# Patient Record
Sex: FEMALE | Race: WHITE | Employment: OTHER | URBAN - METROPOLITAN AREA
[De-identification: names, ages, dates, MRNs, and addresses within clinical notes are randomized per-mention and may not be internally consistent; named-entity substitution may affect disease eponyms.]

---

## 2017-01-23 DIAGNOSIS — I10 ESSENTIAL HYPERTENSION WITH GOAL BLOOD PRESSURE LESS THAN 140/90: ICD-10-CM

## 2017-01-23 DIAGNOSIS — E11.9 TYPE 2 DIABETES MELLITUS WITHOUT COMPLICATION, WITHOUT LONG-TERM CURRENT USE OF INSULIN (HCC): ICD-10-CM

## 2017-01-23 DIAGNOSIS — E78.00 HYPERCHOLESTEREMIA: Primary | ICD-10-CM

## 2017-01-23 RX ORDER — GLIPIZIDE 10 MG/1
10 TABLET, FILM COATED, EXTENDED RELEASE ORAL
Qty: 90 TABLET | Refills: 0 | Status: SHIPPED | OUTPATIENT
Start: 2017-01-23 | End: 2017-06-15

## 2017-01-23 RX ORDER — AMLODIPINE BESYLATE AND BENAZEPRIL HYDROCHLORIDE 10; 20 MG/1; MG/1
1 CAPSULE ORAL DAILY
Qty: 90 CAPSULE | Refills: 0 | Status: SHIPPED | OUTPATIENT
Start: 2017-01-23 | End: 2017-06-21

## 2017-01-23 RX ORDER — PRAVASTATIN SODIUM 20 MG
20 TABLET ORAL
Qty: 90 TABLET | Refills: 0 | Status: SHIPPED | OUTPATIENT
Start: 2017-01-23 | End: 2017-06-21

## 2017-02-02 ENCOUNTER — TELEPHONE (OUTPATIENT)
Dept: INTERNAL MEDICINE CLINIC | Facility: CLINIC | Age: 72
End: 2017-02-02

## 2017-03-15 ENCOUNTER — APPOINTMENT (OUTPATIENT)
Dept: LAB | Age: 72
End: 2017-03-15
Attending: FAMILY MEDICINE
Payer: MEDICARE

## 2017-03-15 ENCOUNTER — OFFICE VISIT (OUTPATIENT)
Dept: INTERNAL MEDICINE CLINIC | Facility: CLINIC | Age: 72
End: 2017-03-15

## 2017-03-15 VITALS
TEMPERATURE: 98 F | DIASTOLIC BLOOD PRESSURE: 70 MMHG | RESPIRATION RATE: 16 BRPM | OXYGEN SATURATION: 98 % | HEART RATE: 75 BPM | HEIGHT: 69 IN | BODY MASS INDEX: 36.84 KG/M2 | SYSTOLIC BLOOD PRESSURE: 124 MMHG | WEIGHT: 248.75 LBS

## 2017-03-15 DIAGNOSIS — E11.9 TYPE 2 DIABETES MELLITUS WITHOUT COMPLICATION, UNSPECIFIED LONG TERM INSULIN USE STATUS: Primary | ICD-10-CM

## 2017-03-15 DIAGNOSIS — E78.00 PURE HYPERCHOLESTEROLEMIA: ICD-10-CM

## 2017-03-15 DIAGNOSIS — E11.9 TYPE 2 DIABETES MELLITUS WITHOUT COMPLICATION, UNSPECIFIED LONG TERM INSULIN USE STATUS: ICD-10-CM

## 2017-03-15 DIAGNOSIS — I10 ESSENTIAL HYPERTENSION, BENIGN: ICD-10-CM

## 2017-03-15 LAB
ALBUMIN SERPL-MCNC: 4.1 G/DL (ref 3.5–4.8)
ALP LIVER SERPL-CCNC: 113 U/L (ref 55–142)
ALT SERPL-CCNC: 23 U/L (ref 14–54)
AST SERPL-CCNC: 16 U/L (ref 15–41)
BILIRUB SERPL-MCNC: 0.4 MG/DL (ref 0.1–2)
BUN BLD-MCNC: 17 MG/DL (ref 8–20)
CALCIUM BLD-MCNC: 9.5 MG/DL (ref 8.3–10.3)
CHLORIDE: 103 MMOL/L (ref 101–111)
CHOLEST SMN-MCNC: 155 MG/DL (ref ?–200)
CO2: 31 MMOL/L (ref 22–32)
CREAT BLD-MCNC: 0.94 MG/DL (ref 0.55–1.02)
CREAT UR-SCNC: 202 MG/DL
EST. AVERAGE GLUCOSE BLD GHB EST-MCNC: 134 MG/DL (ref 68–126)
GLUCOSE BLD-MCNC: 100 MG/DL (ref 70–99)
HBA1C MFR BLD HPLC: 6.3 % (ref ?–5.7)
HDLC SERPL-MCNC: 62 MG/DL (ref 45–?)
HDLC SERPL: 2.5 {RATIO} (ref ?–4.44)
LDLC SERPL CALC-MCNC: 53 MG/DL (ref ?–130)
M PROTEIN MFR SERPL ELPH: 7.7 G/DL (ref 6.1–8.3)
MICROALBUMIN UR-MCNC: 2.54 MG/DL
MICROALBUMIN/CREAT 24H UR-RTO: 12.6 UG/MG (ref ?–30)
NONHDLC SERPL-MCNC: 93 MG/DL (ref ?–130)
POTASSIUM SERPL-SCNC: 4.5 MMOL/L (ref 3.6–5.1)
SODIUM SERPL-SCNC: 143 MMOL/L (ref 136–144)
TRIGLYCERIDES: 198 MG/DL (ref ?–150)
VLDL: 40 MG/DL (ref 5–40)

## 2017-03-15 PROCEDURE — 80053 COMPREHEN METABOLIC PANEL: CPT

## 2017-03-15 PROCEDURE — 82043 UR ALBUMIN QUANTITATIVE: CPT

## 2017-03-15 PROCEDURE — 80061 LIPID PANEL: CPT

## 2017-03-15 PROCEDURE — 36415 COLL VENOUS BLD VENIPUNCTURE: CPT

## 2017-03-15 PROCEDURE — 83036 HEMOGLOBIN GLYCOSYLATED A1C: CPT

## 2017-03-15 PROCEDURE — 82570 ASSAY OF URINE CREATININE: CPT

## 2017-03-15 PROCEDURE — 99214 OFFICE O/P EST MOD 30 MIN: CPT | Performed by: FAMILY MEDICINE

## 2017-03-15 NOTE — PROGRESS NOTES
HPI:    Patient ID: Josh Cantu is a 66year old female. HPI  Josh Cantu is a 66year old female.   HPI:   Here for med ck   Taking meds as directed   Feels well overall  BP occ is cked  /70's      Had eye ck in Jan       Current Outpa kg/m2  SpO2 98%  GENERAL: well developed, well nourished,in no apparent distress  SKIN: no rashes,  NECK: supple,no adenopathy,  LUNGS: clear to auscultation  CARDIO: RRR without murmur  EXTREMITIES: no cyanosis, clubbing or edema  Bilateral barefoot skin encounter diagnosis)  Pure hypercholesterolemia  Essential hypertension, benign    No orders of the defined types were placed in this encounter.        Meds This Visit:  No prescriptions requested or ordered in this encounter    Imaging & Referrals:  None

## 2017-05-22 ENCOUNTER — OFFICE VISIT (OUTPATIENT)
Dept: FAMILY MEDICINE CLINIC | Facility: CLINIC | Age: 72
End: 2017-05-22

## 2017-05-22 VITALS
OXYGEN SATURATION: 98 % | TEMPERATURE: 98 F | HEART RATE: 94 BPM | WEIGHT: 248 LBS | RESPIRATION RATE: 20 BRPM | SYSTOLIC BLOOD PRESSURE: 122 MMHG | DIASTOLIC BLOOD PRESSURE: 70 MMHG | HEIGHT: 69 IN | BODY MASS INDEX: 36.73 KG/M2
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
## 2017-05-22 PROCEDURE — 99213 OFFICE O/P EST LOW 20 MIN: CPT | Performed by: NURSE PRACTITIONER

## 2017-05-22 NOTE — PATIENT INSTRUCTIONS
May take Benadryl as directed. Insect, Spider, and Scorpion Bites and Stings  Most insect bites are harmless and cause only minor swelling or itching.  But if you’re allergic to insects such as wasps or bees, a sting can cause a life-threatening allerg · Try to remove a stinger you can see. Use your fingernail, a knife edge, or credit card to scrape against the skin. Do not squeeze or pull.   · Apply ice or a cold compress to reduce pain and swelling (keep a thin cloth between the cold source and the skin

## 2017-05-22 NOTE — PROGRESS NOTES
CHIEF COMPLAINT:   Patient presents with:  Bump: on arms for 1 day  Itching: on/off  Redness      HPI:     Concetta Smith is a 66year old female who presents with concerns of insect bites. Patient first noticed symptoms 1 days ago.   Reports erythema, i 0 Standard drinks or equivalent per week       Comment: rare       REVIEW OF SYSTEMS:   GENERAL: feels well otherwise, no fever, no chills. SKIN: as above. CHEST: no chest pains, no palpitations.   LUNGS: denies shortness of breath with exertion or r Most insect bites are harmless and cause only minor swelling or itching. But if you’re allergic to insects such as wasps or bees, a sting can cause a life-threatening allergic reaction.  The venom (poison) from scorpions and certain spiders can also be dead · Apply ice or a cold compress to reduce pain and swelling (keep a thin cloth between the cold source and the skin). Date Last Reviewed: 11/20/2014  © 0093-0997 [address]. All rights reserved.  Oneil Hilton

## 2017-05-23 ENCOUNTER — OFFICE VISIT (OUTPATIENT)
Dept: INTERNAL MEDICINE CLINIC | Facility: CLINIC | Age: 72
End: 2017-05-23

## 2017-05-23 VITALS
DIASTOLIC BLOOD PRESSURE: 66 MMHG | WEIGHT: 250.5 LBS | SYSTOLIC BLOOD PRESSURE: 140 MMHG | RESPIRATION RATE: 16 BRPM | OXYGEN SATURATION: 99 % | BODY MASS INDEX: 37 KG/M2 | TEMPERATURE: 99 F | HEART RATE: 84 BPM

## 2017-05-23 DIAGNOSIS — R22.9 LOCAL SUPERFICIAL SWELLING: Primary | ICD-10-CM

## 2017-05-23 DIAGNOSIS — T78.40XA ALLERGIC REACTION, INITIAL ENCOUNTER: ICD-10-CM

## 2017-05-23 PROCEDURE — 99213 OFFICE O/P EST LOW 20 MIN: CPT | Performed by: FAMILY MEDICINE

## 2017-05-23 RX ORDER — METHYLPREDNISOLONE 4 MG/1
TABLET ORAL
Qty: 1 KIT | Refills: 0 | Status: SHIPPED | OUTPATIENT
Start: 2017-05-23 | End: 2017-09-20

## 2017-05-23 NOTE — PROGRESS NOTES
CHIEF COMPLAINT:   Patient presents with:  Swelling: Facial swelling last night. Follow up [facility] 5/22/17. HPI:     Monty Owens is a 66year old female who presents with concerns of facial swelling. Patient first noticed symptoms 1 days ago.   Repo unspecified type diabetes mellitus without mention of complication, not stated as uncontrolled       Social History:    Smoking Status: Never Smoker                      Alcohol Use: Yes                Comment: occ       REVIEW OF SYSTEMS:   GENERAL: feels itching. Pt referred to Allergist for testing. The patient indicates understanding of these issues and agrees to the plan. The patient is asked to call if sx's persist or worsen.

## 2017-06-15 DIAGNOSIS — E11.9 TYPE 2 DIABETES MELLITUS WITHOUT COMPLICATION, WITHOUT LONG-TERM CURRENT USE OF INSULIN (HCC): Primary | ICD-10-CM

## 2017-06-15 RX ORDER — GLIPIZIDE 10 MG/1
10 TABLET, FILM COATED, EXTENDED RELEASE ORAL
Qty: 90 TABLET | Refills: 0 | Status: SHIPPED | OUTPATIENT
Start: 2017-06-15 | End: 2017-09-20

## 2017-06-21 DIAGNOSIS — E78.00 HYPERCHOLESTEREMIA: Primary | ICD-10-CM

## 2017-06-21 DIAGNOSIS — I10 ESSENTIAL HYPERTENSION WITH GOAL BLOOD PRESSURE LESS THAN 140/90: ICD-10-CM

## 2017-06-21 RX ORDER — AMLODIPINE BESYLATE AND BENAZEPRIL HYDROCHLORIDE 10; 20 MG/1; MG/1
1 CAPSULE ORAL DAILY
Qty: 90 CAPSULE | Refills: 0 | Status: SHIPPED | OUTPATIENT
Start: 2017-06-21 | End: 2017-09-01

## 2017-06-21 RX ORDER — PRAVASTATIN SODIUM 20 MG
20 TABLET ORAL
Qty: 90 TABLET | Refills: 0 | Status: SHIPPED | OUTPATIENT
Start: 2017-06-21 | End: 2017-09-01

## 2017-09-01 DIAGNOSIS — E78.00 HYPERCHOLESTEREMIA: ICD-10-CM

## 2017-09-01 DIAGNOSIS — I10 ESSENTIAL HYPERTENSION WITH GOAL BLOOD PRESSURE LESS THAN 140/90: ICD-10-CM

## 2017-09-01 RX ORDER — AMLODIPINE BESYLATE AND BENAZEPRIL HYDROCHLORIDE 10; 20 MG/1; MG/1
CAPSULE ORAL
Qty: 90 CAPSULE | Refills: 0 | Status: SHIPPED | OUTPATIENT
Start: 2017-09-01 | End: 2017-11-30

## 2017-09-01 RX ORDER — PRAVASTATIN SODIUM 20 MG
TABLET ORAL
Qty: 90 TABLET | Refills: 0 | Status: SHIPPED | OUTPATIENT
Start: 2017-09-01 | End: 2017-11-30

## 2017-09-19 DIAGNOSIS — E78.00 HYPERCHOLESTEREMIA: ICD-10-CM

## 2017-09-20 ENCOUNTER — OFFICE VISIT (OUTPATIENT)
Dept: INTERNAL MEDICINE CLINIC | Facility: CLINIC | Age: 72
End: 2017-09-20

## 2017-09-20 ENCOUNTER — LAB ENCOUNTER (OUTPATIENT)
Dept: LAB | Age: 72
End: 2017-09-20
Attending: FAMILY MEDICINE
Payer: MEDICARE

## 2017-09-20 VITALS
WEIGHT: 249 LBS | BODY MASS INDEX: 36.88 KG/M2 | RESPIRATION RATE: 13 BRPM | OXYGEN SATURATION: 98 % | SYSTOLIC BLOOD PRESSURE: 120 MMHG | DIASTOLIC BLOOD PRESSURE: 60 MMHG | TEMPERATURE: 98 F | HEART RATE: 72 BPM | HEIGHT: 69 IN

## 2017-09-20 DIAGNOSIS — Z00.00 ENCOUNTER FOR ANNUAL HEALTH EXAMINATION: ICD-10-CM

## 2017-09-20 DIAGNOSIS — E78.00 HYPERCHOLESTEREMIA: ICD-10-CM

## 2017-09-20 DIAGNOSIS — L50.9 URTICARIA: ICD-10-CM

## 2017-09-20 DIAGNOSIS — I10 ESSENTIAL HYPERTENSION, BENIGN: ICD-10-CM

## 2017-09-20 DIAGNOSIS — E11.9 TYPE 2 DIABETES MELLITUS WITHOUT COMPLICATION, UNSPECIFIED LONG TERM INSULIN USE STATUS: ICD-10-CM

## 2017-09-20 DIAGNOSIS — Z13.31 DEPRESSION SCREENING: ICD-10-CM

## 2017-09-20 DIAGNOSIS — I65.29 OCCLUSION AND STENOSIS OF CAROTID ARTERY: ICD-10-CM

## 2017-09-20 DIAGNOSIS — Z12.31 VISIT FOR SCREENING MAMMOGRAM: ICD-10-CM

## 2017-09-20 DIAGNOSIS — E11.9 TYPE 2 DIABETES MELLITUS WITHOUT COMPLICATION, WITHOUT LONG-TERM CURRENT USE OF INSULIN (HCC): ICD-10-CM

## 2017-09-20 DIAGNOSIS — E78.00 PURE HYPERCHOLESTEROLEMIA: ICD-10-CM

## 2017-09-20 DIAGNOSIS — Z13.1 SCREENING FOR DIABETES MELLITUS (DM): ICD-10-CM

## 2017-09-20 DIAGNOSIS — Z13.6 SCREENING FOR CARDIOVASCULAR CONDITION: ICD-10-CM

## 2017-09-20 DIAGNOSIS — Z00.00 MEDICARE ANNUAL WELLNESS VISIT, SUBSEQUENT: Primary | ICD-10-CM

## 2017-09-20 LAB
ALBUMIN SERPL-MCNC: 4.1 G/DL (ref 3.5–4.8)
ALP LIVER SERPL-CCNC: 123 U/L (ref 55–142)
ALT SERPL-CCNC: 25 U/L (ref 14–54)
AST SERPL-CCNC: 18 U/L (ref 15–41)
BASOPHILS # BLD AUTO: 0.06 X10(3) UL (ref 0–0.1)
BASOPHILS NFR BLD AUTO: 0.5 %
BILIRUB SERPL-MCNC: 0.5 MG/DL (ref 0.1–2)
BUN BLD-MCNC: 18 MG/DL (ref 8–20)
CALCIUM BLD-MCNC: 9.6 MG/DL (ref 8.3–10.3)
CHLORIDE: 101 MMOL/L (ref 101–111)
CHOLEST SMN-MCNC: 171 MG/DL (ref ?–200)
CO2: 28 MMOL/L (ref 22–32)
CREAT BLD-MCNC: 0.96 MG/DL (ref 0.55–1.02)
EOSINOPHIL # BLD AUTO: 0.07 X10(3) UL (ref 0–0.3)
EOSINOPHIL NFR BLD AUTO: 0.6 %
ERYTHROCYTE [DISTWIDTH] IN BLOOD BY AUTOMATED COUNT: 14.1 % (ref 11.5–16)
EST. AVERAGE GLUCOSE BLD GHB EST-MCNC: 146 MG/DL (ref 68–126)
GLUCOSE BLD-MCNC: 115 MG/DL (ref 70–99)
HBA1C MFR BLD HPLC: 6.7 % (ref ?–5.7)
HCT VFR BLD AUTO: 46.9 % (ref 34–50)
HDLC SERPL-MCNC: 61 MG/DL (ref 45–?)
HDLC SERPL: 2.8 {RATIO} (ref ?–4.44)
HGB BLD-MCNC: 15 G/DL (ref 12–16)
IMMATURE GRANULOCYTE COUNT: 0.07 X10(3) UL (ref 0–1)
IMMATURE GRANULOCYTE RATIO %: 0.6 %
LDLC SERPL CALC-MCNC: 73 MG/DL (ref ?–130)
LDLC SERPL-MCNC: 37 MG/DL (ref 5–40)
LYMPHOCYTES # BLD AUTO: 3.04 X10(3) UL (ref 0.9–4)
LYMPHOCYTES NFR BLD AUTO: 25.4 %
M PROTEIN MFR SERPL ELPH: 8 G/DL (ref 6.1–8.3)
MCH RBC QN AUTO: 30.3 PG (ref 27–33.2)
MCHC RBC AUTO-ENTMCNC: 32 G/DL (ref 31–37)
MCV RBC AUTO: 94.7 FL (ref 81–100)
MONOCYTES # BLD AUTO: 0.94 X10(3) UL (ref 0.1–0.6)
MONOCYTES NFR BLD AUTO: 7.8 %
NEUTROPHIL ABS PRELIM: 7.8 X10 (3) UL (ref 1.3–6.7)
NEUTROPHILS # BLD AUTO: 7.8 X10(3) UL (ref 1.3–6.7)
NEUTROPHILS NFR BLD AUTO: 65.1 %
NONHDLC SERPL-MCNC: 110 MG/DL (ref ?–130)
PLATELET # BLD AUTO: 349 10(3)UL (ref 150–450)
POTASSIUM SERPL-SCNC: 4.7 MMOL/L (ref 3.6–5.1)
RBC # BLD AUTO: 4.95 X10(6)UL (ref 3.8–5.1)
RED CELL DISTRIBUTION WIDTH-SD: 49.1 FL (ref 35.1–46.3)
SODIUM SERPL-SCNC: 138 MMOL/L (ref 136–144)
TRIGLYCERIDES: 183 MG/DL (ref ?–150)
TSI SER-ACNC: 1.58 MIU/ML (ref 0.35–5.5)
WBC # BLD AUTO: 12 X10(3) UL (ref 4–13)

## 2017-09-20 PROCEDURE — 80053 COMPREHEN METABOLIC PANEL: CPT

## 2017-09-20 PROCEDURE — G0444 DEPRESSION SCREEN ANNUAL: HCPCS | Performed by: FAMILY MEDICINE

## 2017-09-20 PROCEDURE — G0439 PPPS, SUBSEQ VISIT: HCPCS | Performed by: FAMILY MEDICINE

## 2017-09-20 PROCEDURE — 84443 ASSAY THYROID STIM HORMONE: CPT

## 2017-09-20 PROCEDURE — 36415 COLL VENOUS BLD VENIPUNCTURE: CPT

## 2017-09-20 PROCEDURE — 80061 LIPID PANEL: CPT

## 2017-09-20 PROCEDURE — 99214 OFFICE O/P EST MOD 30 MIN: CPT | Performed by: FAMILY MEDICINE

## 2017-09-20 PROCEDURE — 83036 HEMOGLOBIN GLYCOSYLATED A1C: CPT

## 2017-09-20 PROCEDURE — 85025 COMPLETE CBC W/AUTO DIFF WBC: CPT

## 2017-09-20 RX ORDER — GLIPIZIDE 10 MG/1
10 TABLET, FILM COATED, EXTENDED RELEASE ORAL
Qty: 90 TABLET | Refills: 1 | Status: SHIPPED | OUTPATIENT
Start: 2017-09-20 | End: 2018-03-10

## 2017-09-20 NOTE — PATIENT INSTRUCTIONS
Gosia Henriquez's SCREENING SCHEDULE   Tests on this list are recommended by your physician but may not be covered, or covered at this frequency, by your insurer. Please check with your insurance carrier before scheduling to verify coverage.    Aldo Denney previous visit.  Limited to patients who meet one of the following criteria:   • Men who are 73-68 years old and have smoked more than 100 cigarettes in their lifetime   • Anyone with a family history    Colorectal Cancer Screening  Covered up to Age 76 on 10/06/2017 Please get this Mammogram regularly   Immunizations      Influenza  Covered Annually   Orders placed or performed in visit on 09/28/16  -FLU VACC PRSV FREE INC ANTIG    Please get every year    Pneumococcal 13 (Prevnar)  Covered Once after 65 [place])  [email or web address] org  This link also has information from the [address] [place] [address] regarding Advance Directives.

## 2017-09-20 NOTE — PROGRESS NOTES
HPI:   Darlene Castillo is a 74year old female who presents for a Medicare Subsequent Annual Wellness visit (Pt already had Initial Annual Wellness).       Annual Physical due on 09/20/2018        Patient Care Team: Patient Care Team:  Genaro Fleischer, MD a Measles without mention of complication; Type II or unspecified type diabetes mellitus without mention of complication, not stated as uncontrolled; and Varicella without mention of complication.     She  has a past surgical history that includes colonoscopy Acuity: 20/50   Both Eyes Visual Acuity: Uncorrected Both Eyes Chart Acuity: 20/50   Able To Tolerate Visual Acuity: Yes      General Appearance:  Alert, cooperative, no distress, appears stated age   Head:  Normocephalic, without obvious abnormality, atra (two) times daily. Type 2 diabetes mellitus without complication, without long-term current use of insulin (HCC)  -     GlipiZIDE ER 10 MG Oral Tablet 24 Hr; Take 1 tablet (10 mg total) by mouth once daily. Dx: E11.9         Ms.  Leta Bars already takes a Able without help    Toileting: Able without help    Dressing: Able without help    Eating: Able without help    Driving: Able without help    Preparing your meals: Able without help    Managing money/bills: Able without help    Taking medications as presc or Procedure External Lab or Procedure   Diabetes Screening      HbgA1C   Annually   Lab Results  Component Value Date   A1C 6.3 (H) 03/15/2017    No flowsheet data found.     Fasting Blood Sugar (FSB)Annually   Glucose (mg/dL)   Date Value   03/15/2017 100 63th birthday    Pneumococcal 23 (Pneumovax)  Covered Once after 65 12/27/2010 Please get once after your 65th birthday    Hepatitis B for Moderate/High Risk No vaccine history found Medium/high risk factors:   End-stage renal disease   Hemophiliacs who re found.    COPD      Spirometry Testing Annually Spirometry date:  No flowsheet data found.          Template: [facility] MEDICARE ANNUAL ASSESSMENT FEMALE [[id number]]

## 2017-09-29 ENCOUNTER — IMMUNIZATION (OUTPATIENT)
Dept: FAMILY MEDICINE CLINIC | Facility: CLINIC | Age: 72
End: 2017-09-29

## 2017-09-29 PROCEDURE — 90653 IIV ADJUVANT VACCINE IM: CPT | Performed by: NURSE PRACTITIONER

## 2017-09-29 PROCEDURE — G0008 ADMIN INFLUENZA VIRUS VAC: HCPCS | Performed by: NURSE PRACTITIONER

## 2017-10-12 ENCOUNTER — HOSPITAL ENCOUNTER (OUTPATIENT)
Dept: MAMMOGRAPHY | Age: 72
Discharge: HOME | End: 2017-10-12
Attending: FAMILY MEDICINE
Payer: MEDICARE

## 2017-10-12 DIAGNOSIS — Z12.31 VISIT FOR SCREENING MAMMOGRAM: ICD-10-CM

## 2017-10-12 PROCEDURE — 77067 SCR MAMMO BI INCL CAD: CPT | Performed by: FAMILY MEDICINE

## 2017-11-30 DIAGNOSIS — E78.00 HYPERCHOLESTEREMIA: ICD-10-CM

## 2017-11-30 DIAGNOSIS — I10 ESSENTIAL HYPERTENSION WITH GOAL BLOOD PRESSURE LESS THAN 140/90: ICD-10-CM

## 2017-11-30 RX ORDER — AMLODIPINE BESYLATE AND BENAZEPRIL HYDROCHLORIDE 10; 20 MG/1; MG/1
CAPSULE ORAL
Qty: 90 CAPSULE | Refills: 0 | Status: SHIPPED | OUTPATIENT
Start: 2017-11-30 | End: 2018-02-10

## 2017-11-30 RX ORDER — PRAVASTATIN SODIUM 20 MG
TABLET ORAL
Qty: 90 TABLET | Refills: 0 | Status: SHIPPED | OUTPATIENT
Start: 2017-11-30 | End: 2018-02-10

## 2018-02-10 DIAGNOSIS — E78.00 HYPERCHOLESTEREMIA: ICD-10-CM

## 2018-02-10 DIAGNOSIS — I10 ESSENTIAL HYPERTENSION WITH GOAL BLOOD PRESSURE LESS THAN 140/90: ICD-10-CM

## 2018-02-12 RX ORDER — PRAVASTATIN SODIUM 20 MG
TABLET ORAL
Qty: 90 TABLET | Refills: 0 | Status: SHIPPED | OUTPATIENT
Start: 2018-02-12 | End: 2018-05-13

## 2018-02-12 RX ORDER — AMLODIPINE BESYLATE AND BENAZEPRIL HYDROCHLORIDE 10; 20 MG/1; MG/1
CAPSULE ORAL
Qty: 90 CAPSULE | Refills: 0 | Status: SHIPPED | OUTPATIENT
Start: 2018-02-12 | End: 2018-05-13

## 2018-03-10 DIAGNOSIS — E11.9 TYPE 2 DIABETES MELLITUS WITHOUT COMPLICATION, WITHOUT LONG-TERM CURRENT USE OF INSULIN (HCC): ICD-10-CM

## 2018-03-12 RX ORDER — GLIPIZIDE 10 MG/1
TABLET, FILM COATED, EXTENDED RELEASE ORAL
Qty: 90 TABLET | Refills: 0 | Status: SHIPPED | OUTPATIENT
Start: 2018-03-12 | End: 2018-06-08

## 2018-03-19 ENCOUNTER — TELEPHONE (OUTPATIENT)
Dept: INTERNAL MEDICINE CLINIC | Facility: CLINIC | Age: 73
End: 2018-03-19

## 2018-03-19 DIAGNOSIS — E78.00 HYPERCHOLESTEREMIA: ICD-10-CM

## 2018-03-19 NOTE — TELEPHONE ENCOUNTER
Last filled 9/20/17 #180  With 1 refill      Last Microalb 3/15/17    Notes Recorded by Arely Negrete MD on 3/16/2017 at 7:36 AM  Looks good  CPM    Ref Range & Units 3/15/17  8:23 AM   Microalbumin, Urine mg/dL 2.54    Creatinine Ur Random mg/dL 202.00

## 2018-03-21 ENCOUNTER — APPOINTMENT (OUTPATIENT)
Dept: LAB | Age: 73
End: 2018-03-21
Attending: FAMILY MEDICINE
Payer: MEDICARE

## 2018-03-21 ENCOUNTER — OFFICE VISIT (OUTPATIENT)
Dept: INTERNAL MEDICINE CLINIC | Facility: CLINIC | Age: 73
End: 2018-03-21

## 2018-03-21 VITALS
HEART RATE: 77 BPM | SYSTOLIC BLOOD PRESSURE: 122 MMHG | RESPIRATION RATE: 16 BRPM | WEIGHT: 240 LBS | OXYGEN SATURATION: 98 % | TEMPERATURE: 98 F | DIASTOLIC BLOOD PRESSURE: 64 MMHG | BODY MASS INDEX: 35.55 KG/M2 | HEIGHT: 69 IN

## 2018-03-21 DIAGNOSIS — I10 ESSENTIAL HYPERTENSION, BENIGN: ICD-10-CM

## 2018-03-21 DIAGNOSIS — E78.00 PURE HYPERCHOLESTEROLEMIA: ICD-10-CM

## 2018-03-21 DIAGNOSIS — E11.9 TYPE 2 DIABETES MELLITUS WITHOUT COMPLICATION, UNSPECIFIED WHETHER LONG TERM INSULIN USE (HCC): Primary | ICD-10-CM

## 2018-03-21 DIAGNOSIS — E11.9 TYPE 2 DIABETES MELLITUS WITHOUT COMPLICATION, UNSPECIFIED WHETHER LONG TERM INSULIN USE (HCC): ICD-10-CM

## 2018-03-21 LAB
ALBUMIN SERPL-MCNC: 3.9 G/DL (ref 3.5–4.8)
ALP LIVER SERPL-CCNC: 117 U/L (ref 55–142)
ALT SERPL-CCNC: 27 U/L (ref 14–54)
AST SERPL-CCNC: 19 U/L (ref 15–41)
BILIRUB SERPL-MCNC: 0.4 MG/DL (ref 0.1–2)
BUN BLD-MCNC: 19 MG/DL (ref 8–20)
CALCIUM BLD-MCNC: 9.4 MG/DL (ref 8.3–10.3)
CHLORIDE: 101 MMOL/L (ref 101–111)
CHOLEST SMN-MCNC: 126 MG/DL (ref ?–200)
CO2: 27 MMOL/L (ref 22–32)
CREAT BLD-MCNC: 0.99 MG/DL (ref 0.55–1.02)
CREAT UR-SCNC: 296 MG/DL
EST. AVERAGE GLUCOSE BLD GHB EST-MCNC: 143 MG/DL (ref 68–126)
GLUCOSE BLD-MCNC: 110 MG/DL (ref 70–99)
HBA1C MFR BLD HPLC: 6.6 % (ref ?–5.7)
HDLC SERPL-MCNC: 50 MG/DL (ref 45–?)
HDLC SERPL: 2.52 {RATIO} (ref ?–4.44)
LDLC SERPL CALC-MCNC: 45 MG/DL (ref ?–130)
M PROTEIN MFR SERPL ELPH: 7.4 G/DL (ref 6.1–8.3)
MICROALBUMIN UR-MCNC: 2.5 MG/DL
MICROALBUMIN/CREAT 24H UR-RTO: 8.4 UG/MG (ref ?–30)
NONHDLC SERPL-MCNC: 76 MG/DL (ref ?–130)
POTASSIUM SERPL-SCNC: 4.3 MMOL/L (ref 3.6–5.1)
SODIUM SERPL-SCNC: 139 MMOL/L (ref 136–144)
TRIGL SERPL-MCNC: 154 MG/DL (ref ?–150)
VLDLC SERPL CALC-MCNC: 31 MG/DL (ref 5–40)

## 2018-03-21 PROCEDURE — 83036 HEMOGLOBIN GLYCOSYLATED A1C: CPT

## 2018-03-21 PROCEDURE — 80053 COMPREHEN METABOLIC PANEL: CPT

## 2018-03-21 PROCEDURE — 82570 ASSAY OF URINE CREATININE: CPT

## 2018-03-21 PROCEDURE — 36415 COLL VENOUS BLD VENIPUNCTURE: CPT

## 2018-03-21 PROCEDURE — 80061 LIPID PANEL: CPT

## 2018-03-21 PROCEDURE — 99214 OFFICE O/P EST MOD 30 MIN: CPT | Performed by: FAMILY MEDICINE

## 2018-03-21 PROCEDURE — 82043 UR ALBUMIN QUANTITATIVE: CPT

## 2018-03-21 NOTE — PROGRESS NOTES
HPI:    Patient ID: Lorene Walters is a 74year old female. HPI  HPI:   Lorene Walters is a 74year old female who presents for recheck of her diabetes/med ck  . Patient’s FBS have been good. Last visit with ophthalmologist was this month.   Pt has TABLET ONCE DAILY Disp: 90 tablet Rfl: 0   PRAVASTATIN SODIUM 20 MG Oral Tab TAKE 1 TABLET DAILY Disp: 90 tablet Rfl: 0   AMLODIPINE BESY-BENAZEPRIL HCL 10-20 MG Oral Cap TAKE 1 CAPSULE DAILY Disp: 90 capsule Rfl: 0   Glucose Blood (FREESTYLE LITE TEST) In exertion  GI: denies abdominal pain and denies heartburn  NEURO: denies headaches    EXAM:   /64 (BP Location: Right arm, Patient Position: Sitting, Cuff Size: large)   Pulse 77   Temp 98.1 °F (36.7 °C) (Oral)   Resp 16   Ht 69\"   Wt 240 lb   SpO2 9 Physical Exam           ASSESSMENT/PLAN:   Type 2 diabetes mellitus without complication, unspecified whether long term insulin use (hcc)  (primary encounter diagnosis)  Pure hypercholesterolemia  Essential hypertension, benign    No orders of the define

## 2018-05-13 DIAGNOSIS — I10 ESSENTIAL HYPERTENSION WITH GOAL BLOOD PRESSURE LESS THAN 140/90: ICD-10-CM

## 2018-05-13 DIAGNOSIS — E78.00 HYPERCHOLESTEREMIA: ICD-10-CM

## 2018-05-14 RX ORDER — PRAVASTATIN SODIUM 20 MG
TABLET ORAL
Qty: 90 TABLET | Refills: 0 | Status: SHIPPED | OUTPATIENT
Start: 2018-05-14 | End: 2018-08-11

## 2018-05-14 RX ORDER — AMLODIPINE BESYLATE AND BENAZEPRIL HYDROCHLORIDE 10; 20 MG/1; MG/1
CAPSULE ORAL
Qty: 90 CAPSULE | Refills: 0 | Status: SHIPPED | OUTPATIENT
Start: 2018-05-14 | End: 2018-08-11

## 2018-06-08 DIAGNOSIS — E11.9 TYPE 2 DIABETES MELLITUS WITHOUT COMPLICATION, WITHOUT LONG-TERM CURRENT USE OF INSULIN (HCC): ICD-10-CM

## 2018-06-12 RX ORDER — GLIPIZIDE 10 MG/1
TABLET, FILM COATED, EXTENDED RELEASE ORAL
Qty: 90 TABLET | Refills: 0 | Status: SHIPPED | OUTPATIENT
Start: 2018-06-12 | End: 2018-09-10

## 2018-07-26 ENCOUNTER — PATIENT OUTREACH (OUTPATIENT)
Dept: CASE MANAGEMENT | Age: 73
End: 2018-07-26

## 2018-07-26 NOTE — PROGRESS NOTES
Contacted patient in regards to CCM enrollment. Patient is unsure if she would like to proceed with enrollment. Program information sent to patient. I will follow up with patient in 1 month for any additional questions.

## 2018-08-11 DIAGNOSIS — E78.00 HYPERCHOLESTEREMIA: ICD-10-CM

## 2018-08-11 DIAGNOSIS — I10 ESSENTIAL HYPERTENSION WITH GOAL BLOOD PRESSURE LESS THAN 140/90: ICD-10-CM

## 2018-08-13 RX ORDER — AMLODIPINE BESYLATE AND BENAZEPRIL HYDROCHLORIDE 10; 20 MG/1; MG/1
CAPSULE ORAL
Qty: 90 CAPSULE | Refills: 0 | Status: SHIPPED | OUTPATIENT
Start: 2018-08-13 | End: 2018-11-11

## 2018-08-13 RX ORDER — PRAVASTATIN SODIUM 20 MG
TABLET ORAL
Qty: 90 TABLET | Refills: 0 | Status: SHIPPED | OUTPATIENT
Start: 2018-08-13 | End: 2019-01-21 | Stop reason: ALTCHOICE

## 2018-08-29 DIAGNOSIS — E78.00 HYPERCHOLESTEREMIA: ICD-10-CM

## 2018-09-10 DIAGNOSIS — E11.9 TYPE 2 DIABETES MELLITUS WITHOUT COMPLICATION, WITHOUT LONG-TERM CURRENT USE OF INSULIN (HCC): ICD-10-CM

## 2018-09-11 ENCOUNTER — TELEPHONE (OUTPATIENT)
Dept: INTERNAL MEDICINE CLINIC | Facility: CLINIC | Age: 73
End: 2018-09-11

## 2018-09-11 DIAGNOSIS — E11.9 TYPE 2 DIABETES MELLITUS WITHOUT COMPLICATION, WITHOUT LONG-TERM CURRENT USE OF INSULIN (HCC): ICD-10-CM

## 2018-09-11 RX ORDER — GLIPIZIDE 10 MG/1
TABLET, FILM COATED, EXTENDED RELEASE ORAL
Qty: 90 TABLET | Refills: 0 | Status: SHIPPED | OUTPATIENT
Start: 2018-09-11 | End: 2018-09-13

## 2018-09-11 NOTE — TELEPHONE ENCOUNTER
Patient called stating that the medication glipizide was sent to the wrong pharmacy.  [facility]

## 2018-09-13 RX ORDER — GLIPIZIDE 10 MG/1
TABLET, FILM COATED, EXTENDED RELEASE ORAL
Qty: 90 TABLET | Refills: 0 | Status: SHIPPED | OUTPATIENT
Start: 2018-09-13 | End: 2018-12-17

## 2018-10-04 ENCOUNTER — LAB ENCOUNTER (OUTPATIENT)
Dept: LAB | Age: 73
End: 2018-10-04
Attending: FAMILY MEDICINE
Payer: MEDICARE

## 2018-10-04 ENCOUNTER — OFFICE VISIT (OUTPATIENT)
Dept: INTERNAL MEDICINE CLINIC | Facility: CLINIC | Age: 73
End: 2018-10-04
Payer: MEDICARE

## 2018-10-04 VITALS
TEMPERATURE: 98 F | HEIGHT: 68.5 IN | OXYGEN SATURATION: 98 % | RESPIRATION RATE: 16 BRPM | SYSTOLIC BLOOD PRESSURE: 130 MMHG | DIASTOLIC BLOOD PRESSURE: 80 MMHG | WEIGHT: 242 LBS | BODY MASS INDEX: 36.26 KG/M2 | HEART RATE: 80 BPM

## 2018-10-04 DIAGNOSIS — E11.9 TYPE 2 DIABETES MELLITUS WITHOUT COMPLICATION, UNSPECIFIED WHETHER LONG TERM INSULIN USE (HCC): ICD-10-CM

## 2018-10-04 DIAGNOSIS — I10 ESSENTIAL HYPERTENSION, BENIGN: ICD-10-CM

## 2018-10-04 DIAGNOSIS — E78.00 PURE HYPERCHOLESTEROLEMIA: ICD-10-CM

## 2018-10-04 DIAGNOSIS — L50.9 URTICARIA: ICD-10-CM

## 2018-10-04 DIAGNOSIS — Z13.6 SCREENING FOR CARDIOVASCULAR CONDITION: ICD-10-CM

## 2018-10-04 DIAGNOSIS — Z12.31 VISIT FOR SCREENING MAMMOGRAM: ICD-10-CM

## 2018-10-04 DIAGNOSIS — Z13.1 SCREENING FOR DIABETES MELLITUS (DM): ICD-10-CM

## 2018-10-04 DIAGNOSIS — Z00.00 MEDICARE ANNUAL WELLNESS VISIT, SUBSEQUENT: Primary | ICD-10-CM

## 2018-10-04 DIAGNOSIS — I77.9 CAROTID ARTERY DISEASE, UNSPECIFIED LATERALITY, UNSPECIFIED TYPE (HCC): ICD-10-CM

## 2018-10-04 DIAGNOSIS — Z13.31 DEPRESSION SCREENING: ICD-10-CM

## 2018-10-04 DIAGNOSIS — Z00.00 ENCOUNTER FOR ANNUAL HEALTH EXAMINATION: ICD-10-CM

## 2018-10-04 PROCEDURE — 82043 UR ALBUMIN QUANTITATIVE: CPT

## 2018-10-04 PROCEDURE — 85025 COMPLETE CBC W/AUTO DIFF WBC: CPT

## 2018-10-04 PROCEDURE — G0439 PPPS, SUBSEQ VISIT: HCPCS | Performed by: FAMILY MEDICINE

## 2018-10-04 PROCEDURE — G0444 DEPRESSION SCREEN ANNUAL: HCPCS | Performed by: FAMILY MEDICINE

## 2018-10-04 PROCEDURE — 80061 LIPID PANEL: CPT

## 2018-10-04 PROCEDURE — 83036 HEMOGLOBIN GLYCOSYLATED A1C: CPT

## 2018-10-04 PROCEDURE — 99214 OFFICE O/P EST MOD 30 MIN: CPT | Performed by: FAMILY MEDICINE

## 2018-10-04 PROCEDURE — 84443 ASSAY THYROID STIM HORMONE: CPT

## 2018-10-04 PROCEDURE — 80053 COMPREHEN METABOLIC PANEL: CPT

## 2018-10-04 PROCEDURE — 36415 COLL VENOUS BLD VENIPUNCTURE: CPT

## 2018-10-04 PROCEDURE — 82570 ASSAY OF URINE CREATININE: CPT

## 2018-10-04 NOTE — PATIENT INSTRUCTIONS
August Henriquez's SCREENING SCHEDULE   Tests on this list are recommended by your physician but may not be covered, or covered at this frequency, by your insurer. Please check with your insurance carrier before scheduling to verify coverage.    Katherine Riggs Limited to patients who meet one of the following criteria:   • Men who are 73-68 years old and have smoked more than 100 cigarettes in their lifetime   • Anyone with a family history    Colorectal Cancer Screening  Covered up to Age 76     Colonoscopy Scr display for this patient.  Please get this Mammogram regularly   Immunizations      Influenza  Covered Annually Orders placed or performed in visit on 09/28/16   • FLU VACC PRSV FREE INC ANTIG    Please get every year    Pneumococcal 13 (Prevnar)  Covered [place])  [email or web address]  This link also has information from the [address] [place] [address] regarding Advance Directives.

## 2018-10-04 NOTE — PROGRESS NOTES
HPI:   Stan Ramirez is a 74year old female who presents for a Medicare Subsequent Annual Wellness visit (Pt already had Initial Annual Wellness).       Annual Physical due on 10/04/2019        Fall/Risk Assessment   She has been screened for Falls and Taking for the 10/4/18 encounter (Office Visit) with Lou Mcfarland MD:  GlipiZIDE ER 10 MG Oral Tablet 24 Hr TAKE 1 TABLET ONCE DAILY   METFORMIN HCL 1000 MG Oral Tab TAKE 1 TABLET TWICE A DAY   PRAVASTATIN SODIUM 20 MG Oral Tab TAKE 1 TABLET DAILY   AMLO anxiety  HEMATOLOGIC: denies hx of anemia  ENDOCRINE: denies thyroid history  ALL/ASTHMA: denies  asthma    EXAM:   /80   Pulse 80   Temp 98 °F (36.7 °C) (Oral)   Resp 16   Ht 68.5\"   Wt 242 lb   SpO2 98%   BMI 36.26 kg/m²  Estimated body mass index RELEVANT CHRONIC CONDITIONS:   Karime Hopkins is a 74year old female who presents for a Medicare Assessment.      PLAN SUMMARY:   Diagnoses and all orders for this visit:    Medicare annual wellness visit, subsequent    Type 2 diabetes mellitus without indicates understanding of these issues and agrees to the plan. Reinforced healthy diet, lifestyle, and exercise. No Follow-up on file.      Saroj Villegas MD, 10/4/2018     General Health     In the past six months, have you lost more than 10 pounds wit Pap: Every 3 yrs age 21-65 or Pap+HPV every 5 yrs age 33-67, age 72 and older at high risk There are no preventive care reminders to display for this patient.  Update Health Maintenance if applicable    Chlamydia  Annually if high risk No results found for: 03/21/2018 0.99    No flowsheet data found. Drug Serum Conc  Annually No results found for: DIGOXIN, DIG, VALP No flowsheet data found.        Diabetes      HgbA1C  Annually HGBA1C (%)   Date Value   03/06/2014 6.6 (H)     HgbA1C (%)   Date Value   03/ type (hcc)  Visit for screening mammogram  Screening for cardiovascular condition  Encounter for annual health examination  Screening for diabetes mellitus (dm)  Depression screening    Orders Placed This Encounter      Lipid Panel      CMP      Hemoglobin

## 2018-10-05 ENCOUNTER — TELEPHONE (OUTPATIENT)
Dept: INTERNAL MEDICINE CLINIC | Facility: CLINIC | Age: 73
End: 2018-10-05

## 2018-10-05 NOTE — TELEPHONE ENCOUNTER
Patient called and wanted to let Dr. Trevor Guerrero know that she had her flu shot and Shingrix vaccine  today. Please advise.

## 2018-10-13 ENCOUNTER — HOSPITAL ENCOUNTER (OUTPATIENT)
Dept: MAMMOGRAPHY | Age: 73
Discharge: HOME | End: 2018-10-13
Attending: FAMILY MEDICINE
Payer: MEDICARE

## 2018-10-13 DIAGNOSIS — Z12.31 VISIT FOR SCREENING MAMMOGRAM: ICD-10-CM

## 2018-10-13 PROCEDURE — 77067 SCR MAMMO BI INCL CAD: CPT | Performed by: FAMILY MEDICINE

## 2018-10-13 PROCEDURE — 77063 BREAST TOMOSYNTHESIS BI: CPT | Performed by: FAMILY MEDICINE

## 2018-11-11 DIAGNOSIS — I10 ESSENTIAL HYPERTENSION WITH GOAL BLOOD PRESSURE LESS THAN 140/90: ICD-10-CM

## 2018-11-12 RX ORDER — AMLODIPINE BESYLATE AND BENAZEPRIL HYDROCHLORIDE 10; 20 MG/1; MG/1
CAPSULE ORAL
Qty: 90 CAPSULE | Refills: 1 | Status: SHIPPED | OUTPATIENT
Start: 2018-11-12 | End: 2019-05-10

## 2018-12-17 DIAGNOSIS — E11.9 TYPE 2 DIABETES MELLITUS WITHOUT COMPLICATION, WITHOUT LONG-TERM CURRENT USE OF INSULIN (HCC): ICD-10-CM

## 2018-12-17 RX ORDER — GLIPIZIDE 10 MG/1
TABLET, FILM COATED, EXTENDED RELEASE ORAL
Qty: 90 TABLET | Refills: 0 | Status: SHIPPED | OUTPATIENT
Start: 2018-12-17 | End: 2019-03-17

## 2019-01-21 ENCOUNTER — TELEPHONE (OUTPATIENT)
Dept: INTERNAL MEDICINE CLINIC | Facility: CLINIC | Age: 74
End: 2019-01-21

## 2019-01-21 RX ORDER — ROSUVASTATIN CALCIUM 5 MG/1
5 TABLET, COATED ORAL NIGHTLY
Qty: 90 TABLET | Refills: 0 | Status: SHIPPED | OUTPATIENT
Start: 2019-01-21 | End: 2019-04-03

## 2019-01-21 NOTE — TELEPHONE ENCOUNTER
Is there an alternate cholesterol medication for patient? She went off of it because of muscle cramping and now would like to know if she should get on something else before April appointment scheduled?   Please ask doctor and call back

## 2019-02-25 DIAGNOSIS — E78.00 HYPERCHOLESTEREMIA: ICD-10-CM

## 2019-02-25 NOTE — TELEPHONE ENCOUNTER
Metformin approved per protocol  Last OV relevant to medication: 10-4-18  Last refill date: 8-29-18  #/refills: 1  When pt was asked to return for OV: none  Upcoming appt/reason: 4-4-19  Recent labs: 10-4-18: Microalb./ CBC/ TSH/ HgBA1C/ CMP/ Lipid

## 2019-03-17 DIAGNOSIS — E11.9 TYPE 2 DIABETES MELLITUS WITHOUT COMPLICATION, WITHOUT LONG-TERM CURRENT USE OF INSULIN (HCC): ICD-10-CM

## 2019-03-18 RX ORDER — GLIPIZIDE 10 MG/1
TABLET, FILM COATED, EXTENDED RELEASE ORAL
Qty: 90 TABLET | Refills: 1 | Status: SHIPPED | OUTPATIENT
Start: 2019-03-18 | End: 2019-07-12

## 2019-04-03 RX ORDER — ROSUVASTATIN CALCIUM 5 MG/1
TABLET, COATED ORAL
Qty: 90 TABLET | Refills: 0 | Status: SHIPPED | OUTPATIENT
Start: 2019-04-03 | End: 2019-04-04

## 2019-04-03 NOTE — TELEPHONE ENCOUNTER
Refill requested:   Requested Prescriptions     Pending Prescriptions Disp Refills   • ROSUVASTATIN CALCIUM 5 MG Oral Tab [Pharmacy Med Name: ROSUVASTATIN TABS 5MG] 90 tablet 0     Sig: TAKE 1 TABLET NIGHTLY         Passed protocol    Last refill: 1/21/201

## 2019-04-04 ENCOUNTER — APPOINTMENT (OUTPATIENT)
Dept: LAB | Age: 74
End: 2019-04-04
Attending: FAMILY MEDICINE
Payer: MEDICARE

## 2019-04-04 ENCOUNTER — OFFICE VISIT (OUTPATIENT)
Dept: INTERNAL MEDICINE CLINIC | Facility: CLINIC | Age: 74
End: 2019-04-04
Payer: MEDICARE

## 2019-04-04 VITALS
WEIGHT: 238 LBS | HEART RATE: 72 BPM | DIASTOLIC BLOOD PRESSURE: 60 MMHG | OXYGEN SATURATION: 98 % | RESPIRATION RATE: 16 BRPM | BODY MASS INDEX: 36 KG/M2 | SYSTOLIC BLOOD PRESSURE: 124 MMHG

## 2019-04-04 DIAGNOSIS — E78.00 PURE HYPERCHOLESTEROLEMIA: ICD-10-CM

## 2019-04-04 DIAGNOSIS — E11.9 TYPE 2 DIABETES MELLITUS WITHOUT COMPLICATION, UNSPECIFIED WHETHER LONG TERM INSULIN USE (HCC): ICD-10-CM

## 2019-04-04 DIAGNOSIS — I10 ESSENTIAL HYPERTENSION, BENIGN: Primary | ICD-10-CM

## 2019-04-04 PROCEDURE — 83036 HEMOGLOBIN GLYCOSYLATED A1C: CPT

## 2019-04-04 PROCEDURE — 80053 COMPREHEN METABOLIC PANEL: CPT

## 2019-04-04 PROCEDURE — 36415 COLL VENOUS BLD VENIPUNCTURE: CPT

## 2019-04-04 PROCEDURE — 99214 OFFICE O/P EST MOD 30 MIN: CPT | Performed by: FAMILY MEDICINE

## 2019-04-04 PROCEDURE — 80061 LIPID PANEL: CPT

## 2019-04-04 RX ORDER — ROSUVASTATIN CALCIUM 5 MG/1
TABLET, COATED ORAL
Qty: 90 TABLET | Refills: 0 | COMMUNITY
Start: 2019-04-04 | End: 2019-10-07

## 2019-04-04 NOTE — PROGRESS NOTES
HPI:    Patient ID: Yeny Myrick is a 74year old female. HPI  Yeny Myrick is a 74year old female.   HPI:   Here for f/u on the meds   Is taking meds   The crestor is also causing some aches but not as severe as the pravastatin   Other meds a Patient Position: Sitting)   Pulse 72   Resp 16   Wt 238 lb   SpO2 98%   BMI 35.66 kg/m²   GENERAL: well developed, well nourished,in no apparent distress  SKIN: no rashes  NECK: supple,no adenopathy  LUNGS: clear to auscultation  CARDIO: RRR without murmu Placed This Encounter      Hemoglobin A1C [E]      COMP METABOLIC PANEL      LIPID PANEL      Meds This Visit:  Requested Prescriptions      No prescriptions requested or ordered in this encounter       Imaging & Referrals:  None       RU#[id number]

## 2019-04-08 ENCOUNTER — TELEPHONE (OUTPATIENT)
Dept: INTERNAL MEDICINE CLINIC | Facility: CLINIC | Age: 74
End: 2019-04-08

## 2019-05-10 DIAGNOSIS — I10 ESSENTIAL HYPERTENSION WITH GOAL BLOOD PRESSURE LESS THAN 140/90: ICD-10-CM

## 2019-05-10 RX ORDER — AMLODIPINE BESYLATE AND BENAZEPRIL HYDROCHLORIDE 10; 20 MG/1; MG/1
CAPSULE ORAL
Qty: 90 CAPSULE | Refills: 1 | Status: SHIPPED | OUTPATIENT
Start: 2019-05-10 | End: 2019-07-26 | Stop reason: ALTCHOICE

## 2019-07-05 ENCOUNTER — TELEPHONE (OUTPATIENT)
Dept: INTERNAL MEDICINE CLINIC | Facility: CLINIC | Age: 74
End: 2019-07-05

## 2019-07-05 NOTE — TELEPHONE ENCOUNTER
Pt would like a call back states she has been felling weak she just fell last week is not mobil and would like some advise on how to get home care

## 2019-07-08 NOTE — TELEPHONE ENCOUNTER
Pt was on her way to   being discharged from hospital ,caught her shoe getting in car and fell unable to get back up paramedics were called to help her up .denies any weakness to to extremities walking with walker .  Another time earlier in  w

## 2019-07-12 ENCOUNTER — OFFICE VISIT (OUTPATIENT)
Dept: INTERNAL MEDICINE CLINIC | Facility: CLINIC | Age: 74
End: 2019-07-12
Payer: MEDICARE

## 2019-07-12 VITALS
BODY MASS INDEX: 35.21 KG/M2 | TEMPERATURE: 98 F | OXYGEN SATURATION: 98 % | WEIGHT: 235 LBS | HEIGHT: 68.5 IN | SYSTOLIC BLOOD PRESSURE: 106 MMHG | DIASTOLIC BLOOD PRESSURE: 56 MMHG | HEART RATE: 94 BPM | RESPIRATION RATE: 16 BRPM

## 2019-07-12 DIAGNOSIS — E11.9 TYPE 2 DIABETES MELLITUS WITHOUT COMPLICATION, WITHOUT LONG-TERM CURRENT USE OF INSULIN (HCC): ICD-10-CM

## 2019-07-12 DIAGNOSIS — R29.898 WEAKNESS OF BOTH LOWER EXTREMITIES: Primary | ICD-10-CM

## 2019-07-12 DIAGNOSIS — I10 ESSENTIAL HYPERTENSION, BENIGN: ICD-10-CM

## 2019-07-12 DIAGNOSIS — L03.116 CELLULITIS OF LEFT LOWER EXTREMITY: ICD-10-CM

## 2019-07-12 PROCEDURE — 99214 OFFICE O/P EST MOD 30 MIN: CPT | Performed by: FAMILY MEDICINE

## 2019-07-12 RX ORDER — AMLODIPINE BESYLATE AND BENAZEPRIL HYDROCHLORIDE 5; 20 MG/1; MG/1
1 CAPSULE ORAL DAILY
Qty: 90 CAPSULE | Refills: 0 | Status: SHIPPED | OUTPATIENT
Start: 2019-07-12 | End: 2019-09-17

## 2019-07-12 RX ORDER — CEFADROXIL 500 MG/1
500 CAPSULE ORAL 2 TIMES DAILY
Qty: 20 CAPSULE | Refills: 0 | Status: SHIPPED | OUTPATIENT
Start: 2019-07-12 | End: 2019-07-26 | Stop reason: ALTCHOICE

## 2019-07-12 RX ORDER — GLIPIZIDE 5 MG/1
5 TABLET, FILM COATED, EXTENDED RELEASE ORAL
Qty: 90 TABLET | Refills: 1 | Status: SHIPPED | OUTPATIENT
Start: 2019-07-12 | End: 2019-12-12

## 2019-07-12 NOTE — PROGRESS NOTES
HPI:    Patient ID: Fermín Matt is a 74year old female.     HPI  Here for LE weakness  BL   Has hard time getting up from the ground after a fall   Has had to call 911 to help her get up    [place] Hazy 4 times recent past     Generalized weakness in the LEs ASSESSMENT/PLAN:  (R24.445) Weakness of both lower extremities  (primary encounter diagnosis)  Plan: multifactorial   Glad she is feeling better   Lower lotrel to 5/20   Lower glipizide to 5     Treat the infcn   Vineet 2 weeks    Spinal stenosis?    Does n

## 2019-07-26 ENCOUNTER — OFFICE VISIT (OUTPATIENT)
Dept: INTERNAL MEDICINE CLINIC | Facility: CLINIC | Age: 74
End: 2019-07-26
Payer: MEDICARE

## 2019-07-26 VITALS
WEIGHT: 235.5 LBS | HEIGHT: 68.5 IN | DIASTOLIC BLOOD PRESSURE: 68 MMHG | BODY MASS INDEX: 35.28 KG/M2 | SYSTOLIC BLOOD PRESSURE: 124 MMHG | RESPIRATION RATE: 16 BRPM | OXYGEN SATURATION: 98 % | TEMPERATURE: 98 F | HEART RATE: 94 BPM

## 2019-07-26 DIAGNOSIS — I10 ESSENTIAL HYPERTENSION, BENIGN: Primary | ICD-10-CM

## 2019-07-26 DIAGNOSIS — R29.898 WEAKNESS OF BOTH LOWER EXTREMITIES: ICD-10-CM

## 2019-07-26 DIAGNOSIS — E11.9 TYPE 2 DIABETES MELLITUS WITHOUT COMPLICATION, UNSPECIFIED WHETHER LONG TERM INSULIN USE (HCC): ICD-10-CM

## 2019-07-26 DIAGNOSIS — L03.116 CELLULITIS OF LEFT LOWER EXTREMITY: ICD-10-CM

## 2019-07-26 PROCEDURE — 99214 OFFICE O/P EST MOD 30 MIN: CPT | Performed by: FAMILY MEDICINE

## 2019-07-26 NOTE — PROGRESS NOTES
HPI:    Patient ID: Lorene Walters is a 74year old female.     HPI  Here for f/u from last time    Does feel much better, 85%      No longer using walker   Just a cane   Can walk around the house better    Able to do her chores now   Did finish the abx mellitus without complication, unspecified whether long term insulin use (hcc)  Weakness of both lower extremities    No orders of the defined types were placed in this encounter.       Meds This Visit:  Requested Prescriptions      No prescriptions request

## 2019-08-01 ENCOUNTER — TELEPHONE (OUTPATIENT)
Dept: INTERNAL MEDICINE CLINIC | Facility: CLINIC | Age: 74
End: 2019-08-01

## 2019-08-01 NOTE — TELEPHONE ENCOUNTER
Completed and signed Medicare Initial evaluation/Plan of Care faxed to [facility] with confirmation     Original placed in scan and copy place in blue folder

## 2019-08-24 DIAGNOSIS — E78.00 HYPERCHOLESTEREMIA: ICD-10-CM

## 2019-08-24 NOTE — TELEPHONE ENCOUNTER
Metformin HCL 1000 MG Oral Tab    Passed Protocol    Last OV relevant to medication: 7/26/2019    Last refill date: 2/25/2019     #/refills: #180 w/ 1 refill    When pt was asked to return for OV: None noted    Upcoming appt/reason: No future appointments

## 2019-09-17 RX ORDER — AMLODIPINE BESYLATE AND BENAZEPRIL HYDROCHLORIDE 5; 20 MG/1; MG/1
CAPSULE ORAL
Qty: 90 CAPSULE | Refills: 0 | Status: SHIPPED | OUTPATIENT
Start: 2019-09-17 | End: 2019-11-25

## 2019-09-17 NOTE — TELEPHONE ENCOUNTER
Amlodipine besy-benazepril HCl  Last OV relevant to medication: 7-26-19  Last refill date: 7-12-19  #/refills: 0  When pt was asked to return for OV: 3 mo.   Upcoming appt/reason: 10-7-19  Recent labs: 4-4-19: CMP

## 2019-09-30 ENCOUNTER — IMMUNIZATION (OUTPATIENT)
Dept: FAMILY MEDICINE CLINIC | Facility: CLINIC | Age: 74
End: 2019-09-30
Payer: MEDICARE

## 2019-09-30 DIAGNOSIS — Z23 NEED FOR VACCINATION: ICD-10-CM

## 2019-10-07 ENCOUNTER — LAB ENCOUNTER (OUTPATIENT)
Dept: LAB | Age: 74
End: 2019-10-07
Attending: FAMILY MEDICINE
Payer: MEDICARE

## 2019-10-07 ENCOUNTER — OFFICE VISIT (OUTPATIENT)
Dept: INTERNAL MEDICINE CLINIC | Facility: CLINIC | Age: 74
End: 2019-10-07
Payer: MEDICARE

## 2019-10-07 VITALS
BODY MASS INDEX: 33.11 KG/M2 | WEIGHT: 221 LBS | HEIGHT: 68.5 IN | RESPIRATION RATE: 16 BRPM | HEART RATE: 75 BPM | TEMPERATURE: 98 F | OXYGEN SATURATION: 98 % | SYSTOLIC BLOOD PRESSURE: 100 MMHG | DIASTOLIC BLOOD PRESSURE: 52 MMHG

## 2019-10-07 DIAGNOSIS — E78.00 PURE HYPERCHOLESTEROLEMIA: ICD-10-CM

## 2019-10-07 DIAGNOSIS — E55.9 VITAMIN D DEFICIENCY: ICD-10-CM

## 2019-10-07 DIAGNOSIS — E11.9 TYPE 2 DIABETES MELLITUS WITHOUT COMPLICATION, UNSPECIFIED WHETHER LONG TERM INSULIN USE (HCC): ICD-10-CM

## 2019-10-07 DIAGNOSIS — I77.9 CAROTID ARTERY DISEASE, UNSPECIFIED LATERALITY, UNSPECIFIED TYPE (HCC): ICD-10-CM

## 2019-10-07 DIAGNOSIS — I10 ESSENTIAL HYPERTENSION, BENIGN: ICD-10-CM

## 2019-10-07 DIAGNOSIS — Z00.00 MEDICARE ANNUAL WELLNESS VISIT, SUBSEQUENT: Primary | ICD-10-CM

## 2019-10-07 DIAGNOSIS — Z12.31 VISIT FOR SCREENING MAMMOGRAM: ICD-10-CM

## 2019-10-07 DIAGNOSIS — E11.42 DIABETIC POLYNEUROPATHY ASSOCIATED WITH TYPE 2 DIABETES MELLITUS (HCC): ICD-10-CM

## 2019-10-07 DIAGNOSIS — Z00.00 ENCOUNTER FOR ANNUAL HEALTH EXAMINATION: ICD-10-CM

## 2019-10-07 PROBLEM — L50.9 URTICARIA: Status: RESOLVED | Noted: 2017-09-20 | Resolved: 2019-10-07

## 2019-10-07 PROCEDURE — 99214 OFFICE O/P EST MOD 30 MIN: CPT | Performed by: FAMILY MEDICINE

## 2019-10-07 PROCEDURE — 36415 COLL VENOUS BLD VENIPUNCTURE: CPT

## 2019-10-07 PROCEDURE — 84443 ASSAY THYROID STIM HORMONE: CPT

## 2019-10-07 PROCEDURE — 80061 LIPID PANEL: CPT

## 2019-10-07 PROCEDURE — 80053 COMPREHEN METABOLIC PANEL: CPT

## 2019-10-07 PROCEDURE — G0439 PPPS, SUBSEQ VISIT: HCPCS | Performed by: FAMILY MEDICINE

## 2019-10-07 PROCEDURE — 83036 HEMOGLOBIN GLYCOSYLATED A1C: CPT

## 2019-10-07 PROCEDURE — 85025 COMPLETE CBC W/AUTO DIFF WBC: CPT

## 2019-10-07 PROCEDURE — 82306 VITAMIN D 25 HYDROXY: CPT

## 2019-10-07 RX ORDER — ROSUVASTATIN CALCIUM 5 MG/1
TABLET, COATED ORAL
Qty: 90 TABLET | Refills: 0 | Status: SHIPPED | OUTPATIENT
Start: 2019-10-07 | End: 2019-12-12

## 2019-10-07 RX ORDER — GABAPENTIN 100 MG/1
100 CAPSULE ORAL 3 TIMES DAILY
Qty: 90 CAPSULE | Refills: 0 | Status: SHIPPED | OUTPATIENT
Start: 2019-10-07 | End: 2019-10-30

## 2019-10-07 NOTE — PROGRESS NOTES
HPI:   Kelly Weinberg is a 66year old female who presents for a Medicare Subsequent Annual Wellness visit (Pt already had Initial Annual Wellness).       Annual Physical due on 10/07/2020        Fall/Risk Assessment   She has been screened for Falls and recent labs)   Lab Results   Component Value Date    WBC 11.8 10/04/2018    HGB 14.4 10/04/2018    .0 10/04/2018        ALLERGIES:   She has No Known Allergies.     CURRENT MEDICATIONS:     Outpatient Medications Marked as Taking for the 10/7/19 enco vision  HEENT: denies nasal congestion, sinus pain or ST  LUNGS: denies shortness of breath  No cough      CARDIOVASCULAR: denies chest pain     GI: denies abdominal pain, denies heartburn   No BPR     : denies dysuria  MUSCULOSKELETAL: denies back pain • Td, Preserv Free 07/11/2016   • Varicella 01/01/2010   • Zoster Vaccine Live (Zostavax) 10/04/2011   • Zoster Vaccine Recombinant Adjuvanted (Shingrix) 10/05/2018, 01/18/2019        ASSESSMENT AND OTHER RELEVANT CHRONIC CONDITIONS:   Darlene Castillo [facility] [place]  Plan: HEMOGLOBIN A1C        Ck labs      (E78.00) Pure hypercholesterolemia  Plan: LIPID PANEL, ASSAY, THYROID STIM HORMONE        Ck labs      (E55.9) Vitamin D deficiency  Plan: VITAMIN D, 25-HYDROXY        Ck labs       (Z12.31) Visit for screeni 10 years No results found for this or any previous visit. No flowsheet data found. Fecal Occult Blood Annually No results found for: FOB No flowsheet data found.     Glaucoma Screening      Ophthalmology Visit Annually: Diabetics, FHx Glaucoma, AA>50, H your pharmacy  prescription benefits      SPECIFIC DISEASE MONITORING Internal Lab or Procedure External Lab or Procedure      Annual Monitoring of Persistent     Medications (ACE/ARB, digoxin diuretics, anticonvulsants.)    Potassium  Annually Potassium ( Acidophilus/Pectin (PROBIOTIC) Oral Cap Take 1 capsule by mouth daily. Disp:  Rfl:    Calcium Citrate-Vitamin D (CITRACAL + D OR) Take 1,200 mg by mouth daily. Disp:  Rfl:    Aspirin 81 MG Oral Tab Take 81 mg by mouth daily.  Disp:  Rfl:      Allergies:

## 2019-10-07 NOTE — PATIENT INSTRUCTIONS
Nona Henriquez's SCREENING SCHEDULE   Tests on this list are recommended by your physician but may not be covered, or covered at this frequency, by your insurer. Please check with your insurance carrier before scheduling to verify coverage.    Flavio Daugherty Limited to patients who meet one of the following criteria:   • Men who are 73-68 years old and have smoked more than 100 cigarettes in their lifetime   • Anyone with a family history    Colorectal Cancer Screening  Covered up to Age 76     Colonoscopy Scr There are no preventive care reminders to display for this patient.  Please get this Mammogram regularly   Immunizations      Influenza  Covered Annually Orders placed or performed in visit on 09/28/16   • FLU VACC [address] ANTIG    Please get every ye forms are also available in [place]  [email or web address]  This link also has information from the [facility] regarding Advance Directives.

## 2019-10-11 ENCOUNTER — TELEPHONE (OUTPATIENT)
Dept: INTERNAL MEDICINE CLINIC | Facility: CLINIC | Age: 74
End: 2019-10-11

## 2019-10-11 DIAGNOSIS — D72.829 LEUKOCYTOSIS, UNSPECIFIED TYPE: Primary | ICD-10-CM

## 2019-10-16 ENCOUNTER — HOSPITAL ENCOUNTER (OUTPATIENT)
Dept: MAMMOGRAPHY | Age: 74
Discharge: HOME | End: 2019-10-16
Attending: FAMILY MEDICINE
Payer: MEDICARE

## 2019-10-16 DIAGNOSIS — Z12.31 VISIT FOR SCREENING MAMMOGRAM: ICD-10-CM

## 2019-10-16 PROCEDURE — 77067 SCR MAMMO BI INCL CAD: CPT | Performed by: FAMILY MEDICINE

## 2019-10-16 PROCEDURE — 77063 BREAST TOMOSYNTHESIS BI: CPT | Performed by: FAMILY MEDICINE

## 2019-10-25 ENCOUNTER — HOSPITAL ENCOUNTER (OUTPATIENT)
Dept: ULTRASOUND IMAGING | Age: 74
Discharge: HOME | End: 2019-10-25
Attending: FAMILY MEDICINE
Payer: MEDICARE

## 2019-10-25 ENCOUNTER — HOSPITAL ENCOUNTER (OUTPATIENT)
Dept: MAMMOGRAPHY | Age: 74
Discharge: HOME | End: 2019-10-25
Attending: FAMILY MEDICINE
Payer: MEDICARE

## 2019-10-25 DIAGNOSIS — R92.2 INCONCLUSIVE MAMMOGRAM: ICD-10-CM

## 2019-10-25 PROCEDURE — 77061 BREAST TOMOSYNTHESIS UNI: CPT | Performed by: FAMILY MEDICINE

## 2019-10-25 PROCEDURE — 77065 DX MAMMO INCL CAD UNI: CPT | Performed by: FAMILY MEDICINE

## 2019-10-25 PROCEDURE — 76642 ULTRASOUND BREAST LIMITED: CPT | Performed by: FAMILY MEDICINE

## 2019-10-25 NOTE — IMAGING NOTE
This Breast Care RN spoke with Karime Sandeep and her , Amanda Hernandez re Ultrasound guided Right breast biopsy recommendation by Dr. Nikhil Almonte. Procedure reviewed and all questions answered.   On the day of the biopsy, pt instructed to take Tylenol 650-1000

## 2019-10-31 RX ORDER — GABAPENTIN 100 MG/1
100 CAPSULE ORAL 3 TIMES DAILY
Qty: 90 CAPSULE | Refills: 3 | Status: SHIPPED | OUTPATIENT
Start: 2019-10-31 | End: 2019-12-12

## 2019-11-05 ENCOUNTER — HOSPITAL ENCOUNTER (OUTPATIENT)
Dept: MAMMOGRAPHY | Facility: HOSPITAL | Age: 74
Discharge: HOME | End: 2019-11-05
Attending: FAMILY MEDICINE
Payer: MEDICARE

## 2019-11-05 DIAGNOSIS — R92.8 ABNORMAL MAMMOGRAM: ICD-10-CM

## 2019-11-05 DIAGNOSIS — R92.8 ABNORMAL MAMMOGRAM OF RIGHT BREAST: ICD-10-CM

## 2019-11-05 PROCEDURE — 88305 TISSUE EXAM BY PATHOLOGIST: CPT | Performed by: FAMILY MEDICINE

## 2019-11-05 PROCEDURE — 77065 DX MAMMO INCL CAD UNI: CPT | Performed by: FAMILY MEDICINE

## 2019-11-05 PROCEDURE — 19083 BX BREAST 1ST LESION US IMAG: CPT | Performed by: FAMILY MEDICINE

## 2019-11-05 PROCEDURE — 19084 BX BREAST ADD LESION US IMAG: CPT | Performed by: FAMILY MEDICINE

## 2019-11-07 NOTE — TELEPHONE ENCOUNTER
Faxed [facility] Medicare Discharge Summary- 11/1/19 to #[phone]. Signed and dated by Dr. Monique Han 11/7/19.

## 2019-11-08 ENCOUNTER — TELEPHONE (OUTPATIENT)
Dept: MAMMOGRAPHY | Facility: HOSPITAL | Age: 74
End: 2019-11-08

## 2019-11-08 NOTE — TELEPHONE ENCOUNTER
Telephoned Andreea Santizo and name,  verified with patient. Notified Andreea Santizo of negative for malignancy right breast 2 site biopsy result. Concordance pending. Andreea Santizo reports biopsy site is healing well.  Hematoma management discu

## 2019-11-11 ENCOUNTER — TELEPHONE (OUTPATIENT)
Dept: INTERNAL MEDICINE CLINIC | Facility: CLINIC | Age: 74
End: 2019-11-11

## 2019-11-11 ENCOUNTER — LAB ENCOUNTER (OUTPATIENT)
Dept: LAB | Age: 74
End: 2019-11-11
Attending: FAMILY MEDICINE
Payer: MEDICARE

## 2019-11-11 DIAGNOSIS — D72.829 LEUKOCYTOSIS, UNSPECIFIED TYPE: ICD-10-CM

## 2019-11-11 PROCEDURE — 85025 COMPLETE CBC W/AUTO DIFF WBC: CPT

## 2019-11-11 PROCEDURE — 36415 COLL VENOUS BLD VENIPUNCTURE: CPT

## 2019-11-11 NOTE — TELEPHONE ENCOUNTER
[facility] faxing paperwork with an addendum for doctor \"to check revise and then sign.' as patient was requested to discharge PT in error; she just took a week off.  Please watch for fax and send back to [facility]/call with any questions

## 2019-11-12 ENCOUNTER — TELEPHONE (OUTPATIENT)
Dept: INTERNAL MEDICINE CLINIC | Facility: CLINIC | Age: 74
End: 2019-11-12

## 2019-11-14 ENCOUNTER — TELEPHONE (OUTPATIENT)
Dept: INTERNAL MEDICINE CLINIC | Facility: CLINIC | Age: 74
End: 2019-11-14

## 2019-11-14 DIAGNOSIS — M62.81 MUSCLE WEAKNESS-GENERAL: Primary | ICD-10-CM

## 2019-11-14 NOTE — TELEPHONE ENCOUNTER
Duke Sharpe from [facility] physical therapy called requesting script for patient to continue PT services. States that they accidentally faxed a discharge order which doctor had signed.  Therefore patient was discharged by mistake and needs to restart therapy services

## 2019-11-25 PROCEDURE — G0008 ADMIN INFLUENZA VIRUS VAC: HCPCS | Performed by: NURSE PRACTITIONER

## 2019-11-25 PROCEDURE — 90662 IIV NO PRSV INCREASED AG IM: CPT | Performed by: NURSE PRACTITIONER

## 2019-11-26 RX ORDER — AMLODIPINE BESYLATE AND BENAZEPRIL HYDROCHLORIDE 5; 20 MG/1; MG/1
CAPSULE ORAL
Qty: 90 CAPSULE | Refills: 0 | Status: SHIPPED | OUTPATIENT
Start: 2019-11-26 | End: 2019-12-31

## 2019-12-12 ENCOUNTER — OFFICE VISIT (OUTPATIENT)
Dept: INTERNAL MEDICINE CLINIC | Facility: CLINIC | Age: 74
End: 2019-12-12
Payer: MEDICARE

## 2019-12-12 VITALS
SYSTOLIC BLOOD PRESSURE: 118 MMHG | BODY MASS INDEX: 35.06 KG/M2 | WEIGHT: 234 LBS | DIASTOLIC BLOOD PRESSURE: 68 MMHG | TEMPERATURE: 98 F | RESPIRATION RATE: 12 BRPM | HEIGHT: 68.5 IN | OXYGEN SATURATION: 99 % | HEART RATE: 78 BPM

## 2019-12-12 DIAGNOSIS — I10 ESSENTIAL HYPERTENSION, BENIGN: Primary | ICD-10-CM

## 2019-12-12 DIAGNOSIS — E11.42 DIABETIC POLYNEUROPATHY ASSOCIATED WITH TYPE 2 DIABETES MELLITUS (HCC): ICD-10-CM

## 2019-12-12 DIAGNOSIS — D72.829 LEUKOCYTOSIS, UNSPECIFIED TYPE: ICD-10-CM

## 2019-12-12 DIAGNOSIS — E11.9 TYPE 2 DIABETES MELLITUS WITHOUT COMPLICATION, WITHOUT LONG-TERM CURRENT USE OF INSULIN (HCC): ICD-10-CM

## 2019-12-12 DIAGNOSIS — M79.662 BILATERAL CALF PAIN: ICD-10-CM

## 2019-12-12 DIAGNOSIS — I73.9 CLAUDICATION OF BOTH LOWER EXTREMITIES (HCC): ICD-10-CM

## 2019-12-12 DIAGNOSIS — M79.661 BILATERAL CALF PAIN: ICD-10-CM

## 2019-12-12 PROCEDURE — 99214 OFFICE O/P EST MOD 30 MIN: CPT | Performed by: FAMILY MEDICINE

## 2019-12-12 RX ORDER — GABAPENTIN 100 MG/1
200 CAPSULE ORAL 3 TIMES DAILY
Qty: 180 CAPSULE | Refills: 0 | Status: SHIPPED | OUTPATIENT
Start: 2019-12-12 | End: 2020-02-10

## 2019-12-12 RX ORDER — ROSUVASTATIN CALCIUM 5 MG/1
TABLET, COATED ORAL
Qty: 90 TABLET | Refills: 1 | Status: SHIPPED | OUTPATIENT
Start: 2019-12-12 | End: 2019-12-31

## 2019-12-12 RX ORDER — GLIPIZIDE 5 MG/1
5 TABLET, FILM COATED, EXTENDED RELEASE ORAL
Qty: 90 TABLET | Refills: 1 | Status: SHIPPED | OUTPATIENT
Start: 2019-12-12 | End: 2020-06-09

## 2019-12-12 NOTE — PROGRESS NOTES
HPI:    Patient ID: Elijah Murillo is a 66year old female. HPI  Elijah Murillo is a 66year old female.   HPI:   Here for f/u   Finished w the PT       Strength is better but still w achiness in the LEs leg area   Thighs ok   Off on  Not nessacari feels well otherwise  SKIN: denies rashes  RESPIRATORY: denies shortness of breath   CARDIOVASCULAR: denies chest pain on exertion  GI: denies abdominal pain  NEURO: denies headaches    EXAM:   /68   Pulse 78   Temp 97.5 °F (36.4 °C) (Oral)   Resp 12 Strip E11.9 Test once daily 100 each 1   • Acidophilus/Pectin (PROBIOTIC) Oral Cap Take 1 capsule by mouth daily. • Calcium Citrate-Vitamin D (CITRACAL + D OR) Take 1,200 mg by mouth daily. • Aspirin 81 MG Oral Tab Take 81 mg by mouth daily.

## 2019-12-31 ENCOUNTER — HOSPITAL ENCOUNTER (INPATIENT)
Facility: HOSPITAL | Age: 74
LOS: 3 days | Discharge: HOME | DRG: 853 | End: 2020-01-03
Attending: EMERGENCY MEDICINE | Admitting: HOSPITALIST
Payer: MEDICARE

## 2019-12-31 ENCOUNTER — APPOINTMENT (OUTPATIENT)
Dept: GENERAL RADIOLOGY | Facility: HOSPITAL | Age: 74
DRG: 853 | End: 2019-12-31
Attending: EMERGENCY MEDICINE
Payer: MEDICARE

## 2019-12-31 ENCOUNTER — APPOINTMENT (OUTPATIENT)
Dept: CT IMAGING | Facility: HOSPITAL | Age: 74
DRG: 853 | End: 2019-12-31
Attending: EMERGENCY MEDICINE
Payer: MEDICARE

## 2019-12-31 DIAGNOSIS — R65.21 SEPTIC SHOCK (HCC): ICD-10-CM

## 2019-12-31 DIAGNOSIS — N30.00 ACUTE CYSTITIS WITHOUT HEMATURIA: ICD-10-CM

## 2019-12-31 DIAGNOSIS — A41.9 SEPTIC SHOCK (HCC): ICD-10-CM

## 2019-12-31 DIAGNOSIS — N28.9 RENAL INSUFFICIENCY: Primary | ICD-10-CM

## 2019-12-31 LAB
ADENOVIRUS PCR:: NEGATIVE
ALBUMIN SERPL-MCNC: 3 G/DL (ref 3.4–5)
ALBUMIN/GLOB SERPL: 0.8 {RATIO} (ref 1–2)
ALLENS TEST: POSITIVE
ALP LIVER SERPL-CCNC: 141 U/L (ref 55–142)
ALT SERPL-CCNC: 23 U/L (ref 13–56)
ANION GAP SERPL CALC-SCNC: 8 MMOL/L (ref 0–18)
APTT PPP: 28.3 SECONDS (ref 25.4–36.1)
ARTERIAL BLD GAS O2 SATURATION: 92 % (ref 92–100)
ARTERIAL BLOOD GAS BASE EXCESS: -1.2 MMOL/L (ref ?–2)
ARTERIAL BLOOD GAS HCO3: 20.6 MEQ/L (ref 22–26)
ARTERIAL BLOOD GAS PCO2: 27 MM HG (ref 35–45)
ARTERIAL BLOOD GAS PH: 7.5 (ref 7.35–7.45)
ARTERIAL BLOOD GAS PO2: 58 MM HG (ref 80–105)
AST SERPL-CCNC: 28 U/L (ref 15–37)
B PERT DNA SPEC QL NAA+PROBE: NEGATIVE
BASOPHILS # BLD: 0 X10(3) UL (ref 0–0.2)
BASOPHILS NFR BLD: 0 %
BILIRUB SERPL-MCNC: 0.4 MG/DL (ref 0.1–2)
BILIRUB UR QL STRIP.AUTO: NEGATIVE
BUN BLD-MCNC: 31 MG/DL (ref 7–18)
BUN/CREAT SERPL: 17.9 (ref 10–20)
C PNEUM DNA SPEC QL NAA+PROBE: NEGATIVE
CALCIUM BLD-MCNC: 9.4 MG/DL (ref 8.5–10.1)
CALCULATED O2 SATURATION: 92 % (ref 92–100)
CARBOXYHEMOGLOBIN: 1.4 % SAT (ref 0–3)
CHLORIDE SERPL-SCNC: 103 MMOL/L (ref 98–112)
CO2 SERPL-SCNC: 27 MMOL/L (ref 21–32)
COLOR UR AUTO: YELLOW
CORONAVIRUS 229E PCR:: NEGATIVE
CORONAVIRUS HKU1 PCR:: NEGATIVE
CORONAVIRUS NL63 PCR:: NEGATIVE
CORONAVIRUS OC43 PCR:: NEGATIVE
CREAT BLD-MCNC: 1.73 MG/DL (ref 0.55–1.02)
DEPRECATED RDW RBC AUTO: 48.6 FL (ref 35.1–46.3)
EOSINOPHIL # BLD: 0 X10(3) UL (ref 0–0.7)
EOSINOPHIL NFR BLD: 0 %
ERYTHROCYTE [DISTWIDTH] IN BLOOD BY AUTOMATED COUNT: 14.7 % (ref 11–15)
FLUAV RNA SPEC QL NAA+PROBE: NEGATIVE
FLUBV RNA SPEC QL NAA+PROBE: NEGATIVE
GLOBULIN PLAS-MCNC: 4 G/DL (ref 2.8–4.4)
GLUCOSE BLD-MCNC: 134 MG/DL (ref 70–99)
GLUCOSE BLD-MCNC: 165 MG/DL (ref 70–99)
GLUCOSE UR STRIP.AUTO-MCNC: NEGATIVE MG/DL
HCT VFR BLD AUTO: 42 % (ref 35–48)
HGB BLD-MCNC: 13.5 G/DL (ref 12–16)
INR BLD: 1.08 (ref 0.9–1.1)
IONIZED CALCIUM: 1.1 MMOL/L (ref 1.12–1.32)
LACTATE SERPL-SCNC: 2.2 MMOL/L (ref 0.4–2)
LACTATE SERPL-SCNC: 2.5 MMOL/L (ref 0.4–2)
LACTATE SERPL-SCNC: 2.9 MMOL/L (ref 0.4–2)
LACTATE SERPL-SCNC: 4.2 MMOL/L (ref 0.4–2)
LACTIC ACID ARTERIAL: 4.7 MMOL/L (ref 0.5–2)
LYMPHOCYTES NFR BLD: 0.54 X10(3) UL (ref 1–4)
LYMPHOCYTES NFR BLD: 2 %
M PROTEIN MFR SERPL ELPH: 7 G/DL (ref 6.4–8.2)
MCH RBC QN AUTO: 29 PG (ref 26–34)
MCHC RBC AUTO-ENTMCNC: 32.1 G/DL (ref 31–37)
MCV RBC AUTO: 90.1 FL (ref 80–100)
METAPNEUMOVIRUS PCR:: NEGATIVE
METHEMOGLOBIN: 0.8 % SAT (ref 0.4–1.5)
MONOCYTES # BLD: 1.36 X10(3) UL (ref 0.1–1)
MONOCYTES NFR BLD: 5 %
MORPHOLOGY: NORMAL
MYCOPLASMA PNEUMONIA PCR:: NEGATIVE
NEUTROPHILS # BLD AUTO: 24.92 X10 (3) UL (ref 1.5–7.7)
NEUTROPHILS NFR BLD: 54 %
NEUTS BAND NFR BLD: 39 %
NEUTS HYPERSEG # BLD: 25.2 X10(3) UL (ref 1.5–7.7)
NITRITE UR QL STRIP.AUTO: POSITIVE
OSMOLALITY SERPL CALC.SUM OF ELEC: 295 MOSM/KG (ref 275–295)
PARAINFLUENZA 1 PCR:: NEGATIVE
PARAINFLUENZA 2 PCR:: NEGATIVE
PARAINFLUENZA 3 PCR:: NEGATIVE
PARAINFLUENZA 4 PCR:: NEGATIVE
PATIENT TEMPERATURE: 98.6 F
PH UR STRIP.AUTO: 5 [PH] (ref 4.5–8)
PLATELET # BLD AUTO: 252 10(3)UL (ref 150–450)
PLATELET MORPHOLOGY: NORMAL
POTASSIUM BLOOD GAS: 4.2 MMOL/L (ref 3.6–5.1)
POTASSIUM SERPL-SCNC: 4.1 MMOL/L (ref 3.5–5.1)
PROT UR STRIP.AUTO-MCNC: 30 MG/DL
PSA SERPL DL<=0.01 NG/ML-MCNC: 14.5 SECONDS (ref 12.5–14.7)
RBC # BLD AUTO: 4.66 X10(6)UL (ref 3.8–5.3)
RHINOVIRUS/ENTERO PCR:: NEGATIVE
RSV RNA SPEC QL NAA+PROBE: NEGATIVE
SODIUM BLOOD GAS: 136 MMOL/L (ref 136–144)
SODIUM SERPL-SCNC: 138 MMOL/L (ref 136–145)
SP GR UR STRIP.AUTO: 1.02 (ref 1–1.03)
TOTAL CELLS COUNTED: 100
TOTAL HEMOGLOBIN: 13.6 G/DL (ref 11.7–16)
UROBILINOGEN UR STRIP.AUTO-MCNC: <2 MG/DL
WBC # BLD AUTO: 27.1 X10(3) UL (ref 4–11)

## 2019-12-31 PROCEDURE — 99291 CRITICAL CARE FIRST HOUR: CPT | Performed by: HOSPITALIST

## 2019-12-31 PROCEDURE — 71045 X-RAY EXAM CHEST 1 VIEW: CPT | Performed by: EMERGENCY MEDICINE

## 2019-12-31 PROCEDURE — 72131 CT LUMBAR SPINE W/O DYE: CPT | Performed by: EMERGENCY MEDICINE

## 2019-12-31 RX ORDER — MORPHINE SULFATE 4 MG/ML
1 INJECTION, SOLUTION INTRAMUSCULAR; INTRAVENOUS EVERY 2 HOUR PRN
Status: DISCONTINUED | OUTPATIENT
Start: 2019-12-31 | End: 2020-01-03

## 2019-12-31 RX ORDER — METOCLOPRAMIDE HYDROCHLORIDE 5 MG/ML
5 INJECTION INTRAMUSCULAR; INTRAVENOUS EVERY 8 HOURS PRN
Status: DISCONTINUED | OUTPATIENT
Start: 2019-12-31 | End: 2020-01-02

## 2019-12-31 RX ORDER — MORPHINE SULFATE 4 MG/ML
4 INJECTION, SOLUTION INTRAMUSCULAR; INTRAVENOUS EVERY 2 HOUR PRN
Status: DISCONTINUED | OUTPATIENT
Start: 2019-12-31 | End: 2020-01-03

## 2019-12-31 RX ORDER — ROSUVASTATIN CALCIUM 5 MG/1
5 TABLET, COATED ORAL NIGHTLY
COMMUNITY
End: 2020-04-29

## 2019-12-31 RX ORDER — ACETAMINOPHEN 500 MG
1000 TABLET ORAL EVERY 6 HOURS PRN
Status: DISCONTINUED | OUTPATIENT
Start: 2019-12-31 | End: 2020-01-03

## 2019-12-31 RX ORDER — HEPARIN SODIUM 5000 [USP'U]/ML
5000 INJECTION, SOLUTION INTRAVENOUS; SUBCUTANEOUS EVERY 8 HOURS SCHEDULED
Status: DISCONTINUED | OUTPATIENT
Start: 2019-12-31 | End: 2020-01-03

## 2019-12-31 RX ORDER — SODIUM CHLORIDE 9 MG/ML
INJECTION, SOLUTION INTRAVENOUS CONTINUOUS
Status: ACTIVE | OUTPATIENT
Start: 2019-12-31 | End: 2019-12-31

## 2019-12-31 RX ORDER — SODIUM CHLORIDE 9 MG/ML
INJECTION, SOLUTION INTRAVENOUS CONTINUOUS
Status: DISCONTINUED | OUTPATIENT
Start: 2019-12-31 | End: 2020-01-02

## 2019-12-31 RX ORDER — ONDANSETRON 2 MG/ML
4 INJECTION INTRAMUSCULAR; INTRAVENOUS EVERY 6 HOURS PRN
Status: DISCONTINUED | OUTPATIENT
Start: 2019-12-31 | End: 2020-01-03

## 2019-12-31 RX ORDER — DEXTROSE MONOHYDRATE 25 G/50ML
50 INJECTION, SOLUTION INTRAVENOUS
Status: DISCONTINUED | OUTPATIENT
Start: 2019-12-31 | End: 2020-01-01

## 2019-12-31 RX ORDER — ACETAMINOPHEN 650 MG/1
650 SUPPOSITORY RECTAL ONCE
Status: COMPLETED | OUTPATIENT
Start: 2019-12-31 | End: 2019-12-31

## 2019-12-31 RX ORDER — MORPHINE SULFATE 4 MG/ML
2 INJECTION, SOLUTION INTRAMUSCULAR; INTRAVENOUS EVERY 2 HOUR PRN
Status: DISCONTINUED | OUTPATIENT
Start: 2019-12-31 | End: 2020-01-03

## 2019-12-31 RX ORDER — AMLODIPINE BESYLATE AND BENAZEPRIL HYDROCHLORIDE 5; 20 MG/1; MG/1
1 CAPSULE ORAL DAILY
Status: ON HOLD | COMMUNITY
End: 2020-01-03

## 2019-12-31 RX ORDER — ASPIRIN 81 MG/1
81 TABLET ORAL DAILY
Status: ON HOLD | COMMUNITY
End: 2021-06-09

## 2019-12-31 NOTE — ED NOTES
Report called to [facility]. Room 471 not ready yet, will call us when ready.

## 2019-12-31 NOTE — ED NOTES
Rn went into room and pt not responding to name. Monitor vitals all wnl except for heart rate, tachycardic. Pt felt hot and rectal temp taken.

## 2019-12-31 NOTE — PROGRESS NOTES
[facility] Pharmacy Note: Antimicrobial Weight Based Dose Adjustment for: piperacillin/tazobactam (Kei Roberts)    Ella Mcclendon is a 66year old female who has been prescribed piperacillin/tazobactam (ZOSYN) 3.375 g IV in the ER x 1 dose.     12/31 weight = 106.1 k

## 2019-12-31 NOTE — CONSULTS
[address] Patient Status:  Emergency    1945 MRN [id number]   Location [address] Attending Christine Christianson MD   Hosp Day # 0 PCP Emile Thomason MD     Date of Admission: 2019  Admission Rosi smokeless tobacco. She reports current alcohol use. She reports that she does not use drugs. Family History:  No family history on file.       Home Medications:  No outpatient medications have been marked as taking for the 12/31/19 encounter [facility] 32.1 12/31/2019    RDW 14.7 12/31/2019    .0 12/31/2019     Lab Results   Component Value Date     12/31/2019    K 4.1 12/31/2019     12/31/2019    CO2 27.0 12/31/2019    BUN 31 12/31/2019    CREATSERUM 1.73 12/31/2019     12/31/2

## 2019-12-31 NOTE — ED PROVIDER NOTES
Patient Seen in: [facility] Emergency Department      History   Patient presents with:  Fatigue  Back Pain    Stated Complaint: weakness, chronic    HPI    66-year-old woman who comes emergency department stating that she is had intermittent lumbar reviewed. All other systems reviewed and negative except as noted above.     Physical Exam     ED Triage Vitals [12/31/19 1058]   /59   Pulse 98   Resp 18   Temp 100.1 °F (37.8 °C)   Temp src Oral   SpO2 90 %   O2 Device None (Room air)       Cur Abnormal; Notable for the following components:    Lactic Acid 2.9 (*)     All other components within normal limits   ABG PANEL W ELECT AND LACTATE - Abnormal; Notable for the following components:    ABG pH 7.50 (*)     ABG pCO2 27 (*)     ABG pO2 58 (*) ordered. Patient started on Zosyn. At 2:30 PM - the patient became less responsive. She will localize pain and moan. Blood gas obtained and showed pH 7.50, PCO2 27, PO2 58 on room air.   Lactate increased to 4.7 from the initial measure of 2.9 about

## 2019-12-31 NOTE — ED INITIAL ASSESSMENT (HPI)
Pt here weakness in both legs along with back pain. Pt denies any injury. Pt had hx of weakness, pt scheduled for US in January for leg weakness. Pt denies n,v,d. Pt usually uses walker in am, today called medics for unable to get out of bed.

## 2019-12-31 NOTE — H&P
[facility]  History and Physical     [place] Patient Status:  Emergency    1945 MRN [id number]   Location [address] [facility] Attending Savi Hood MD   Hosp Day # 0 PCP Horacio Causey MD     Chief Complaint: 11/24/2003    +diminutive polyp; repeat 10 years Rickie Rich   • HYSTERECTOMY     • LAPAROSCOPIC CHOLECYSTECTOMY  02/21/2005   • OTHER SURGICAL HISTORY  1972    salpingo-oophorectomy right side   • REMOVAL GALLBLADDER     • TOTAL ABDOM HYSTERECTOMY  1998    rem Abdomen: Soft, nontender, nondistended. Positive bowel sounds. No rebound or guarding. Neurologic: CNII-XII grossly intact. No acute focal neurological deficits but diffusely 4/5 in all extremities  Musculoskeletal: Moves all extremities.   Extremities:

## 2020-01-01 ENCOUNTER — ANESTHESIA (OUTPATIENT)
Dept: SURGERY | Facility: HOSPITAL | Age: 75
DRG: 853 | End: 2020-01-01
Payer: MEDICARE

## 2020-01-01 ENCOUNTER — APPOINTMENT (OUTPATIENT)
Dept: CT IMAGING | Facility: HOSPITAL | Age: 75
DRG: 853 | End: 2020-01-01
Attending: INTERNAL MEDICINE
Payer: MEDICARE

## 2020-01-01 ENCOUNTER — ANESTHESIA EVENT (OUTPATIENT)
Dept: SURGERY | Facility: HOSPITAL | Age: 75
DRG: 853 | End: 2020-01-01
Payer: MEDICARE

## 2020-01-01 ENCOUNTER — APPOINTMENT (OUTPATIENT)
Dept: GENERAL RADIOLOGY | Facility: HOSPITAL | Age: 75
DRG: 853 | End: 2020-01-01
Attending: UROLOGY
Payer: MEDICARE

## 2020-01-01 LAB
ANION GAP SERPL CALC-SCNC: 7 MMOL/L (ref 0–18)
BASOPHILS # BLD: 0 X10(3) UL (ref 0–0.2)
BASOPHILS NFR BLD: 0 %
BUN BLD-MCNC: 36 MG/DL (ref 7–18)
BUN/CREAT SERPL: 20.1 (ref 10–20)
C DIFF TOX B STL QL: NEGATIVE
CALCIUM BLD-MCNC: 7.7 MG/DL (ref 8.5–10.1)
CHLORIDE SERPL-SCNC: 113 MMOL/L (ref 98–112)
CO2 SERPL-SCNC: 23 MMOL/L (ref 21–32)
CREAT BLD-MCNC: 1.79 MG/DL (ref 0.55–1.02)
DEPRECATED RDW RBC AUTO: 51.2 FL (ref 35.1–46.3)
EOSINOPHIL # BLD: 0 X10(3) UL (ref 0–0.7)
EOSINOPHIL NFR BLD: 0 %
ERYTHROCYTE [DISTWIDTH] IN BLOOD BY AUTOMATED COUNT: 15.4 % (ref 11–15)
GLUCOSE BLD-MCNC: 135 MG/DL (ref 70–99)
GLUCOSE BLD-MCNC: 161 MG/DL (ref 70–99)
GLUCOSE BLD-MCNC: 198 MG/DL (ref 70–99)
GLUCOSE BLD-MCNC: 209 MG/DL (ref 70–99)
GLUCOSE BLD-MCNC: 227 MG/DL (ref 70–99)
HCT VFR BLD AUTO: 35.1 % (ref 35–48)
HGB BLD-MCNC: 11.5 G/DL (ref 12–16)
LACTATE SERPL-SCNC: 1.4 MMOL/L (ref 0.4–2)
LACTATE SERPL-SCNC: 2.6 MMOL/L (ref 0.4–2)
LACTATE SERPL-SCNC: 2.7 MMOL/L (ref 0.4–2)
LYMPHOCYTES NFR BLD: 1.02 X10(3) UL (ref 1–4)
LYMPHOCYTES NFR BLD: 2 %
MCH RBC QN AUTO: 29.6 PG (ref 26–34)
MCHC RBC AUTO-ENTMCNC: 32.8 G/DL (ref 31–37)
MCV RBC AUTO: 90.2 FL (ref 80–100)
METAMYELOCYTES # BLD: 1.02 X10(3) UL
METAMYELOCYTES NFR BLD: 2 %
MONOCYTES # BLD: 0.51 X10(3) UL (ref 0.1–1)
MONOCYTES NFR BLD: 1 %
MORPHOLOGY: NORMAL
NEUTROPHILS # BLD AUTO: 43.65 X10 (3) UL (ref 1.5–7.7)
NEUTROPHILS NFR BLD: 72 %
NEUTS BAND NFR BLD: 23 %
NEUTS HYPERSEG # BLD: 48.55 X10(3) UL (ref 1.5–7.7)
OSMOLALITY SERPL CALC.SUM OF ELEC: 311 MOSM/KG (ref 275–295)
PLATELET # BLD AUTO: 179 10(3)UL (ref 150–450)
PLATELET MORPHOLOGY: NORMAL
POTASSIUM SERPL-SCNC: 4.1 MMOL/L (ref 3.5–5.1)
RBC # BLD AUTO: 3.89 X10(6)UL (ref 3.8–5.3)
SODIUM SERPL-SCNC: 143 MMOL/L (ref 136–145)
TOTAL CELLS COUNTED: 100
WBC # BLD AUTO: 51.1 X10(3) UL (ref 4–11)

## 2020-01-01 PROCEDURE — BT1FYZZ FLUOROSCOPY OF LEFT KIDNEY, URETER AND BLADDER USING OTHER CONTRAST: ICD-10-PCS | Performed by: UROLOGY

## 2020-01-01 PROCEDURE — 0T778DZ DILATION OF LEFT URETER WITH INTRALUMINAL DEVICE, VIA NATURAL OR ARTIFICIAL OPENING ENDOSCOPIC: ICD-10-PCS | Performed by: UROLOGY

## 2020-01-01 PROCEDURE — 99233 SBSQ HOSP IP/OBS HIGH 50: CPT | Performed by: HOSPITALIST

## 2020-01-01 PROCEDURE — 74176 CT ABD & PELVIS W/O CONTRAST: CPT | Performed by: INTERNAL MEDICINE

## 2020-01-01 DEVICE — STENT URET 6F 26CM WO GW INL: Type: IMPLANTABLE DEVICE | Site: URETER | Status: FUNCTIONAL

## 2020-01-01 RX ORDER — HYDROMORPHONE HYDROCHLORIDE 1 MG/ML
0.4 INJECTION, SOLUTION INTRAMUSCULAR; INTRAVENOUS; SUBCUTANEOUS EVERY 5 MIN PRN
Status: ACTIVE | OUTPATIENT
Start: 2020-01-01 | End: 2020-01-02

## 2020-01-01 RX ORDER — ONDANSETRON 2 MG/ML
INJECTION INTRAMUSCULAR; INTRAVENOUS AS NEEDED
Status: DISCONTINUED | OUTPATIENT
Start: 2020-01-01 | End: 2020-01-01 | Stop reason: SURG

## 2020-01-01 RX ORDER — LIDOCAINE HYDROCHLORIDE 20 MG/ML
JELLY TOPICAL AS NEEDED
Status: DISCONTINUED | OUTPATIENT
Start: 2020-01-01 | End: 2020-01-01

## 2020-01-01 RX ORDER — INSULIN ASPART 100 [IU]/ML
INJECTION, SOLUTION INTRAVENOUS; SUBCUTANEOUS ONCE
Status: DISCONTINUED | OUTPATIENT
Start: 2020-01-01 | End: 2020-01-01

## 2020-01-01 RX ORDER — SODIUM CHLORIDE 9 MG/ML
INJECTION, SOLUTION INTRAVENOUS CONTINUOUS PRN
Status: DISCONTINUED | OUTPATIENT
Start: 2020-01-01 | End: 2020-01-01 | Stop reason: SURG

## 2020-01-01 RX ORDER — SODIUM CHLORIDE, SODIUM LACTATE, POTASSIUM CHLORIDE, CALCIUM CHLORIDE 600; 310; 30; 20 MG/100ML; MG/100ML; MG/100ML; MG/100ML
INJECTION, SOLUTION INTRAVENOUS CONTINUOUS
Status: DISCONTINUED | OUTPATIENT
Start: 2020-01-01 | End: 2020-01-01

## 2020-01-01 RX ORDER — LABETALOL HYDROCHLORIDE 5 MG/ML
5 INJECTION, SOLUTION INTRAVENOUS EVERY 5 MIN PRN
Status: ACTIVE | OUTPATIENT
Start: 2020-01-01 | End: 2020-01-02

## 2020-01-01 RX ORDER — ONDANSETRON 2 MG/ML
4 INJECTION INTRAMUSCULAR; INTRAVENOUS AS NEEDED
Status: ACTIVE | OUTPATIENT
Start: 2020-01-01 | End: 2020-01-02

## 2020-01-01 RX ORDER — NALOXONE HYDROCHLORIDE 0.4 MG/ML
80 INJECTION, SOLUTION INTRAMUSCULAR; INTRAVENOUS; SUBCUTANEOUS AS NEEDED
Status: ACTIVE | OUTPATIENT
Start: 2020-01-01 | End: 2020-01-02

## 2020-01-01 RX ORDER — DEXTROSE MONOHYDRATE 25 G/50ML
50 INJECTION, SOLUTION INTRAVENOUS
Status: DISCONTINUED | OUTPATIENT
Start: 2020-01-01 | End: 2020-01-03

## 2020-01-01 RX ORDER — LIDOCAINE HYDROCHLORIDE 10 MG/ML
INJECTION, SOLUTION EPIDURAL; INFILTRATION; INTRACAUDAL; PERINEURAL AS NEEDED
Status: DISCONTINUED | OUTPATIENT
Start: 2020-01-01 | End: 2020-01-01 | Stop reason: SURG

## 2020-01-01 RX ADMIN — SODIUM CHLORIDE: 9 INJECTION, SOLUTION INTRAVENOUS at 15:14:00

## 2020-01-01 RX ADMIN — ONDANSETRON 4 MG: 2 INJECTION INTRAMUSCULAR; INTRAVENOUS at 14:44:00

## 2020-01-01 RX ADMIN — SODIUM CHLORIDE: 9 INJECTION, SOLUTION INTRAVENOUS at 14:39:00

## 2020-01-01 RX ADMIN — LIDOCAINE HYDROCHLORIDE 30 MG: 10 INJECTION, SOLUTION EPIDURAL; INFILTRATION; INTRACAUDAL; PERINEURAL at 14:26:00

## 2020-01-01 NOTE — CONSULTS
INFECTIOUS DISEASE [facility] Patient Status:  Inpatient    1945 MRN [id number]   [facility] 4SW-A Attending Jayla Amin MD   Hosp Day # 1 PCP Saroj Villegas, Intravenous, Once **FOLLOWED BY** 0.9% NaCl infusion, , Intravenous, Continuous  •  glucose (DEX4) oral liquid 15 g, 15 g, Oral, Q15 Min PRN **OR** Glucose-Vitamin C (DEX-4) chewable tab 4 tablet, 4 tablet, Oral, Q15 Min PRN **OR** dextrose 50 % injection total) by mouth 3 (three) times daily. , Disp: 180 capsule, Rfl: 0  Acidophilus/Pectin (PROBIOTIC) Oral Cap, Take 1 capsule by mouth daily. , Disp: , Rfl:   Calcium Citrate-Vitamin D (CITRACAL + D OR), Take 1,200 mg by mouth daily. , Disp: , Rfl:         Re   Klebsiella pneumoniae by PCR DetectedAbnormal                   Problem list reviewed:  Patient Active Problem List:     Carotid artery disease ([facility]     Diabetes mellitus ([facility]     Pure hypercholesterolemia     Essential hypertension, benign

## 2020-01-01 NOTE — PROGRESS NOTES
Pharmacy Note: Renal dose adjustment of Dylan Johnson is a 66year old female who has been prescribed Merrem 500mg IV every 6 hours.   CrCl is 28.9 ml/min so the dose has been adjusted  to 500mg IV every 12 hours per hospital renal dose adjust

## 2020-01-01 NOTE — OPERATIVE REPORT
URETEROSCOPY OPERATIVE NOTE    PATIENT NAME: Stan Ramirez  YOB: 1945  DATE OF SERVICE: 1/1/2020    SURGEON:  Jake Roman MD    ASSISTANT:  None    PREOPERATIVE DIAGNOSIS:  Left mid/distal 4mm ureteral calculus, left hydronephrosis, bact The patient was brought to the operating room and placed in the supine position on the OR table. SCD's were applied and all pressure points were carefully padded. At this point, anesthesia was successfully induced.   The patient was then given the periope This completed the procedure. They tolerated it well without complications. Please note that I was present for, and completed the entirety of this operation myself.     PLAN:  She will return to the ICU for close observation and continuation of antibiot

## 2020-01-01 NOTE — PROGRESS NOTES
01/01/20 1220   Clinical Encounter Type   Visited With Patient and family together   Routine Visit Introduction    responded to [facility] consult. Patient stated that she did not have any needs at this time.    remains available for vilma

## 2020-01-01 NOTE — PROGRESS NOTES
[facility] Pharmacy Note: Antimicrobial Weight Based Dose Adjustment for: piperacillin/tazobactam (Juan Gottron)    Saniya Garner is a 66year old female who has been prescribed piperacillin/tazobactam (ZOSYN) 3.375 g every 8 hours.     Estimated Creatinine Clearance

## 2020-01-01 NOTE — ANESTHESIA POSTPROCEDURE EVALUATION
[address] [address] Patient Status:  Inpatient   Age/Gender 66year old female MRN [id number]   Location [address] [place] [address] Attending Mary Jo Toledo MD   [facility] Day # 1 PCP Emile Thomason MD       Anesthesia Post-op Note    Proced

## 2020-01-01 NOTE — PROGRESS NOTES
[facility] PULMONARY/CRITICAL CARE    S: Pt is feeling better. Denies pain, sob, cough. Weaned off levophed.      Meds:  • Heparin Sodium (Porcine)  5,000 Units Subcutaneous Q8H [address]   • Insulin Aspart Pen  1-10 Units Subcutaneous TID AC and HS   • meropenem  500 m reviewed    Assessment and Plan    1. Septic shock: 2/2 UTI, GNR bacteremia.  Off pressors as of this am. WBC has increased to 50K  -IVF  -vasopressors as needed to maintain MAP >65  -Abx per ID -- meropenem  -trend lactate  -will order CT A/P to r/o absces

## 2020-01-01 NOTE — PLAN OF CARE
Received pt drowsy, opens eyes, knows her name, disoriented to place, time, date, follows all simple commands, requested to be pulled up in the bed, generalized weakness noted.  Pt is asking for water, due to temp up to 101.2, tylenol given, swallows withou Instruct patient on self management of diabetes  Outcome: Progressing

## 2020-01-01 NOTE — OCCUPATIONAL THERAPY NOTE
OCCUPATIONAL THERAPY EVALUATION - INPATIENT     Room Number: 471/471-A   Evaluation Date: 1/1/2020  Type of Evaluation: Initial  Presenting Problem: back pain, sepsis     Physician Order: IP Consult to Occupational Therapy  Reason for Therapy: ADL/IADL Dys Yes  Patient Regularly Uses:  Other (Comment)(contact lenses)    Prior Level of Function: Pt alert and oriented x4, reports she lives with healthy, supportive spouse in single level home, reports her spouse is \"in the process of putting in a railing for th completed bed mobility to EOB with min A. Pt unable to jerome socks while seated at EOB, therefore provided total assist to complete. Educated pt on long handled equipment for lower body dressing so as to limit back pain and increase independence.  Pt stood t modifications of tasks    Clinical Decision Making LOW - Analysis of occupational profile, problem-focused assessments, limited treatment options    Overall Complexity LOW     OT Discharge Recommendations: Home with home health PT/OT  OT Device Recommendat

## 2020-01-01 NOTE — PROGRESS NOTES
[facility]  Progress Note     Classie  Patient Status:  Inpatient    1945 MRN [id number]   [facility] 4SW-A Attending Rachele Dunham MD   Hosp Day # 1 PCP Antonio Ruiz MD     Chief Complaint: sepsis  S:  Feel better now. Acute pyelonephritis  1. Now +Blood Cx Klebsiella  2. Aggressive IVF  3. CT abd pelvis stone protocol  4. Urology consult with incidental hydro on CT spine  5. IV abx- ID consulted  6. ICU following    2.  Lactic acidosis- Serial lactates trending down

## 2020-01-01 NOTE — PHYSICAL THERAPY NOTE
PHYSICAL THERAPY EVALUATION - INPATIENT     Room Number: 471/471-A  Evaluation Date: 1/1/2020  Type of Evaluation: Initial  Physician Order: PT Eval and Treat    Presenting Problem: sepsis  Reason for Therapy: Mobility Dysfunction and Discharge Plann Yes  Patient Owned Equipment: Rolling walker;Cane(uses cane out of house, rw in AM only)  Patient Regularly Uses:  Other (Comment)(contact lenses)    Prior Level of [place]: Pt alert and oriented x4, reports she lives with healthy, supportive spouse in bedclothes, sheets and blankets)?: None   -   Sitting down on and standing up from a chair with arms (e.g., wheelchair, bedside commode, etc.): A Little   -   Moving from lying on back to sitting on the side of the bed?: A Little   How much help from [facility] concerns addressed    ASSESSMENT   Patient is a 66year old female admitted on 12/31/2019 for lumbar pain, weakness bilat LE, inability to ambulate, diagnosed with sepsis.  Pt improving, today alert and oriented x4, able to actively participate with therapy supervision   Goal #5    Goal #6    Goal Comments: Goals established on 1/1/2020

## 2020-01-01 NOTE — ANESTHESIA PROCEDURE NOTES
Airway  Date/Time: 1/1/2020 2:27 PM  Urgency: elective    Airway not difficult    General Information and Staff    Patient location during procedure: OR  Anesthesiologist: Regina Frank MD  Performed: anesthesiologist     Indications and Patient Conditi

## 2020-01-01 NOTE — ANESTHESIA PREPROCEDURE EVALUATION
PRE-OP EVALUATION    Patient Name: [facility]    Pre-op Diagnosis: Ureteral calculi [N20.1]    Procedure(s):  CYSTOSCOPY LEFT URETEROSCOPY AND STENT INSERTION    Surgeon(s) and Role:     Annie Tran MD - Primary    Pre-op vitals reviewed.   Temp: TID AC and HS  [COMPLETED] Piperacillin Sod-Tazobactam So (ZOSYN) 4.5 g in dextrose 5 % 100 mL MBP/ADD-vantage, 4.5 g, Intravenous, Once  norepinephrine (LEVOPHED) 4 mg/250 ml premix infusion, 0.5-10 mcg/min, Intravenous, Continuous  [COMPLETED] sodium chl diabetes mellitus (HCC)     Renal insufficiency     Lactic acidosis     Septicemia (HCC)     Acute respiratory failure with hypoxia (HCC)     BRITTANY (acute kidney injury) ([facility] [address]     Septic shock (HCC)     Acute cystitis without hematuria     Acute pyelonephrit guidelines. Post-procedure pain management plan discussed with surgeon and patient.       Plan/risks discussed with: patient                Present on Admission:  **None**

## 2020-01-01 NOTE — CONSULTS
[facility]    Report of Consultation    Fermín Matt Patient Status:  Inpatient    1945 MRN [id number]   [facility] 4SW-A Attending Siena Gomez MD   Hosp Day # 1 PCP Jennifer Berumen MD     Date of Admission:  2019  Ramírez CHOLECYSTECTOMY  02/21/2005   • OTHER SURGICAL HISTORY  1972    salpingo-oophorectomy right side   • REMOVAL GALLBLADDER     • TOTAL ABDOM HYSTERECTOMY  1998    removal of left ovary     Family History  No family history on file.     Social History  Social meals. Rosuvastatin Calcium 5 MG Oral Tab, Take 5 mg by mouth nightly. glipiZIDE ER 5 MG Oral Tablet 24 Hr, Take 1 tablet (5 mg total) by mouth once daily. gabapentin 100 MG Oral Cap, Take 2 capsules (200 mg total) by mouth 3 (three) times daily.   Acido Dictated by: Franny Biggs MD on 12/31/2019 at 13:49     Approved by: Franny Biggs MD on 12/31/2019 at 13:52          Ct Abdomen+pelvis Kidneystone 2d Rndr(no Iv,no Oral)(cpt=74176)    Result Date: 1/1/2020  CONCLUSION:  1.  There is an obstructing 6 nephrostomy tube placement. She will need to return to the ICU post operatively for close monitoring, as some patients can transiently decompensate after stent placement in these settings.      Thank you for allowing me to participate in the care of your pa

## 2020-01-01 NOTE — PLAN OF CARE
Patient admitted from the ED alert and oriented to place and self.  present at bedside. Remains on 2L/NC. No c/o pain or discomfort. No SOB noted. Generalized weakness noted. Swelling to BLE. Please refer to nursing assessments.  Will continue to mon

## 2020-01-02 LAB
ANION GAP SERPL CALC-SCNC: 7 MMOL/L (ref 0–18)
BASOPHILS # BLD: 0 X10(3) UL (ref 0–0.2)
BASOPHILS NFR BLD: 0 %
BUN BLD-MCNC: 34 MG/DL (ref 7–18)
BUN/CREAT SERPL: 29.8 (ref 10–20)
CALCIUM BLD-MCNC: 7 MG/DL (ref 8.5–10.1)
CHLORIDE SERPL-SCNC: 114 MMOL/L (ref 98–112)
CO2 SERPL-SCNC: 21 MMOL/L (ref 21–32)
CREAT BLD-MCNC: 1.14 MG/DL (ref 0.55–1.02)
DEPRECATED RDW RBC AUTO: 51.7 FL (ref 35.1–46.3)
EOSINOPHIL # BLD: 0 X10(3) UL (ref 0–0.7)
EOSINOPHIL NFR BLD: 0 %
ERYTHROCYTE [DISTWIDTH] IN BLOOD BY AUTOMATED COUNT: 15.7 % (ref 11–15)
GLUCOSE BLD-MCNC: 123 MG/DL (ref 70–99)
GLUCOSE BLD-MCNC: 123 MG/DL (ref 70–99)
GLUCOSE BLD-MCNC: 151 MG/DL (ref 70–99)
GLUCOSE BLD-MCNC: 161 MG/DL (ref 70–99)
GLUCOSE BLD-MCNC: 175 MG/DL (ref 70–99)
HAV IGM SER QL: 1.5 MG/DL (ref 1.6–2.6)
HCT VFR BLD AUTO: 35.2 % (ref 35–48)
HGB BLD-MCNC: 11.3 G/DL (ref 12–16)
K PNEUMON DNA BLD POS QL NAA+NON-PROBE: DETECTED
LYMPHOCYTES NFR BLD: 0.76 X10(3) UL (ref 1–4)
LYMPHOCYTES NFR BLD: 2 %
MCH RBC QN AUTO: 28.8 PG (ref 26–34)
MCHC RBC AUTO-ENTMCNC: 32.1 G/DL (ref 31–37)
MCV RBC AUTO: 89.6 FL (ref 80–100)
MONOCYTES # BLD: 0.38 X10(3) UL (ref 0.1–1)
MONOCYTES NFR BLD: 1 %
MORPHOLOGY: NORMAL
NEUTROPHILS # BLD AUTO: 33.12 X10 (3) UL (ref 1.5–7.7)
NEUTROPHILS NFR BLD: 85 %
NEUTS BAND NFR BLD: 12 %
NEUTS HYPERSEG # BLD: 36.96 X10(3) UL (ref 1.5–7.7)
OSMOLALITY SERPL CALC.SUM OF ELEC: 303 MOSM/KG (ref 275–295)
PLATELET # BLD AUTO: 171 10(3)UL (ref 150–450)
PLATELET MORPHOLOGY: NORMAL
POTASSIUM SERPL-SCNC: 3.8 MMOL/L (ref 3.5–5.1)
RBC # BLD AUTO: 3.93 X10(6)UL (ref 3.8–5.3)
SODIUM SERPL-SCNC: 142 MMOL/L (ref 136–145)
TOTAL CELLS COUNTED: 100
WBC # BLD AUTO: 38.1 X10(3) UL (ref 4–11)

## 2020-01-02 PROCEDURE — 99233 SBSQ HOSP IP/OBS HIGH 50: CPT | Performed by: HOSPITALIST

## 2020-01-02 RX ORDER — METOCLOPRAMIDE HYDROCHLORIDE 5 MG/ML
10 INJECTION INTRAMUSCULAR; INTRAVENOUS EVERY 8 HOURS PRN
Status: DISCONTINUED | OUTPATIENT
Start: 2020-01-02 | End: 2020-01-03

## 2020-01-02 RX ORDER — POTASSIUM CHLORIDE 20 MEQ/1
40 TABLET, EXTENDED RELEASE ORAL ONCE
Status: COMPLETED | OUTPATIENT
Start: 2020-01-02 | End: 2020-01-02

## 2020-01-02 RX ORDER — MAGNESIUM OXIDE 400 MG (241.3 MG MAGNESIUM) TABLET
800 TABLET ONCE
Status: COMPLETED | OUTPATIENT
Start: 2020-01-02 | End: 2020-01-02

## 2020-01-02 RX ORDER — POTASSIUM CHLORIDE 1.5 G/1.77G
40 POWDER, FOR SOLUTION ORAL ONCE
Status: DISCONTINUED | OUTPATIENT
Start: 2020-01-02 | End: 2020-01-02

## 2020-01-02 RX ORDER — CEFAZOLIN SODIUM/WATER 2 G/20 ML
2 SYRINGE (ML) INTRAVENOUS EVERY 8 HOURS
Status: DISCONTINUED | OUTPATIENT
Start: 2020-01-02 | End: 2020-01-03

## 2020-01-02 NOTE — PROGRESS NOTES
Pharmacy Note: Renal dose adjustment   Karla Fontaine was originally prescribed Reglan which was renally dose adjusted at the time of the original order per P&T approved renal dosing protocol. Renal function has now improved.     Estimated Creatinine Cl

## 2020-01-02 NOTE — PROGRESS NOTES
[facility]  Urology Progress Note    Kelly Pump Patient Status:  Inpatient    1945 MRN [id number]   [facility] 4SW-A Attending Cruz Lemon MD   Hosp Day # 2 PCP Rashmi Gamez MD     Subjective:  Kelly Pump is a(n)

## 2020-01-02 NOTE — PHYSICAL THERAPY NOTE
PHYSICAL THERAPY TREATMENT NOTE - INPATIENT    Room Number: 471/471-A     Session: 1  Number of Visits to Meet Established Goals: 5    Presenting Problem: sepsis   History related to current admission: Pt is 66year old female admitted on 12/31/2019 from Restriction: None                PAIN ASSESSMENT   Ratin  Location: denies at this time  Management Techniques:  Activity promotion    BALANCE left with call light in reach. RN aware.       THERAPEUTIC EXERCISES  Lower Extremity Ankle pumps     Upper Extremity      Position Sitting     Repetitions   20   Sets   1     Patient End of Session: Up in chair;Needs met;Call light within reach;RN aware of

## 2020-01-02 NOTE — HOME CARE RN NOTE
Received referral from [facility] . Met with patient at the bedside to discuss home health services and offer choice. Patient is not agreeable to [facility] services at discharge. Contact information provided. Any questions addressed. CNM Updated.

## 2020-01-02 NOTE — PROGRESS NOTES
[facility]  Progress Note     Swapna Montanez Patient Status:  Inpatient    1945 MRN [id number]   [facility] 4SW-A Attending Miguel Millard MD   Hosp Day # 2 PCP Quita Becerra MD     Chief Complaint: sepsis    S:  Reports f Subcutaneous TID AC and HS     ASSESSMENT / PLAN:   1. Septic shock resolved  2. Klebsiella bacteremia 2/2 Acute pyelonephritis, improving  3. Left ureteral calculus s/p stent  1. Continue ancef IV per ID and cx reviewed  2. reilly/stent per urology  4.  Lac

## 2020-01-02 NOTE — CM/SW NOTE
01/02/20 1500   CM/SW Screening   Referral Source    Information Source Chart review;Nursing rounds   Patient's Mental Status Alert;Oriented   Patient lives with Spouse   Patient Status Prior to Admission   Independent with ADLs and Mobility

## 2020-01-02 NOTE — PROGRESS NOTES
[facility]                INFECTIOUS DISEASE PROGRESS NOTE    Kelly Pump Patient Status:  Inpatient    1945 MRN [id number]   [facility] 4SW-A Attending Cruz Lemon MD   Hosp Day # 2 PCP Rashmi Gamez MD     Antibiot Collection Time: 12/31/19 12:18 PM   Result Value Ref Range    Blood Culture Result Gram Negative Avila (A) N/A    Blood Culture Smear Gram Negative Rods (A) N/A       Blood Culture FREQ X 2 [[id number]] (Abnormal)  Collected: 12/31/19 1203   Order Status: C

## 2020-01-02 NOTE — PLAN OF CARE
Assumed care of pt. For noc shift. A/Ox4.  2l NC--> Room air 1/2 a.m., diminished breath sounds. NSR on bedside monitor, stable BP, afebrile. Tolerating ice chips. Holm with output charted- s/p cystoscopy, and stent placement.   Up to bedside commode a

## 2020-01-02 NOTE — PROGRESS NOTES
Critical Care Progress Note     Assessment / Plan:  1. Septic shock - due to Kelebsiella bacteremia from UTI  - stop IVFs  - lactic acid normalized  - meropenem to cefazolin today per ID  2.  Left ureteral calculus s/p cystoscopy and stent placement  - per

## 2020-01-02 NOTE — OCCUPATIONAL THERAPY NOTE
OCCUPATIONAL THERAPY TREATMENT NOTE - INPATIENT     Room Number: 471/471-A  Session: 1   Number of Visits to Meet Established Goals: 5    Presenting Problem: renal insufficinency, back pain, cystitis, septic shock     History related to current admission: couples. \"     Patient self-stated goal is none identified     OBJECTIVE       WEIGHT BEARING RESTRICTION  Weight Bearing Restriction: None                PAIN ASSESSMENT  Rating: Unable to rate  Location: \"back ache, not pain\"   Management Techniques: A questions and concerns addressed    ASSESSMENT   Pt is a 65 y/o F admitted to [facility]  [address] [place] [address] with an admitting diagnosis of renal insufficinency, back pain, cystitis, septic shock.  Pt was previously functioning independently including

## 2020-01-02 NOTE — BH PROGRESS NOTE
Received patient this AM A&Ox4 on room air and no c/o pain. Tolerating diet, patient to have soft diet for dinner. IVF discontinued. Antibiotic switched to Ancef. Holm remains in place with adequate output.  Patient up in chair for most of the morning and

## 2020-01-02 NOTE — PLAN OF CARE
Problem: Patient/Family Goals  Goal: Patient/Family Long Term Goal  Description  Patient's Long Term Goal: \"to go home\"    Interventions:  - IV abx  Blood cultures/ urine culture  Fluids    - See additional Care Plan goals for specific interventions therapy as ordered  Outcome: Progressing     Problem: GENITOURINARY - ADULT  Goal: Absence of urinary retention  Description  INTERVENTIONS:  - Assess patient’s ability to void and empty bladder  - Monitor intake/output and perform bladder scan as needed appropriate  Outcome: Progressing     Problem: RISK FOR INFECTION - ADULT  Goal: Absence of fever/infection during anticipated neutropenic period  Description  INTERVENTIONS  - Monitor WBC  - Administer growth factors as ordered  - Implement neutropenic gu

## 2020-01-02 NOTE — PROGRESS NOTES
01/02/20 1016   Clinical Encounter Type   Visited With Patient   Routine Visit Follow-up   Continue Visiting No   Oriental orthodox Encounters   Spiritual Requests During Visit / Hospitalization [address] Patient wa

## 2020-01-02 NOTE — PLAN OF CARE
Patient received alert and oriented x4, no c/o pain or discomfort. Patient up to chair today with PT/OT. Up to commode with multiple sfot, formed BM today with 2 assist. Cdiff and RPP negative. Isolation discontinued.  STAT CT ordered to evaluate for hydron

## 2020-01-03 VITALS
HEART RATE: 64 BPM | HEIGHT: 69 IN | BODY MASS INDEX: 35.81 KG/M2 | OXYGEN SATURATION: 100 % | DIASTOLIC BLOOD PRESSURE: 69 MMHG | SYSTOLIC BLOOD PRESSURE: 141 MMHG | WEIGHT: 241.81 LBS | RESPIRATION RATE: 18 BRPM | TEMPERATURE: 98 F

## 2020-01-03 LAB
BASOPHILS # BLD AUTO: 0.07 X10(3) UL (ref 0–0.2)
BASOPHILS NFR BLD AUTO: 0.3 %
DEPRECATED RDW RBC AUTO: 52.2 FL (ref 35.1–46.3)
EOSINOPHIL # BLD AUTO: 0.02 X10(3) UL (ref 0–0.7)
EOSINOPHIL NFR BLD AUTO: 0.1 %
ERYTHROCYTE [DISTWIDTH] IN BLOOD BY AUTOMATED COUNT: 15.9 % (ref 11–15)
GLUCOSE BLD-MCNC: 131 MG/DL (ref 70–99)
HAV IGM SER QL: 1.7 MG/DL (ref 1.6–2.6)
HCT VFR BLD AUTO: 38.5 % (ref 35–48)
HGB BLD-MCNC: 12.8 G/DL (ref 12–16)
IMM GRANULOCYTES # BLD AUTO: 0.19 X10(3) UL (ref 0–1)
IMM GRANULOCYTES NFR BLD: 0.7 %
LYMPHOCYTES # BLD AUTO: 1.54 X10(3) UL (ref 1–4)
LYMPHOCYTES NFR BLD AUTO: 6 %
MCH RBC QN AUTO: 29.7 PG (ref 26–34)
MCHC RBC AUTO-ENTMCNC: 33.2 G/DL (ref 31–37)
MCV RBC AUTO: 89.3 FL (ref 80–100)
MONOCYTES # BLD AUTO: 0.63 X10(3) UL (ref 0.1–1)
MONOCYTES NFR BLD AUTO: 2.4 %
NEUTROPHILS # BLD AUTO: 23.3 X10 (3) UL (ref 1.5–7.7)
NEUTROPHILS # BLD AUTO: 23.3 X10(3) UL (ref 1.5–7.7)
NEUTROPHILS NFR BLD AUTO: 90.5 %
PLATELET # BLD AUTO: 212 10(3)UL (ref 150–450)
POTASSIUM SERPL-SCNC: 4 MMOL/L (ref 3.5–5.1)
RBC # BLD AUTO: 4.31 X10(6)UL (ref 3.8–5.3)
WBC # BLD AUTO: 25.8 X10(3) UL (ref 4–11)

## 2020-01-03 PROCEDURE — 99239 HOSP IP/OBS DSCHRG MGMT >30: CPT | Performed by: HOSPITALIST

## 2020-01-03 RX ORDER — AMLODIPINE BESYLATE AND BENAZEPRIL HYDROCHLORIDE 5; 20 MG/1; MG/1
1 CAPSULE ORAL DAILY
Qty: 30 CAPSULE | Refills: 0 | Status: SHIPPED | COMMUNITY
Start: 2020-01-06 | End: 2020-01-09

## 2020-01-03 RX ORDER — AMLODIPINE BESYLATE 5 MG/1
5 TABLET ORAL DAILY
Status: DISCONTINUED | OUTPATIENT
Start: 2020-01-03 | End: 2020-01-03

## 2020-01-03 RX ORDER — CIPROFLOXACIN 500 MG/1
500 TABLET, FILM COATED ORAL 2 TIMES DAILY
Qty: 30 TABLET | Refills: 0 | Status: SHIPPED | OUTPATIENT
Start: 2020-01-03 | End: 2020-01-18

## 2020-01-03 RX ORDER — MAGNESIUM OXIDE 400 MG (241.3 MG MAGNESIUM) TABLET
400 TABLET ONCE
Status: COMPLETED | OUTPATIENT
Start: 2020-01-03 | End: 2020-01-03

## 2020-01-03 RX ORDER — ROSUVASTATIN CALCIUM 5 MG/1
5 TABLET, COATED ORAL NIGHTLY
Status: DISCONTINUED | OUTPATIENT
Start: 2020-01-03 | End: 2020-01-03

## 2020-01-03 NOTE — DISCHARGE SUMMARY
[facility]  DISCHARGE SUMMARY     Monty Owens Patient Status:  Inpatient    1945 MRN [id number]   [facility] 3NW-A Attending Adela Ervin MD   Hosp Day # 3 PCP Micki Pedro MD     Date of Admission: 2019  Date of consulted. CT scan showed obstructing 6 mm left ureteral calculus with marked left hydronephrosis and hydroureter. Patient was taken to the OR for stent placement. She continued to be monitored closely in the ICU post procedure.   Blood cultures returned start on January 6, 2020. If you are unsure what to do until then, ask your doctor or other care provider. Take 1 capsule by mouth daily.    Quantity:  30 capsule  Refills:  0        CONTINUE taking these medications      Instructions Prescription deta -----------------------------------------------------------------------------------------------  PATIENT DISCHARGE INSTRUCTIONS: See electronic chart    EUGENIA Saavedra    Time spent:  > 30 minutes

## 2020-01-03 NOTE — OCCUPATIONAL THERAPY NOTE
OCCUPATIONAL THERAPY TREATMENT NOTE - INPATIENT     Room Number: 329/329-A  Session: 1   Number of Visits to Meet Established Goals: 5    Presenting Problem: renal insufficinency, back pain, cystitis, septic shock     History related to current admission: home    OBJECTIVE  Precautions: None    WEIGHT BEARING RESTRICTION  Weight Bearing Restriction: None                PAIN ASSESSMENT  Ratin  Location: denies  Management Techniques:  Activity promotion;Repositioning     ACTIVITY TOLERANCE addressed    ASSESSMENT   Patient demonstrate increased standing balance, endurance, strength during self care tasks. Patient is requiring mod I to supervision assist during functional transfers from modified surfaces.   Patient requires mod I to min a for

## 2020-01-03 NOTE — PLAN OF CARE
Problem: Patient/Family Goals  Goal: Patient/Family Long Term Goal  Description  Patient's Long Term Goal: \"to go home\"    Interventions:  - IV abx  Blood cultures/ urine culture  Fluids    - See additional Care Plan goals for specific interventions

## 2020-01-03 NOTE — PROGRESS NOTES
[facility]                INFECTIOUS DISEASE PROGRESS NOTE    Lorene Walters Patient Status:  Inpatient    1945 MRN [id number]   [facility] 4SW-A Attending Chrystal Oppenheim, MD   Hosp Day # 3 PCP Enma Perez MD     Antibiot Urine Culture 50,000-99,000 CFU/ML Klebsiella pneumoniae (A) N/A       Susceptibility    Klebsiella pneumoniae -  (no method available)     Ampicillin >=32 Resistant      Ampicillin + Sulbactam 4 Sensitive      Cefazolin <=4 Sensitive      Ciprofloxacin < 1. Left ureteral stone /hydronephrosis/Klebsiella bacteremia  SP cysto/stent 1/1  -renal function improved  On cefazo  Home po cipro  today      Shawanda Toth MD  [facility]  [phone]

## 2020-01-03 NOTE — PLAN OF CARE
Assumed care of pt for NOC shift. A/ox 4. RA while awake, 2l NC while asleep- clear/diminished lung sounds. NSR on bedside monitor. Afebrile, stable BP. Tolerating PO fluid and nutrition- encouarged fluid intake. No reports of pain.   Transfer orders

## 2020-01-03 NOTE — PROGRESS NOTES
NURSING ADMISSION NOTE      Patient admitted via Wheelchair from ICU  Oriented to room. Safety precautions initiated. Bed in low position. Call light in reach.

## 2020-01-03 NOTE — PROGRESS NOTES
[facility]  Urology Progress Note    Sandy Kiser Patient Status:  Inpatient    1945 MRN [id number]   [facility] 3NW-A Attending Flo Pandey MD   Hosp Day # 3 PCP Sheldon Gonzalez MD     Subjective:  Sandy Kiser is a(n)

## 2020-01-03 NOTE — PLAN OF CARE
Pt alert and orientatedx4. Arrived from the ICU this shift in stable condition. Room air. Pulse Ox. Denies pain. Denies N/V. Carb controlled diet. Ice water proved. Up with stand by.  Cane in room but states she only needs that when she is going longer dist decreased coronary artery perfusion - ex.  Angina  - Evaluate fluid balance, assess for edema, trend weights  Outcome: Progressing  Goal: Absence of cardiac arrhythmias or at baseline  Description  INTERVENTIONS:  - Continuous cardiac monitoring, monitor vi

## 2020-01-03 NOTE — PROGRESS NOTES
[facility]  Progress Note     Kelly Pump Patient Status:  Inpatient    1945 MRN [id number]   [facility] 4SW-A Attending Cruz Lemon MD   Hosp Day # 3 PCP Rashmi Gamez MD     Chief Complaint: sepsis    S:  Reports f Imaging data reviewed in Epic.   Medications:   • magnesium oxide  400 mg Oral Once   • ceFAZolin  2 g Intravenous Q8H   • Heparin Sodium (Porcine)  5,000 Units Subcutaneous Q8H [address]   • Insulin Aspart Pen  1-10 Units Subcutaneous TID AC and HS     ASSESSMEN

## 2020-01-03 NOTE — PROGRESS NOTES
PT RESTING IN BED, EASY NON LABORED BREATHING ON RA. IV HL. TREJO DRAINING YELLOW URINE. PT TOLERATING DM DIET. UP WITH SB ASSIST AND WALKER. ACCHECK QID. AM MEDS ADMIN. INSTRUCTED TO CALL IF ANY NEEDS ARISE, CALL LIGHT WITH IN REACH.

## 2020-01-03 NOTE — PROGRESS NOTES
DISCHARGE PAPERWORK DISCUSSED WITH PT, ALL QUESTIONS ANSWERED. DISCHARGE PAPERWORK GIVEN TO PT, PT LEFT UNIT IN STABLE CONDITION TO [facility] VIA WHEELCHAIR.

## 2020-01-06 ENCOUNTER — PATIENT OUTREACH (OUTPATIENT)
Dept: CASE MANAGEMENT | Age: 75
End: 2020-01-06

## 2020-01-06 DIAGNOSIS — R65.21 SEPTIC SHOCK (HCC): ICD-10-CM

## 2020-01-06 DIAGNOSIS — N13.30 HYDRONEPHROSIS, UNSPECIFIED HYDRONEPHROSIS TYPE: ICD-10-CM

## 2020-01-06 DIAGNOSIS — Z02.9 ENCOUNTERS FOR UNSPECIFIED ADMINISTRATIVE PURPOSE: ICD-10-CM

## 2020-01-06 DIAGNOSIS — N10 ACUTE PYELONEPHRITIS: ICD-10-CM

## 2020-01-06 DIAGNOSIS — N28.9 RENAL INSUFFICIENCY: ICD-10-CM

## 2020-01-06 DIAGNOSIS — N30.00 ACUTE CYSTITIS WITHOUT HEMATURIA: ICD-10-CM

## 2020-01-06 DIAGNOSIS — A41.9 SEPTIC SHOCK (HCC): ICD-10-CM

## 2020-01-06 DIAGNOSIS — A41.9 SEPTICEMIA (HCC): ICD-10-CM

## 2020-01-06 PROCEDURE — 1111F DSCHRG MED/CURRENT MED MERGE: CPT

## 2020-01-06 NOTE — PROGRESS NOTES
Initial Post Discharge Follow Up   Discharge Date: 1/3/20  Contact Date: 1/6/2020    Consent Verification:  Assessment Completed With: Patient  HIPAA Verified? Yes    Discharge Dx:    1. Septic shock resolved  2.  Klebsiella bacteremia 2/2 Acute pyelone about your diagnoses? No  • Are you able to perform normal daily activities of living as you have prior to your hospital stay (dressing, bathing, ambulating to the bathroom, etc)?  yes  • (NCM) Was patient given a different diet per AVS? no      Medications addressed before your next visit with your PCP?  (DME, meds, disease concerns, Etc): No     Follow up appointments:      Your appointments     Date & Time Appointment Department [facility])    Jan 09, 2020 10:00 AM [address] [place] [address] Follow Up with HANNAH Salgado to a surgery scheduler. Interventions by [facility]:  NCM reviewed medications, discharge instructions, S&S of infection/blood clots, patient instructed to report any new or worsening symptoms, when to call the doctor and when to call 911.  Patient verbalized u

## 2020-01-07 ENCOUNTER — TELEPHONE (OUTPATIENT)
Dept: INTERNAL MEDICINE CLINIC | Facility: CLINIC | Age: 75
End: 2020-01-07

## 2020-01-07 NOTE — TELEPHONE ENCOUNTER
Received pre op request for Cystoscopy, Ureteroscopy, Retrograde Pyelogram, Laser Lithotripsy, Stone Extraction, Stent Exchange scheduled for 1/23/2020 with Dr Roverto Charlton. Please contact pt to schedule Pre Op. He needs an EKG.    Paperwork in upcoming

## 2020-01-09 ENCOUNTER — OFFICE VISIT (OUTPATIENT)
Dept: INTERNAL MEDICINE CLINIC | Facility: CLINIC | Age: 75
End: 2020-01-09
Payer: MEDICARE

## 2020-01-09 ENCOUNTER — EKG ENCOUNTER (OUTPATIENT)
Dept: LAB | Age: 75
End: 2020-01-09
Attending: FAMILY MEDICINE
Payer: MEDICARE

## 2020-01-09 VITALS
BODY MASS INDEX: 35.47 KG/M2 | OXYGEN SATURATION: 95 % | SYSTOLIC BLOOD PRESSURE: 122 MMHG | DIASTOLIC BLOOD PRESSURE: 60 MMHG | HEIGHT: 69 IN | WEIGHT: 239.5 LBS | RESPIRATION RATE: 18 BRPM | HEART RATE: 81 BPM | TEMPERATURE: 98 F

## 2020-01-09 DIAGNOSIS — N17.9 AKI (ACUTE KIDNEY INJURY) (HCC): ICD-10-CM

## 2020-01-09 DIAGNOSIS — R60.0 LOWER EXTREMITY EDEMA: ICD-10-CM

## 2020-01-09 DIAGNOSIS — N10 ACUTE PYELONEPHRITIS: ICD-10-CM

## 2020-01-09 DIAGNOSIS — I10 ESSENTIAL HYPERTENSION, BENIGN: ICD-10-CM

## 2020-01-09 DIAGNOSIS — N20.1 LEFT URETERAL CALCULUS: ICD-10-CM

## 2020-01-09 DIAGNOSIS — A41.9 SEPTICEMIA (HCC): Primary | ICD-10-CM

## 2020-01-09 LAB
ATRIAL RATE: 92 BPM
P AXIS: 67 DEGREES
P-R INTERVAL: 124 MS
Q-T INTERVAL: 366 MS
QRS DURATION: 90 MS
QTC CALCULATION (BEZET): 452 MS
R AXIS: -19 DEGREES
T AXIS: 142 DEGREES
VENTRICULAR RATE: 92 BPM

## 2020-01-09 PROCEDURE — 99495 TRANSJ CARE MGMT MOD F2F 14D: CPT | Performed by: FAMILY MEDICINE

## 2020-01-09 PROCEDURE — 93010 ELECTROCARDIOGRAM REPORT: CPT | Performed by: INTERNAL MEDICINE

## 2020-01-09 PROCEDURE — 1111F DSCHRG MED/CURRENT MED MERGE: CPT | Performed by: FAMILY MEDICINE

## 2020-01-09 PROCEDURE — 93005 ELECTROCARDIOGRAM TRACING: CPT

## 2020-01-09 RX ORDER — BENAZEPRIL HYDROCHLORIDE AND HYDROCHLOROTHIAZIDE 20; 12.5 MG/1; MG/1
1 TABLET ORAL DAILY
Qty: 30 TABLET | Refills: 0 | Status: SHIPPED | OUTPATIENT
Start: 2020-01-09 | End: 2020-02-04

## 2020-01-09 NOTE — PROGRESS NOTES
HPI:    Tan Mcclure is a 66year old female here today for hospital follow up.    She was discharged from Inpatient hospital, [facility] to Home   Admission Date: 12/31/19   Discharge Date: 1/3/20  Hospital Discharge Diagnoses (since 12/10/2019) continue on oral antibiotics per ID. Patient will resume blood pressure meds in 2-3 days as discussed. She has blood pressure cuff at home and plans to monitor. She was instructed to follow-up with PCP - has appointment on 1/9.   Urology as outpatient fo surgical history that includes colonoscopy & polypectomy (11/24/2003); laparoscopic cholecystectomy (02/21/2005); total abdom hysterectomy (1998); other surgical history (1972); hysterectomy; colonoscopy (12/19/2013); colonoscopy (12/19/13 ); and removal g Oral Tab; Take 1 tablet by mouth daily. No orders of the defined types were placed in this encounter.       Meds & Refills for this Visit:  Requested Prescriptions     Signed Prescriptions Disp Refills   • Benazepril-hydroCHLOROthiazide 20-12.5 MG Or

## 2020-01-16 ENCOUNTER — TELEPHONE (OUTPATIENT)
Dept: INTERNAL MEDICINE CLINIC | Facility: CLINIC | Age: 75
End: 2020-01-16

## 2020-01-16 ENCOUNTER — APPOINTMENT (OUTPATIENT)
Dept: LAB | Age: 75
End: 2020-01-16
Attending: FAMILY MEDICINE
Payer: MEDICARE

## 2020-01-16 DIAGNOSIS — N10 ACUTE PYELONEPHRITIS: ICD-10-CM

## 2020-01-16 DIAGNOSIS — N20.1 LEFT URETERAL CALCULUS: ICD-10-CM

## 2020-01-16 LAB
BILIRUB UR QL STRIP.AUTO: NEGATIVE
COLOR UR AUTO: YELLOW
GLUCOSE UR STRIP.AUTO-MCNC: NEGATIVE MG/DL
HYALINE CASTS #/AREA URNS AUTO: PRESENT /LPF
KETONES UR STRIP.AUTO-MCNC: NEGATIVE MG/DL
NITRITE UR QL STRIP.AUTO: NEGATIVE
PH UR STRIP.AUTO: 5 [PH] (ref 4.5–8)
PROT UR STRIP.AUTO-MCNC: 100 MG/DL
RBC #/AREA URNS AUTO: >10 /HPF
SP GR UR STRIP.AUTO: 1.01 (ref 1–1.03)
UROBILINOGEN UR STRIP.AUTO-MCNC: <2 MG/DL

## 2020-01-16 PROCEDURE — 87086 URINE CULTURE/COLONY COUNT: CPT

## 2020-01-16 PROCEDURE — 81001 URINALYSIS AUTO W/SCOPE: CPT

## 2020-01-16 NOTE — TELEPHONE ENCOUNTER
Rep from [facility] with a corrected lab result for patient:  WBC corrected from 21-50 to 11-20 lower than original result; please let doctor know.   Also the urine culture is still processing

## 2020-01-21 ENCOUNTER — HOSPITAL ENCOUNTER (OUTPATIENT)
Dept: ULTRASOUND IMAGING | Age: 75
Discharge: HOME | End: 2020-01-21
Attending: FAMILY MEDICINE
Payer: MEDICARE

## 2020-01-21 DIAGNOSIS — I73.9 CLAUDICATION OF BOTH LOWER EXTREMITIES (HCC): ICD-10-CM

## 2020-01-21 DIAGNOSIS — M79.661 BILATERAL CALF PAIN: ICD-10-CM

## 2020-01-21 DIAGNOSIS — M79.662 BILATERAL CALF PAIN: ICD-10-CM

## 2020-01-21 PROCEDURE — 93923 UPR/LXTR ART STDY 3+ LVLS: CPT | Performed by: FAMILY MEDICINE

## 2020-02-05 RX ORDER — BENAZEPRIL HYDROCHLORIDE AND HYDROCHLOROTHIAZIDE 20; 12.5 MG/1; MG/1
1 TABLET ORAL DAILY
Qty: 90 TABLET | Refills: 0 | Status: SHIPPED | OUTPATIENT
Start: 2020-02-05 | End: 2020-04-17

## 2020-02-10 ENCOUNTER — OFFICE VISIT (OUTPATIENT)
Dept: INTERNAL MEDICINE CLINIC | Facility: CLINIC | Age: 75
End: 2020-02-10
Payer: MEDICARE

## 2020-02-10 VITALS
DIASTOLIC BLOOD PRESSURE: 62 MMHG | SYSTOLIC BLOOD PRESSURE: 120 MMHG | RESPIRATION RATE: 16 BRPM | BODY MASS INDEX: 33.92 KG/M2 | TEMPERATURE: 98 F | WEIGHT: 229 LBS | HEIGHT: 69 IN | OXYGEN SATURATION: 99 % | HEART RATE: 66 BPM

## 2020-02-10 DIAGNOSIS — M48.061 SPINAL STENOSIS OF LUMBAR REGION, UNSPECIFIED WHETHER NEUROGENIC CLAUDICATION PRESENT: ICD-10-CM

## 2020-02-10 DIAGNOSIS — M79.604 LOWER EXTREMITY PAIN, BILATERAL: Primary | ICD-10-CM

## 2020-02-10 DIAGNOSIS — M79.605 LOWER EXTREMITY PAIN, BILATERAL: Primary | ICD-10-CM

## 2020-02-10 DIAGNOSIS — N30.00 ACUTE CYSTITIS WITHOUT HEMATURIA: ICD-10-CM

## 2020-02-10 PROCEDURE — 99214 OFFICE O/P EST MOD 30 MIN: CPT | Performed by: FAMILY MEDICINE

## 2020-02-10 RX ORDER — GABAPENTIN 300 MG/1
300 CAPSULE ORAL 3 TIMES DAILY
Qty: 270 CAPSULE | Refills: 1 | Status: SHIPPED | OUTPATIENT
Start: 2020-02-10 | End: 2020-07-27

## 2020-02-10 NOTE — PROGRESS NOTES
HPI:    Patient ID: Autumn Daniel is a 66year old female. HPI  Autumn Daniel is a 66year old female.   HPI:   Pt is here after her L ureteral stent was removed     Overall feeling good   Denies any urinary s/s     The BP meds are working well otherwise  SKIN: denies rashes  RESPIRATORY: denies shortness of breath   No cough  CARDIOVASCULAR: denies chest pain on exertion   GI: denies abdominal pain  NEURO: denies headaches    EXAM:   /62   Pulse 66   Temp 98 °F (36.7 °C) (Oral)   Resp 16 ASSESSMENT/PLAN:   No diagnosis found. No orders of the defined types were placed in this encounter.       Meds This Visit:  Requested Prescriptions      No prescriptions requested or ordered in this encounter       Imaging & Referrals:  None       ID

## 2020-04-17 RX ORDER — BENAZEPRIL HYDROCHLORIDE AND HYDROCHLOROTHIAZIDE 20; 12.5 MG/1; MG/1
TABLET ORAL
Qty: 90 TABLET | Refills: 0 | Status: SHIPPED | OUTPATIENT
Start: 2020-04-17 | End: 2020-07-16

## 2020-04-17 NOTE — TELEPHONE ENCOUNTER
Benazepril-hydroCHLOROthiazide 20-12.5 MG QD  Last OV relevant to medication: 2/10/20  Last refill date:2/5/20 #90  When pt was asked to return for OV: 4/2020  Upcoming appt/reason:   Future Appointments   Date Time Provider Alejandro Nguyen   5/6/2020  8

## 2020-04-29 RX ORDER — ROSUVASTATIN CALCIUM 5 MG/1
TABLET, COATED ORAL
Qty: 90 TABLET | Refills: 0 | Status: SHIPPED | OUTPATIENT
Start: 2020-04-29 | End: 2020-07-28

## 2020-04-29 NOTE — TELEPHONE ENCOUNTER
Rosuvastatin 5 mg  Last OV relevant to medication: 12-12-19  Last refill date: 12-12-19 #/refills: 1  When pt was asked to return for OV: none  Upcoming appt/reason: 5-6-20  Recent labs: 10-7-19: lipid

## 2020-05-06 ENCOUNTER — VIRTUAL PHONE E/M (OUTPATIENT)
Dept: INTERNAL MEDICINE CLINIC | Facility: CLINIC | Age: 75
End: 2020-05-06
Payer: MEDICARE

## 2020-05-06 DIAGNOSIS — R92.8 ABNORMAL MAMMOGRAM: ICD-10-CM

## 2020-05-06 DIAGNOSIS — E11.42 DIABETIC POLYNEUROPATHY ASSOCIATED WITH TYPE 2 DIABETES MELLITUS (HCC): ICD-10-CM

## 2020-05-06 DIAGNOSIS — I10 ESSENTIAL HYPERTENSION, BENIGN: Primary | ICD-10-CM

## 2020-05-06 DIAGNOSIS — E78.00 PURE HYPERCHOLESTEROLEMIA: ICD-10-CM

## 2020-05-06 DIAGNOSIS — M79.10 MUSCULAR ACHES: ICD-10-CM

## 2020-05-06 PROCEDURE — 99442 PHONE E/M BY PHYS 11-20 MIN: CPT | Performed by: FAMILY MEDICINE

## 2020-05-06 NOTE — PROGRESS NOTES
Virtual Telephone Check-In    Kingsleyedson Yu verbally consents to a Virtual/Telephone Check-In visit on 05/06/20. Patient understands and accepts financial responsibility for any deductible, co-insurance and/or co-pays associated with this service. Hyperlipidemia    • Measles without mention of complication    • Type II or unspecified type diabetes mellitus without mention of complication, not stated as uncontrolled    • Varicella without mention of complication       Social History:  Social History

## 2020-05-14 ENCOUNTER — OFFICE VISIT (OUTPATIENT)
Dept: ELECTROPHYSIOLOGY | Facility: HOSPITAL | Age: 75
End: 2020-05-14
Attending: FAMILY MEDICINE
Payer: MEDICARE

## 2020-05-14 DIAGNOSIS — E11.42 DIABETIC POLYNEUROPATHY ASSOCIATED WITH TYPE 2 DIABETES MELLITUS (HCC): ICD-10-CM

## 2020-05-14 PROCEDURE — 95909 NRV CNDJ TST 5-6 STUDIES: CPT | Performed by: OTHER

## 2020-05-14 PROCEDURE — 95885 MUSC TST DONE W/NERV TST LIM: CPT | Performed by: OTHER

## 2020-05-15 NOTE — PROCEDURES
[address]      PATIENT'S NAME: Loretta Miguel   REFERRING PHYSICIAN: Krystin Brooks M.D.    PATIENT ACCOUNT #: [de-identified] LOCATION: [facility]   MEDICAL RECORD #: [id number] DATE OF BIRTH: [birth year]

## 2020-05-21 ENCOUNTER — APPOINTMENT (OUTPATIENT)
Dept: LAB | Age: 75
End: 2020-05-21
Attending: FAMILY MEDICINE
Payer: MEDICARE

## 2020-05-21 DIAGNOSIS — E78.00 PURE HYPERCHOLESTEROLEMIA: ICD-10-CM

## 2020-05-21 DIAGNOSIS — E11.42 DIABETIC POLYNEUROPATHY ASSOCIATED WITH TYPE 2 DIABETES MELLITUS (HCC): ICD-10-CM

## 2020-05-21 DIAGNOSIS — M79.10 MUSCULAR ACHES: ICD-10-CM

## 2020-05-21 PROCEDURE — 82550 ASSAY OF CK (CPK): CPT

## 2020-05-21 PROCEDURE — 80061 LIPID PANEL: CPT

## 2020-05-21 PROCEDURE — 85652 RBC SED RATE AUTOMATED: CPT

## 2020-05-21 PROCEDURE — 36415 COLL VENOUS BLD VENIPUNCTURE: CPT

## 2020-05-21 PROCEDURE — 83036 HEMOGLOBIN GLYCOSYLATED A1C: CPT

## 2020-05-21 PROCEDURE — 86038 ANTINUCLEAR ANTIBODIES: CPT

## 2020-06-01 ENCOUNTER — HOSPITAL ENCOUNTER (OUTPATIENT)
Dept: MAMMOGRAPHY | Age: 75
Discharge: HOME | End: 2020-06-01
Attending: FAMILY MEDICINE
Payer: MEDICARE

## 2020-06-01 DIAGNOSIS — R92.8 ABNORMAL MAMMOGRAM: ICD-10-CM

## 2020-06-01 PROCEDURE — 77061 BREAST TOMOSYNTHESIS UNI: CPT | Performed by: FAMILY MEDICINE

## 2020-06-01 PROCEDURE — 77065 DX MAMMO INCL CAD UNI: CPT | Performed by: FAMILY MEDICINE

## 2020-06-09 DIAGNOSIS — E11.9 TYPE 2 DIABETES MELLITUS WITHOUT COMPLICATION, WITHOUT LONG-TERM CURRENT USE OF INSULIN (HCC): ICD-10-CM

## 2020-06-09 RX ORDER — GLIPIZIDE 5 MG/1
TABLET, EXTENDED RELEASE ORAL
Qty: 90 TABLET | Refills: 0 | Status: SHIPPED | OUTPATIENT
Start: 2020-06-09 | End: 2020-09-02

## 2020-07-16 ENCOUNTER — OFFICE VISIT (OUTPATIENT)
Dept: NEUROLOGY | Facility: CLINIC | Age: 75
End: 2020-07-16
Payer: MEDICARE

## 2020-07-16 ENCOUNTER — TELEPHONE (OUTPATIENT)
Dept: INTERNAL MEDICINE CLINIC | Facility: CLINIC | Age: 75
End: 2020-07-16

## 2020-07-16 VITALS
HEART RATE: 84 BPM | WEIGHT: 233 LBS | DIASTOLIC BLOOD PRESSURE: 74 MMHG | RESPIRATION RATE: 13 BRPM | BODY MASS INDEX: 34.51 KG/M2 | HEIGHT: 69 IN | SYSTOLIC BLOOD PRESSURE: 132 MMHG

## 2020-07-16 DIAGNOSIS — E11.42 DIABETIC PERIPHERAL NEUROPATHY (HCC): ICD-10-CM

## 2020-07-16 DIAGNOSIS — R29.898 BILATERAL LEG WEAKNESS: ICD-10-CM

## 2020-07-16 PROCEDURE — 99214 OFFICE O/P EST MOD 30 MIN: CPT | Performed by: OTHER

## 2020-07-16 RX ORDER — ANTIOX #8/OM3/DHA/EPA/LUT/ZEAX 250-2.5 MG
CAPSULE ORAL
COMMUNITY
Start: 2020-05-23

## 2020-07-16 RX ORDER — BENAZEPRIL HYDROCHLORIDE AND HYDROCHLOROTHIAZIDE 20; 12.5 MG/1; MG/1
TABLET ORAL
Qty: 90 TABLET | Refills: 0 | Status: SHIPPED | OUTPATIENT
Start: 2020-07-16 | End: 2020-10-14

## 2020-07-16 NOTE — PROGRESS NOTES
HPI:    Patient ID: Sherri Dodson is a 66year old female. PCP: Dr Agnes Harrell    HPI  Patient is a 66year old female with history of hypertension, diabetes,hyperlipidemia who presented for evaluation of bilateral leg weakness.  She reports her legs are pro History reviewed. No pertinent family history.    Social History    Tobacco Use      Smoking status: Never Smoker      Smokeless tobacco: Never Used    Alcohol use: Yes      Comment: occ    Drug use: No         Review of Systems   Constitutional: Negative sounds are normal.   Skin: Skin is warm and dry. Psychiatric:  normal mood and affect. Neurological   Awake, alert and oriented to time, place and person. Speech is fluent with intact comprehension, repetition and naming.    Normal attention and memor Crestor to see if that improves her symptoms  4. Refer to physical therapy for leg strengthening and gait training  5. Continue Gabapentin at same dose      Thank you for allowing us to participate in your patient's care.  Please do not hesitate to call if

## 2020-07-16 NOTE — PATIENT INSTRUCTIONS
After your visit at the [facility] office  today, please direct any follow up questions or medication needs to the staff in our [place] office so that your concerns may be promptly addressed.   We are available through [employer] or at the numbers below: be picked up in office. • Please allow the office 2-3 business days to fill the prescription. • Patient must present photo ID at time of . PLEASE NOTE: PRESCRIPTIONS MUST BE PICKED UP PRIOR TO 3:00PM MONDAY-FRIDAY    Scheduling Tests:     If your submitting forms to office staff. • Form completion may require an additional fee. • A signed Release of Information (ANGELICA) must be on file before forms may be submitted. When dropping off forms, please ask the  for this paper.    • Failure

## 2020-07-21 ENCOUNTER — HOSPITAL ENCOUNTER (OUTPATIENT)
Dept: MRI IMAGING | Age: 75
Discharge: HOME | End: 2020-07-21
Attending: Other
Payer: MEDICARE

## 2020-07-21 DIAGNOSIS — R29.898 BILATERAL LEG WEAKNESS: ICD-10-CM

## 2020-07-21 PROCEDURE — 72148 MRI LUMBAR SPINE W/O DYE: CPT | Performed by: OTHER

## 2020-07-27 RX ORDER — GABAPENTIN 300 MG/1
CAPSULE ORAL
Qty: 270 CAPSULE | Refills: 3 | Status: SHIPPED | OUTPATIENT
Start: 2020-07-27 | End: 2021-01-14

## 2020-07-27 NOTE — PROGRESS NOTES
LOWER EXTREMITY EVALUATION:   Referring Physician: Dr. Fariba Irizarry  Diagnosis: Bilateral leg weakness      Date of Service: 7/27/2020     PATIENT SUMMARY   Radha Barnhart is a 66year old female who presents to therapy today with complaints of BLE bhargav shopping again and ambulate without the front wheeled walker.  The patient's past medical history was reviewed and significant findings include hypercholesterolemia, HTN, acute respiratory failure w/ hypoxia, diabetes mellitus, diabetic polyneuropathy, spin PT discussed evaluation findings, pathology, POC and HEP. Khushbu voiced understanding and performs HEP correctly without reported pain. Skilled Physical Therapy is medically necessary to address the above impairments and reach functional goals.      Sofya ergonomics, pain science education , importance of remaining active, strategies to reduce fall risk at home and shoe wear.   Patient was instructed in and issued a HEP for: forward lumbar stretch, SKC, and supine hamstring stretch    Charges: TIFFANIE Arambula precautions, and treatment options and has agreed to actively participate in planning and for this course of care. Thank you for your referral. Please co-sign or sign and return this letter via fax as soon as possible to [phone].  If you have any qu

## 2020-07-28 ENCOUNTER — OFFICE VISIT (OUTPATIENT)
Dept: PHYSICAL THERAPY | Age: 75
End: 2020-07-28
Attending: Other
Payer: MEDICARE

## 2020-07-28 DIAGNOSIS — R29.898 BILATERAL LEG WEAKNESS: ICD-10-CM

## 2020-07-28 PROCEDURE — 97162 PT EVAL MOD COMPLEX 30 MIN: CPT

## 2020-07-28 PROCEDURE — 97110 THERAPEUTIC EXERCISES: CPT

## 2020-07-28 RX ORDER — ROSUVASTATIN CALCIUM 5 MG/1
TABLET, COATED ORAL
Qty: 90 TABLET | Refills: 0 | Status: SHIPPED | OUTPATIENT
Start: 2020-07-28 | End: 2021-03-10

## 2020-07-28 NOTE — TELEPHONE ENCOUNTER
Rosuvastatin 5 mg  Last OV relevant to medication: 5-6-20  Last refill date: 4-29-20 #/refills: 0  When pt was asked to return for OV: none  Upcoming appt/reason: none  Recent labs: 5-21-20: Lipid

## 2020-08-06 ENCOUNTER — OFFICE VISIT (OUTPATIENT)
Dept: PHYSICAL THERAPY | Age: 75
End: 2020-08-06
Attending: Other
Payer: MEDICARE

## 2020-08-06 DIAGNOSIS — R29.898 BILATERAL LEG WEAKNESS: ICD-10-CM

## 2020-08-06 PROCEDURE — 97116 GAIT TRAINING THERAPY: CPT

## 2020-08-06 PROCEDURE — 97110 THERAPEUTIC EXERCISES: CPT

## 2020-08-06 NOTE — PROGRESS NOTES
Dx:  Bilateral leg weakness        Insurance (Authorized # of Visits):  6           Authorizing Physician: Dr. Issa Guillen MD visit: none scheduled  Fall Risk: standard         Precautions: n/a             Subjective:  The patient comes into therapy with community ambulation  · Pt will perform 9 reps during the 30 sec STS test in order to improve her efficiency with transfe  · Pt will improve knee extension ROM to 10 deg to allow proper heel strike during gait and terminal knee extension in stance   · Pt w

## 2020-08-11 ENCOUNTER — OFFICE VISIT (OUTPATIENT)
Dept: PHYSICAL THERAPY | Age: 75
End: 2020-08-11
Attending: Other
Payer: MEDICARE

## 2020-08-11 DIAGNOSIS — R29.898 BILATERAL LEG WEAKNESS: ICD-10-CM

## 2020-08-11 PROCEDURE — 97116 GAIT TRAINING THERAPY: CPT

## 2020-08-11 PROCEDURE — 97110 THERAPEUTIC EXERCISES: CPT

## 2020-08-11 NOTE — PROGRESS NOTES
Dx:  Bilateral leg weakness        Insurance (Authorized # of Visits):  6           Authorizing Physician: Dr. Jessica Cain  Next MD visit: none scheduled  Fall Risk: standard         Precautions: n/a             Subjective:  The patient comes into therapy with degrees to improve ability to perform swing clearance during gait   · Pt will stand for at least 30 minutes with < or = VAS 2/10 BLE pain and discomfort  · Pt will increase hip and knee strength to grossly 4/5 to be able to get up and down from the floor s

## 2020-08-13 ENCOUNTER — OFFICE VISIT (OUTPATIENT)
Dept: PHYSICAL THERAPY | Age: 75
End: 2020-08-13
Attending: Other
Payer: MEDICARE

## 2020-08-13 ENCOUNTER — TELEPHONE (OUTPATIENT)
Dept: NEUROLOGY | Facility: CLINIC | Age: 75
End: 2020-08-13

## 2020-08-13 DIAGNOSIS — R29.898 BILATERAL LEG WEAKNESS: ICD-10-CM

## 2020-08-13 PROCEDURE — 97110 THERAPEUTIC EXERCISES: CPT

## 2020-08-13 PROCEDURE — 97112 NEUROMUSCULAR REEDUCATION: CPT

## 2020-08-13 PROCEDURE — 97116 GAIT TRAINING THERAPY: CPT

## 2020-08-13 NOTE — PROGRESS NOTES
Dx:  Bilateral leg weakness        Insurance (Authorized # of Visits):  6           Authorizing Physician: Dr. Aditya Guillen MD visit: none scheduled  Fall Risk: standard         Precautions: n/a             Subjective:  The patient comes into therapy with she received a handout. The patient will continue to benefit from skilled PT to reach all her functional goals.       Goals:   · Pt will improve hip ABD and ER strength to 4/5 to increase ease with standing and walking   · Pt will improve functional hip str sets    Supine Tr A holds w/ marching, x 10 reps, 2 sets     6\" forward step ups w/ BUE support - x 10 reps, 2 sets 6\" forward step ups w/ BUE support - x 10 reps, 2 sets    6\" lateral step overs w/ BUE support - x 10 reps, 2 sets --     SKC - 30 sec, 6

## 2020-08-14 NOTE — TELEPHONE ENCOUNTER
Wheels for walker order faxed to [facility] with fax confirmation received. Sent to scanning.

## 2020-08-17 NOTE — TELEPHONE ENCOUNTER
Received fax from [address] [place] [address] stating they need an order for a new walker with wheels as patient did not purchase her existing walker through Medicare. New Walker order placed in provider's folder for review and signature.

## 2020-08-17 NOTE — TELEPHONE ENCOUNTER
Wheelchair order form completed and signed by provider and faxed to [facility] [employer] with fax confirmation received. Sent to scanning.

## 2020-08-18 ENCOUNTER — OFFICE VISIT (OUTPATIENT)
Dept: PHYSICAL THERAPY | Age: 75
End: 2020-08-18
Attending: Other
Payer: MEDICARE

## 2020-08-18 DIAGNOSIS — R29.898 BILATERAL LEG WEAKNESS: ICD-10-CM

## 2020-08-18 PROCEDURE — 97110 THERAPEUTIC EXERCISES: CPT

## 2020-08-18 PROCEDURE — 97140 MANUAL THERAPY 1/> REGIONS: CPT

## 2020-08-18 PROCEDURE — 97112 NEUROMUSCULAR REEDUCATION: CPT

## 2020-08-18 NOTE — PROGRESS NOTES
Dx:  Bilateral leg weakness        Insurance (Authorized # of Visits):  6           Authorizing Physician: Dr. Fredy Uriostegui  Next MD visit: none scheduled  Fall Risk: standard         Precautions: n/a             Subjective:  The patient comes into therapy with reciprocally with use of 1 handrail   · Pt will perform TUG in < or = to 15 seconds, demonstrating improved gait speed for improved community ambulation  · Pt will perform 9 reps during the 30 sec STS test in order to improve her efficiency with transfe  · 6\" forward step ups w/ BUE support - x 10 reps, 2 sets 6\" forward step ups w/ BUE support - x 10 reps, 2 sets    6\" lateral step overs w/ BUE support - x 10 reps, 2 sets -- 6\" forward step ups w/ BUE support - x 10 reps, 2 sets    SKC - 30 sec, 6 se

## 2020-08-19 NOTE — TELEPHONE ENCOUNTER
Spoke with patient and appt made for 9/2/2020. Patient requesting labs to be ordered prior to appt.     Please advise which labs to order

## 2020-08-20 ENCOUNTER — OFFICE VISIT (OUTPATIENT)
Dept: PHYSICAL THERAPY | Age: 75
End: 2020-08-20
Attending: Other
Payer: MEDICARE

## 2020-08-20 DIAGNOSIS — R29.898 BILATERAL LEG WEAKNESS: ICD-10-CM

## 2020-08-20 PROCEDURE — 97110 THERAPEUTIC EXERCISES: CPT

## 2020-08-20 PROCEDURE — 97112 NEUROMUSCULAR REEDUCATION: CPT

## 2020-08-20 PROCEDURE — 97140 MANUAL THERAPY 1/> REGIONS: CPT

## 2020-08-20 NOTE — PROGRESS NOTES
Dx:  Bilateral leg weakness        Insurance (Authorized # of Visits):  6           Authorizing Physician: Dr. Alison Barakat  Next MD visit: none scheduled  Fall Risk: standard         Precautions: n/a             Subjective:  The patient comes into therapy with will stand for at least 30 minutes with < or = VAS 2/10 BLE pain and discomfort  · Pt will increase hip and knee strength to grossly 4/5 to be able to get up and down from the floor safely       Plan: POC will emphasize lumbar flexion stretching, LE streng reps, 2 sets --   SKC - 30 sec, 6 sets SKC - 30 sec, 6 sets -- LBW/MW w/ YB inside parallel bars w BUE support - 20 feet - 6 reps --   Forward lumbar stretch to floor - 60 sec, 3 sets Forward lumbar stretch to floor - 60 sec, 3 sets Forward lumbar stretch

## 2020-08-25 ENCOUNTER — OFFICE VISIT (OUTPATIENT)
Dept: PHYSICAL THERAPY | Age: 75
End: 2020-08-25
Attending: Other
Payer: MEDICARE

## 2020-08-25 DIAGNOSIS — R29.898 BILATERAL LEG WEAKNESS: ICD-10-CM

## 2020-08-25 PROCEDURE — 97116 GAIT TRAINING THERAPY: CPT

## 2020-08-25 PROCEDURE — 97110 THERAPEUTIC EXERCISES: CPT

## 2020-08-25 PROCEDURE — 97112 NEUROMUSCULAR REEDUCATION: CPT

## 2020-08-25 NOTE — PROGRESS NOTES
Dx:  Bilateral leg weakness        Insurance (Authorized # of Visits):  6           Authorizing Physician: Dr. Grace Lerma  Next MD visit: none scheduled  Fall Risk: standard         Precautions: n/a             Subjective:  The patient comes into therapy with down from the floor safely       Plan: The patient will be formally reassessed next visit, 8/27/20.   Date: 8/11/20                TX#: 3/8 Date: 8/13/20                TX#: 4/8 Date: 8/18/20                TX#: 5/8 Date: 8/20/20  Tx#: 6/8 Date: 8/25/20  Tx inside parallel bars    Forward gait over hurdles inside parallel bars w/ 2 lb weights    Lateral stepping inside parallel bars w/ 2 lb ankle weights   SKC - 30 sec, 6 sets -- LBW/MW w/ YB inside parallel bars w BUE support - 20 feet - 6 reps --    Forward

## 2020-08-27 ENCOUNTER — OFFICE VISIT (OUTPATIENT)
Dept: PHYSICAL THERAPY | Age: 75
End: 2020-08-27
Attending: Other
Payer: MEDICARE

## 2020-08-27 ENCOUNTER — OFFICE VISIT (OUTPATIENT)
Dept: NEUROLOGY | Facility: CLINIC | Age: 75
End: 2020-08-27
Payer: MEDICARE

## 2020-08-27 VITALS
HEIGHT: 69 IN | SYSTOLIC BLOOD PRESSURE: 134 MMHG | RESPIRATION RATE: 15 BRPM | BODY MASS INDEX: 35.37 KG/M2 | WEIGHT: 238.81 LBS | DIASTOLIC BLOOD PRESSURE: 74 MMHG | HEART RATE: 78 BPM

## 2020-08-27 DIAGNOSIS — E11.42 DIABETIC PERIPHERAL NEUROPATHY (HCC): ICD-10-CM

## 2020-08-27 DIAGNOSIS — R29.898 BILATERAL LEG WEAKNESS: ICD-10-CM

## 2020-08-27 DIAGNOSIS — M48.061 SPINAL STENOSIS OF LUMBAR REGION WITHOUT NEUROGENIC CLAUDICATION: ICD-10-CM

## 2020-08-27 PROCEDURE — 97110 THERAPEUTIC EXERCISES: CPT

## 2020-08-27 PROCEDURE — 99213 OFFICE O/P EST LOW 20 MIN: CPT | Performed by: OTHER

## 2020-08-27 NOTE — PROGRESS NOTES
Progress Summary    Pt has attended 8 visits in Physical Therapy. Subjective: The patient comes into therapy with some mild BLE discomfort. Miguel Heck states she was very sore after Tuesday's session due to the progression in her exercise interventions.  A functional ability. She shows a noticeable improvement with her TUG and 6MWT compared to her initial evaluation. Therapy was able to work with [employer] to get [place] a front wheeled walker.  This has made a tremendous improvement with her gait and energy 8/27/20  Tx#: 8/8   NU Step Level 7 - 6 mins NU Step Level 7 - 6 mins NU Step Level 9 - 6 mins -- NU Step Level 7 - 5 mins   Bilateral Manual hamstring stretch - 3 mins -- -- Supine hook lying abduction w/ RB, x 20 reps, 2 sets --   Elevated STS, x 10 reps floor - 60 sec, 3 sets Forward lumbar stretch to floor - 60 sec, 3 sets --   -- -- --   --   -- R side lying grade 2 lumbar joint mobilizations - 8 mins R side lying grade 2 lumbar joint mobilizations - 8 mins   --   Gait w/ front wheeled walker in obstacl

## 2020-08-27 NOTE — PROGRESS NOTES
HPI:    Patient ID: Yeny Cao is a 66year old female. PCP: Dr Yoly Velez    HPI    Donna Rosa is a 66year old female who presented for follow up for bilateral leg weakness, lumbar spinal stenosis and diabetic peripheral neuropathy.   She is [facility] • HYSTERECTOMY     • LAPAROSCOPIC CHOLECYSTECTOMY  02/21/2005   • NEEDLE BIOPSY RIGHT  11/2019    benign us guided x 2   • OTHER SURGICAL HISTORY  1972    salpingo-oophorectomy right side   • OTHER SURGICAL HISTORY  01/01/2020    Cysto, Left RPG,stent in 78, resp. rate 15, height 69\", weight 238 lb 12.8 oz (108.3 kg), not currently breastfeeding. General: well developed, well nourished  HEENT: Normocephalic and atraumatic. Cardiovascular: Normal rate, regular rhythm and normal heart sounds.     Pulmon physical therapy. Progress notes reviewed    Continue PT and home exercises  Ok to lower down Gabapentin to 300 mg BID. May take extra cap as needed  RTC in about 5-6 months. See orders and medications filed with this encounter.  The patient indicates un

## 2020-08-27 NOTE — PATIENT INSTRUCTIONS
After your visit at the [facility] office  today, please direct any follow up questions or medication needs to the staff in our [place] office so that your concerns may be promptly addressed.   We are available through [employer] or at the numbers below: be picked up in office. • Please allow the office 2-3 business days to fill the prescription. • Patient must present photo ID at time of . PLEASE NOTE: PRESCRIPTIONS MUST BE PICKED UP PRIOR TO 3:00PM MONDAY-FRIDAY    Scheduling Tests:     If your submitting forms to office staff. • Form completion may require an additional fee. • A signed Release of Information (ANGELICA) must be on file before forms may be submitted. When dropping off forms, please ask the  for this paper.    • Failure

## 2020-09-01 ENCOUNTER — APPOINTMENT (OUTPATIENT)
Dept: PHYSICAL THERAPY | Age: 75
End: 2020-09-01
Attending: Other
Payer: MEDICARE

## 2020-09-02 ENCOUNTER — LAB ENCOUNTER (OUTPATIENT)
Dept: LAB | Age: 75
End: 2020-09-02
Attending: FAMILY MEDICINE
Payer: MEDICARE

## 2020-09-02 ENCOUNTER — OFFICE VISIT (OUTPATIENT)
Dept: INTERNAL MEDICINE CLINIC | Facility: CLINIC | Age: 75
End: 2020-09-02
Payer: MEDICARE

## 2020-09-02 VITALS
OXYGEN SATURATION: 99 % | HEIGHT: 69 IN | TEMPERATURE: 98 F | SYSTOLIC BLOOD PRESSURE: 136 MMHG | RESPIRATION RATE: 16 BRPM | WEIGHT: 239 LBS | BODY MASS INDEX: 35.4 KG/M2 | DIASTOLIC BLOOD PRESSURE: 66 MMHG | HEART RATE: 70 BPM

## 2020-09-02 DIAGNOSIS — E11.42 DIABETIC POLYNEUROPATHY ASSOCIATED WITH TYPE 2 DIABETES MELLITUS (HCC): ICD-10-CM

## 2020-09-02 DIAGNOSIS — I10 ESSENTIAL HYPERTENSION, BENIGN: Primary | ICD-10-CM

## 2020-09-02 DIAGNOSIS — E78.00 PURE HYPERCHOLESTEROLEMIA: ICD-10-CM

## 2020-09-02 DIAGNOSIS — I10 ESSENTIAL HYPERTENSION, BENIGN: ICD-10-CM

## 2020-09-02 PROBLEM — R65.21 SEPTIC SHOCK (HCC): Status: RESOLVED | Noted: 2019-12-31 | Resolved: 2020-09-02

## 2020-09-02 PROBLEM — N30.00 ACUTE CYSTITIS WITHOUT HEMATURIA: Status: RESOLVED | Noted: 2019-12-31 | Resolved: 2020-09-02

## 2020-09-02 PROBLEM — A41.9 SEPTIC SHOCK (HCC): Status: RESOLVED | Noted: 2019-12-31 | Resolved: 2020-09-02

## 2020-09-02 LAB
ALBUMIN SERPL-MCNC: 3.9 G/DL (ref 3.4–5)
ALBUMIN/GLOB SERPL: 1 {RATIO} (ref 1–2)
ALP LIVER SERPL-CCNC: 126 U/L (ref 55–142)
ALT SERPL-CCNC: 18 U/L (ref 13–56)
ANION GAP SERPL CALC-SCNC: 4 MMOL/L (ref 0–18)
AST SERPL-CCNC: 12 U/L (ref 15–37)
BILIRUB SERPL-MCNC: 0.3 MG/DL (ref 0.1–2)
BUN BLD-MCNC: 36 MG/DL (ref 7–18)
BUN/CREAT SERPL: 25.2 (ref 10–20)
CALCIUM BLD-MCNC: 9.8 MG/DL (ref 8.5–10.1)
CHLORIDE SERPL-SCNC: 102 MMOL/L (ref 98–112)
CHOLEST SMN-MCNC: 195 MG/DL (ref ?–200)
CO2 SERPL-SCNC: 31 MMOL/L (ref 21–32)
CREAT BLD-MCNC: 1.43 MG/DL (ref 0.55–1.02)
EST. AVERAGE GLUCOSE BLD GHB EST-MCNC: 123 MG/DL (ref 68–126)
GLOBULIN PLAS-MCNC: 3.8 G/DL (ref 2.8–4.4)
GLUCOSE BLD-MCNC: 96 MG/DL (ref 70–99)
HBA1C MFR BLD HPLC: 5.9 % (ref ?–5.7)
HDLC SERPL-MCNC: 56 MG/DL (ref 40–59)
LDLC SERPL CALC-MCNC: 98 MG/DL (ref ?–100)
M PROTEIN MFR SERPL ELPH: 7.7 G/DL (ref 6.4–8.2)
NONHDLC SERPL-MCNC: 139 MG/DL (ref ?–130)
OSMOLALITY SERPL CALC.SUM OF ELEC: 292 MOSM/KG (ref 275–295)
PATIENT FASTING Y/N/NP: YES
PATIENT FASTING Y/N/NP: YES
POTASSIUM SERPL-SCNC: 5 MMOL/L (ref 3.5–5.1)
SODIUM SERPL-SCNC: 137 MMOL/L (ref 136–145)
TRIGL SERPL-MCNC: 207 MG/DL (ref 30–149)
VLDLC SERPL CALC-MCNC: 41 MG/DL (ref 0–30)

## 2020-09-02 PROCEDURE — 36415 COLL VENOUS BLD VENIPUNCTURE: CPT

## 2020-09-02 PROCEDURE — 80053 COMPREHEN METABOLIC PANEL: CPT

## 2020-09-02 PROCEDURE — 83036 HEMOGLOBIN GLYCOSYLATED A1C: CPT

## 2020-09-02 PROCEDURE — 80061 LIPID PANEL: CPT

## 2020-09-02 PROCEDURE — 99214 OFFICE O/P EST MOD 30 MIN: CPT | Performed by: FAMILY MEDICINE

## 2020-09-02 RX ORDER — GLIPIZIDE 5 MG/1
5 TABLET, FILM COATED, EXTENDED RELEASE ORAL DAILY
Qty: 90 TABLET | Refills: 0 | Status: SHIPPED | OUTPATIENT
Start: 2020-09-02 | End: 2020-09-09 | Stop reason: DRUGHIGH

## 2020-09-02 NOTE — PROGRESS NOTES
Elijah Murillo is a 66year old female. HPI:   Pt is here for med ck/follow up. Overall is doing well. Is taking meds as directed. No issues w medications. Is staying healthy given the [place] pandemic.     No f/c  no unusual cough  no smell or tast SYSTEMS:   GENERAL HEALTH: feels well otherwise  SKIN: denies rashes  RESPIRATORY: denies shortness of breath   CARDIOVASCULAR: denies chest pain on exertion  GI: denies abdominal pain  NEURO: denies headaches    EXAM:   /66   Pulse 70   Temp 98.1 °F

## 2020-09-03 ENCOUNTER — OFFICE VISIT (OUTPATIENT)
Dept: PHYSICAL THERAPY | Age: 75
End: 2020-09-03
Attending: Other
Payer: MEDICARE

## 2020-09-03 PROCEDURE — 97112 NEUROMUSCULAR REEDUCATION: CPT

## 2020-09-03 PROCEDURE — 97110 THERAPEUTIC EXERCISES: CPT

## 2020-09-03 NOTE — PROGRESS NOTES
Dx:  Bilateral leg Pitney Brit (Authorized # of Visits):  12          Authorizing Physician: Dr. Tobin Jones    Next MD visit: none scheduled  Fall Risk: standard         Precautions: n/a               Subjective:  The patient comes into therapy extension in stance   · Pt will improve knee AROM flexion to > 105 degrees to improve ability to perform swing clearance during gait   · Pt will stand for at least 30 minutes with < or = VAS 2/10 BLE pain and discomfort - IN PROGRESS  · Pt will increase hi 6\" forward step ups w/ BUE support - x 10 reps, 2 sets -- Gait over hurdles w/ 2 lb ankle weights over hurdles inside parallel bars     Forward gait over hurdles inside parallel bars w/ 2 lb weights     Lateral stepping inside parallel bars w/ 2 lb ankl

## 2020-09-09 ENCOUNTER — OFFICE VISIT (OUTPATIENT)
Dept: PHYSICAL THERAPY | Age: 75
End: 2020-09-09
Attending: Other
Payer: MEDICARE

## 2020-09-09 DIAGNOSIS — E11.42 DIABETIC POLYNEUROPATHY ASSOCIATED WITH TYPE 2 DIABETES MELLITUS (HCC): Primary | ICD-10-CM

## 2020-09-09 PROCEDURE — 97112 NEUROMUSCULAR REEDUCATION: CPT

## 2020-09-09 PROCEDURE — 97110 THERAPEUTIC EXERCISES: CPT

## 2020-09-09 RX ORDER — GLIPIZIDE 2.5 MG/1
2.5 TABLET, EXTENDED RELEASE ORAL
Qty: 90 TABLET | Refills: 0 | Status: SHIPPED | OUTPATIENT
Start: 2020-09-09 | End: 2020-11-18

## 2020-09-09 NOTE — PROGRESS NOTES
Dx:  Bilateral leg Pitney Brit (Authorized # of Visits):  12          Authorizing Physician: Dr. Jef Miguel    Next MD visit: none scheduled  Fall Risk: standard         Precautions: n/a               Subjective:  The patient comes into therapy extension in stance   · Pt will improve knee AROM flexion to > 105 degrees to improve ability to perform swing clearance during gait   · Pt will stand for at least 30 minutes with < or = VAS 2/10 BLE pain and discomfort - IN PROGRESS  · Pt will increase hi holds, x 10 reps, 2 sets     Supine Tr A holds w/ adduction ball squeeze, x 10 reps, 2 sets     Supine Tr A holds w/ abduction x 10 reps, 2 sets   -- Gait over hurdles w/ 2 lb ankle weights over hurdles inside parallel bars     Forward gait over hurdles in

## 2020-09-15 ENCOUNTER — OFFICE VISIT (OUTPATIENT)
Dept: PHYSICAL THERAPY | Age: 75
End: 2020-09-15
Attending: Other
Payer: MEDICARE

## 2020-09-15 PROCEDURE — 97110 THERAPEUTIC EXERCISES: CPT

## 2020-09-15 PROCEDURE — 97116 GAIT TRAINING THERAPY: CPT

## 2020-09-15 PROCEDURE — 97140 MANUAL THERAPY 1/> REGIONS: CPT

## 2020-09-15 NOTE — PROGRESS NOTES
Dx:  Bilateral leg Pitney Brit (Authorized # of Visits):  12          Authorizing Physician: Dr. Mikel Montez    Next MD visit: none scheduled  Fall Risk: standard         Precautions: n/a               Subjective:  The patient comes into therapy gastroc stretch to take home. Meche Barrios was advised to adhere to her HEP twice per day and will continue to benefit from skilled PT to reach all her functional goals.       Goals:   · Pt will improve hip ABD and ER strength to 4/5 to increase ease with standing Long sitting gastroc stretch - 60 sec - 3 sets   -- Objective tests and measures, education, and HEP review - 40 mins -- 4\" forward and lateral step ups inside parallel bars, x 10 reps, 1 set each, RUE support --   Stair negotiation on [facility] [email or web address]

## 2020-09-17 ENCOUNTER — OFFICE VISIT (OUTPATIENT)
Dept: PHYSICAL THERAPY | Age: 75
End: 2020-09-17
Attending: Other
Payer: MEDICARE

## 2020-09-17 PROCEDURE — 97140 MANUAL THERAPY 1/> REGIONS: CPT

## 2020-09-17 PROCEDURE — 97110 THERAPEUTIC EXERCISES: CPT

## 2020-09-17 NOTE — PROGRESS NOTES
Dx:  Bilateral leg Pitney Brit (Authorized # of Visits):  12          Authorizing Physician: Dr. Mikel Montez    Next MD visit: none scheduled  Fall Risk: standard         Precautions: n/a               Subjective:  The patient comes into therapy the 30 sec STS test in order to improve her efficiency with transfers  · Pt will improve knee extension ROM to 10 deg to allow proper heel strike during gait and terminal knee extension in stance   · Pt will improve knee AROM flexion to > 105 degrees to im 10 stairs - 1 set   -- Supine Tr A holds, x 10 reps, 2 sets     Supine Tr A holds w/ adduction ball squeeze, x 10 reps, 2 sets     Supine Tr A holds w/ abduction x 10 reps, 2 sets Supine Tr A holds, x 10 reps, 2 sets     Supine Tr A holds w/ adduction ball

## 2020-09-22 ENCOUNTER — OFFICE VISIT (OUTPATIENT)
Dept: PHYSICAL THERAPY | Age: 75
End: 2020-09-22
Attending: Other
Payer: MEDICARE

## 2020-09-22 PROCEDURE — 97140 MANUAL THERAPY 1/> REGIONS: CPT

## 2020-09-22 PROCEDURE — 97112 NEUROMUSCULAR REEDUCATION: CPT

## 2020-09-22 PROCEDURE — 97110 THERAPEUTIC EXERCISES: CPT

## 2020-09-22 NOTE — PROGRESS NOTES
Dx:  Bilateral leg Pitney Brit (Authorized # of Visits):  12          Authorizing Physician: Dr. Donna Guillen MD visit: none scheduled  Fall Risk: standard         Precautions: n/a               Subjective:  The patient comes into therapy proper heel strike during gait and terminal knee extension in stance   · Pt will improve knee AROM flexion to > 105 degrees to improve ability to perform swing clearance during gait   · Pt will stand for at least 30 minutes with < or = VAS 2/10 BLE pain an stair case w/ BUE support - 10 stairs - 1 set -- Stair negotiation on facility stair case w/ BUE support - 10 stairs - 1 set --   Supine Tr A holds, x 10 reps, 2 sets     Supine Tr A holds w/ adduction ball squeeze, x 10 reps, 2 sets     Supine Tr A holds

## 2020-09-24 ENCOUNTER — OFFICE VISIT (OUTPATIENT)
Dept: PHYSICAL THERAPY | Age: 75
End: 2020-09-24
Attending: Other
Payer: MEDICARE

## 2020-09-24 PROCEDURE — 97112 NEUROMUSCULAR REEDUCATION: CPT

## 2020-09-24 PROCEDURE — 97116 GAIT TRAINING THERAPY: CPT

## 2020-09-24 PROCEDURE — 97110 THERAPEUTIC EXERCISES: CPT

## 2020-09-24 NOTE — PROGRESS NOTES
Dx:  Bilateral leg Pitney Brit (Authorized # of Visits):  12          Authorizing Physician: Dr. Tammie Lund    Next MD visit: none scheduled  Fall Risk: standard         Precautions: n/a               Subjective:  The patient comes into therapy 15 seconds, demonstrating improved gait speed for improved community ambulation  · Pt will perform 9 reps during the 30 sec STS test in order to improve her efficiency with transfers  · Pt will improve knee extension ROM to 10 deg to allow proper heel stri mobilizations for pain - 10 mins -- Three way kicks in parallel bars - x 5 reps, 2 sets    Long sitting gastroc stretch - 60 sec - 3 sets -- Long sitting gastroc stretch - 60 sec - 3 sets --   4\" forward and lateral step ups inside parallel bars, x 10 rep

## 2020-09-29 ENCOUNTER — OFFICE VISIT (OUTPATIENT)
Dept: PHYSICAL THERAPY | Age: 75
End: 2020-09-29
Attending: Other
Payer: MEDICARE

## 2020-09-29 PROCEDURE — 97110 THERAPEUTIC EXERCISES: CPT

## 2020-09-29 PROCEDURE — 97112 NEUROMUSCULAR REEDUCATION: CPT

## 2020-09-29 PROCEDURE — 97140 MANUAL THERAPY 1/> REGIONS: CPT

## 2020-09-29 NOTE — PROGRESS NOTES
Dx:  Bilateral leg Pitney Brit (Authorized # of Visits):  12          Authorizing Physician: Dr. Paola Richards    Next MD visit: none scheduled  Fall Risk: standard         Precautions: n/a               Subjective:  The patient comes into therapy flexion to > 105 degrees to improve ability to perform swing clearance during gait   · Pt will stand for at least 30 minutes with < or = VAS 2/10 BLE pain and discomfort - IN PROGRESS  · Pt will increase hip and knee strength to grossly 4/5 to be able to g support -- 4\" forward and lateral step ups inside parallel bars, x 10 reps, 1 set each, RUE support   -- Stair negotiation on facility stair case w/ BUE support - 10 stairs - 1 set -- -- --   -- -- Supine Tr A holds, x 10 reps, 2 sets     Supine Tr A hold

## 2020-09-30 ENCOUNTER — IMMUNIZATION (OUTPATIENT)
Dept: FAMILY MEDICINE CLINIC | Facility: CLINIC | Age: 75
End: 2020-09-30
Payer: MEDICARE

## 2020-09-30 PROCEDURE — 90662 IIV NO PRSV INCREASED AG IM: CPT | Performed by: NURSE PRACTITIONER

## 2020-09-30 PROCEDURE — G0008 ADMIN INFLUENZA VIRUS VAC: HCPCS | Performed by: NURSE PRACTITIONER

## 2020-10-01 ENCOUNTER — OFFICE VISIT (OUTPATIENT)
Dept: PHYSICAL THERAPY | Age: 75
End: 2020-10-01
Attending: Other
Payer: MEDICARE

## 2020-10-01 PROCEDURE — 97110 THERAPEUTIC EXERCISES: CPT

## 2020-10-01 NOTE — PROGRESS NOTES
Discharge Summary    Pt has attended 16 visits in Physical Therapy. Subjective: The patient comes into therapy today with mild BLE soreness. Hima Bagley notes she has been moving better the last couple of days with less BLE pain.  She states feeling about 5 Miguelito Moreira has made noticeable improvements with her gait pace/efficiency by upgrading to the front wheeled walker and with increasing her mobility during her POC.  She has been consistent with adhering to her HEP and it shows with her overall functional mobilit will be formally discharged today, 10/1/20, and referred back to her physician for further pain management and assessment.     Date: 9/17/20  Tx#: 12/16 Date: 9/22/20  Tx#: 13/16 Date: 9/24/20  Tx#: 14/16 Date: 9/29/20  Tx#: 15/16 Date: 10/1/20  Tx#: 16/16 sets     Supine Tr A holds w/ adduction ball squeeze, x 10 reps, 2 sets     Supine Tr A holds w/ abduction x 10 reps, 2 sets -- --   -- --   Gait w/ front wheeled walker on uneven terrain - 5 mins --   Supine Bridges, x 10 reps, 2 sets --   -- --   Forward

## 2020-10-14 RX ORDER — BENAZEPRIL HYDROCHLORIDE AND HYDROCHLOROTHIAZIDE 20; 12.5 MG/1; MG/1
1 TABLET ORAL DAILY
Qty: 90 TABLET | Refills: 0 | Status: SHIPPED | OUTPATIENT
Start: 2020-10-14 | End: 2021-01-12

## 2020-11-16 NOTE — TELEPHONE ENCOUNTER
Labs needed    Requesting METFORMIN HCL 1000 MG Oral Tab  LOV: 9/2/20  RTC: NA  Last Relevant Labs: 10/4/18 (microalbumin)   Filled: 8/19/20 #180 with 0 refills    Future Appointments   Date Time Provider Alejandro Nguyen   11/18/2020  1:45 PM Angelica Cullen MD [facility] [place]

## 2020-11-18 ENCOUNTER — OFFICE VISIT (OUTPATIENT)
Dept: INTERNAL MEDICINE CLINIC | Facility: CLINIC | Age: 75
End: 2020-11-18
Payer: MEDICARE

## 2020-11-18 VITALS
HEART RATE: 90 BPM | SYSTOLIC BLOOD PRESSURE: 115 MMHG | DIASTOLIC BLOOD PRESSURE: 80 MMHG | OXYGEN SATURATION: 99 % | BODY MASS INDEX: 37.64 KG/M2 | WEIGHT: 234.19 LBS | TEMPERATURE: 98 F | HEIGHT: 66 IN

## 2020-11-18 DIAGNOSIS — N18.30 TYPE 2 DIABETES MELLITUS WITH STAGE 3 CHRONIC KIDNEY DISEASE, WITHOUT LONG-TERM CURRENT USE OF INSULIN, UNSPECIFIED WHETHER STAGE 3A OR 3B CKD (HCC): ICD-10-CM

## 2020-11-18 DIAGNOSIS — N18.30 CKD STAGE 3 DUE TO TYPE 2 DIABETES MELLITUS (HCC): ICD-10-CM

## 2020-11-18 DIAGNOSIS — I70.209 ATHEROSCLEROSIS OF ARTERIES OF EXTREMITIES (HCC): Primary | ICD-10-CM

## 2020-11-18 DIAGNOSIS — I77.9 CAROTID ARTERY DISEASE, UNSPECIFIED LATERALITY, UNSPECIFIED TYPE (HCC): ICD-10-CM

## 2020-11-18 DIAGNOSIS — Z00.00 ENCOUNTER FOR ANNUAL HEALTH EXAMINATION: ICD-10-CM

## 2020-11-18 DIAGNOSIS — E11.22 TYPE 2 DIABETES MELLITUS WITH STAGE 3 CHRONIC KIDNEY DISEASE, WITHOUT LONG-TERM CURRENT USE OF INSULIN, UNSPECIFIED WHETHER STAGE 3A OR 3B CKD (HCC): ICD-10-CM

## 2020-11-18 DIAGNOSIS — N13.30 HYDRONEPHROSIS, UNSPECIFIED HYDRONEPHROSIS TYPE: ICD-10-CM

## 2020-11-18 DIAGNOSIS — E66.01 CLASS 2 SEVERE OBESITY DUE TO EXCESS CALORIES WITH SERIOUS COMORBIDITY AND BODY MASS INDEX (BMI) OF 35.0 TO 35.9 IN ADULT (HCC): ICD-10-CM

## 2020-11-18 DIAGNOSIS — M48.061 SPINAL STENOSIS OF LUMBAR REGION WITHOUT NEUROGENIC CLAUDICATION: ICD-10-CM

## 2020-11-18 DIAGNOSIS — E78.00 PURE HYPERCHOLESTEROLEMIA: ICD-10-CM

## 2020-11-18 DIAGNOSIS — E11.22 CKD STAGE 3 DUE TO TYPE 2 DIABETES MELLITUS (HCC): ICD-10-CM

## 2020-11-18 DIAGNOSIS — I10 ESSENTIAL HYPERTENSION, BENIGN: ICD-10-CM

## 2020-11-18 DIAGNOSIS — M47.816 FACET HYPERTROPHY OF LUMBAR REGION: ICD-10-CM

## 2020-11-18 PROBLEM — E66.812 CLASS 2 SEVERE OBESITY WITH SERIOUS COMORBIDITY AND BODY MASS INDEX (BMI) OF 35.0 TO 35.9 IN ADULT (HCC): Status: ACTIVE | Noted: 2020-11-18

## 2020-11-18 PROBLEM — N28.9 RENAL INSUFFICIENCY: Status: RESOLVED | Noted: 2019-12-31 | Resolved: 2020-11-18

## 2020-11-18 PROCEDURE — G0439 PPPS, SUBSEQ VISIT: HCPCS | Performed by: FAMILY MEDICINE

## 2020-11-18 PROCEDURE — 99214 OFFICE O/P EST MOD 30 MIN: CPT | Performed by: FAMILY MEDICINE

## 2020-11-18 RX ORDER — GLIPIZIDE 2.5 MG/1
2.5 TABLET, EXTENDED RELEASE ORAL
Qty: 90 TABLET | Refills: 0 | Status: ON HOLD | OUTPATIENT
Start: 2020-11-18 | End: 2021-01-25

## 2020-11-18 NOTE — PATIENT INSTRUCTIONS
Elma Henriquez's SCREENING SCHEDULE   Tests on this list are recommended by your physician but may not be covered, or covered at this frequency, by your insurer. Please check with your insurance carrier before scheduling to verify coverage.    [facility] Limited to patients who meet one of the following criteria:   • Men who are 73-68 years old and have smoked more than 100 cigarettes in their lifetime   • Anyone with a family history    Colorectal Cancer Screening  Covered up to Age 76     Colonoscopy Scr There are no preventive care reminders to display for this patient.  Please get this Mammogram regularly   Immunizations      Influenza  Covered Annually Orders placed or performed in visit on 09/30/20   • FLU VACC HIGH DOSE PRSV FREE   Orders placed or per different types of Advance Directives. It also has the State forms available on it's website for anyone to review and print using their home computer and printer. (the forms are also available in [address])  [email or web address]  This link also has informa

## 2020-11-18 NOTE — PROGRESS NOTES
HPI:   Yumiko Mccormick is a 66year old female who presents for a Medicare Subsequent Annual Wellness visit (Pt already had Initial Annual Wellness).       Annual Physical due on 11/18/2021        Fall/Risk Assessment   She has been screened for Falls and Hydronephrosis     Spinal stenosis of lumbar region     Class 2 severe obesity with serious comorbidity and body mass index (BMI) of 35.0 to 35.9 in adult [facility])     CKD stage 3 due to type 2 diabetes mellitus ([facility]     Atherosclerosis of arteries of extrem CHOLESTEROL, History of cardiac cath (03/2001), Hyperlipidemia, Measles without mention of complication, Type II or unspecified type diabetes mellitus without mention of complication, not stated as uncontrolled, and Varicella without mention of complicatio Hearing Screening    Time taken: 11/18/2020  1:54 PM  Screening Method: Whisper Test  Whisper Test Result: Pass             Visual Acuity                           General Appearance:  Alert, cooperative, no distress, appears stated age   Head:  Normocep or 3b CKD ([facility]    Facet hypertrophy of lumbar region    Spinal stenosis of lumbar region without neurogenic claudication    Hydronephrosis, unspecified hydronephrosis type    Carotid artery disease, unspecified laterality, unspecified type ([facility]    Class Kaylan Grimaldo MD, 11/18/2020     General Health     In the past six months, have you lost more than 10 pounds without trying?: 1 - Yes  Has your appetite been poor?: No  How does the patient maintain a good energy level?: Other  How would you describe age 33-67, age 72 and older at high risk There are no preventive care reminders to display for this patient. Update Health Maintenance if applicable    Chlamydia  Annually if high risk No results found for: CHLAMYDIA No flowsheet data found.     Screening M Serum Conc  Annually No results found for: DIGOXIN, DIG, VALP No flowsheet data found. Diabetes      HgbA1C  Annually HGBA1C (%)   Date Value   03/06/2014 6.6 (H)     HgbA1C (%)   Date Value   09/02/2020 5.9 (H)       No flowsheet data found.     Crea

## 2020-12-31 ENCOUNTER — OFFICE VISIT (OUTPATIENT)
Dept: SURGERY | Facility: CLINIC | Age: 75
End: 2020-12-31
Payer: MEDICARE

## 2020-12-31 VITALS
SYSTOLIC BLOOD PRESSURE: 122 MMHG | HEIGHT: 69 IN | WEIGHT: 233 LBS | BODY MASS INDEX: 34.51 KG/M2 | DIASTOLIC BLOOD PRESSURE: 80 MMHG | HEART RATE: 67 BPM

## 2020-12-31 DIAGNOSIS — M48.061 SPINAL STENOSIS OF LUMBAR REGION WITHOUT NEUROGENIC CLAUDICATION: Primary | ICD-10-CM

## 2020-12-31 PROCEDURE — 99203 OFFICE O/P NEW LOW 30 MIN: CPT | Performed by: NEUROLOGICAL SURGERY

## 2020-12-31 RX ORDER — GABAPENTIN 400 MG/1
400 CAPSULE ORAL 3 TIMES DAILY
Qty: 90 CAPSULE | Refills: 3 | Status: SHIPPED | OUTPATIENT
Start: 2020-12-31 | End: 2021-02-20

## 2020-12-31 NOTE — H&P (VIEW-ONLY)
Neurosurgery Clinic Visit  2020    Ella Mcclendon PCP:  Teagan Valentin MD    1945 MRN [id number]       CHIEF COMPLAINT:  Patient presents with:  Consult: Refd by Dr. Pippa Oconnor  spinal stenosis of lumbar region      HISTORY OF PRESENT ILLNESS:  S (PRESERVISION AREDS 2) Oral Cap      • aspirin EC 81 MG Oral Tab EC Take 81 mg by mouth daily. • Acidophilus/Pectin (PROBIOTIC) Oral Cap Take 1 capsule by mouth daily. • Calcium Citrate-Vitamin D (CITRACAL + D OR) Take 1,200 mg by mouth daily. is intact and symmetric. Neck: Reveals no masses. Breast:  Exam is deferred. Skin:  Exam reveals no obvious lesions or other indications of disease. A detailed skin exam was not performed.   Heart:  Regular rate and rhythm  Lungs: Clear to auscultation PLAN:  25-year-old female with bilateral leg pain and soreness  She has circumferential pain from her knees down bilaterally, this is not classic for stenosis  She does have neuropathy in her EMG  I think it is reasonable to have referred to pain managemen

## 2020-12-31 NOTE — PROGRESS NOTES
Patient here for consult, referred by Dr. Silvia Olea for spinal stenosis of lumbar region without neurogenic claudication. Back pain for quite a few years. Saw Dr. Aditya Espino, had MRI lumbar on 07/21/2020.  Went for therapy, helped some, but ran out of medicare

## 2020-12-31 NOTE — H&P (VIEW-ONLY)
Neurosurgery Clinic Visit  2020    Lorene Walters PCP:  Enma Perez MD    1945 MRN [id number]       CHIEF COMPLAINT:  Patient presents with:  Consult: Refd by Dr. Trevor Guerrero  spinal stenosis of lumbar region      HISTORY OF PRESENT ILLNESS:  S (PRESERVISION AREDS 2) Oral Cap      • aspirin EC 81 MG Oral Tab EC Take 81 mg by mouth daily. • Acidophilus/Pectin (PROBIOTIC) Oral Cap Take 1 capsule by mouth daily. • Calcium Citrate-Vitamin D (CITRACAL + D OR) Take 1,200 mg by mouth daily. is intact and symmetric. Neck: Reveals no masses. Breast:  Exam is deferred. Skin:  Exam reveals no obvious lesions or other indications of disease. A detailed skin exam was not performed.   Heart:  Regular rate and rhythm  Lungs: Clear to auscultation PLAN:  77-year-old female with bilateral leg pain and soreness  She has circumferential pain from her knees down bilaterally, this is not classic for stenosis  She does have neuropathy in her EMG  I think it is reasonable to have referred to pain managemen

## 2020-12-31 NOTE — H&P
Neurosurgery Clinic Visit  2020    Caron Rutherford PCP:  Yadiel Finch MD    1945 MRN [id number]       CHIEF COMPLAINT:  Patient presents with:  Consult: Refd by Dr. Minor Flurry  spinal stenosis of lumbar region      HISTORY OF PRESENT ILLNESS:  S (PRESERVISION AREDS 2) Oral Cap      • aspirin EC 81 MG Oral Tab EC Take 81 mg by mouth daily. • Acidophilus/Pectin (PROBIOTIC) Oral Cap Take 1 capsule by mouth daily. • Calcium Citrate-Vitamin D (CITRACAL + D OR) Take 1,200 mg by mouth daily. is intact and symmetric. Neck: Reveals no masses. Breast:  Exam is deferred. Skin:  Exam reveals no obvious lesions or other indications of disease. A detailed skin exam was not performed.   Heart:  Regular rate and rhythm  Lungs: Clear to auscultation PLAN:  47-year-old female with bilateral leg pain and soreness  She has circumferential pain from her knees down bilaterally, this is not classic for stenosis  She does have neuropathy in her EMG  I think it is reasonable to have referred to pain managemen

## 2021-01-05 ENCOUNTER — HOSPITAL ENCOUNTER (OUTPATIENT)
Dept: GENERAL RADIOLOGY | Age: 76
Discharge: HOME | End: 2021-01-05
Attending: NEUROLOGICAL SURGERY
Payer: MEDICARE

## 2021-01-05 DIAGNOSIS — M48.061 SPINAL STENOSIS OF LUMBAR REGION WITHOUT NEUROGENIC CLAUDICATION: ICD-10-CM

## 2021-01-05 PROCEDURE — 72114 X-RAY EXAM L-S SPINE BENDING: CPT | Performed by: NEUROLOGICAL SURGERY

## 2021-01-12 RX ORDER — BENAZEPRIL HYDROCHLORIDE AND HYDROCHLOROTHIAZIDE 20; 12.5 MG/1; MG/1
TABLET ORAL
Qty: 90 TABLET | Refills: 1 | Status: SHIPPED | OUTPATIENT
Start: 2021-01-12 | End: 2021-07-12

## 2021-01-14 ENCOUNTER — OFFICE VISIT (OUTPATIENT)
Dept: PAIN CLINIC | Facility: CLINIC | Age: 76
End: 2021-01-14
Payer: MEDICARE

## 2021-01-14 ENCOUNTER — TELEPHONE (OUTPATIENT)
Dept: PAIN CLINIC | Facility: CLINIC | Age: 76
End: 2021-01-14

## 2021-01-14 VITALS
HEART RATE: 84 BPM | WEIGHT: 233 LBS | SYSTOLIC BLOOD PRESSURE: 122 MMHG | RESPIRATION RATE: 16 BRPM | DIASTOLIC BLOOD PRESSURE: 78 MMHG | BODY MASS INDEX: 34.51 KG/M2 | HEIGHT: 69 IN | OXYGEN SATURATION: 99 %

## 2021-01-14 DIAGNOSIS — M54.16 LUMBAR RADICULITIS: Primary | ICD-10-CM

## 2021-01-14 PROCEDURE — 99205 OFFICE O/P NEW HI 60 MIN: CPT | Performed by: ANESTHESIOLOGY

## 2021-01-14 NOTE — PROGRESS NOTES
Location of Pain: starting from shin down to ankle and up to the knee    Date Pain Began: years          Work Related:   No        Receiving Work Comp/Disability:   No    Numeric Rating Scale:  Pain at Present:  7

## 2021-01-14 NOTE — TELEPHONE ENCOUNTER
Question Answer Comment   Anesthesia Type Sedation    Provider Lyla Mcdonalds    Location Lab    Procedure Lumbar RUSTY x 3    Implants No    Medical clearance requested (will send to Pain Navigator) No    Patient has Medicare coverage?  Yes           [facility]

## 2021-01-14 NOTE — TELEPHONE ENCOUNTER
Spoke to patient regarding recommended LESI procedures and reviewed all relevant pre-procedure instructions. Patient scheduled for procedures.      Patient elects to undergo procedure with conscious sedation, reviewed associated ride and fasting requiremen ? If you take morning blood pressure medication or oral diabetic medication, please take with a small sip of water. ? You are required to have a responsible adult drive you home after your procedure.  You may not take a cab unless you have a responsible ad Meloxicam -- Cervical procedures require 3 days    Ibuprofen (Motrin, Advil, Vicoprofen), Naproxen (Naprosyn, Aleve), Piroxcam (Feldene), Meloxicam (Mobic)--(Cervical procedures will require 3 days), Oxaprozin (Daypro), Diclofenac (Voltaren), Indomethacin

## 2021-01-15 ENCOUNTER — LAB ENCOUNTER (OUTPATIENT)
Dept: LAB | Age: 76
End: 2021-01-15
Attending: ANESTHESIOLOGY
Payer: MEDICARE

## 2021-01-15 DIAGNOSIS — M54.16 LUMBAR RADICULITIS: ICD-10-CM

## 2021-01-16 LAB — SARS-COV-2 RNA RESP QL NAA+PROBE: NOT DETECTED

## 2021-01-16 NOTE — PROGRESS NOTES
Name: Karime Hopkins   : 1945   DOS: 2021     Chief complaint: Low back pain    History of present illness:  Karime Hopkins is a 66year old female complaining of  pain in the lower back for 2 years.   Pain is sharp shooting in nature asso capsule by mouth daily. • Calcium Citrate-Vitamin D (CITRACAL + D OR) Take 1,200 mg by mouth daily.        Past Surgical History:   Procedure Laterality Date   • COLONOSCOPY  12/19/13     Noah flores 10   • COLONOSCOPY N/A 12/19/2013    Performed by other vascular problems. Dermatology: Negative for all skin problems. Hematolgy/Lymph: negative for all the hematological problems. Neuropsychiatric:  Denies, anxiety, depression, suicidal ideation.       Physical examination: Daryl Diallo is a 66year old fem better, extension and lateral rotation of the spine makes the pain worse. Richard's, thigh thrust, the Stork or Gillet, SI joint compression, Yeoman's and Gaenslen's tests are positive bilaterally. Romberg test is positive bilaterally.  Straight leg raisin

## 2021-01-18 ENCOUNTER — HOSPITAL ENCOUNTER (OUTPATIENT)
Facility: HOSPITAL | Age: 76
Setting detail: HOSPITAL OUTPATIENT SURGERY
Discharge: HOME | End: 2021-01-18
Attending: ANESTHESIOLOGY | Admitting: ANESTHESIOLOGY
Payer: MEDICARE

## 2021-01-18 ENCOUNTER — APPOINTMENT (OUTPATIENT)
Dept: GENERAL RADIOLOGY | Facility: HOSPITAL | Age: 76
End: 2021-01-18
Attending: ANESTHESIOLOGY
Payer: MEDICARE

## 2021-01-18 VITALS
RESPIRATION RATE: 18 BRPM | HEART RATE: 71 BPM | OXYGEN SATURATION: 97 % | SYSTOLIC BLOOD PRESSURE: 147 MMHG | DIASTOLIC BLOOD PRESSURE: 66 MMHG | TEMPERATURE: 97 F

## 2021-01-18 DIAGNOSIS — M54.16 LUMBAR RADICULITIS: ICD-10-CM

## 2021-01-18 LAB — GLUCOSE BLD-MCNC: 123 MG/DL (ref 70–99)

## 2021-01-18 PROCEDURE — 82962 GLUCOSE BLOOD TEST: CPT

## 2021-01-18 PROCEDURE — 3E0S33Z INTRODUCTION OF ANTI-INFLAMMATORY INTO EPIDURAL SPACE, PERCUTANEOUS APPROACH: ICD-10-PCS | Performed by: ANESTHESIOLOGY

## 2021-01-18 PROCEDURE — 99152 MOD SED SAME PHYS/QHP 5/>YRS: CPT | Performed by: ANESTHESIOLOGY

## 2021-01-18 RX ORDER — DEXTROSE MONOHYDRATE 25 G/50ML
50 INJECTION, SOLUTION INTRAVENOUS
Status: DISCONTINUED | OUTPATIENT
Start: 2021-01-18 | End: 2021-01-18

## 2021-01-18 RX ORDER — SODIUM CHLORIDE 9 MG/ML
INJECTION INTRAVENOUS AS NEEDED
Status: DISCONTINUED | OUTPATIENT
Start: 2021-01-18 | End: 2021-01-18

## 2021-01-18 RX ORDER — SODIUM CHLORIDE, SODIUM LACTATE, POTASSIUM CHLORIDE, CALCIUM CHLORIDE 600; 310; 30; 20 MG/100ML; MG/100ML; MG/100ML; MG/100ML
100 INJECTION, SOLUTION INTRAVENOUS CONTINUOUS
Status: DISCONTINUED | OUTPATIENT
Start: 2021-01-18 | End: 2021-01-18

## 2021-01-18 RX ORDER — DIPHENHYDRAMINE HYDROCHLORIDE 50 MG/ML
50 INJECTION INTRAMUSCULAR; INTRAVENOUS ONCE AS NEEDED
Status: DISCONTINUED | OUTPATIENT
Start: 2021-01-18 | End: 2021-01-18

## 2021-01-18 RX ORDER — ONDANSETRON 2 MG/ML
4 INJECTION INTRAMUSCULAR; INTRAVENOUS ONCE AS NEEDED
Status: DISCONTINUED | OUTPATIENT
Start: 2021-01-18 | End: 2021-01-18

## 2021-01-18 RX ORDER — LIDOCAINE HYDROCHLORIDE 10 MG/ML
INJECTION, SOLUTION EPIDURAL; INFILTRATION; INTRACAUDAL; PERINEURAL AS NEEDED
Status: DISCONTINUED | OUTPATIENT
Start: 2021-01-18 | End: 2021-01-18

## 2021-01-18 RX ORDER — DEXAMETHASONE SODIUM PHOSPHATE 10 MG/ML
INJECTION, SOLUTION INTRAMUSCULAR; INTRAVENOUS AS NEEDED
Status: DISCONTINUED | OUTPATIENT
Start: 2021-01-18 | End: 2021-01-18

## 2021-01-18 RX ORDER — INSULIN ASPART 100 [IU]/ML
3 INJECTION, SOLUTION INTRAVENOUS; SUBCUTANEOUS ONCE
Status: DISCONTINUED | OUTPATIENT
Start: 2021-01-18 | End: 2021-01-18

## 2021-01-18 RX ORDER — MIDAZOLAM HYDROCHLORIDE 1 MG/ML
INJECTION INTRAMUSCULAR; INTRAVENOUS AS NEEDED
Status: DISCONTINUED | OUTPATIENT
Start: 2021-01-18 | End: 2021-01-18

## 2021-01-18 NOTE — H&P (VIEW-ONLY)
History & Physical Examination    Patient Name: Yanira Jimenez  MRN: [id number]  CSN: [id number]  YOB: 1945    Pre-Operative Diagnosis:  Lumbar radiculitis [M54.16]    Present Illness: 77-year-old female patient with chronic low back pain fa Allergies    Past Medical History:   Diagnosis Date   • Essential hypertension    • Essential hypertension, benign 7/31/2013   • HIGH BLOOD PRESSURE    • HIGH CHOLESTEROL    • History of cardiac cath 03/2001    post procedure data   • Hyperlipidemia    • M

## 2021-01-18 NOTE — OPERATIVE REPORT
[facility]  Operative Report  2021     Elijah Murillo Patient Status:  Hospital Outpatient Surgery    1945 MRN [id number]   [facility] ENDOSCOPY Attending Felicia Mccauley MD   Hosp Day # 0 PCP Katleynn Stone MD     Indicat was obtained. Thereafter, dexamethasone 10 mg with normal saline of 5 mL in total volume of 6 mL was injected through the Tuohy needle. The needle was flushed with 1 mL lidocaine. The needle was withdrawn with the stylet intact in situ.   The needle's ti

## 2021-01-18 NOTE — H&P
History & Physical Examination    Patient Name: Fermín Matt  MRN: [id number]  CSN: [id number]  YOB: 1945    Pre-Operative Diagnosis:  Lumbar radiculitis [M54.16]    Present Illness: 75-year-old female patient with chronic low back pain fa Allergies    Past Medical History:   Diagnosis Date   • Essential hypertension    • Essential hypertension, benign 7/31/2013   • HIGH BLOOD PRESSURE    • HIGH CHOLESTEROL    • History of cardiac cath 03/2001    post procedure data   • Hyperlipidemia    • M

## 2021-01-18 NOTE — H&P (VIEW-ONLY)
History & Physical Examination    Patient Name: Raheem Colin  MRN: [id number]  [facility]: [id number]  YOB: 1945    Pre-Operative Diagnosis:  Lumbar radiculitis [M54.16]    Present Illness: 59-year-old female patient with chronic low back pain fa Allergies    Past Medical History:   Diagnosis Date   • Essential hypertension    • Essential hypertension, benign 7/31/2013   • HIGH BLOOD PRESSURE    • HIGH CHOLESTEROL    • History of cardiac cath 03/2001    post procedure data   • Hyperlipidemia    • M

## 2021-01-18 NOTE — INTERVAL H&P NOTE
Pre-op Diagnosis: Lumbar radiculitis [M54.16]    The above referenced H&P was reviewed by Semaj Isabel MD on 1/18/2021, the patient was examined and no significant changes have occurred in the patient's condition since the H&P was performed.   I discuss

## 2021-01-22 ENCOUNTER — LAB ENCOUNTER (OUTPATIENT)
Dept: LAB | Age: 76
End: 2021-01-22
Attending: ANESTHESIOLOGY
Payer: MEDICARE

## 2021-01-22 DIAGNOSIS — M54.16 LUMBAR RADICULITIS: ICD-10-CM

## 2021-01-23 LAB — SARS-COV-2 RNA RESP QL NAA+PROBE: NOT DETECTED

## 2021-01-25 ENCOUNTER — HOSPITAL ENCOUNTER (OUTPATIENT)
Facility: HOSPITAL | Age: 76
Setting detail: HOSPITAL OUTPATIENT SURGERY
Discharge: HOME | End: 2021-01-25
Attending: ANESTHESIOLOGY | Admitting: ANESTHESIOLOGY
Payer: MEDICARE

## 2021-01-25 ENCOUNTER — APPOINTMENT (OUTPATIENT)
Dept: GENERAL RADIOLOGY | Facility: HOSPITAL | Age: 76
End: 2021-01-25
Attending: ANESTHESIOLOGY
Payer: MEDICARE

## 2021-01-25 VITALS
TEMPERATURE: 98 F | OXYGEN SATURATION: 98 % | SYSTOLIC BLOOD PRESSURE: 116 MMHG | RESPIRATION RATE: 18 BRPM | HEART RATE: 71 BPM | DIASTOLIC BLOOD PRESSURE: 75 MMHG

## 2021-01-25 DIAGNOSIS — M54.16 LUMBAR RADICULITIS: ICD-10-CM

## 2021-01-25 LAB — GLUCOSE BLD-MCNC: 111 MG/DL (ref 70–99)

## 2021-01-25 PROCEDURE — 99152 MOD SED SAME PHYS/QHP 5/>YRS: CPT | Performed by: ANESTHESIOLOGY

## 2021-01-25 PROCEDURE — 82962 GLUCOSE BLOOD TEST: CPT

## 2021-01-25 PROCEDURE — 3E0R33Z INTRODUCTION OF ANTI-INFLAMMATORY INTO SPINAL CANAL, PERCUTANEOUS APPROACH: ICD-10-PCS | Performed by: ANESTHESIOLOGY

## 2021-01-25 RX ORDER — GLIPIZIDE 2.5 MG/1
TABLET, EXTENDED RELEASE ORAL
Qty: 90 TABLET | Refills: 0 | Status: SHIPPED | OUTPATIENT
Start: 2021-01-25 | End: 2021-04-08

## 2021-01-25 RX ORDER — DIPHENHYDRAMINE HYDROCHLORIDE 50 MG/ML
50 INJECTION INTRAMUSCULAR; INTRAVENOUS ONCE AS NEEDED
Status: DISCONTINUED | OUTPATIENT
Start: 2021-01-25 | End: 2021-01-25

## 2021-01-25 RX ORDER — INSULIN ASPART 100 [IU]/ML
3 INJECTION, SOLUTION INTRAVENOUS; SUBCUTANEOUS ONCE
Status: DISCONTINUED | OUTPATIENT
Start: 2021-01-25 | End: 2021-01-25

## 2021-01-25 RX ORDER — LIDOCAINE HYDROCHLORIDE 10 MG/ML
INJECTION, SOLUTION EPIDURAL; INFILTRATION; INTRACAUDAL; PERINEURAL AS NEEDED
Status: DISCONTINUED | OUTPATIENT
Start: 2021-01-25 | End: 2021-01-25

## 2021-01-25 RX ORDER — MIDAZOLAM HYDROCHLORIDE 1 MG/ML
INJECTION INTRAMUSCULAR; INTRAVENOUS AS NEEDED
Status: DISCONTINUED | OUTPATIENT
Start: 2021-01-25 | End: 2021-01-25

## 2021-01-25 RX ORDER — SODIUM CHLORIDE, SODIUM LACTATE, POTASSIUM CHLORIDE, CALCIUM CHLORIDE 600; 310; 30; 20 MG/100ML; MG/100ML; MG/100ML; MG/100ML
100 INJECTION, SOLUTION INTRAVENOUS CONTINUOUS
Status: DISCONTINUED | OUTPATIENT
Start: 2021-01-25 | End: 2021-01-25

## 2021-01-25 RX ORDER — SODIUM CHLORIDE 9 MG/ML
INJECTION INTRAVENOUS AS NEEDED
Status: DISCONTINUED | OUTPATIENT
Start: 2021-01-25 | End: 2021-01-25

## 2021-01-25 RX ORDER — DEXAMETHASONE SODIUM PHOSPHATE 10 MG/ML
INJECTION, SOLUTION INTRAMUSCULAR; INTRAVENOUS AS NEEDED
Status: DISCONTINUED | OUTPATIENT
Start: 2021-01-25 | End: 2021-01-25

## 2021-01-25 RX ORDER — DEXTROSE MONOHYDRATE 25 G/50ML
50 INJECTION, SOLUTION INTRAVENOUS
Status: DISCONTINUED | OUTPATIENT
Start: 2021-01-25 | End: 2021-01-25

## 2021-01-25 RX ORDER — ONDANSETRON 2 MG/ML
4 INJECTION INTRAMUSCULAR; INTRAVENOUS ONCE AS NEEDED
Status: DISCONTINUED | OUTPATIENT
Start: 2021-01-25 | End: 2021-01-25

## 2021-01-25 NOTE — OPERATIVE REPORT
[facility]  Operative Report  2021     Kelly Pump Patient Status:  Hospital Outpatient Surgery    1945 MRN [id number]   [facility] ENDOSCOPY Attending No att. providers found   Hosp Day # 0 PCP Rashmi Gamez MD     Katelyn epidurogram was obtained. Thereafter, dexamethasone 10 mg with normal saline of 5 mL in total volume of 6 mL was injected through the Tuohy needle. The needle was flushed with 1 mL lidocaine. The needle was withdrawn with the stylet intact in situ.   The

## 2021-01-29 ENCOUNTER — LAB ENCOUNTER (OUTPATIENT)
Dept: LAB | Age: 76
End: 2021-01-29
Attending: ANESTHESIOLOGY
Payer: MEDICARE

## 2021-01-29 DIAGNOSIS — M54.16 LUMBAR RADICULITIS: ICD-10-CM

## 2021-01-30 LAB — SARS-COV-2 RNA RESP QL NAA+PROBE: NOT DETECTED

## 2021-02-01 ENCOUNTER — HOSPITAL ENCOUNTER (OUTPATIENT)
Facility: HOSPITAL | Age: 76
Setting detail: HOSPITAL OUTPATIENT SURGERY
Discharge: HOME | End: 2021-02-01
Attending: ANESTHESIOLOGY | Admitting: ANESTHESIOLOGY
Payer: MEDICARE

## 2021-02-01 ENCOUNTER — APPOINTMENT (OUTPATIENT)
Dept: GENERAL RADIOLOGY | Facility: HOSPITAL | Age: 76
End: 2021-02-01
Attending: ANESTHESIOLOGY
Payer: MEDICARE

## 2021-02-01 VITALS
HEART RATE: 72 BPM | TEMPERATURE: 99 F | RESPIRATION RATE: 16 BRPM | OXYGEN SATURATION: 97 % | DIASTOLIC BLOOD PRESSURE: 52 MMHG | SYSTOLIC BLOOD PRESSURE: 135 MMHG

## 2021-02-01 DIAGNOSIS — Z23 NEED FOR VACCINATION: ICD-10-CM

## 2021-02-01 DIAGNOSIS — M54.16 LUMBAR RADICULITIS: ICD-10-CM

## 2021-02-01 LAB — GLUCOSE BLD-MCNC: 106 MG/DL (ref 70–99)

## 2021-02-01 PROCEDURE — 3E0R33Z INTRODUCTION OF ANTI-INFLAMMATORY INTO SPINAL CANAL, PERCUTANEOUS APPROACH: ICD-10-PCS | Performed by: ANESTHESIOLOGY

## 2021-02-01 PROCEDURE — B01B1ZZ FLUOROSCOPY OF SPINAL CORD USING LOW OSMOLAR CONTRAST: ICD-10-PCS | Performed by: ANESTHESIOLOGY

## 2021-02-01 PROCEDURE — 99152 MOD SED SAME PHYS/QHP 5/>YRS: CPT | Performed by: ANESTHESIOLOGY

## 2021-02-01 PROCEDURE — 82962 GLUCOSE BLOOD TEST: CPT

## 2021-02-01 RX ORDER — MIDAZOLAM HYDROCHLORIDE 1 MG/ML
INJECTION INTRAMUSCULAR; INTRAVENOUS AS NEEDED
Status: DISCONTINUED | OUTPATIENT
Start: 2021-02-01 | End: 2021-02-01

## 2021-02-01 RX ORDER — DIPHENHYDRAMINE HYDROCHLORIDE 50 MG/ML
50 INJECTION INTRAMUSCULAR; INTRAVENOUS ONCE AS NEEDED
Status: DISCONTINUED | OUTPATIENT
Start: 2021-02-01 | End: 2021-02-01

## 2021-02-01 RX ORDER — INSULIN ASPART 100 [IU]/ML
3 INJECTION, SOLUTION INTRAVENOUS; SUBCUTANEOUS ONCE
Status: DISCONTINUED | OUTPATIENT
Start: 2021-02-01 | End: 2021-02-01

## 2021-02-01 RX ORDER — ONDANSETRON 2 MG/ML
4 INJECTION INTRAMUSCULAR; INTRAVENOUS ONCE AS NEEDED
Status: DISCONTINUED | OUTPATIENT
Start: 2021-02-01 | End: 2021-02-01

## 2021-02-01 RX ORDER — DEXTROSE MONOHYDRATE 25 G/50ML
50 INJECTION, SOLUTION INTRAVENOUS
Status: DISCONTINUED | OUTPATIENT
Start: 2021-02-01 | End: 2021-02-01

## 2021-02-01 RX ORDER — LIDOCAINE HYDROCHLORIDE 10 MG/ML
INJECTION, SOLUTION EPIDURAL; INFILTRATION; INTRACAUDAL; PERINEURAL AS NEEDED
Status: DISCONTINUED | OUTPATIENT
Start: 2021-02-01 | End: 2021-02-01

## 2021-02-01 RX ORDER — SODIUM CHLORIDE 9 MG/ML
INJECTION INTRAVENOUS AS NEEDED
Status: DISCONTINUED | OUTPATIENT
Start: 2021-02-01 | End: 2021-02-01

## 2021-02-01 RX ORDER — DEXAMETHASONE SODIUM PHOSPHATE 10 MG/ML
INJECTION, SOLUTION INTRAMUSCULAR; INTRAVENOUS AS NEEDED
Status: DISCONTINUED | OUTPATIENT
Start: 2021-02-01 | End: 2021-02-01

## 2021-02-01 RX ORDER — SODIUM CHLORIDE, SODIUM LACTATE, POTASSIUM CHLORIDE, CALCIUM CHLORIDE 600; 310; 30; 20 MG/100ML; MG/100ML; MG/100ML; MG/100ML
100 INJECTION, SOLUTION INTRAVENOUS CONTINUOUS
Status: DISCONTINUED | OUTPATIENT
Start: 2021-02-01 | End: 2021-02-01

## 2021-02-01 NOTE — OPERATIVE REPORT
[facility]  Operative Report  2021     Elijah Murillo Patient Status:  Hospital Outpatient Surgery    1945 MRN [id number]   [facility] ENDOSCOPY Attending Felicia Mccauley MD   Hosp Day # 0 PCP MD Paras Rene epidurogram was obtained. Thereafter, dexamethasone 10 mg with normal saline of 5 mL in total volume of 6 mL was injected through the Tuohy needle. The needle was flushed with 1 mL lidocaine. The needle was withdrawn with the stylet intact in situ.   The

## 2021-02-01 NOTE — INTERVAL H&P NOTE
Pre-op Diagnosis: Lumbar radiculitis [M54.16]    The above referenced H&P was reviewed by Lamon Babinski, MD on 2/1/2021, the patient was examined and no significant changes have occurred in the patient's condition since the H&P was performed.   I discusse

## 2021-02-01 NOTE — INTERVAL H&P NOTE
Pre-op Diagnosis: Lumbar radiculitis [M54.16]    The above referenced H&P was reviewed by Ariel Hogue MD on 2/1/2021, the patient was examined and no significant changes have occurred in the patient's condition since the H&P was performed.   I discusse

## 2021-02-02 ENCOUNTER — IMMUNIZATION (OUTPATIENT)
Dept: LAB | Age: 76
End: 2021-02-02
Attending: HOSPITALIST
Payer: MEDICARE

## 2021-02-02 DIAGNOSIS — Z23 NEED FOR VACCINATION: Primary | ICD-10-CM

## 2021-02-02 PROCEDURE — 0001A SARSCOV2 VAC 30MCG/0.3ML IM: CPT

## 2021-02-20 DIAGNOSIS — M48.061 SPINAL STENOSIS OF LUMBAR REGION WITHOUT NEUROGENIC CLAUDICATION: ICD-10-CM

## 2021-02-22 RX ORDER — GABAPENTIN 400 MG/1
400 CAPSULE ORAL 3 TIMES DAILY
Qty: 90 CAPSULE | Refills: 3 | Status: SHIPPED | OUTPATIENT
Start: 2021-02-22 | End: 2021-03-18

## 2021-02-22 NOTE — TELEPHONE ENCOUNTER
Medication: gabapentin 400 mg cap  TID #270/0 refills 3 month supply  Date of last refill: 12-  [place]    Last office visit: 12-  Due back to clinic per last office note:  4-6 weeks  Date next office v

## 2021-02-23 ENCOUNTER — IMMUNIZATION (OUTPATIENT)
Dept: LAB | Age: 76
End: 2021-02-23
Attending: HOSPITALIST
Payer: MEDICARE

## 2021-02-23 DIAGNOSIS — Z23 NEED FOR VACCINATION: Primary | ICD-10-CM

## 2021-02-23 PROCEDURE — 0002A SARSCOV2 VAC 30MCG/0.3ML IM: CPT

## 2021-03-10 ENCOUNTER — LAB ENCOUNTER (OUTPATIENT)
Dept: LAB | Age: 76
End: 2021-03-10
Attending: FAMILY MEDICINE
Payer: MEDICARE

## 2021-03-10 ENCOUNTER — OFFICE VISIT (OUTPATIENT)
Dept: INTERNAL MEDICINE CLINIC | Facility: CLINIC | Age: 76
End: 2021-03-10
Payer: MEDICARE

## 2021-03-10 VITALS
WEIGHT: 226.5 LBS | DIASTOLIC BLOOD PRESSURE: 72 MMHG | RESPIRATION RATE: 16 BRPM | HEIGHT: 69 IN | HEART RATE: 80 BPM | BODY MASS INDEX: 33.55 KG/M2 | OXYGEN SATURATION: 98 % | SYSTOLIC BLOOD PRESSURE: 130 MMHG | TEMPERATURE: 98 F

## 2021-03-10 DIAGNOSIS — E11.42 DIABETIC POLYNEUROPATHY ASSOCIATED WITH TYPE 2 DIABETES MELLITUS (HCC): ICD-10-CM

## 2021-03-10 DIAGNOSIS — M48.061 SPINAL STENOSIS OF LUMBAR REGION WITHOUT NEUROGENIC CLAUDICATION: ICD-10-CM

## 2021-03-10 DIAGNOSIS — I10 ESSENTIAL HYPERTENSION, BENIGN: ICD-10-CM

## 2021-03-10 DIAGNOSIS — E11.22 CKD STAGE 3 DUE TO TYPE 2 DIABETES MELLITUS (HCC): ICD-10-CM

## 2021-03-10 DIAGNOSIS — N18.30 CKD STAGE 3 DUE TO TYPE 2 DIABETES MELLITUS (HCC): ICD-10-CM

## 2021-03-10 DIAGNOSIS — E78.00 PURE HYPERCHOLESTEROLEMIA: ICD-10-CM

## 2021-03-10 DIAGNOSIS — N18.30 TYPE 2 DIABETES MELLITUS WITH STAGE 3 CHRONIC KIDNEY DISEASE, WITHOUT LONG-TERM CURRENT USE OF INSULIN, UNSPECIFIED WHETHER STAGE 3A OR 3B CKD (HCC): ICD-10-CM

## 2021-03-10 DIAGNOSIS — I10 ESSENTIAL HYPERTENSION, BENIGN: Primary | ICD-10-CM

## 2021-03-10 DIAGNOSIS — E11.22 TYPE 2 DIABETES MELLITUS WITH STAGE 3 CHRONIC KIDNEY DISEASE, WITHOUT LONG-TERM CURRENT USE OF INSULIN, UNSPECIFIED WHETHER STAGE 3A OR 3B CKD (HCC): ICD-10-CM

## 2021-03-10 LAB
ALBUMIN SERPL-MCNC: 3.7 G/DL (ref 3.4–5)
ALBUMIN/GLOB SERPL: 1.1 {RATIO} (ref 1–2)
ALP LIVER SERPL-CCNC: 114 U/L
ALT SERPL-CCNC: 16 U/L
ANION GAP SERPL CALC-SCNC: 6 MMOL/L (ref 0–18)
AST SERPL-CCNC: 9 U/L (ref 15–37)
BASOPHILS # BLD AUTO: 0.07 X10(3) UL (ref 0–0.2)
BASOPHILS NFR BLD AUTO: 0.6 %
BILIRUB SERPL-MCNC: 0.3 MG/DL (ref 0.1–2)
BUN BLD-MCNC: 27 MG/DL (ref 7–18)
BUN/CREAT SERPL: 23.7 (ref 10–20)
CALCIUM BLD-MCNC: 9.5 MG/DL (ref 8.5–10.1)
CHLORIDE SERPL-SCNC: 104 MMOL/L (ref 98–112)
CHOLEST SMN-MCNC: 154 MG/DL (ref ?–200)
CO2 SERPL-SCNC: 28 MMOL/L (ref 21–32)
CREAT BLD-MCNC: 1.14 MG/DL
DEPRECATED RDW RBC AUTO: 49.6 FL (ref 35.1–46.3)
EOSINOPHIL # BLD AUTO: 0.1 X10(3) UL (ref 0–0.7)
EOSINOPHIL NFR BLD AUTO: 0.9 %
ERYTHROCYTE [DISTWIDTH] IN BLOOD BY AUTOMATED COUNT: 14.3 % (ref 11–15)
EST. AVERAGE GLUCOSE BLD GHB EST-MCNC: 148 MG/DL (ref 68–126)
GLOBULIN PLAS-MCNC: 3.4 G/DL (ref 2.8–4.4)
GLUCOSE BLD-MCNC: 98 MG/DL (ref 70–99)
HBA1C MFR BLD HPLC: 6.8 % (ref ?–5.7)
HCT VFR BLD AUTO: 43.4 %
HDLC SERPL-MCNC: 51 MG/DL (ref 40–59)
HGB BLD-MCNC: 13.8 G/DL
IMM GRANULOCYTES # BLD AUTO: 0.04 X10(3) UL (ref 0–1)
IMM GRANULOCYTES NFR BLD: 0.3 %
LDLC SERPL CALC-MCNC: 60 MG/DL (ref ?–100)
LYMPHOCYTES # BLD AUTO: 2.79 X10(3) UL (ref 1–4)
LYMPHOCYTES NFR BLD AUTO: 23.8 %
M PROTEIN MFR SERPL ELPH: 7.1 G/DL (ref 6.4–8.2)
MCH RBC QN AUTO: 30.1 PG (ref 26–34)
MCHC RBC AUTO-ENTMCNC: 31.8 G/DL (ref 31–37)
MCV RBC AUTO: 94.6 FL
MONOCYTES # BLD AUTO: 0.91 X10(3) UL (ref 0.1–1)
MONOCYTES NFR BLD AUTO: 7.8 %
NEUTROPHILS # BLD AUTO: 7.82 X10 (3) UL (ref 1.5–7.7)
NEUTROPHILS # BLD AUTO: 7.82 X10(3) UL (ref 1.5–7.7)
NEUTROPHILS NFR BLD AUTO: 66.6 %
NONHDLC SERPL-MCNC: 103 MG/DL (ref ?–130)
OSMOLALITY SERPL CALC.SUM OF ELEC: 291 MOSM/KG (ref 275–295)
PLATELET # BLD AUTO: 288 10(3)UL (ref 150–450)
POTASSIUM SERPL-SCNC: 4.8 MMOL/L (ref 3.5–5.1)
RBC # BLD AUTO: 4.59 X10(6)UL
SODIUM SERPL-SCNC: 138 MMOL/L (ref 136–145)
TRIGL SERPL-MCNC: 215 MG/DL (ref 30–149)
TSI SER-ACNC: 0.93 MIU/ML (ref 0.36–3.74)
VLDLC SERPL CALC-MCNC: 43 MG/DL (ref 0–30)
WBC # BLD AUTO: 11.7 X10(3) UL (ref 4–11)

## 2021-03-10 PROCEDURE — 84443 ASSAY THYROID STIM HORMONE: CPT

## 2021-03-10 PROCEDURE — 83036 HEMOGLOBIN GLYCOSYLATED A1C: CPT

## 2021-03-10 PROCEDURE — 80053 COMPREHEN METABOLIC PANEL: CPT

## 2021-03-10 PROCEDURE — 99214 OFFICE O/P EST MOD 30 MIN: CPT | Performed by: FAMILY MEDICINE

## 2021-03-10 PROCEDURE — 80061 LIPID PANEL: CPT

## 2021-03-10 PROCEDURE — 36415 COLL VENOUS BLD VENIPUNCTURE: CPT

## 2021-03-10 PROCEDURE — 85025 COMPLETE CBC W/AUTO DIFF WBC: CPT

## 2021-03-10 RX ORDER — ROSUVASTATIN CALCIUM 5 MG/1
5 TABLET, COATED ORAL DAILY
Qty: 90 TABLET | Refills: 1 | Status: SHIPPED | OUTPATIENT
Start: 2021-03-10 | End: 2021-08-11

## 2021-03-10 NOTE — PROGRESS NOTES
Chuy Barnett is a 66year old female.   HPI:   Here for f/u on her meds   Overall is stable   Still seeing Dr YUNI CALDERA for her back  Had 3 shots   No significant relief w her LE symptoms    Sitting is not an issue but after standing for a period of time th Drug use: No       REVIEW OF SYSTEMS:   GENERAL HEALTH: feels well otherwise  SKIN: denies rashes  RESPIRATORY: denies shortness of breath   CARDIOVASCULAR: denies chest pain on exertion  GI: denies abdominal pain  NEURO: denies headaches    EXAM:

## 2021-03-18 ENCOUNTER — OFFICE VISIT (OUTPATIENT)
Dept: SURGERY | Facility: CLINIC | Age: 76
End: 2021-03-18
Payer: MEDICARE

## 2021-03-18 VITALS
BODY MASS INDEX: 33.47 KG/M2 | DIASTOLIC BLOOD PRESSURE: 72 MMHG | SYSTOLIC BLOOD PRESSURE: 140 MMHG | HEIGHT: 69 IN | WEIGHT: 226 LBS

## 2021-03-18 DIAGNOSIS — M48.061 SPINAL STENOSIS OF LUMBAR REGION WITHOUT NEUROGENIC CLAUDICATION: Primary | ICD-10-CM

## 2021-03-18 PROCEDURE — 99213 OFFICE O/P EST LOW 20 MIN: CPT | Performed by: PHYSICIAN ASSISTANT

## 2021-03-18 RX ORDER — GABAPENTIN 300 MG/1
600 CAPSULE ORAL 3 TIMES DAILY
Qty: 180 CAPSULE | Refills: 2 | Status: SHIPPED | OUTPATIENT
Start: 2021-03-18 | End: 2021-08-12 | Stop reason: DRUGHIGH

## 2021-03-18 NOTE — PROGRESS NOTES
She is not feeling any better after injections  Had 3 injections done  Usually the day of the injection she had a little relief but then the pain came back right after    Pain 6-7/10 currently  HALLEY thigh in back and then wraps around to the front of the lo

## 2021-03-18 NOTE — PROGRESS NOTES
Neurosurgery Clinic Visit  3/18/2021    Concetta Smith PCP:  Symone Moore MD    1945 MRN [id number]     HISTORY OF PRESENT ILLNESS:  Concetta Smith is a(n) 66year old female who is here for follow-up of bilateral leg pain.   She underwent umb [place]  [facility], [address]  [address] [place] [address], [address] [place] [address]  [phone]  3/18/2021  3:01 PM

## 2021-04-08 RX ORDER — GLIPIZIDE 2.5 MG/1
2.5 TABLET, EXTENDED RELEASE ORAL
Qty: 90 TABLET | Refills: 0 | Status: SHIPPED | OUTPATIENT
Start: 2021-04-08 | End: 2021-07-08

## 2021-04-08 NOTE — TELEPHONE ENCOUNTER
LOV: 3/10/2021 with Dr. Yeimi Morrison   RTC: no follow-up on file  Last Relevant Labs: 3/10/2021   Filled: 1/25/2021   #90 with 0 refills    Future Appointments   Date Time Provider Alejandro Nguyen   5/6/2021 11:00 AM Fauzia Velasquez MD ENINAPER2 [facility]

## 2021-05-06 ENCOUNTER — OFFICE VISIT (OUTPATIENT)
Dept: SURGERY | Facility: CLINIC | Age: 76
End: 2021-05-06
Payer: MEDICARE

## 2021-05-06 ENCOUNTER — TELEPHONE (OUTPATIENT)
Dept: SURGERY | Facility: CLINIC | Age: 76
End: 2021-05-06

## 2021-05-06 VITALS
HEART RATE: 70 BPM | DIASTOLIC BLOOD PRESSURE: 62 MMHG | BODY MASS INDEX: 32.44 KG/M2 | HEIGHT: 69 IN | SYSTOLIC BLOOD PRESSURE: 110 MMHG | WEIGHT: 219 LBS

## 2021-05-06 DIAGNOSIS — M48.061 SPINAL STENOSIS OF LUMBAR REGION WITHOUT NEUROGENIC CLAUDICATION: Primary | ICD-10-CM

## 2021-05-06 PROCEDURE — 99213 OFFICE O/P EST LOW 20 MIN: CPT | Performed by: PHYSICIAN ASSISTANT

## 2021-05-06 NOTE — TELEPHONE ENCOUNTER
You are scheduled for L 3-4, laminectomies on 6/9/21 with Dr. Latisha Angel. · PCP clearance is needed. We have faxed a request for pre-op clearance to you PCP . Please contact their office for appointment.   ·   · You will need  pre operative labs which wi you 1-2 days before surgery with your arrival time.    · If you were on blood thinners (such as Coumadin, Pradaxa, Xarelto, etc) prior to surgery that we had you stop for surgery, please make sure you get instructions about when to resume the medication bef the night before surgery.    Steps to bathing with Hibiclens  · Do not use Hibiclens on your hair, face or private areas  · Wash your hair and face as normal with your usual cleansers  · Rinse well  · Using a clean wet washcloth apply enough Hibiclens to co

## 2021-05-06 NOTE — PROGRESS NOTES
Patient here for 6-week follow-up. States gabapentin did not help much with pain. There are days where pain is worse than usual. Patient has no back pain, pain is primarily down bilateral legs.

## 2021-05-06 NOTE — PROGRESS NOTES
Neurosurgery Clinic Visit  2021    Ella Mcclendon PCP:  Teagan Valentin MD    1945 MRN [id number]     HISTORY OF PRESENT ILLNESS:  Ella Mcclendon is a(n) 66year old female who is here for follow-up of bilateral leg pain.   She continues to h counseling/coordination of care. Nature of education / counseling: Pathology, treatment options, and expected outcomes. Eli Blair M.S., PA-C  [facility]  [address] [facility],  [address], [address]  [phone]  5/

## 2021-05-06 NOTE — PATIENT INSTRUCTIONS
You are scheduled for L 3-4, laminectomies on 6/9/21 with .     · PCP clearance is needed. We have faxed a request for pre-op clearance to you PCP . Please contact their office for appointment.   ·   · You will need  pre operative labs which will include days before surgery with your arrival time.    · If you were on blood thinners (such as Coumadin, Pradaxa, Xarelto, etc) prior to surgery that we had you stop for surgery, please make sure you get instructions about when to resume the medication before you night before surgery.    Steps to bathing with Hibiclens  · Do not use Hibiclens on your hair, face or private areas  · Wash your hair and face as normal with your usual cleansers  · Rinse well  · Using a clean wet washcloth apply enough Hibiclens to cover has the requested medication available.   We will gladly send a new prescription to that pharmacy at your request.    Tomasa Sun up protocol for controlled substances; Written prescriptions:    • EFFECTIVE April 1, 2017 PATIENTS MUST  THEIR OWN NARCOTIC [place] Not all paperwork can be completed during your office visit. • Allow 2 weeks for FMLA, disability or other complex paperwork to be processed. • Other paperwork may require up to 5 business days for completion.   • Fill in and sign any sections that are t

## 2021-05-07 NOTE — TELEPHONE ENCOUNTER
Patient is scheduled for L3-L4 laminectomies and all indicated procedures on 6/9/21 with Dr Jazmine Kwon.     X  form completed  X Surgery order signed   X Placed sx on surgery sheet  X Placed on outlook calendar  X [employer] message sent to patient with s

## 2021-05-10 RX ORDER — DEXTROSE MONOHYDRATE 25 G/50ML
50 INJECTION, SOLUTION INTRAVENOUS
Status: CANCELLED | OUTPATIENT
Start: 2021-05-10

## 2021-05-10 RX ORDER — ACETAMINOPHEN 500 MG
1000 TABLET ORAL ONCE
Status: CANCELLED | OUTPATIENT
Start: 2021-05-10 | End: 2021-05-10

## 2021-05-10 RX ORDER — SODIUM CHLORIDE, SODIUM LACTATE, POTASSIUM CHLORIDE, CALCIUM CHLORIDE 600; 310; 30; 20 MG/100ML; MG/100ML; MG/100ML; MG/100ML
INJECTION, SOLUTION INTRAVENOUS CONTINUOUS
Status: CANCELLED | OUTPATIENT
Start: 2021-05-10

## 2021-05-11 ENCOUNTER — TELEPHONE (OUTPATIENT)
Dept: INTERNAL MEDICINE CLINIC | Facility: CLINIC | Age: 76
End: 2021-05-11

## 2021-05-11 NOTE — TELEPHONE ENCOUNTER
Incoming fax from pre admission testing department at [address]. Surgery on 6/9/21 - needs pre op appt. Please contact pt to schedule.

## 2021-05-12 NOTE — TELEPHONE ENCOUNTER
Patient scheduled.      Future Appointments   Date Time Provider Alejandro Nguyen   5/15/2021  9:00 AM PF MRI RM1 (1.5T) PF MRI [place]   5/22/2021 11:00 AM Prince Aguirre MD EMG 8 EMG [place]   6/7/2021 11:00 AM PF LABTECHS PF OUTPT LAB [place]   6/7/2021 11:15 AM PF COVID RESOURCE PF OUTPT [facility]   6/23/2021 11:10 AM Sheila Camarena PA-C ENINAPER2 EMG [place]   7/20/2021  1:30 PM Nydia Velasquez MD ENINAPER2 EM[facility] [place]

## 2021-05-15 ENCOUNTER — HOSPITAL ENCOUNTER (OUTPATIENT)
Dept: MRI IMAGING | Age: 76
Discharge: HOME | End: 2021-05-15
Attending: PHYSICIAN ASSISTANT
Payer: MEDICARE

## 2021-05-15 DIAGNOSIS — M48.061 SPINAL STENOSIS OF LUMBAR REGION WITHOUT NEUROGENIC CLAUDICATION: ICD-10-CM

## 2021-05-15 PROCEDURE — 72148 MRI LUMBAR SPINE W/O DYE: CPT | Performed by: PHYSICIAN ASSISTANT

## 2021-05-22 ENCOUNTER — LAB ENCOUNTER (OUTPATIENT)
Dept: LAB | Age: 76
End: 2021-05-22
Attending: FAMILY MEDICINE
Payer: MEDICARE

## 2021-05-22 ENCOUNTER — OFFICE VISIT (OUTPATIENT)
Dept: INTERNAL MEDICINE CLINIC | Facility: CLINIC | Age: 76
End: 2021-05-22
Payer: MEDICARE

## 2021-05-22 VITALS
RESPIRATION RATE: 18 BRPM | HEIGHT: 69 IN | OXYGEN SATURATION: 98 % | SYSTOLIC BLOOD PRESSURE: 128 MMHG | DIASTOLIC BLOOD PRESSURE: 68 MMHG | HEART RATE: 94 BPM | WEIGHT: 235 LBS | BODY MASS INDEX: 34.8 KG/M2

## 2021-05-22 DIAGNOSIS — E11.22 CKD STAGE 3 DUE TO TYPE 2 DIABETES MELLITUS (HCC): ICD-10-CM

## 2021-05-22 DIAGNOSIS — Z01.818 PREOP EXAMINATION: Primary | ICD-10-CM

## 2021-05-22 DIAGNOSIS — N18.30 CKD STAGE 3 DUE TO TYPE 2 DIABETES MELLITUS (HCC): ICD-10-CM

## 2021-05-22 DIAGNOSIS — N18.30 TYPE 2 DIABETES MELLITUS WITH STAGE 3 CHRONIC KIDNEY DISEASE, WITHOUT LONG-TERM CURRENT USE OF INSULIN, UNSPECIFIED WHETHER STAGE 3A OR 3B CKD (HCC): ICD-10-CM

## 2021-05-22 DIAGNOSIS — Z01.818 PREOP EXAMINATION: ICD-10-CM

## 2021-05-22 DIAGNOSIS — Z01.818 PRE-OP TESTING: ICD-10-CM

## 2021-05-22 DIAGNOSIS — I10 ESSENTIAL HYPERTENSION, BENIGN: ICD-10-CM

## 2021-05-22 DIAGNOSIS — E11.22 TYPE 2 DIABETES MELLITUS WITH STAGE 3 CHRONIC KIDNEY DISEASE, WITHOUT LONG-TERM CURRENT USE OF INSULIN, UNSPECIFIED WHETHER STAGE 3A OR 3B CKD (HCC): ICD-10-CM

## 2021-05-22 DIAGNOSIS — M48.061 SPINAL STENOSIS OF LUMBAR REGION, UNSPECIFIED WHETHER NEUROGENIC CLAUDICATION PRESENT: ICD-10-CM

## 2021-05-22 PROCEDURE — 87081 CULTURE SCREEN ONLY: CPT

## 2021-05-22 PROCEDURE — 86901 BLOOD TYPING SEROLOGIC RH(D): CPT

## 2021-05-22 PROCEDURE — 85025 COMPLETE CBC W/AUTO DIFF WBC: CPT

## 2021-05-22 PROCEDURE — 86900 BLOOD TYPING SEROLOGIC ABO: CPT

## 2021-05-22 PROCEDURE — 85730 THROMBOPLASTIN TIME PARTIAL: CPT

## 2021-05-22 PROCEDURE — 86850 RBC ANTIBODY SCREEN: CPT

## 2021-05-22 PROCEDURE — 80053 COMPREHEN METABOLIC PANEL: CPT

## 2021-05-22 PROCEDURE — 93000 ELECTROCARDIOGRAM COMPLETE: CPT | Performed by: FAMILY MEDICINE

## 2021-05-22 PROCEDURE — 36415 COLL VENOUS BLD VENIPUNCTURE: CPT

## 2021-05-22 PROCEDURE — 99214 OFFICE O/P EST MOD 30 MIN: CPT | Performed by: FAMILY MEDICINE

## 2021-05-22 PROCEDURE — 85610 PROTHROMBIN TIME: CPT

## 2021-05-22 NOTE — PROGRESS NOTES
Raheem Colin is a 66year old female.   HPI:   Pt is here for a preop exam for her upcoming L3 L4 laminectomies under the care of Dr Melani Sibley    Date is set for 6/9/21 at [address] anesthesia to be used      No issues w anesthesia in t 03/2001    post procedure data   • Macular degeneration of right eye    • Measles without mention of complication    • Type II or unspecified type diabetes mellitus without mention of complication, not stated as uncontrolled    • Varicella without mention benign  Plan: stable BP  CPM w meds       (E11.22,  N18.30) CKD stage 3 due to type 2 diabetes mellitus ([facility]  Plan: await labs      (E11.22,  N18.30) Type 2 diabetes mellitus with stage 3 chronic kidney disease, without long-term current use of insulin, u

## 2021-05-23 ENCOUNTER — HOSPITAL ENCOUNTER (OUTPATIENT)
Dept: GENERAL RADIOLOGY | Age: 76
Discharge: HOME | End: 2021-05-23
Attending: FAMILY MEDICINE
Payer: MEDICARE

## 2021-05-23 DIAGNOSIS — Z01.818 PREOP EXAMINATION: ICD-10-CM

## 2021-05-23 PROCEDURE — 71046 X-RAY EXAM CHEST 2 VIEWS: CPT | Performed by: FAMILY MEDICINE

## 2021-05-24 NOTE — TELEPHONE ENCOUNTER
Per pt's PCP Dr. Bain Milder 5/22/21    \"(Z01.818) Preop examination  (primary encounter diagnosis)  Plan: appears overall stable    EKG stable   Await preop labs and CXR       (I10) Essential hypertension, benign  Plan: stable BP  CPM w meds        (E11.2

## 2021-05-26 ENCOUNTER — TELEPHONE (OUTPATIENT)
Dept: INTERNAL MEDICINE CLINIC | Facility: CLINIC | Age: 76
End: 2021-05-26

## 2021-05-26 NOTE — TELEPHONE ENCOUNTER
Caller inquiring on EKG for upcoming surgery that was done on May 22nd 2021.  Please fax to [phone]

## 2021-05-27 NOTE — TELEPHONE ENCOUNTER
Original EKG sent to scan - reprinted and faxed to [address] [place] [address] - fax confirmation received. EKG placed in pod folder.

## 2021-05-28 ENCOUNTER — ANESTHESIA EVENT (OUTPATIENT)
Dept: SURGERY | Facility: HOSPITAL | Age: 76
End: 2021-05-28
Payer: MEDICARE

## 2021-06-02 ENCOUNTER — TELEPHONE (OUTPATIENT)
Dept: INTERNAL MEDICINE CLINIC | Facility: CLINIC | Age: 76
End: 2021-06-02

## 2021-06-07 ENCOUNTER — LABORATORY ENCOUNTER (OUTPATIENT)
Dept: LAB | Age: 76
End: 2021-06-07
Attending: NEUROLOGICAL SURGERY
Payer: MEDICARE

## 2021-06-07 ENCOUNTER — LAB ENCOUNTER (OUTPATIENT)
Dept: LAB | Age: 76
End: 2021-06-07
Attending: NEUROLOGICAL SURGERY
Payer: MEDICARE

## 2021-06-07 DIAGNOSIS — Z01.818 PRE-OP TESTING: ICD-10-CM

## 2021-06-09 ENCOUNTER — APPOINTMENT (OUTPATIENT)
Dept: GENERAL RADIOLOGY | Facility: HOSPITAL | Age: 76
End: 2021-06-09
Attending: NEUROLOGICAL SURGERY
Payer: MEDICARE

## 2021-06-09 ENCOUNTER — HOSPITAL ENCOUNTER (OUTPATIENT)
Facility: HOSPITAL | Age: 76
Setting detail: HOSPITAL OUTPATIENT SURGERY
Discharge: HOME | End: 2021-06-09
Attending: NEUROLOGICAL SURGERY | Admitting: NEUROLOGICAL SURGERY
Payer: MEDICARE

## 2021-06-09 ENCOUNTER — ANESTHESIA (OUTPATIENT)
Dept: SURGERY | Facility: HOSPITAL | Age: 76
End: 2021-06-09
Payer: MEDICARE

## 2021-06-09 VITALS
HEART RATE: 75 BPM | HEIGHT: 69 IN | TEMPERATURE: 98 F | DIASTOLIC BLOOD PRESSURE: 78 MMHG | SYSTOLIC BLOOD PRESSURE: 152 MMHG | WEIGHT: 224.88 LBS | RESPIRATION RATE: 16 BRPM | OXYGEN SATURATION: 95 % | BODY MASS INDEX: 33.31 KG/M2

## 2021-06-09 DIAGNOSIS — Z01.818 PRE-OP TESTING: Primary | ICD-10-CM

## 2021-06-09 DIAGNOSIS — M48.061 SPINAL STENOSIS OF LUMBAR REGION WITHOUT NEUROGENIC CLAUDICATION: ICD-10-CM

## 2021-06-09 PROCEDURE — 82962 GLUCOSE BLOOD TEST: CPT

## 2021-06-09 PROCEDURE — 01NB0ZZ RELEASE LUMBAR NERVE, OPEN APPROACH: ICD-10-PCS | Performed by: NEUROLOGICAL SURGERY

## 2021-06-09 PROCEDURE — 76000 FLUOROSCOPY <1 HR PHYS/QHP: CPT | Performed by: NEUROLOGICAL SURGERY

## 2021-06-09 PROCEDURE — 0ST20ZZ RESECTION OF LUMBAR VERTEBRAL DISC, OPEN APPROACH: ICD-10-PCS | Performed by: NEUROLOGICAL SURGERY

## 2021-06-09 RX ORDER — ROCURONIUM BROMIDE 10 MG/ML
INJECTION, SOLUTION INTRAVENOUS AS NEEDED
Status: DISCONTINUED | OUTPATIENT
Start: 2021-06-09 | End: 2021-06-09 | Stop reason: SURG

## 2021-06-09 RX ORDER — METOCLOPRAMIDE HYDROCHLORIDE 5 MG/ML
INJECTION INTRAMUSCULAR; INTRAVENOUS AS NEEDED
Status: DISCONTINUED | OUTPATIENT
Start: 2021-06-09 | End: 2021-06-09 | Stop reason: SURG

## 2021-06-09 RX ORDER — NALOXONE HYDROCHLORIDE 0.4 MG/ML
80 INJECTION, SOLUTION INTRAMUSCULAR; INTRAVENOUS; SUBCUTANEOUS AS NEEDED
Status: DISCONTINUED | OUTPATIENT
Start: 2021-06-09 | End: 2021-06-09

## 2021-06-09 RX ORDER — CEFAZOLIN SODIUM/WATER 2 G/20 ML
2 SYRINGE (ML) INTRAVENOUS ONCE
Status: COMPLETED | OUTPATIENT
Start: 2021-06-09 | End: 2021-06-09

## 2021-06-09 RX ORDER — HYDROCODONE BITARTRATE AND ACETAMINOPHEN 10; 325 MG/1; MG/1
1 TABLET ORAL
Qty: 60 TABLET | Refills: 0 | Status: SHIPPED | OUTPATIENT
Start: 2021-06-09 | End: 2021-07-23

## 2021-06-09 RX ORDER — HYDROCODONE BITARTRATE AND ACETAMINOPHEN 5; 325 MG/1; MG/1
1 TABLET ORAL AS NEEDED
Status: DISCONTINUED | OUTPATIENT
Start: 2021-06-09 | End: 2021-06-09

## 2021-06-09 RX ORDER — DOCUSATE SODIUM 100 MG/1
100 CAPSULE, LIQUID FILLED ORAL 2 TIMES DAILY
Qty: 60 CAPSULE | Refills: 2 | Status: SHIPPED | OUTPATIENT
Start: 2021-06-09 | End: 2021-07-23

## 2021-06-09 RX ORDER — ACETAMINOPHEN 500 MG
1000 TABLET ORAL ONCE AS NEEDED
Status: DISCONTINUED | OUTPATIENT
Start: 2021-06-09 | End: 2021-06-09

## 2021-06-09 RX ORDER — ONDANSETRON 2 MG/ML
4 INJECTION INTRAMUSCULAR; INTRAVENOUS AS NEEDED
Status: DISCONTINUED | OUTPATIENT
Start: 2021-06-09 | End: 2021-06-09

## 2021-06-09 RX ORDER — DEXAMETHASONE SODIUM PHOSPHATE 4 MG/ML
4 VIAL (ML) INJECTION AS NEEDED
Status: DISCONTINUED | OUTPATIENT
Start: 2021-06-09 | End: 2021-06-09

## 2021-06-09 RX ORDER — SODIUM CHLORIDE, SODIUM LACTATE, POTASSIUM CHLORIDE, CALCIUM CHLORIDE 600; 310; 30; 20 MG/100ML; MG/100ML; MG/100ML; MG/100ML
INJECTION, SOLUTION INTRAVENOUS CONTINUOUS
Status: DISCONTINUED | OUTPATIENT
Start: 2021-06-09 | End: 2021-06-09

## 2021-06-09 RX ORDER — ACETAMINOPHEN 500 MG
1000 TABLET ORAL ONCE
Status: ON HOLD | COMMUNITY
End: 2021-06-09

## 2021-06-09 RX ORDER — CEPHALEXIN 500 MG/1
500 CAPSULE ORAL 4 TIMES DAILY
Qty: 12 CAPSULE | Refills: 0 | Status: SHIPPED | OUTPATIENT
Start: 2021-06-09 | End: 2021-06-12

## 2021-06-09 RX ORDER — MIDAZOLAM HYDROCHLORIDE 1 MG/ML
1 INJECTION INTRAMUSCULAR; INTRAVENOUS EVERY 5 MIN PRN
Status: DISCONTINUED | OUTPATIENT
Start: 2021-06-09 | End: 2021-06-09

## 2021-06-09 RX ORDER — HYDROCODONE BITARTRATE AND ACETAMINOPHEN 5; 325 MG/1; MG/1
2 TABLET ORAL AS NEEDED
Status: DISCONTINUED | OUTPATIENT
Start: 2021-06-09 | End: 2021-06-09

## 2021-06-09 RX ORDER — MEPERIDINE HYDROCHLORIDE 25 MG/ML
25 INJECTION INTRAMUSCULAR; INTRAVENOUS; SUBCUTANEOUS
Status: DISCONTINUED | OUTPATIENT
Start: 2021-06-09 | End: 2021-06-09

## 2021-06-09 RX ORDER — ONDANSETRON 2 MG/ML
INJECTION INTRAMUSCULAR; INTRAVENOUS AS NEEDED
Status: DISCONTINUED | OUTPATIENT
Start: 2021-06-09 | End: 2021-06-09 | Stop reason: SURG

## 2021-06-09 RX ORDER — BUPIVACAINE HYDROCHLORIDE AND EPINEPHRINE 2.5; 5 MG/ML; UG/ML
INJECTION, SOLUTION EPIDURAL; INFILTRATION; INTRACAUDAL; PERINEURAL AS NEEDED
Status: DISCONTINUED | OUTPATIENT
Start: 2021-06-09 | End: 2021-06-09 | Stop reason: HOSPADM

## 2021-06-09 RX ORDER — HYDROMORPHONE HYDROCHLORIDE 1 MG/ML
0.5 INJECTION, SOLUTION INTRAMUSCULAR; INTRAVENOUS; SUBCUTANEOUS EVERY 5 MIN PRN
Status: DISCONTINUED | OUTPATIENT
Start: 2021-06-09 | End: 2021-06-09

## 2021-06-09 RX ORDER — DEXTROSE MONOHYDRATE 25 G/50ML
50 INJECTION, SOLUTION INTRAVENOUS
Status: DISCONTINUED | OUTPATIENT
Start: 2021-06-09 | End: 2021-06-09

## 2021-06-09 RX ADMIN — ROCURONIUM BROMIDE 50 MG: 10 INJECTION, SOLUTION INTRAVENOUS at 07:39:00

## 2021-06-09 RX ADMIN — SODIUM CHLORIDE, SODIUM LACTATE, POTASSIUM CHLORIDE, CALCIUM CHLORIDE: 600; 310; 30; 20 INJECTION, SOLUTION INTRAVENOUS at 07:39:00

## 2021-06-09 RX ADMIN — METOCLOPRAMIDE HYDROCHLORIDE 10 MG: 5 INJECTION INTRAMUSCULAR; INTRAVENOUS at 09:41:00

## 2021-06-09 RX ADMIN — ONDANSETRON 4 MG: 2 INJECTION INTRAMUSCULAR; INTRAVENOUS at 09:45:00

## 2021-06-09 RX ADMIN — CEFAZOLIN SODIUM/WATER 2 G: 2 G/20 ML SYRINGE (ML) INTRAVENOUS at 07:36:00

## 2021-06-09 NOTE — ANESTHESIA PROCEDURE NOTES
Airway  Date/Time: 6/9/2021 7:40 AM  Urgency: elective    Airway not difficult    General Information and Staff    Patient location during procedure: OR  Anesthesiologist: Vic Higginbotham MD  Performed: anesthesiologist     Indications and Patient Conditio

## 2021-06-09 NOTE — OPERATIVE REPORT
Operative Note    Patient Name: Lorene Walters    Preoperative Diagnosis: Spinal stenosis of lumbar region without neurogenic claudication [M48.061]    Postoperative Diagnosis: same    Primary Surgeon: Salud George    Assistant: Obie Nageotte, pa-

## 2021-06-09 NOTE — H&P
Karime Hopkins PCP:  Monroe Ward MD    1945 MRN [id number]      HISTORY OF PRESENT ILLNESS:  Karime Hopkins is a(n) 66year old female who is here for follow-up of bilateral leg pain.  She continues to have soreness in her legs whic surgery.

## 2021-06-09 NOTE — ANESTHESIA POSTPROCEDURE EVALUATION
[address] [address] Patient Status:  Hospital Outpatient Surgery   Age/Gender 66year old female MRN [id number]   [facility] SURGERY Attending Calvin Wei MD   Hosp Day # 0 PCP Ning Fitch MD       Anesthesia Post-o

## 2021-06-09 NOTE — BRIEF OP NOTE
Pre-Operative Diagnosis: Spinal stenosis of lumbar region without neurogenic claudication [M48.061]     Post-Operative Diagnosis: Spinal stenosis of lumbar region without neurogenic claudication [M48.061]      Procedure Performed:   LUMBAR 3 AND LUMBAR 4 L

## 2021-06-09 NOTE — ANESTHESIA PREPROCEDURE EVALUATION
PRE-OP EVALUATION    Patient Name: Sheila Yu    Admit Diagnosis: Spinal stenosis of lumbar region without neurogenic claudication [M48.061]    Pre-op Diagnosis: Spinal stenosis of lumbar region without neurogenic claudication [M48.061]    LUMBAR 3 Unknown time  Acidophilus/Pectin (PROBIOTIC) Oral Cap, Take 1 capsule by mouth daily. , Disp: , Rfl: , Past Week at Unknown time  Calcium Citrate-Vitamin D (CITRACAL + D OR), Take 1,200 mg by mouth daily. , Disp: , Rfl: , 6/8/2021 at 0800        Allergies: Smoker      Smokeless tobacco: Never Used    Alcohol use: Yes      Comment: about 4 times per year      Drug use:    Comment: CBD gummies 2 daily     Available pre-op labs reviewed.   Lab Results   Component Value Date    WBC 13.0 (H) 05/22/2021    RBC 4.47

## 2021-06-12 ENCOUNTER — TELEPHONE (OUTPATIENT)
Dept: FAMILY MEDICINE CLINIC | Facility: CLINIC | Age: 76
End: 2021-06-12

## 2021-06-12 ENCOUNTER — TELEPHONE (OUTPATIENT)
Dept: SURGERY | Facility: CLINIC | Age: 76
End: 2021-06-12

## 2021-06-12 DIAGNOSIS — M48.062 LUMBAR STENOSIS WITH NEUROGENIC CLAUDICATION: Primary | ICD-10-CM

## 2021-06-12 DIAGNOSIS — M48.061 SPINAL STENOSIS OF LUMBAR REGION WITHOUT NEUROGENIC CLAUDICATION: Primary | ICD-10-CM

## 2021-06-12 DIAGNOSIS — M47.816 FACET HYPERTROPHY OF LUMBAR REGION: ICD-10-CM

## 2021-06-12 DIAGNOSIS — Z98.890 S/P LUMBAR LAMINECTOMY: ICD-10-CM

## 2021-06-12 NOTE — TELEPHONE ENCOUNTER
Dr Román Toribio said we could not put in referral as we are not the primary office I called the  back and told him I will send this message to Dr Brittany Schulz. I also told him to call the surgeon as he does not know if she is to go to physical therapy.  I also gave

## 2021-06-12 NOTE — TELEPHONE ENCOUNTER
calling he needs help caring for her She just had an L3-4 laminectomy he cannot manage her care on his own and has no back up. She can stand with assistance but cannot pivot to get into the wheel chair. She is not walking at this point either.  Pain

## 2021-06-12 NOTE — TELEPHONE ENCOUNTER
Patient's  contacted me today stating he needed help at home with his wife; she was having a great deal of pain and mobility was poor. He wants \"home health services\". I placed an order for referral today.

## 2021-06-14 NOTE — TELEPHONE ENCOUNTER
Looks like referral was already placed by Dr. Stephanie Shin for [facility].  FYI to TO

## 2021-06-16 NOTE — TELEPHONE ENCOUNTER
Patient had LUMBAR 3 AND LUMBAR 4 LAMINECTOMIES on 6/9/21 by Dr. Shirlene Candelaria    For Lumbar Sx:    Post op day 7    1)Asked how Marin Ortiz is feeling in general she/he stated:  She feels she is getting better, she ended up not needing the Home Health Aid since she update on FMLA (received, initiated, need signature, completed etc)  None     Routed to provider for update

## 2021-06-23 ENCOUNTER — OFFICE VISIT (OUTPATIENT)
Dept: SURGERY | Facility: CLINIC | Age: 76
End: 2021-06-23
Payer: MEDICARE

## 2021-06-23 VITALS
BODY MASS INDEX: 35.4 KG/M2 | SYSTOLIC BLOOD PRESSURE: 126 MMHG | HEIGHT: 69 IN | WEIGHT: 239 LBS | DIASTOLIC BLOOD PRESSURE: 70 MMHG | HEART RATE: 69 BPM

## 2021-06-23 DIAGNOSIS — R29.898 WEAKNESS OF BOTH LOWER EXTREMITIES: ICD-10-CM

## 2021-06-23 DIAGNOSIS — Z98.890 POST-OPERATIVE STATE: ICD-10-CM

## 2021-06-23 DIAGNOSIS — Z98.890 STATUS POST LUMBAR SPINE SURGERY FOR DECOMPRESSION OF SPINAL CORD: Primary | ICD-10-CM

## 2021-06-23 PROCEDURE — 99024 POSTOP FOLLOW-UP VISIT: CPT | Performed by: PHYSICIAN ASSISTANT

## 2021-06-23 NOTE — PROGRESS NOTES
Patient: Swapna Montanez  Medical Record Number: [id number]  YOB: 1945  PCP: Quita Becerra MD    Reason for visit: Lumbar follow up, 2 week post op visit    HISTORY OF CHIEF COMPLAINT:    Swapna Montanez is a very pleasant 66year old fem Vaping Use      Vaping Use: Never used    Substance and Sexual Activity      Alcohol use: Yes        Comment: about 4 times per year      Drug use: Yes        Comment: CBD gummies 2 daily    Other Topics      Concerns:        Caffeine Concern: No        E intact, answering questions appropriately. SPINE:  Gait/Coordination: Intact, non-antalgic gait.    Moving bilateral upper extremities spontaneously to full resistance   Lower Extremity Strength:     Iliopsoas    D-flexion   Right       5         5   Le

## 2021-06-30 ENCOUNTER — OFFICE VISIT (OUTPATIENT)
Dept: PHYSICAL THERAPY | Age: 76
End: 2021-06-30
Attending: PHYSICIAN ASSISTANT
Payer: MEDICARE

## 2021-06-30 PROCEDURE — 97162 PT EVAL MOD COMPLEX 30 MIN: CPT

## 2021-06-30 PROCEDURE — 97163 PT EVAL HIGH COMPLEX 45 MIN: CPT

## 2021-06-30 PROCEDURE — 97110 THERAPEUTIC EXERCISES: CPT

## 2021-06-30 NOTE — PROGRESS NOTES
SPINE EVALUATION:   Referring Physician: Dr. Luis Carlos Smith  Diagnosis: Weakness of both lower extremities (R29.898)  Status post lumbar spine surgery for decompression of spinal cord (M69.613)  Post-operative state (I18.738) Date of Service: 6/30/2021     VIN including class 2 severe obesity with serious comorbidity and BMI of 35.0-35.9, CKD stage 3 due to type 2 diabetes mellitus, atherosclerosis of arteries of extremities, facet hypertrophy of lumbar region, hydronephrosis, essential hypertension, carotid art Hip flexion (L2): R 3+/5; L 4/5  Hip abduction: R 3+/5; L 3+/5  Hip Extension: NT  Hip ER: R 4-/5; L 4-/5  Hip IR: R 4-5; L 4-/5  Knee Flexion: R 5/5; L 5/5   Knee extension (L3): R 4-/5; L 4-/5   DF (L4): R 5/5; L 5/5  Great Toe Ext (L5): NT  PF (S1): R mobility.   3. Patient will increase global hip strength to at least a 4/5 MMT in order to exhibit functional strength for ADL's.  4. Patient will improve improve 6MWT distance to at least 700 feet to increase endurance for walking in the grocery store usin

## 2021-07-01 ENCOUNTER — OFFICE VISIT (OUTPATIENT)
Dept: PHYSICAL THERAPY | Age: 76
End: 2021-07-01
Attending: PHYSICIAN ASSISTANT
Payer: MEDICARE

## 2021-07-01 PROCEDURE — 97112 NEUROMUSCULAR REEDUCATION: CPT

## 2021-07-01 PROCEDURE — 97110 THERAPEUTIC EXERCISES: CPT

## 2021-07-01 NOTE — PROGRESS NOTES
Dx:  Weakness of both lower extremities (R29.898)  Status post lumbar spine surgery for decompression of spinal cord (Y09.806)  Post-operative state (Z98.890)        Insurance (Authorized # of Visits):  12         Authorizing Physician: Dr. Kelly Guillen MD grocery store using her front wheeled walker    Plan: POC will include global LE ROM & strengthening, balance, gait, flexibility and lumbar ROM per surgical protocol guidelines.   Date: 7/1/2021  TX#: 2/16 Date:                 TX#: 3/ Date:

## 2021-07-06 ENCOUNTER — APPOINTMENT (OUTPATIENT)
Dept: PHYSICAL THERAPY | Age: 76
End: 2021-07-06
Payer: MEDICARE

## 2021-07-08 ENCOUNTER — OFFICE VISIT (OUTPATIENT)
Dept: PHYSICAL THERAPY | Age: 76
End: 2021-07-08
Attending: PHYSICIAN ASSISTANT
Payer: MEDICARE

## 2021-07-08 PROCEDURE — 97112 NEUROMUSCULAR REEDUCATION: CPT

## 2021-07-08 PROCEDURE — 97110 THERAPEUTIC EXERCISES: CPT

## 2021-07-08 RX ORDER — GLIPIZIDE 2.5 MG/1
TABLET, EXTENDED RELEASE ORAL
Qty: 90 TABLET | Refills: 3 | Status: SHIPPED | OUTPATIENT
Start: 2021-07-08 | End: 2021-10-29

## 2021-07-08 NOTE — PROGRESS NOTES
Dx:  Weakness of both lower extremities (R29.898)  Status post lumbar spine surgery for decompression of spinal cord (O05.161)  Post-operative state (Z98.890)        Insurance (Authorized # of Visits):  12         Authorizing Physician: Dr. Corie Guillen MD Patient will improve improve 6MWT distance to at least 700 feet to increase endurance for walking in the grocery store using her front wheeled walker    Plan: POC will include global LE ROM & strengthening, balance, gait, flexibility and lumbar ROM per ootniel

## 2021-07-08 NOTE — TELEPHONE ENCOUNTER
Glipizide 2.5 mg failed protocol due to  Diabetic Medication Protocol Brseqt2107/07/2021 02:08 AM   Microalbumin procedure in past 12 months or taking ACE/ARB   Filled 4-8-21  Qty 90  0 refills  No upcoming appt.    LOV 5-22-21

## 2021-07-11 DIAGNOSIS — I10 ESSENTIAL HYPERTENSION, BENIGN: Primary | ICD-10-CM

## 2021-07-12 RX ORDER — BENAZEPRIL HYDROCHLORIDE AND HYDROCHLOROTHIAZIDE 20; 12.5 MG/1; MG/1
TABLET ORAL
Qty: 90 TABLET | Refills: 0 | Status: SHIPPED | OUTPATIENT
Start: 2021-07-12 | End: 2021-10-11

## 2021-07-12 NOTE — TELEPHONE ENCOUNTER
Benazepril-hydrochlorothiazide 20-12.5 mg  Filled 1-12-21  Qty 90  1 refill  No upcoming appt.    LOV 5-22-21

## 2021-07-13 ENCOUNTER — APPOINTMENT (OUTPATIENT)
Dept: PHYSICAL THERAPY | Age: 76
End: 2021-07-13
Attending: PHYSICIAN ASSISTANT
Payer: MEDICARE

## 2021-07-14 ENCOUNTER — APPOINTMENT (OUTPATIENT)
Dept: PHYSICAL THERAPY | Age: 76
End: 2021-07-14
Payer: MEDICARE

## 2021-07-20 ENCOUNTER — APPOINTMENT (OUTPATIENT)
Dept: PHYSICAL THERAPY | Age: 76
End: 2021-07-20
Payer: MEDICARE

## 2021-07-20 ENCOUNTER — OFFICE VISIT (OUTPATIENT)
Dept: SURGERY | Facility: CLINIC | Age: 76
End: 2021-07-20
Payer: MEDICARE

## 2021-07-20 VITALS
BODY MASS INDEX: 35.4 KG/M2 | HEART RATE: 70 BPM | SYSTOLIC BLOOD PRESSURE: 120 MMHG | WEIGHT: 239 LBS | HEIGHT: 69 IN | DIASTOLIC BLOOD PRESSURE: 62 MMHG

## 2021-07-20 DIAGNOSIS — Z98.890 STATUS POST LUMBAR SPINE SURGERY FOR DECOMPRESSION OF SPINAL CORD: Primary | ICD-10-CM

## 2021-07-20 DIAGNOSIS — M48.062 LUMBAR STENOSIS WITH NEUROGENIC CLAUDICATION: ICD-10-CM

## 2021-07-20 DIAGNOSIS — R29.898 WEAKNESS OF BOTH LOWER EXTREMITIES: ICD-10-CM

## 2021-07-20 PROCEDURE — 99024 POSTOP FOLLOW-UP VISIT: CPT | Performed by: NEUROLOGICAL SURGERY

## 2021-07-20 NOTE — PROGRESS NOTES
Pt here for 6wk post op after L3-L4 laminectomies on 6/9/21    Pain 6/10  Located in bilateral legs  Numbness/tingling not noted   Weakness in bilateral legs, going to PT    Tx pain with PT, norco

## 2021-07-20 NOTE — PROGRESS NOTES
[facility]   Neurosurgery Follow up      Karime Hopkins  : 1945  PCP: Monroe Ward MD  Referring Provider: No ref.  provider found    REASON FOR CONSULTATION:  Patient presents with:  Post-Op: 6wk post op sx  BENAZEPRIL-HYDROCHLOROTHIAZIDE 20-12.5 MG Oral Tab TAKE 1 TABLET DAILY 90 tablet 0   • GLIPIZIDE XL 2.5 MG Oral Tablet 24 Hr TAKE 1 TABLET DAILY WITH BREAKFAST 90 tablet 3   • HYDROcodone-acetaminophen  MG Oral Tab Take 1 tablet by mouth every 4 to 6 spent 15 minutes. % of time spent educating: Greater than 50%.   RENÉ Mosqueda  7/20/2021, 1:27 PM

## 2021-07-21 ENCOUNTER — TELEPHONE (OUTPATIENT)
Dept: SURGERY | Facility: CLINIC | Age: 76
End: 2021-07-21

## 2021-07-21 ENCOUNTER — OFFICE VISIT (OUTPATIENT)
Dept: PHYSICAL THERAPY | Age: 76
End: 2021-07-21
Attending: FAMILY MEDICINE
Payer: MEDICARE

## 2021-07-21 PROCEDURE — 97110 THERAPEUTIC EXERCISES: CPT

## 2021-07-21 PROCEDURE — 97112 NEUROMUSCULAR REEDUCATION: CPT

## 2021-07-21 NOTE — TELEPHONE ENCOUNTER
Per Dr. Bill De Leon 7/20/21       \" ASSESSMENT:  66year old female s/p L3-4 laminectomies 6/9/2021        PLAN:  1. Continue PT for strengthening  2. Wean Gabapentin as follows:  400 mg BID x 1 week, 400 mg daily x 1 week then discontinue   3.  Ok to

## 2021-07-21 NOTE — TELEPHONE ENCOUNTER
Pt states there was a misunderstanding during her OV yesterday. Pt states she does not want to wean off of her Gabapentin, she just wants instructions on how she should take it.   Pls call pt to discuss/advise

## 2021-07-21 NOTE — PROGRESS NOTES
Dx:  Weakness of both lower extremities (R29.898)  Status post lumbar spine surgery for decompression of spinal cord (U52.894)  Post-operative state (Z98.890)        Insurance (Authorized # of Visits):  12         Authorizing Physician: Dr. Amaya ref.  provide strength for ADL's.  4. Patient will improve improve 6MWT distance to at least 700 feet to increase endurance for walking in the grocery store using her front wheeled walker    Plan: POC will include global LE ROM & strengthening, balance, gait, flexibilit

## 2021-07-22 ENCOUNTER — APPOINTMENT (OUTPATIENT)
Dept: PHYSICAL THERAPY | Age: 76
End: 2021-07-22
Payer: MEDICARE

## 2021-07-23 ENCOUNTER — OFFICE VISIT (OUTPATIENT)
Dept: HEMATOLOGY/ONCOLOGY | Age: 76
End: 2021-07-23
Attending: INTERNAL MEDICINE
Payer: MEDICARE

## 2021-07-23 VITALS
TEMPERATURE: 98 F | OXYGEN SATURATION: 98 % | RESPIRATION RATE: 18 BRPM | DIASTOLIC BLOOD PRESSURE: 84 MMHG | SYSTOLIC BLOOD PRESSURE: 138 MMHG | HEART RATE: 97 BPM

## 2021-07-23 DIAGNOSIS — D72.828 ACQUIRED NEUTROPHILIA: Primary | ICD-10-CM

## 2021-07-23 PROCEDURE — 99204 OFFICE O/P NEW MOD 45 MIN: CPT | Performed by: INTERNAL MEDICINE

## 2021-07-23 NOTE — CONSULTS
[facility] Report of Consultation    Patient Name: Elijah Murillo   YOB: 1945   Medical Record Number: [id number]   CSN: [id number]   Consulting Physician: Graciela Shetty MD  Referring Physician(s): No ref.  provider found  Date of Consult salpingo-oophorectomy right side   • OTHER SURGICAL HISTORY  01/01/2020    Cysto, Left RPG,stent insertion       Family Medical History:  Family History   Problem Relation Age of Onset   • Other (Lung Cancer) Mother        Gyne History:  OB History   G0  P and Family:       Frequency of Social Gatherings with Friends and Family:       Attends Sikhism Services:       Active Member of Clubs or Organizations:       Attends Club or Organization Meetings:       Marital Status:   Intimate Partner Violence:       Pink conjunctiva. Respiratory: Clear to auscultation and percussion. No rales. No wheezes. Cardiovascular: Regular rate and rhythm. Gastrointestinal: Soft, non tender with good bowel sounds. Extremities: No edema. No calf tenderness. Lymphatics:  The came with her  who I follow at the [facility] with frequent visits.         Jessica Powell MD

## 2021-08-03 ENCOUNTER — OFFICE VISIT (OUTPATIENT)
Dept: PHYSICAL THERAPY | Age: 76
End: 2021-08-03
Attending: FAMILY MEDICINE
Payer: MEDICARE

## 2021-08-03 ENCOUNTER — TELEPHONE (OUTPATIENT)
Dept: SURGERY | Facility: CLINIC | Age: 76
End: 2021-08-03

## 2021-08-03 PROCEDURE — 97110 THERAPEUTIC EXERCISES: CPT

## 2021-08-03 PROCEDURE — 97116 GAIT TRAINING THERAPY: CPT

## 2021-08-03 NOTE — PROGRESS NOTES
Dx:  Weakness of both lower extremities (R29.898)  Status post lumbar spine surgery for decompression of spinal cord (T98.109)  Post-operative state (Z98.890)        Insurance (Authorized # of Visits):  12         Authorizing Physician: Dr. Amaya ref.  provide compliant with prescribed HEP. 2. Patient will increase FOTO score to at least a   /100 in order to improve functional mobility.   3. Patient will increase global hip strength to at least a 4/5 MMT in order to exhibit functional strength for ADL's.  4. Harman Redman press with 2 lb weighted dowel angel 2x20 reps -- Supine SLR and SAQ x 10 reps, 2 sets    Walking mini marches x25 feet -- -- Elevated STS w/ double FP in wc, x 10 reps, 2 sets                                HEP: supine TA contractions, supine LE marches, di

## 2021-08-03 NOTE — TELEPHONE ENCOUNTER
Pt had sx on 6.9 with Dr. Reed Hernandez and said her pain in her legs has \"regressed\". She said that she has pain and soreness in both legs and they feel weak and numb. Pt states she has a fear of falling.  She would like to know what she can do to help with h

## 2021-08-03 NOTE — TELEPHONE ENCOUNTER
S: Pt had sx on 6.9 with Dr. Constance Carroll and said her pain in her legs has \"regressed\". She said that she has pain and soreness in both legs and they feel weak and numb. Pt states she has a fear of falling.  She would like to know what she can do to help wit

## 2021-08-05 ENCOUNTER — APPOINTMENT (OUTPATIENT)
Dept: PHYSICAL THERAPY | Age: 76
End: 2021-08-05
Attending: FAMILY MEDICINE
Payer: MEDICARE

## 2021-08-05 ENCOUNTER — TELEPHONE (OUTPATIENT)
Dept: SURGERY | Facility: CLINIC | Age: 76
End: 2021-08-05

## 2021-08-05 RX ORDER — CYCLOBENZAPRINE HCL 10 MG
5 TABLET ORAL 3 TIMES DAILY PRN
Qty: 60 TABLET | Refills: 1 | Status: SHIPPED | OUTPATIENT
Start: 2021-08-05 | End: 2022-01-08

## 2021-08-05 NOTE — TELEPHONE ENCOUNTER
Called and spoke with pt  tanisha helped her pain  Now having some spasms with walking  Sent in flexeril  Cont gabapentin  Will call with an update next week  Pt appreciative of call

## 2021-08-05 NOTE — TELEPHONE ENCOUNTER
Routed to provider to inform and give further recommendations.  Assessment from 8/3/21 routed as well

## 2021-08-05 NOTE — TELEPHONE ENCOUNTER
Kari Fitch from [facility] calling to notify  that patient has had extremely limited mobility and feels the patients condition is regressing. Also states Patient reached out to the office on 8/3/21 and has not heard back. Please contact patient to further discuss.

## 2021-08-10 ENCOUNTER — APPOINTMENT (OUTPATIENT)
Dept: PHYSICAL THERAPY | Age: 76
End: 2021-08-10
Attending: FAMILY MEDICINE
Payer: MEDICARE

## 2021-08-10 DIAGNOSIS — M48.061 SPINAL STENOSIS OF LUMBAR REGION WITHOUT NEUROGENIC CLAUDICATION: ICD-10-CM

## 2021-08-10 DIAGNOSIS — E11.9 TYPE 2 DIABETES MELLITUS WITHOUT COMPLICATION, WITHOUT LONG-TERM CURRENT USE OF INSULIN (HCC): ICD-10-CM

## 2021-08-11 RX ORDER — GLIPIZIDE 5 MG/1
TABLET, EXTENDED RELEASE ORAL
Qty: 90 TABLET | Refills: 3 | Status: ON HOLD | OUTPATIENT
Start: 2021-08-11 | End: 2021-12-31

## 2021-08-11 RX ORDER — ROSUVASTATIN CALCIUM 5 MG/1
TABLET, COATED ORAL
Qty: 90 TABLET | Refills: 3 | Status: ON HOLD | OUTPATIENT
Start: 2021-08-11 | End: 2022-01-03

## 2021-08-11 NOTE — TELEPHONE ENCOUNTER
Per last TE from Dr. Canales [place] 8/5/21:    Called and spoke with pt  tanisha helped her pain  Now having some spasms with walking  Sent in flexeril  Cont gabapentin  Will call with an update next week  Pt appreciative of call    Can we confirm the dosing she

## 2021-08-11 NOTE — TELEPHONE ENCOUNTER
Name from pharmacy: ROSUVASTATIN TABS 5MG          Will file in chart as: ROSUVASTATIN 5 MG Oral Tab    Sig: TAKE 1 TABLET DAILY    Disp:  90 tablet    Refills:  3    Start: 8/10/2021    Class: Normal    Non-formulary    Last ordered: 5 months ago by [employer]

## 2021-08-11 NOTE — TELEPHONE ENCOUNTER
Medication: Gabapentin    Date of last refill: 3/18/21 (#180/2)  [place]      Last office visit: 7/20/21  Due back to clinic per last office note:  F/u in 2 months   Date next office visit scheduled:    Future Appointmen

## 2021-08-12 ENCOUNTER — TELEPHONE (OUTPATIENT)
Dept: INTERNAL MEDICINE CLINIC | Facility: CLINIC | Age: 76
End: 2021-08-12

## 2021-08-12 ENCOUNTER — TELEPHONE (OUTPATIENT)
Dept: SURGERY | Facility: CLINIC | Age: 76
End: 2021-08-12

## 2021-08-12 DIAGNOSIS — M48.061 SPINAL STENOSIS OF LUMBAR REGION WITHOUT NEUROGENIC CLAUDICATION: ICD-10-CM

## 2021-08-12 DIAGNOSIS — R29.898 WEAKNESS OF BOTH LOWER EXTREMITIES: Primary | ICD-10-CM

## 2021-08-12 RX ORDER — GABAPENTIN 400 MG/1
CAPSULE ORAL
Qty: 90 CAPSULE | Refills: 11 | Status: SHIPPED | OUTPATIENT
Start: 2021-08-12 | End: 2022-01-03

## 2021-08-12 NOTE — TELEPHONE ENCOUNTER
Spoke with pt she is requesting in home PT, she is unable to get around much, and her  is unable to care for her at home.  Patient aware to stop Crestor and they will contact Dr. Zohaib Arzola office as well, but in the meantime can [facility] for nursing care

## 2021-08-12 NOTE — TELEPHONE ENCOUNTER
Spoke with pt stating she fell yesterday and called paramedics, was not taken to hospital, paramedics assessed patient and picked her up floor, patient did not hit anything, patient indicates from fall has left ankle pain-7-8/10, no swelling, able to move

## 2021-08-12 NOTE — TELEPHONE ENCOUNTER
Spoke with patient notified [facility] ordered, through [facility], they will call her to schedule. Patient verbalized understanding and agreeable to POC.

## 2021-08-12 NOTE — TELEPHONE ENCOUNTER
Pt calling to see if Dr. Erica Rosales would be able to order home health care services for her. She is unable to get around and she said PT is on hold until the end of August. Pt states she \"just sits in the wheelchair\". Please call pt to discuss.

## 2021-08-12 NOTE — TELEPHONE ENCOUNTER
pts  called wanting to speak with a nurse. He states the pt has very little leg strength right now. He said he is having a very difficult time getting her in and out of chairs, bed, and the bathroom.      Pt would like a call back with any help we ca

## 2021-08-16 NOTE — TELEPHONE ENCOUNTER
Name from pharmacy: METFORMIN HCL TABS 1000MG          Will file in chart as: METFORMIN HCL 1000 MG Oral Tab    Sig: TAKE 1 TABLET TWICE A DAY    Disp:  180 tablet    Refills:  3    Start: 8/15/2021    Class: Normal    Non-formulary    Last ordered: 3 cami

## 2021-08-17 ENCOUNTER — TELEPHONE (OUTPATIENT)
Dept: INTERNAL MEDICINE CLINIC | Facility: CLINIC | Age: 76
End: 2021-08-17

## 2021-08-17 NOTE — TELEPHONE ENCOUNTER
Incoming plan of care order via fax from [facility]. Report placed in TO in basket for completion.     Order #: [id number]

## 2021-08-18 ENCOUNTER — APPOINTMENT (OUTPATIENT)
Dept: PHYSICAL THERAPY | Age: 76
End: 2021-08-18
Payer: MEDICARE

## 2021-08-18 NOTE — TELEPHONE ENCOUNTER
Orders faxed with confirmation received. Copy made and placed in teal accordion file in pod 3. Original sent to scanning.

## 2021-08-19 ENCOUNTER — TELEPHONE (OUTPATIENT)
Dept: INTERNAL MEDICINE CLINIC | Facility: CLINIC | Age: 76
End: 2021-08-19

## 2021-08-19 DIAGNOSIS — M25.572 ACUTE LEFT ANKLE PAIN: Primary | ICD-10-CM

## 2021-08-19 NOTE — TELEPHONE ENCOUNTER
Patient called and stated that she was seen by her home health nurse today and was advised by the nurse to contact her PCP to have an order placed for an x ray of her left ankle.

## 2021-08-19 NOTE — TELEPHONE ENCOUNTER
Spoke with patient stating fell a couple of weeks ago, and thinks may have twisted her ankle, per Melisa 90 does not believe it is broken, patient indicates was swollen but the swelling has improved, patient is unable to put weight on it, unable to walk on

## 2021-08-20 ENCOUNTER — APPOINTMENT (OUTPATIENT)
Dept: PHYSICAL THERAPY | Age: 76
End: 2021-08-20
Attending: FAMILY MEDICINE
Payer: MEDICARE

## 2021-08-20 ENCOUNTER — APPOINTMENT (OUTPATIENT)
Dept: PHYSICAL THERAPY | Age: 76
End: 2021-08-20
Payer: MEDICARE

## 2021-08-24 NOTE — TELEPHONE ENCOUNTER
Faxed completed paperwork to [facility] with confirmation    Placed to scan as well as copy placed in accordion folder in Pod 3

## 2021-08-26 ENCOUNTER — APPOINTMENT (OUTPATIENT)
Dept: PHYSICAL THERAPY | Age: 76
End: 2021-08-26
Attending: FAMILY MEDICINE
Payer: MEDICARE

## 2021-08-30 ENCOUNTER — TELEPHONE (OUTPATIENT)
Dept: INTERNAL MEDICINE CLINIC | Facility: CLINIC | Age: 76
End: 2021-08-30

## 2021-08-30 NOTE — TELEPHONE ENCOUNTER
Incoming fax from [facility]     Patients Discharge/Transfer Summary    8/14/2021    Placed in  inbox for review

## 2021-10-07 NOTE — INTERVAL H&P NOTE
Pre-op Diagnosis: Lumbar radiculitis [M54.16]    The above referenced H&P was reviewed by Scotty Elena MD on 1/25/2021, the patient was examined and no significant changes have occurred in the patient's condition since the H&P was performed.   I discuss
Pre-op Diagnosis: Lumbar radiculitis [M54.16]    The above referenced H&P was reviewed by Tamela Escalante MD on 1/25/2021, the patient was examined and no significant changes have occurred in the patient's condition since the H&P was performed.   I discuss
no

## 2021-10-09 DIAGNOSIS — I10 ESSENTIAL HYPERTENSION, BENIGN: ICD-10-CM

## 2021-10-11 RX ORDER — BENAZEPRIL HYDROCHLORIDE AND HYDROCHLOROTHIAZIDE 20; 12.5 MG/1; MG/1
TABLET ORAL
Qty: 90 TABLET | Refills: 0 | Status: SHIPPED | OUTPATIENT
Start: 2021-10-11 | End: 2022-01-03

## 2021-10-11 NOTE — TELEPHONE ENCOUNTER
Name from pharmacy: BENAZEPRIL/HYDROCHLOROTHIAZIDE TABS 20/12.5MG          Will file in chart as: BENAZEPRIL-HYDROCHLOROTHIAZIDE 20-12.5 MG Oral Tab    Sig: TAKE 1 TABLET DAILY    Disp:  90 tablet    Refills:  3    Start: 10/9/2021    Class: Normal    Non-

## 2021-10-13 ENCOUNTER — TELEPHONE (OUTPATIENT)
Dept: INTERNAL MEDICINE CLINIC | Facility: CLINIC | Age: 76
End: 2021-10-13

## 2021-10-13 NOTE — TELEPHONE ENCOUNTER
Incoming fax from [address] for review    Radiology notes from 10/12/2021

## 2021-10-18 ENCOUNTER — LAB REQUISITION (OUTPATIENT)
Dept: LAB | Facility: HOSPITAL | Age: 76
End: 2021-10-18
Payer: MEDICARE

## 2021-10-18 DIAGNOSIS — I13.0 HYPERTENSIVE HEART AND CHRONIC KIDNEY DISEASE WITH HEART FAILURE AND STAGE 1 THROUGH STAGE 4 CHRONIC KIDNEY DISEASE, OR UNSPECIFIED CHRONIC KIDNEY DISEASE (HCC): ICD-10-CM

## 2021-10-18 PROCEDURE — 80048 BASIC METABOLIC PNL TOTAL CA: CPT | Performed by: FAMILY MEDICINE

## 2021-10-19 ENCOUNTER — TELEPHONE (OUTPATIENT)
Dept: INTERNAL MEDICINE CLINIC | Facility: CLINIC | Age: 76
End: 2021-10-19

## 2021-10-19 NOTE — TELEPHONE ENCOUNTER
Miriam Galeano 1926 [facility] PT eval today.   Plan of Care:    1 visits x 1 week  2 visits x 1 week  1 visit x 5 weeks    Faxed order to follow

## 2021-10-19 NOTE — TELEPHONE ENCOUNTER
Incoming fax from [facility]    Order # [id number]    Placed in  in basket for review

## 2021-10-20 ENCOUNTER — TELEPHONE (OUTPATIENT)
Dept: INTERNAL MEDICINE CLINIC | Facility: CLINIC | Age: 76
End: 2021-10-20

## 2021-10-20 NOTE — TELEPHONE ENCOUNTER
Luan from [facility] called requesting a verbal order for a flu shot for the pt. He states they are giving out the flu shot this Friday 10/22 and would like the pt to receive the shot.  Please advise    [phone]

## 2021-10-20 NOTE — TELEPHONE ENCOUNTER
Paperwork faxed with confirmation     Placed to scan as well as placed in blue accordion folder in Pod 3

## 2021-10-21 ENCOUNTER — TELEPHONE (OUTPATIENT)
Dept: INTERNAL MEDICINE CLINIC | Facility: CLINIC | Age: 76
End: 2021-10-21

## 2021-10-25 ENCOUNTER — TELEPHONE (OUTPATIENT)
Dept: INTERNAL MEDICINE CLINIC | Facility: CLINIC | Age: 76
End: 2021-10-25

## 2021-10-25 NOTE — TELEPHONE ENCOUNTER
Incoming fax from [facility]    Order # [id number]    Placed in  in basket for review

## 2021-10-25 NOTE — TELEPHONE ENCOUNTER
Faxed paperwork with confirmation to Yassine Pitt 1945 in scan as well as placed in blue accordion folder in Pod 3

## 2021-10-27 NOTE — TELEPHONE ENCOUNTER
Faxed completed paperwork with confirmation    Placed to scan as well as placed in blue accordion folder in Pod 3

## 2021-10-29 RX ORDER — GLIPIZIDE 2.5 MG/1
2.5 TABLET, EXTENDED RELEASE ORAL
Qty: 90 TABLET | Refills: 0 | Status: SHIPPED | OUTPATIENT
Start: 2021-10-29 | End: 2022-01-10

## 2021-11-03 ENCOUNTER — TELEPHONE (OUTPATIENT)
Dept: INTERNAL MEDICINE CLINIC | Facility: CLINIC | Age: 76
End: 2021-11-03

## 2021-11-03 NOTE — TELEPHONE ENCOUNTER
Incoming fax from [facility]    Patients radiology report from 11/2/2021     Placed in  in basket for review

## 2021-11-04 ENCOUNTER — TELEPHONE (OUTPATIENT)
Dept: INTERNAL MEDICINE CLINIC | Facility: CLINIC | Age: 76
End: 2021-11-04

## 2021-11-04 NOTE — TELEPHONE ENCOUNTER
Incoming fax from [facility]    Patients consultation report from 11/2/2021 for left ankle surgical site infection    Placed in  in basket for review

## 2021-11-08 ENCOUNTER — TELEPHONE (OUTPATIENT)
Dept: INTERNAL MEDICINE CLINIC | Facility: CLINIC | Age: 76
End: 2021-11-08

## 2021-11-08 NOTE — TELEPHONE ENCOUNTER
Incoming fax from [facility]    Patients discharge-transfer summary     Placed in  in basket for review

## 2021-11-09 NOTE — TELEPHONE ENCOUNTER
Benazepril-hydroCHLOROthiazide 20-12.5 MG Oral Tab          Sig: Take 1 tablet by mouth daily.     Disp:  90 tablet    Refills:  0    Start: 10/14/2020    Class: Normal    Last ordered: 3 months ago by Veronica Antonio MD     Hypertension Medications Protocol New spec rx given.

## 2021-11-17 ENCOUNTER — TELEPHONE (OUTPATIENT)
Dept: INTERNAL MEDICINE CLINIC | Facility: CLINIC | Age: 76
End: 2021-11-17

## 2021-11-17 NOTE — TELEPHONE ENCOUNTER
Pt called wanting  to be aware that she is currently at [facility] for a broken ankle and once sh is released she will schedule her awv.  NAMITA

## 2021-11-30 ENCOUNTER — TELEPHONE (OUTPATIENT)
Dept: INTERNAL MEDICINE CLINIC | Facility: CLINIC | Age: 76
End: 2021-11-30

## 2021-12-09 ENCOUNTER — TELEPHONE (OUTPATIENT)
Dept: INTERNAL MEDICINE CLINIC | Facility: CLINIC | Age: 76
End: 2021-12-09

## 2021-12-10 ENCOUNTER — TELEPHONE (OUTPATIENT)
Dept: INTERNAL MEDICINE CLINIC | Facility: CLINIC | Age: 76
End: 2021-12-10

## 2021-12-10 NOTE — TELEPHONE ENCOUNTER
Incoming fax from [facility]    Patients Left ankle radiograph from 12/9/2021    Placed in  in basket for review

## 2021-12-21 ENCOUNTER — TELEPHONE (OUTPATIENT)
Dept: INTERNAL MEDICINE CLINIC | Facility: CLINIC | Age: 76
End: 2021-12-21

## 2021-12-21 NOTE — TELEPHONE ENCOUNTER
Spoke with Catrina Nunez nurse stated she received lab orders from [place], patient was recently discharged. Asked Catrina Nunez to fax a copy to our office of test results.

## 2021-12-21 NOTE — TELEPHONE ENCOUNTER
Calling to let Dr. Roz Greco know that they will draw labs within 48 hours at the start of care if that is okay with Dr. Roz Greco.

## 2021-12-22 ENCOUNTER — TELEPHONE (OUTPATIENT)
Dept: INTERNAL MEDICINE CLINIC | Facility: CLINIC | Age: 76
End: 2021-12-22

## 2021-12-22 NOTE — TELEPHONE ENCOUNTER
[facility]rendan Hernandes called stating they received the orders for home care and labs, Pts spouse would like start of care to begin on Monday. She is requesting a VO for start of care and labs for Monday.      Ph: [phone]

## 2021-12-29 ENCOUNTER — LAB ENCOUNTER (OUTPATIENT)
Dept: LAB | Age: 76
DRG: 684 | End: 2021-12-29
Attending: FAMILY MEDICINE
Payer: MEDICARE

## 2021-12-29 ENCOUNTER — TELEPHONE (OUTPATIENT)
Dept: INTERNAL MEDICINE CLINIC | Facility: CLINIC | Age: 76
End: 2021-12-29

## 2021-12-29 NOTE — TELEPHONE ENCOUNTER
Arron, physical therapist  from [facility] called and stated dr. Holly Kwon needs to know she will be visiting pt 2 times a week for three weeks and 1 time a week for four weeks. [phone].

## 2021-12-30 ENCOUNTER — LAB REQUISITION (OUTPATIENT)
Dept: LAB | Facility: HOSPITAL | Age: 76
DRG: 684 | End: 2021-12-30
Payer: MEDICARE

## 2021-12-30 ENCOUNTER — TELEPHONE (OUTPATIENT)
Dept: INTERNAL MEDICINE CLINIC | Facility: CLINIC | Age: 76
End: 2021-12-30

## 2021-12-30 ENCOUNTER — HOSPITAL ENCOUNTER (INPATIENT)
Facility: HOSPITAL | Age: 76
LOS: 3 days | Discharge: HOME | DRG: 684 | End: 2022-01-03
Attending: EMERGENCY MEDICINE | Admitting: HOSPITALIST
Payer: MEDICARE

## 2021-12-30 DIAGNOSIS — N17.9 ACUTE KIDNEY INJURY (HCC): Primary | ICD-10-CM

## 2021-12-30 DIAGNOSIS — M48.061 SPINAL STENOSIS OF LUMBAR REGION WITHOUT NEUROGENIC CLAUDICATION: ICD-10-CM

## 2021-12-30 DIAGNOSIS — E87.5 HYPERKALEMIA: ICD-10-CM

## 2021-12-30 DIAGNOSIS — I13.0 HYPERTENSIVE HEART AND CHRONIC KIDNEY DISEASE WITH HEART FAILURE AND STAGE 1 THROUGH STAGE 4 CHRONIC KIDNEY DISEASE, OR UNSPECIFIED CHRONIC KIDNEY DISEASE (HCC): ICD-10-CM

## 2021-12-30 LAB
ALBUMIN SERPL-MCNC: 3.2 G/DL (ref 3.4–5)
ALBUMIN/GLOB SERPL: 0.8 {RATIO} (ref 1–2)
ALP LIVER SERPL-CCNC: 132 U/L
ALT SERPL-CCNC: 16 U/L
ANION GAP SERPL CALC-SCNC: 12 MMOL/L (ref 0–18)
ANION GAP SERPL CALC-SCNC: 6 MMOL/L (ref 0–18)
AST SERPL-CCNC: 17 U/L (ref 15–37)
BASOPHILS # BLD AUTO: 0.06 X10(3) UL (ref 0–0.2)
BASOPHILS NFR BLD AUTO: 0.5 %
BILIRUB SERPL-MCNC: 0.3 MG/DL (ref 0.1–2)
BILIRUB UR QL STRIP.AUTO: NEGATIVE
BUN BLD-MCNC: 56 MG/DL (ref 7–18)
BUN BLD-MCNC: 59 MG/DL (ref 7–18)
CALCIUM BLD-MCNC: 10.2 MG/DL (ref 8.5–10.1)
CALCIUM BLD-MCNC: 9.7 MG/DL (ref 8.5–10.1)
CHLORIDE SERPL-SCNC: 100 MMOL/L (ref 98–112)
CHLORIDE SERPL-SCNC: 101 MMOL/L (ref 98–112)
CLARITY UR REFRACT.AUTO: CLEAR
CO2 SERPL-SCNC: 24 MMOL/L (ref 21–32)
CO2 SERPL-SCNC: 30 MMOL/L (ref 21–32)
CREAT BLD-MCNC: 3.47 MG/DL
CREAT BLD-MCNC: 3.57 MG/DL
EOSINOPHIL # BLD AUTO: 0.07 X10(3) UL (ref 0–0.7)
EOSINOPHIL NFR BLD AUTO: 0.6 %
ERYTHROCYTE [DISTWIDTH] IN BLOOD BY AUTOMATED COUNT: 15.3 %
GLOBULIN PLAS-MCNC: 4.1 G/DL (ref 2.8–4.4)
GLUCOSE BLD-MCNC: 107 MG/DL (ref 70–99)
GLUCOSE BLD-MCNC: 91 MG/DL (ref 70–99)
GLUCOSE UR STRIP.AUTO-MCNC: NEGATIVE MG/DL
HCT VFR BLD AUTO: 39.7 %
HGB BLD-MCNC: 13 G/DL
IMM GRANULOCYTES # BLD AUTO: 0.04 X10(3) UL (ref 0–1)
IMM GRANULOCYTES NFR BLD: 0.3 %
KETONES UR STRIP.AUTO-MCNC: NEGATIVE MG/DL
LEUKOCYTE ESTERASE UR QL STRIP.AUTO: NEGATIVE
LYMPHOCYTES # BLD AUTO: 2.38 X10(3) UL (ref 1–4)
LYMPHOCYTES NFR BLD AUTO: 20.8 %
MAGNESIUM SERPL-MCNC: 1.9 MG/DL (ref 1.6–2.6)
MCH RBC QN AUTO: 29.7 PG (ref 26–34)
MCHC RBC AUTO-ENTMCNC: 32.7 G/DL (ref 31–37)
MCV RBC AUTO: 90.8 FL
MONOCYTES # BLD AUTO: 0.9 X10(3) UL (ref 0.1–1)
MONOCYTES NFR BLD AUTO: 7.9 %
NEUTROPHILS # BLD AUTO: 8.01 X10 (3) UL (ref 1.5–7.7)
NEUTROPHILS # BLD AUTO: 8.01 X10(3) UL (ref 1.5–7.7)
NEUTROPHILS NFR BLD AUTO: 69.9 %
NITRITE UR QL STRIP.AUTO: NEGATIVE
OSMOLALITY SERPL CALC.SUM OF ELEC: 299 MOSM/KG (ref 275–295)
OSMOLALITY SERPL CALC.SUM OF ELEC: 299 MOSM/KG (ref 275–295)
PH UR STRIP.AUTO: 7 [PH] (ref 5–8)
PLATELET # BLD AUTO: 300 10(3)UL (ref 150–450)
POTASSIUM SERPL-SCNC: 5.8 MMOL/L (ref 3.5–5.1)
POTASSIUM SERPL-SCNC: 5.9 MMOL/L (ref 3.5–5.1)
PROT SERPL-MCNC: 7.3 G/DL (ref 6.4–8.2)
PROT UR STRIP.AUTO-MCNC: 30 MG/DL
RBC # BLD AUTO: 4.37 X10(6)UL
RBC UR QL AUTO: NEGATIVE
SARS-COV-2 RNA RESP QL NAA+PROBE: NOT DETECTED
SODIUM SERPL-SCNC: 136 MMOL/L (ref 136–145)
SODIUM SERPL-SCNC: 137 MMOL/L (ref 136–145)
SP GR UR STRIP.AUTO: 1.01 (ref 1–1.03)
UROBILINOGEN UR STRIP.AUTO-MCNC: <2 MG/DL
WBC # BLD AUTO: 11.5 X10(3) UL (ref 4–11)

## 2021-12-30 PROCEDURE — 80053 COMPREHEN METABOLIC PANEL: CPT | Performed by: FAMILY MEDICINE

## 2021-12-30 PROCEDURE — 99223 1ST HOSP IP/OBS HIGH 75: CPT | Performed by: HOSPITALIST

## 2021-12-30 PROCEDURE — 80048 BASIC METABOLIC PNL TOTAL CA: CPT | Performed by: FAMILY MEDICINE

## 2021-12-31 PROBLEM — E87.5 HYPERKALEMIA: Status: ACTIVE | Noted: 2021-12-31

## 2021-12-31 LAB
ANION GAP SERPL CALC-SCNC: 8 MMOL/L (ref 0–18)
ATRIAL RATE: 75 BPM
BUN BLD-MCNC: 55 MG/DL (ref 7–18)
CALCIUM BLD-MCNC: 9.4 MG/DL (ref 8.5–10.1)
CHLORIDE SERPL-SCNC: 104 MMOL/L (ref 98–112)
CO2 SERPL-SCNC: 28 MMOL/L (ref 21–32)
CREAT BLD-MCNC: 3.2 MG/DL
CREAT UR-SCNC: 23.1 MG/DL
CREAT UR-SCNC: 46.2 MG/DL
EOSINOPHIL URNS QL WRIGHT STN: NEGATIVE
EST. AVERAGE GLUCOSE BLD GHB EST-MCNC: 117 MG/DL (ref 68–126)
GLUCOSE BLD-MCNC: 117 MG/DL (ref 70–99)
GLUCOSE BLD-MCNC: 154 MG/DL (ref 70–99)
GLUCOSE BLD-MCNC: 178 MG/DL (ref 70–99)
GLUCOSE BLD-MCNC: 81 MG/DL (ref 70–99)
GLUCOSE BLD-MCNC: 84 MG/DL (ref 70–99)
GLUCOSE BLD-MCNC: 91 MG/DL (ref 70–99)
HBA1C MFR BLD: 5.7 % (ref ?–5.7)
OSMOLALITY SERPL CALC.SUM OF ELEC: 304 MOSM/KG (ref 275–295)
P AXIS: 63 DEGREES
P-R INTERVAL: 138 MS
PHOSPHATE SERPL-MCNC: 3.3 MG/DL (ref 2.5–4.9)
POTASSIUM SERPL-SCNC: 5.3 MMOL/L (ref 3.5–5.1)
POTASSIUM SERPL-SCNC: 5.5 MMOL/L (ref 3.5–5.1)
Q-T INTERVAL: 392 MS
QRS DURATION: 78 MS
QTC CALCULATION (BEZET): 437 MS
R AXIS: -31 DEGREES
SODIUM SERPL-SCNC: 102 MMOL/L
SODIUM SERPL-SCNC: 114 MMOL/L
SODIUM SERPL-SCNC: 140 MMOL/L (ref 136–145)
T AXIS: 44 DEGREES
URATE SERPL-MCNC: 6.6 MG/DL
VENTRICULAR RATE: 75 BPM

## 2021-12-31 PROCEDURE — 99232 SBSQ HOSP IP/OBS MODERATE 35: CPT | Performed by: INTERNAL MEDICINE

## 2021-12-31 PROCEDURE — 99223 1ST HOSP IP/OBS HIGH 75: CPT | Performed by: INTERNAL MEDICINE

## 2021-12-31 RX ORDER — METOCLOPRAMIDE HYDROCHLORIDE 5 MG/ML
5 INJECTION INTRAMUSCULAR; INTRAVENOUS EVERY 8 HOURS PRN
Status: DISCONTINUED | OUTPATIENT
Start: 2021-12-31 | End: 2022-01-03

## 2021-12-31 RX ORDER — ONDANSETRON 2 MG/ML
4 INJECTION INTRAMUSCULAR; INTRAVENOUS EVERY 4 HOURS PRN
Status: DISCONTINUED | OUTPATIENT
Start: 2021-12-31 | End: 2021-12-31

## 2021-12-31 RX ORDER — SENNOSIDES 8.6 MG
17.2 TABLET ORAL NIGHTLY PRN
Status: DISCONTINUED | OUTPATIENT
Start: 2021-12-31 | End: 2022-01-03

## 2021-12-31 RX ORDER — DEXTROSE MONOHYDRATE 25 G/50ML
50 INJECTION, SOLUTION INTRAVENOUS
Status: DISCONTINUED | OUTPATIENT
Start: 2021-12-30 | End: 2022-01-03

## 2021-12-31 RX ORDER — SODIUM CHLORIDE 9 MG/ML
INJECTION, SOLUTION INTRAVENOUS CONTINUOUS
Status: DISCONTINUED | OUTPATIENT
Start: 2021-12-31 | End: 2022-01-01

## 2021-12-31 RX ORDER — POLYETHYLENE GLYCOL 3350 17 G/17G
17 POWDER, FOR SOLUTION ORAL DAILY PRN
Status: DISCONTINUED | OUTPATIENT
Start: 2021-12-31 | End: 2022-01-03

## 2021-12-31 RX ORDER — DOXYCYCLINE HYCLATE 100 MG/1
100 CAPSULE ORAL 2 TIMES DAILY
COMMUNITY

## 2021-12-31 RX ORDER — ONDANSETRON 2 MG/ML
4 INJECTION INTRAMUSCULAR; INTRAVENOUS EVERY 6 HOURS PRN
Status: DISCONTINUED | OUTPATIENT
Start: 2021-12-31 | End: 2022-01-03

## 2021-12-31 RX ORDER — BISACODYL 10 MG
10 SUPPOSITORY, RECTAL RECTAL
Status: DISCONTINUED | OUTPATIENT
Start: 2021-12-31 | End: 2022-01-03

## 2021-12-31 RX ORDER — GABAPENTIN 300 MG/1
300 CAPSULE ORAL 2 TIMES DAILY
Status: DISCONTINUED | OUTPATIENT
Start: 2021-12-31 | End: 2022-01-03

## 2021-12-31 RX ORDER — CEFDINIR 300 MG/1
300 CAPSULE ORAL DAILY
COMMUNITY

## 2021-12-31 RX ORDER — MELATONIN
3 NIGHTLY PRN
Status: DISCONTINUED | OUTPATIENT
Start: 2021-12-31 | End: 2022-01-03

## 2021-12-31 RX ORDER — ROSUVASTATIN CALCIUM 5 MG/1
5 TABLET, COATED ORAL EVERY OTHER DAY
Status: DISCONTINUED | OUTPATIENT
Start: 2021-12-31 | End: 2022-01-03

## 2021-12-31 RX ORDER — GABAPENTIN 400 MG/1
400 CAPSULE ORAL 3 TIMES DAILY
Status: DISCONTINUED | OUTPATIENT
Start: 2021-12-31 | End: 2021-12-31

## 2021-12-31 RX ORDER — ACETAMINOPHEN 325 MG/1
650 TABLET ORAL EVERY 6 HOURS PRN
Status: DISCONTINUED | OUTPATIENT
Start: 2021-12-31 | End: 2022-01-03

## 2021-12-31 RX ORDER — ROSUVASTATIN CALCIUM 5 MG/1
5 TABLET, COATED ORAL DAILY
Status: DISCONTINUED | OUTPATIENT
Start: 2021-12-31 | End: 2021-12-31

## 2021-12-31 RX ORDER — CYCLOBENZAPRINE HCL 5 MG
5 TABLET ORAL 3 TIMES DAILY PRN
Status: DISCONTINUED | OUTPATIENT
Start: 2021-12-31 | End: 2022-01-03

## 2021-12-31 RX ORDER — SODIUM CHLORIDE 9 MG/ML
INJECTION, SOLUTION INTRAVENOUS CONTINUOUS
Status: ACTIVE | OUTPATIENT
Start: 2021-12-31 | End: 2021-12-31

## 2021-12-31 NOTE — ED PROVIDER NOTES
Patient  Patient Seen in: [facility] Emergency Department      History   Patient presents with:  Abnormal Result    Stated Complaint: elevated potassium level of 5.8    Subjective:   HPI    66-year-old female presents today for evaluation of an acute Alcohol use: Yes      Comment: about 4 times per year    Drug use: Yes      Comment: CBD gummies 2 daily             Review of Systems    Positive for stated complaint: elevated potassium level of 5.8  Other systems are as noted in HPI.   Constitutional and Non- 12 (*)     GFR, -American 13 (*)     Albumin 3.2 (*)     A/G Ratio 0.8 (*)     All other components within normal limits   URINALYSIS WITH CULTURE REFLEX - Abnormal; Notable for the following components:    Protein Urine 30  (*) patient's critical lab values due to her acute kidney injury and hyperkalemia. This involved direct patient intervention, complex decision making, and/or extensive discussions with the patient, family, and clinical staff.     Admission disposition: 12/30/2

## 2021-12-31 NOTE — DIETARY NOTE
[facility]    NUTRITION ASSESSMENT    Pt does not meet malnutrition criteria at this time. NUTRITION INTERVENTION:    1. Meal and Snacks - monitor patient po intake. Encourage adequate po of appropriate diet.   2. Medical Food Supplements - RD added Accuchecks?  No     Oral Supplements: Butter Pecan Nepro Daily; at dinner    Percent Meals Eaten (last 3 days)     None        FOOD/NUTRITION RELATED HISTORY:   Appetite: Poor  Intake: no PO intakes recorded yet  Intake Meeting Needs: No, but supplements to

## 2021-12-31 NOTE — H&P
[facility]  History and Physical     Beau Toro Patient Status:  Emergency    1945 MRN [id number]   Location [address] [facility] Attending Javy Renteria Day # 0 PCP Nora Ladd MD     Chief Compl HISTORY  1972    salpingo-oophorectomy right side   • OTHER SURGICAL HISTORY  01/01/2020    Cysto, Left RPG,stent insertion     Social History:  reports that she has never smoked. She has never used smokeless tobacco. She reports current alcohol use.  She r bilaterally. No rhonchi. Cardiovascular: S1, S2. Regular rhythm. Regular rate. No murmurs, rubs or gallops. Equal pulses. Chest and Back: No tenderness or deformity. Abdomen: Soft, nontender, nondistended. Positive bowel sounds.  No rebound or guarding isolation: Yes      Plan of care discussed with Patient     Rashard Carter MD  12/30/2021

## 2021-12-31 NOTE — ED INITIAL ASSESSMENT (HPI)
Patient had blood work today and sent here for high potassium and high creatine. No other complaints.

## 2021-12-31 NOTE — PROGRESS NOTES
[facility]     Hospitalist Progress Note     Rojelio Heart Patient Status:  Inpatient    1945 MRN [id number]   [facility] 2NE-A Attending Kati Anderson MD   Hosp Day # 0 PCP Susana Oviedo MD     Chief Complaint: BRITTANY    Subject Kinase  No results for input(s): CK in the last 168 hours. Inflammatory Markers  No results for input(s): CRP, EDILBERTO, LDH, DDIMER in the last 168 hours. Imaging: Imaging data reviewed in Epic.     Medications:   • apixaban  5 mg Oral BID   • insulin det patient will require TBD.

## 2021-12-31 NOTE — PHYSICAL THERAPY NOTE
PHYSICAL THERAPY EVALUATION - INPATIENT     Room Number: 2440/6683-I  Evaluation Date: 12/31/2021  Type of Evaluation: Initial  Physician Order: PT Eval and Treat    Presenting Problem: weakness, BRITTANY  Co-Morbidities : recent L ankle surgery complicat Visits to Meet Established Goals: 4      CURRENT GOALS    Goal #1 Patient is able to demonstrate supine - sit EOB @ level: supervision     Goal #2 Patient is able to demonstrate transfers Sit to/from Stand at assistance level: moderate assistance     Goal Difficulty: Turning over in bed (including adjusting bedclothes, sheets and blankets)?: A Little   Patient Difficulty: Sitting down on and standing up from a chair with arms (e.g., wheelchair, bedside commode, etc.): A Lot   Patient Difficulty: Moving from met;Call light within reach;RN aware of session/findings; All patient questions and concerns addressed; Alarm set    Therapist PPE: Mask, gloves and goggles were worn.   Patient/Family PPE: Mask not worn

## 2021-12-31 NOTE — ED QUICK NOTES
Orders for admission, patient is aware of plan and ready to go upstairs. Any questions, please call ED RN Malka Riggs at [address] [facility] or [phone]. Vaccinated?  Yes - Pfizer x 3 doses  Type of COVID test sent: Rapid - Negative  COVID Suspicion level: Low

## 2021-12-31 NOTE — CONSULTS
[facility]  Report of Consultation    Ricky Manuel Patient Status:  Inpatient    1945 MRN [id number]   [facility] 2NE-A Attending Cecy Singh MD   Hosp Day # 0 PCP Zandra Siemens, MD     Reason for Consultation:  BRITTANY/CKD  Hyp She has never used smokeless tobacco. She reports current alcohol use. She reports current drug use.     Allergies:  No Known Allergies    Current Medications:    Current Facility-Administered Medications:   •  0.9% NaCl infusion, , Intravenous, Continuous 18, height 5' 9\" (1.753 m), weight 222 lb (100.7 kg), SpO2 100 %, not currently breastfeeding. General: No acute distress. Alert and oriented x 3. HEENT: Moist mucous membranes. EOM-I. PERRL  Neck: No lymphadenopathy. No JVD. No carotid bruits.   Respir Imaging:  Reviewed    Impression:  1. BRITTANY/CKD- baseline Cr ~ 1.2; BRITTANY due to unclear etiology; concern for AIN given prolonged abx use (has been treated w/ various drugs including bactrim/augmentin/keflex; now on doxy/cefdinir).  She looks euvolemic

## 2021-12-31 NOTE — CM/SW NOTE
12/31/21 1300   CM/SW Referral Data   Referral Source Social Work (self-referral)   Reason for Referral Discharge planning   Informant Patient   Pertinent Medical Hx   Does patient have an established PCP?  Yes   Patient Info   Patient's Current Mental S refused. RN updated.  &  to remain available and supportive for discharge planning needs.     MARYANN Nye, [facility]   / Discharge Planner  [id number]

## 2021-12-31 NOTE — PROGRESS NOTES
12/31/21 0208 12/31/21 0217   Vital Signs   /72 (!) 165/63   MAP (mmHg) 94 87   BP Location Right arm Right arm   BP Method Automatic Automatic   Patient Position Lying Sitting

## 2021-12-31 NOTE — PLAN OF CARE
Assumed care of pt from ED around 0200. A/ox4, rm air, SR on tele. No reports of pain. Breath sounds clear/diminished upon auscultation. Denies cough. +2 edema present in BLE and feet.  Left outer ankle incision site clean/dry/intact - telfa, tape dressing Monitor urine specific gravity, serum osmolarity and serum sodium as indicated or ordered  - Monitor response to interventions for patient's volume status, including labs, urine output, blood pressure (other measures as available)  - Encourage oral intake

## 2021-12-31 NOTE — CM/SW NOTE
Pt current w/ [facility].     [facility]  P:[phone]  [facility], [facility] [place]   / Discharge [address] [facility].

## 2021-12-31 NOTE — OCCUPATIONAL THERAPY NOTE
OCCUPATIONAL THERAPY EVALUATION - INPATIENT     Room Number: 1463/9968-V  Evaluation Date: 12/31/2021  Type of Evaluation: Initial  Presenting Problem: weakness, abnormal labs    Physician Order: IP Consult to Occupational Therapy  Reason for Therapy: ADL/ assistance  Sit to Stand: Dependent assistance  Chair transfer: Max A x2 to pivot to chair  Ambulation Pt max assist x2 to come to partial stand, unable to take steps at this time                           Education provided: Role of OT, Safety with ADL an functional limits    ASSESSMENTS    Upper Extremity:   ROM: within functional limits   Strength: is within functional limits   Coordination:  Gross motor: WNL  Fine motor: WNL  Sensation: Light touch:  intact    AM-PAC ‘6-Clicks’ Inpatient Daily Activity S worn

## 2022-01-01 ENCOUNTER — APPOINTMENT (OUTPATIENT)
Dept: ULTRASOUND IMAGING | Facility: HOSPITAL | Age: 77
DRG: 684 | End: 2022-01-01
Attending: INTERNAL MEDICINE
Payer: MEDICARE

## 2022-01-01 LAB
ANION GAP SERPL CALC-SCNC: 8 MMOL/L (ref 0–18)
BUN BLD-MCNC: 54 MG/DL (ref 7–18)
CALCIUM BLD-MCNC: 9 MG/DL (ref 8.5–10.1)
CHLORIDE SERPL-SCNC: 105 MMOL/L (ref 98–112)
CO2 SERPL-SCNC: 25 MMOL/L (ref 21–32)
CREAT BLD-MCNC: 2.96 MG/DL
GLUCOSE BLD-MCNC: 106 MG/DL (ref 70–99)
GLUCOSE BLD-MCNC: 130 MG/DL (ref 70–99)
GLUCOSE BLD-MCNC: 184 MG/DL (ref 70–99)
GLUCOSE BLD-MCNC: 93 MG/DL (ref 70–99)
GLUCOSE BLD-MCNC: 96 MG/DL (ref 70–99)
MAGNESIUM SERPL-MCNC: 1.7 MG/DL (ref 1.6–2.6)
OSMOLALITY SERPL CALC.SUM OF ELEC: 301 MOSM/KG (ref 275–295)
POTASSIUM SERPL-SCNC: 4.3 MMOL/L (ref 3.5–5.1)
SODIUM SERPL-SCNC: 138 MMOL/L (ref 136–145)

## 2022-01-01 PROCEDURE — 99232 SBSQ HOSP IP/OBS MODERATE 35: CPT | Performed by: INTERNAL MEDICINE

## 2022-01-01 PROCEDURE — 99233 SBSQ HOSP IP/OBS HIGH 50: CPT | Performed by: INTERNAL MEDICINE

## 2022-01-01 PROCEDURE — 76770 US EXAM ABDO BACK WALL COMP: CPT | Performed by: INTERNAL MEDICINE

## 2022-01-01 NOTE — PLAN OF CARE
Patient sitting in chair, denies chest pain, shortness of breath and dizziness. Left outter ankle site cleansed, sutures intact, scant drainage. IVF's infusing. Release of medical information sent to [employer] to fax number [phone].  Attempted to send

## 2022-01-01 NOTE — PLAN OF CARE
Assumed patient care at 0700. AOx4. Calm, cooperative. Up with 2x assist, a walker, and pivot to the chair. NSR on cardiac monitor. Adequate saturation on RA. Lung sounds clear/diminished. Denies SOB, chest discomfort, N/V. Denies pain.  Voiding without dif available)  - Encourage oral intake as appropriate  - Instruct patient on fluid and nutrition restrictions as appropriate  Outcome: Progressing

## 2022-01-01 NOTE — PROGRESS NOTES
[facility]  Nephrology Progress Note    Jazmin Francis Attending:  Marshal Kerr MD       Assessment and Plan:    1) BRITTANY- most likely due to volume depletion / ACE-I effect / renal hypoperfusion- improving with IVF.  Also consider AIN given prolong 01/01/2022    BUN 54 01/01/2022     01/01/2022    K 4.3 01/01/2022     01/01/2022    CO2 25.0 01/01/2022    GLU 96 01/01/2022    CA 9.0 01/01/2022    MG 1.7 01/01/2022    PGLU 93 01/01/2022       Imaging: All imaging studies reviewed.     Meds:

## 2022-01-01 NOTE — PHYSICAL THERAPY NOTE
PHYSICAL THERAPY TREATMENT NOTE - INPATIENT    Room Number: 6415/7258-C     Session: 1     Number of Visits to Meet Established Goals: 4    Presenting Problem: weakness, BRITTANY  Co-Morbidities : recent L ankle surgery complicated with infection     History RESTRICTION  Weight Bearing Restriction: None                PAIN ASSESSMENT   Ratin  Location: denies pain at this time  Management Techniques: Repositioning    BALANCE Pt required assist with B LE exercises due to weakness. Pt reports she is wet and requires brief to be changed prior to getting OOB. This therapist called for PCT, PCT occupied in another room.   While waiting for assist in changing brief, transport arriv

## 2022-01-01 NOTE — PLAN OF CARE
Assumed care of pt at [address] [facility]. A/ox4, rm air, SR on tele. No reports of pain. Breath sounds clear/diminished upon auscultation. Denies cough. +2 edema present in BLE and feet. Left outer ankle incision site clean/dry/intact - sorbaview dressing in place.  Be [place] nutrition restrictions as appropriate  Outcome: Progressing

## 2022-01-01 NOTE — PROGRESS NOTES
[facility]     Hospitalist Progress Note     Lee Yun Patient Status:  Inpatient    1945 MRN [id number]   [facility] 2NE-A Attending Jody Wallace MD   Hosp Day # 1 PCP Pebbles Castillo MD     Chief Complaint: BRITTANY    Subject 12/30/2021    COVID19 Not Detected 06/07/2021    COVID19 Not Detected 01/29/2021       Pro-Calcitonin  No results for input(s): PCT in the last 168 hours. Cardiac  No results for input(s): TROP, PBNP in the last 168 hours.     Creatinine Kinase  No resul

## 2022-01-02 LAB
ANION GAP SERPL CALC-SCNC: 5 MMOL/L (ref 0–18)
BUN BLD-MCNC: 55 MG/DL (ref 7–18)
CALCIUM BLD-MCNC: 8.9 MG/DL (ref 8.5–10.1)
CHLORIDE SERPL-SCNC: 107 MMOL/L (ref 98–112)
CO2 SERPL-SCNC: 28 MMOL/L (ref 21–32)
CREAT BLD-MCNC: 2.84 MG/DL
GLUCOSE BLD-MCNC: 100 MG/DL (ref 70–99)
GLUCOSE BLD-MCNC: 194 MG/DL (ref 70–99)
GLUCOSE BLD-MCNC: 201 MG/DL (ref 70–99)
GLUCOSE BLD-MCNC: 89 MG/DL (ref 70–99)
GLUCOSE BLD-MCNC: 91 MG/DL (ref 70–99)
OSMOLALITY SERPL CALC.SUM OF ELEC: 305 MOSM/KG (ref 275–295)
POTASSIUM SERPL-SCNC: 4.1 MMOL/L (ref 3.5–5.1)
SODIUM SERPL-SCNC: 140 MMOL/L (ref 136–145)

## 2022-01-02 PROCEDURE — 99233 SBSQ HOSP IP/OBS HIGH 50: CPT | Performed by: INTERNAL MEDICINE

## 2022-01-02 PROCEDURE — 99232 SBSQ HOSP IP/OBS MODERATE 35: CPT | Performed by: INTERNAL MEDICINE

## 2022-01-02 NOTE — PROGRESS NOTES
[facility]     Hospitalist Progress Note     Cecy Michael Patient Status:  Inpatient    1945 MRN [id number]   [facility] 2NE-A Attending Karen Nieves MD   Hosp Day # 2 PCP Shashank Rodriguez MD     Chief Complaint: BRITTANY    Subject Creatinine Clearance: 17.1 mL/min (A) (based on SCr of 2.84 mg/dL (H)). No results for input(s): PTP, INR in the last 168 hours.          COVID-19 Lab Results    COVID-19  Lab Results   Component Value Date    COVID19 Not Detected 12/30/2021    COVID19 N testing is negative, may discontinue isolation: NA    Will the patient be referred to [facility] on discharge?: TBD  Estimated date of discharge: TBD  Discharge is dependent on: course  At this point Ms. Gary Mayberry is expected to be discharge to: home with [facility],

## 2022-01-02 NOTE — PLAN OF CARE
Pt denies c/o pain, malaise, or cardiac symptoms. A&Ox4. Lungs diminished bilaterally with equal expansion, on room air. Pt NSR on monitor with regular rate. Abdomen soft and non-tender with active bowel sounds in all four quadrants. Continent of B&B.  Pt u

## 2022-01-02 NOTE — PROGRESS NOTES
[facility]  Nephrology Progress Note    Marly Liu Attending:  Esperanza Li MD       Assessment and Plan:    1) BRITTANY- most likely due to volume depletion / ACE-I effect / renal hypoperfusion; also consider AIN given prolonged / multiple abx ex Date    PGLU 91 01/02/2022       Imaging: All imaging studies reviewed.     Meds:   apixaban (ELIQUIS) tab 5 mg, 5 mg, Oral, BID  cyclobenzaprine (Flexeril) tab 5 mg, 5 mg, Oral, TID PRN  glucose (DEX4) oral liquid 15 g, 15 g, Oral, Q15 Min PRN   Or  gluco

## 2022-01-02 NOTE — PLAN OF CARE
Alert and oriented x4. On RA. Denies any SOB or chest pain. NSR on tele. Continent of bowel and bladder. Denies pain. Up with 2 x walker + gait belt. QID accucheck. Call light within reach. Fall precautions in place.        Problem: METABOLIC/FLUID AND ELEC

## 2022-01-03 ENCOUNTER — TELEPHONE (OUTPATIENT)
Dept: INTERNAL MEDICINE CLINIC | Facility: CLINIC | Age: 77
End: 2022-01-03

## 2022-01-03 VITALS
TEMPERATURE: 98 F | RESPIRATION RATE: 16 BRPM | BODY MASS INDEX: 33.68 KG/M2 | OXYGEN SATURATION: 98 % | HEIGHT: 69 IN | DIASTOLIC BLOOD PRESSURE: 63 MMHG | WEIGHT: 227.38 LBS | SYSTOLIC BLOOD PRESSURE: 156 MMHG | HEART RATE: 56 BPM

## 2022-01-03 LAB
ANION GAP SERPL CALC-SCNC: 5 MMOL/L (ref 0–18)
BUN BLD-MCNC: 57 MG/DL (ref 7–18)
CALCIUM BLD-MCNC: 9 MG/DL (ref 8.5–10.1)
CHLORIDE SERPL-SCNC: 105 MMOL/L (ref 98–112)
CO2 SERPL-SCNC: 29 MMOL/L (ref 21–32)
CREAT BLD-MCNC: 3.12 MG/DL
GLUCOSE BLD-MCNC: 117 MG/DL (ref 70–99)
GLUCOSE BLD-MCNC: 118 MG/DL (ref 70–99)
GLUCOSE BLD-MCNC: 222 MG/DL (ref 70–99)
OSMOLALITY SERPL CALC.SUM OF ELEC: 305 MOSM/KG (ref 275–295)
POTASSIUM SERPL-SCNC: 4.3 MMOL/L (ref 3.5–5.1)
SODIUM SERPL-SCNC: 139 MMOL/L (ref 136–145)

## 2022-01-03 PROCEDURE — 99239 HOSP IP/OBS DSCHRG MGMT >30: CPT | Performed by: INTERNAL MEDICINE

## 2022-01-03 PROCEDURE — 99233 SBSQ HOSP IP/OBS HIGH 50: CPT | Performed by: INTERNAL MEDICINE

## 2022-01-03 RX ORDER — ROSUVASTATIN CALCIUM 5 MG/1
5 TABLET, COATED ORAL EVERY OTHER DAY
Qty: 90 TABLET | Refills: 3 | Status: SHIPPED | COMMUNITY
Start: 2022-01-03

## 2022-01-03 RX ORDER — CEFDINIR 300 MG/1
300 CAPSULE ORAL DAILY
Status: DISCONTINUED | OUTPATIENT
Start: 2022-01-03 | End: 2022-01-03

## 2022-01-03 RX ORDER — DOXYCYCLINE HYCLATE 100 MG/1
100 CAPSULE ORAL 2 TIMES DAILY
Status: DISCONTINUED | OUTPATIENT
Start: 2022-01-03 | End: 2022-01-03

## 2022-01-03 RX ORDER — GABAPENTIN 300 MG/1
300 CAPSULE ORAL 2 TIMES DAILY
Qty: 60 CAPSULE | Refills: 0 | Status: SHIPPED | OUTPATIENT
Start: 2022-01-03 | End: 2022-01-08

## 2022-01-03 NOTE — PLAN OF CARE
Asleep during rounds but arousable on tele monitor hr 60's sinus rhythm. Left ankle dressing c/d/I. Denies any pain. All needs attended and will continue to monitor. Call light within reach.   Problem: Patient/Family Goals  Goal: Patient/Family Long Term Go

## 2022-01-03 NOTE — TELEPHONE ENCOUNTER
Incoming fax from [facility]     Order # [id number]    Placed in  in basket for review DISCHARGE

## 2022-01-03 NOTE — PROGRESS NOTES
[facility]     Nephrology Progress Note    Santo Garcia Patient Status:  Inpatient    1945 MRN [id number]   [facility] 2NE-A Attending Yohannes Murdock MD   Hosp Day # 3 PCP Cyndee Ott MD       SUBJECTIVE:  Patient sitting up --  9.4  --  9.0 8.9 9.0   MG 1.9  --   --   --  1.7  --   --    PHOS  --   --  3.3  --   --   --   --    *  --  81  --  96 89 118*    < > = values in this interval not displayed.        Recent Labs   Lab 12/30/21 2011   ALT 16   AST 17   ALB 3.2* 1.5 mg/dL so related to hypertension and diabetes. She presents with acute kidney injury thought to be due to prerenal azotemia in the setting of an ACE inhibitor. Ultrasound was unremarkable.   Acute interstitial nephritis was also a consideration given

## 2022-01-03 NOTE — DISCHARGE SUMMARY
[facility] HOSPITALIST  DISCHARGE SUMMARY     Vilma Welsh Patient Status:  Inpatient    1945 MRN [id number]   [facility] 2NE-A Attending No att. providers found   Hosp Day # 3 PCP Mary Jackson MD     Date of Admission: 2021  Da antibiotic were resumed. No evidence of active infection per exam.  Plan for patient to follow-up with her infectious disease group as outpatient this coming week and have a repeat [facility] nephrology recommendation.   Patient has declined subacute rehab and wo Tb24  Commonly known as: glipiZIDE XL      Take 1 tablet (2.5 mg total) by mouth daily with breakfast.   Quantity: 90 tablet  Refills: 0     PreserVision AREDS 2 Caps       Refills: 0        STOP taking these medications    Benazepril-hydroCHLOROthiazide 2 mobile device before joining the video  4. Come off Wi-Fi and switch over to Data    Please see our Video Visit Tip Sheet if you need additional assistance. If you believe this is an emergency, please dial 911 immediately.

## 2022-01-03 NOTE — CONSULTS
[facility] Pharmacy Note:  Renal Adjustment for cefdinir (OMNICEF)    Joann Barahona is a 64year old patient who has been prescribed cefdinir (OMNICEF) 300 mg every 12 hrs.   The estimated creatinine clearance is 15.5 mL/min (A) (based on SCr of 3.12 mg/dL (H)

## 2022-01-03 NOTE — CM/SW NOTE
SW met with pt at bedside to confirm discharge plans of returning home. Pt did confirm this, stating that \"being in rehab for 17 weeks is too long. \" SW confirmed w/ pt that she will have support and assistance at home since the hospital is much different

## 2022-01-03 NOTE — PLAN OF CARE
Pt denies c/o pain, malaise, or cardiac symptoms. A&Ox4. Lungs diminished bilaterally with equal expansion, on room air. Pt NSR on monitor with regular rate. Abdomen soft and non-tender with active bowel sounds in all four quadrants. Semi-continent of B&B. restrictions as appropriate  Outcome: Progressing

## 2022-01-03 NOTE — CM/SW NOTE
01/03/22 1300   Discharge disposition   Expected discharge disposition Home or Self   Post Acute Care Provider [facility]   Discharge transportation [facility]     Pt stable for discharge home.  [facility] arranged for 4:00pm. PCS form completed/printe

## 2022-01-03 NOTE — CM/SW NOTE
[facility] on will call for possible discharge today. PCS form completed.      MARYANN Mallory, [facility]   / Discharge Planner  [id number]

## 2022-01-03 NOTE — OCCUPATIONAL THERAPY NOTE
OCCUPATIONAL THERAPY TREATMENT NOTE - INPATIENT     Room Number: 0005/6301-W  Session: 1   Number of Visits to Meet Established Goals: 5    History: Patient is a 64year old female admitted on 12/30/2021 with Presenting Problem: weakness, abnormal labs.  PM Repetitions Comments   Scapular elevation     Scapular retraction     Shoulder rolls     Shoulder flexion     Shoulder abduction     Shoulder internal/external rotation     Forward punch     Elbow flexion     Elbow extension     Forearm pronation/supinatio Goals  Patient will transfer from supine to sit:  with supervision  Patient will transfer from sit to stand:  with supervision  Patient will transfer to toilet:  with supervision     UE Exercise Program Goal  Patient will be supervision with bilateral AROM

## 2022-01-03 NOTE — PHYSICAL THERAPY NOTE
PHYSICAL THERAPY TREATMENT NOTE - INPATIENT    Room Number: 1384/9451-S     Session: 2    Number of Visits to Meet Established Goals: 4    Presenting Problem: weakness, BRITTANY  Co-Morbidities : recent L ankle surgery complicated with infection     History here\"     OBJECTIVE  Precautions: Bed/chair alarm    WEIGHT BEARING RESTRICTION  Weight Bearing Restriction: None                PAIN ASSESSMENT   Ratin  Location: L ankle   Management Techniques: Repositioning    BALANCE safety. Pt required max A x2 for squat pivot to chair. Pt states this is how her caregiver and  preform transfers at home. RN notified to use mechanical lift for safety when transferring pt.      Patient End of Session: Up in chair;Call light within

## 2022-01-04 ENCOUNTER — TELEPHONE (OUTPATIENT)
Dept: INTERNAL MEDICINE CLINIC | Facility: CLINIC | Age: 77
End: 2022-01-04

## 2022-01-04 ENCOUNTER — PATIENT OUTREACH (OUTPATIENT)
Dept: CASE MANAGEMENT | Age: 77
End: 2022-01-04

## 2022-01-04 NOTE — PROGRESS NOTES
KOKI s/w patient for HFU. She states that she did not know of any VV with [facility] tomorrow and wants to cancel as she prefers to speak with PCP. She states that a video visit is too overwhelming and  heard in the background saying the same.  KOKI cancelled

## 2022-01-04 NOTE — TELEPHONE ENCOUNTER
S/w patient for TCM. She is requesting a telephone follow up (Not a video visit) with Dr. Chris Cifuentes, preferably for tomorrow to discuss hospitalization and medication changes.  I attempted to go over medications but patient declined stating that it is too overw

## 2022-01-05 ENCOUNTER — TELEPHONE (OUTPATIENT)
Dept: INTERNAL MEDICINE CLINIC | Facility: CLINIC | Age: 77
End: 2022-01-05

## 2022-01-05 NOTE — TELEPHONE ENCOUNTER
Pt called office and stated she was recently hospitalized on 12/30 for kidney issues. Pt stated her medication routine was changed after hospital discharge. Pt would like to speak to a nurse in regards to medication routine. Please advise. [phone].

## 2022-01-05 NOTE — TELEPHONE ENCOUNTER
LM on VM for Marilynn Zapata therapist, provided VO for OTMike Zapata to call back with any further questions.

## 2022-01-05 NOTE — TELEPHONE ENCOUNTER
Future Appointments   Date Time Provider Alejandro Wendy   1/8/2022 11:30 AM Zeyad Schwab MD [facility] [place]

## 2022-01-05 NOTE — TELEPHONE ENCOUNTER
Faxed with confirmation and placed to scan as well as in [facility] accordion folder in Pod 3

## 2022-01-05 NOTE — TELEPHONE ENCOUNTER
Kolton Sarabia occupational therapist from [facility] calling for a VO for OT for the pt. She states she was supposed to see the pt for OT but the pt was hospitalized. She is requesting a new order for OT and she will see the pt next week.  She states she will also fax an

## 2022-01-05 NOTE — TELEPHONE ENCOUNTER
Hold metformin d/t the kidneys     ideally a video visit thru [employer] is requested if unable to come in      30min

## 2022-01-05 NOTE — TELEPHONE ENCOUNTER
Spoke with patient stating was admitted to [facility] on 12/30/2021 for acute kidney injury, pt indicates some of her medications were discontinued like her Metformin, states her BS at hospital and now at home are at normal range.  Patient advised to f

## 2022-01-05 NOTE — TELEPHONE ENCOUNTER
Spoke with patient advised to hold metformin. Patient verbalized understanding and agreeable to POC. Patient has appt scheduled.

## 2022-01-07 ENCOUNTER — TELEPHONE (OUTPATIENT)
Dept: INTERNAL MEDICINE CLINIC | Facility: CLINIC | Age: 77
End: 2022-01-07

## 2022-01-07 ENCOUNTER — LAB REQUISITION (OUTPATIENT)
Dept: LAB | Age: 77
End: 2022-01-07
Payer: MEDICARE

## 2022-01-07 DIAGNOSIS — T81.31XA DISRUPTION OF EXTERNAL OPERATION (SURGICAL) WOUND, NOT ELSEWHERE CLASSIFIED, INITIAL ENCOUNTER: ICD-10-CM

## 2022-01-07 LAB
ANION GAP SERPL CALC-SCNC: 7 MMOL/L (ref 0–18)
BUN BLD-MCNC: 60 MG/DL (ref 7–18)
CALCIUM BLD-MCNC: 9.5 MG/DL (ref 8.5–10.1)
CHLORIDE SERPL-SCNC: 103 MMOL/L (ref 98–112)
CO2 SERPL-SCNC: 28 MMOL/L (ref 21–32)
CREAT BLD-MCNC: 3.23 MG/DL
GLUCOSE BLD-MCNC: 143 MG/DL (ref 70–99)
OSMOLALITY SERPL CALC.SUM OF ELEC: 305 MOSM/KG (ref 275–295)
POTASSIUM SERPL-SCNC: 5.4 MMOL/L (ref 3.5–5.1)
SODIUM SERPL-SCNC: 138 MMOL/L (ref 136–145)

## 2022-01-07 PROCEDURE — 80048 BASIC METABOLIC PNL TOTAL CA: CPT | Performed by: FAMILY MEDICINE

## 2022-01-07 NOTE — TELEPHONE ENCOUNTER
Romelia physical therapist with [facility] wanted to let Dr Paulette Martin know the plan of care for pt. Per Katerin Mccarthy, she will see pt 1 time a week for 7 weeks. Katerin Mccarthy stated that she thinks pt might benefit from sub acute replacement care. Per Katerin Mccarthy pt personal care giver will place pt on toilet in the morning and then place pt in wheelchair after she is done and keep it in a sitting position until the caregiver places pt back on the toilet at night before bed. Kateirn Mccarthy stated that it has effected pt knees. Katerin Mccarthy would like to speak with a nurse to go over other clinical concerns regarding pt.      Katerin Mccarthy - [phone]

## 2022-01-07 NOTE — TELEPHONE ENCOUNTER
Spoke with Karin Flores PT with [facility],stating patient finished subacute antibiotics    requesting 5 days of physical therapy in the home, she does have a Caregiver but she comes to pts home sits her on the toilet and then on her wheelchair. Patient sits in wheelchair mostly all day. Karin Flores believes pt would be a candidate for subacute facility like [facility], [facility] or [facility]. Karin Florse indicates will fax over report before pt's appt tomorrow. Karin Flores also indicates patient is very forgetful, foggy and depressed. FYI to TO.

## 2022-01-08 ENCOUNTER — TELEMEDICINE (OUTPATIENT)
Dept: INTERNAL MEDICINE CLINIC | Facility: CLINIC | Age: 77
End: 2022-01-08

## 2022-01-08 DIAGNOSIS — S82.892G CLOSED FRACTURE OF LEFT ANKLE WITH DELAYED HEALING, SUBSEQUENT ENCOUNTER: ICD-10-CM

## 2022-01-08 DIAGNOSIS — N18.9 ACUTE KIDNEY INJURY SUPERIMPOSED ON CKD (HCC): Primary | ICD-10-CM

## 2022-01-08 DIAGNOSIS — E87.5 HYPERKALEMIA: ICD-10-CM

## 2022-01-08 DIAGNOSIS — I10 ESSENTIAL HYPERTENSION, BENIGN: ICD-10-CM

## 2022-01-08 DIAGNOSIS — N17.9 ACUTE KIDNEY INJURY SUPERIMPOSED ON CKD (HCC): Primary | ICD-10-CM

## 2022-01-08 PROCEDURE — 99496 TRANSJ CARE MGMT HIGH F2F 7D: CPT | Performed by: FAMILY MEDICINE

## 2022-01-08 RX ORDER — GABAPENTIN 300 MG/1
300 CAPSULE ORAL 2 TIMES DAILY
Qty: 180 CAPSULE | Refills: 1 | Status: SHIPPED | OUTPATIENT
Start: 2022-01-08 | End: 2022-02-07

## 2022-01-08 NOTE — PROGRESS NOTES
HPI:    Yara Seen is a 64year old female here today for hospital follow up.    She was discharged from Inpatient hospital, [facility] to Home   Admission Date: 12/30/21   Discharge Date: 1/3/22  Hospital Discharge Diagnoses (since 12/9/2021) attitude . BP was ok when cked by the VN. Thebreathing is comfortable    Heart feels well   Legs are slightly swollen but getting better   Did see ID earlier in the week and told stable    To see Podiatry for a f/u as well in the next couple weeks.    Jimi Gross and hysterectomy (1989). She family history includes Lung Cancer in her mother. She  reports that she has never smoked. She has never used smokeless tobacco. She reports current alcohol use. She reports current drug use.      ROS:   GENERAL:otherwise O discharge on date above   Medical Decision Making- Based on service period of discharge to 30 days:   · Number of Possible Diagnoses and/or Management Options: severe  · Amount and/or Complexity of Data to Be Reviewed: severe  · Risk of Significant Complic

## 2022-01-10 ENCOUNTER — TELEPHONE (OUTPATIENT)
Dept: INTERNAL MEDICINE CLINIC | Facility: CLINIC | Age: 77
End: 2022-01-10

## 2022-01-10 RX ORDER — GLIPIZIDE 2.5 MG/1
TABLET, EXTENDED RELEASE ORAL
Qty: 90 TABLET | Refills: 3 | Status: SHIPPED | OUTPATIENT
Start: 2022-01-10

## 2022-01-10 NOTE — TELEPHONE ENCOUNTER
Incoming fax from [facility]    Order # [id number]    Placed in  in basket for review

## 2022-01-10 NOTE — TELEPHONE ENCOUNTER
Incoming fax from [facility]    Order # [id number]    Placed in  in basket for review

## 2022-01-11 ENCOUNTER — TELEPHONE (OUTPATIENT)
Dept: INTERNAL MEDICINE CLINIC | Facility: CLINIC | Age: 77
End: 2022-01-11

## 2022-01-11 NOTE — TELEPHONE ENCOUNTER
Omar from [facility] wanted to confirm that patient is no longer taking benazepril-hydrochlorothiazide.  Per Omar, pt still has the medication at home and pt asked Omar if she should be taking it cause Dr Ric Allen did not mention it during the video visit on

## 2022-01-11 NOTE — TELEPHONE ENCOUNTER
Spoke with Sergio, notified per TO LOV pt to stay off metformin and Benazepril. Sergio verbalized understanding and agreeable.      ASSESSMENT/ PLAN:  (N17.9,  N18.9) Acute kidney injury superimposed on CKD ([facility] [address]  (primary encounter diagnosis)  Plan: DELILAH

## 2022-01-12 ENCOUNTER — TELEPHONE (OUTPATIENT)
Dept: INTERNAL MEDICINE CLINIC | Facility: CLINIC | Age: 77
End: 2022-01-12

## 2022-01-12 ENCOUNTER — LAB REQUISITION (OUTPATIENT)
Dept: LAB | Facility: HOSPITAL | Age: 77
End: 2022-01-12
Payer: MEDICARE

## 2022-01-12 DIAGNOSIS — T84.625A: ICD-10-CM

## 2022-01-12 LAB
ANION GAP SERPL CALC-SCNC: 8 MMOL/L (ref 0–18)
BUN BLD-MCNC: 58 MG/DL (ref 7–18)
CALCIUM BLD-MCNC: 9.4 MG/DL (ref 8.5–10.1)
CHLORIDE SERPL-SCNC: 103 MMOL/L (ref 98–112)
CO2 SERPL-SCNC: 27 MMOL/L (ref 21–32)
CREAT BLD-MCNC: 3.54 MG/DL
GLUCOSE BLD-MCNC: 137 MG/DL (ref 70–99)
OSMOLALITY SERPL CALC.SUM OF ELEC: 304 MOSM/KG (ref 275–295)
POTASSIUM SERPL-SCNC: 5.1 MMOL/L (ref 3.5–5.1)
SODIUM SERPL-SCNC: 138 MMOL/L (ref 136–145)

## 2022-01-12 PROCEDURE — 80048 BASIC METABOLIC PNL TOTAL CA: CPT | Performed by: FAMILY MEDICINE

## 2022-01-21 ENCOUNTER — TELEPHONE (OUTPATIENT)
Dept: INTERNAL MEDICINE CLINIC | Facility: CLINIC | Age: 77
End: 2022-01-21

## 2022-01-21 LAB
ANION GAP: 11 MMOL/L (ref ?–16)
BASO (ABSOLUTE): 0.05 X10E3/UL
BASOPHIL %: 0
BUN: 60 MG/DL (ref 7–22)
C-REACTIVE PROTEIN: 0.9
CALCIUM: 9.3
CARBON DIOXIDE: 26
CHLORIDE: 100
CREATININE: 3.36 MG/DL
EOS (ABSOLUTE): 0.07 X10E3/UL
EOSINOPHIL %: 1
GLUCOSE: 164
HCT: 38.3
HGB: 12
LYMPHOCYTE %: 15
LYMPHOCYTE ABSOLUTE: 1.56
Lab: 12.5 ML/MIN/1.73 M² (ref 60–?)
MEAN CELL VOLUME: 90.8
MEAN CORPUSCULAR HEMOGLOBIN: 28.4
MEAN CORPUSCULAR HGB CONC: 31.3
MONOCYTE %: 7
MONOCYTES(ABSOLUTE): 0.75
NEUTROPHIL %: 76
NEUTROPHIL ABSOLUTE: 8.1
NUCRBC: 0
PLT: 341
POTASSIUM: 4.8
RED BLOOD COUNT: 4.22
RED CELL DISTRIBUTION WIDTH: 15.3
SED RATE: 48
SODIUM: 137
WBC: 10.6

## 2022-01-21 NOTE — TELEPHONE ENCOUNTER
Incoming fax from [employer]    Johanna Lab report from 1/20/2022 and Radiology report    Labs entered in Epic and paperwork placed in  in basket for review

## 2022-02-14 ENCOUNTER — TELEPHONE (OUTPATIENT)
Dept: INTERNAL MEDICINE CLINIC | Facility: CLINIC | Age: 77
End: 2022-02-14

## 2022-02-14 NOTE — TELEPHONE ENCOUNTER
Spoke with [facility] nurse to recertify for wound care week of 2/21/2022. Luan verbalized understanding.

## 2022-02-14 NOTE — TELEPHONE ENCOUNTER
Celora Limbo [facility]    Needs to recertify for wound care week of 2/21 - Verbal orders needed

## 2022-02-16 ENCOUNTER — TELEPHONE (OUTPATIENT)
Dept: INTERNAL MEDICINE CLINIC | Facility: CLINIC | Age: 77
End: 2022-02-16

## 2022-02-16 NOTE — TELEPHONE ENCOUNTER
Incoming fax from [address] [facility] # [id number]    Placed in  in basket for review

## 2022-02-16 NOTE — TELEPHONE ENCOUNTER
Lacinda Lesch, physical therapist from [facility] called to notify dr. Paulette Martin pt fell out of bed on 2/16 early morning around 2:00AM. Pt has a non-healing wound on L leg. Best call back number for Lacinda Lesch is [phone].

## 2022-02-16 NOTE — TELEPHONE ENCOUNTER
Spoke with Ally Moffett PT from [facility] stating patient fell out of bad early morning 2/16 today, pt somehow got tangled in her bed sheets, she called neighbor who called 911 for pt. She was seen by paramedics. Per Ally Moffett patient denies any injuries or any pain. She wanted to give TO FYI just in case her left lower extremity wound is not healing properly.

## 2022-02-21 ENCOUNTER — TELEPHONE (OUTPATIENT)
Dept: INTERNAL MEDICINE CLINIC | Facility: CLINIC | Age: 77
End: 2022-02-21

## 2022-02-21 NOTE — TELEPHONE ENCOUNTER
Pt called requesting to speak with a nurse regarding her current medications. Pt states she was at the [facility] for a broken ankle, and the transferred to [facility] for a bone infection. Pt would like to verify she is taking the correct medications on her med list and the dosage is correct.

## 2022-02-21 NOTE — TELEPHONE ENCOUNTER
Patient stated she is unable to come into the office due to her broken ankle. Scheduled patient for a video visit HFU for now. Patient stated her caregiver can help her with the video visit since she has done it in the past.     [id number] Eliz Lou to keep as video visit due to broken ankle?     Future Appointments   Date Time Provider Alejandro Nguyen   2/24/2022  9:30 AM Toro Mccullough MD [facility] [place]

## 2022-02-22 ENCOUNTER — TELEPHONE (OUTPATIENT)
Dept: INTERNAL MEDICINE CLINIC | Facility: CLINIC | Age: 77
End: 2022-02-22

## 2022-02-22 NOTE — TELEPHONE ENCOUNTER
Luan from [facility] calling to inquire for pt. She would like to know if she should start taking Gabapentin and Metformin again. She was taking them previously she just wants clarification.

## 2022-02-24 ENCOUNTER — TELEMEDICINE (OUTPATIENT)
Dept: INTERNAL MEDICINE CLINIC | Facility: CLINIC | Age: 77
End: 2022-02-24

## 2022-02-24 DIAGNOSIS — L08.9 WOUND INFECTION: Primary | ICD-10-CM

## 2022-02-24 DIAGNOSIS — I10 ESSENTIAL HYPERTENSION, BENIGN: ICD-10-CM

## 2022-02-24 DIAGNOSIS — E11.22 TYPE 2 DIABETES MELLITUS WITH STAGE 3 CHRONIC KIDNEY DISEASE, WITHOUT LONG-TERM CURRENT USE OF INSULIN, UNSPECIFIED WHETHER STAGE 3A OR 3B CKD (HCC): ICD-10-CM

## 2022-02-24 DIAGNOSIS — E78.00 PURE HYPERCHOLESTEROLEMIA: ICD-10-CM

## 2022-02-24 DIAGNOSIS — T14.8XXA WOUND INFECTION: Primary | ICD-10-CM

## 2022-02-24 DIAGNOSIS — N18.30 TYPE 2 DIABETES MELLITUS WITH STAGE 3 CHRONIC KIDNEY DISEASE, WITHOUT LONG-TERM CURRENT USE OF INSULIN, UNSPECIFIED WHETHER STAGE 3A OR 3B CKD (HCC): ICD-10-CM

## 2022-02-24 PROCEDURE — 99214 OFFICE O/P EST MOD 30 MIN: CPT | Performed by: FAMILY MEDICINE

## 2022-02-28 ENCOUNTER — TELEPHONE (OUTPATIENT)
Dept: INTERNAL MEDICINE CLINIC | Facility: CLINIC | Age: 77
End: 2022-02-28

## 2022-02-28 NOTE — TELEPHONE ENCOUNTER
Incoming fax from [facility]    Patients Radiology report from 2/25/2022    US ext lower venus duplex LT    Conclusions:    1- Evidence of deep vein thrombosis noted in left common femoral vein as well as the left popliteal vein, as evidenced by echogenic thrombus and incomplete compressibility of these vessels. These vessels are patent however. This is a known diagnosis and the patient is on chronic anticoagulant therapy per technician report    20 there is not evidence of venous reflux/insufficiency in the left greater saphenous vein. There is no evidence of significant venous reflux/insufficiency noted in the left SSV. There is not venous perforators noted in left lower extremity. 3- There are no prior examinations available for comparison purposes.     Placed in  in basket for review

## 2022-03-07 ENCOUNTER — TELEPHONE (OUTPATIENT)
Dept: INTERNAL MEDICINE CLINIC | Facility: CLINIC | Age: 77
End: 2022-03-07

## 2022-03-25 ENCOUNTER — TELEPHONE (OUTPATIENT)
Dept: INTERNAL MEDICINE CLINIC | Facility: CLINIC | Age: 77
End: 2022-03-25

## 2022-03-25 NOTE — TELEPHONE ENCOUNTER
Pt stated that she received 3 bottles of elloquis in the mail from her mail order pharmacy. Per pt she thought she did not have to take the medication anymore after being discharged from the hospital.     Pt wants to know if she needs to continue the elloquis since she received the medication in the mail.      Confirmed [phone] as best contact for pt

## 2022-03-31 ENCOUNTER — HOSPITAL ENCOUNTER (OUTPATIENT)
Facility: HOSPITAL | Age: 77
Setting detail: OBSERVATION
Discharge: HOME HEALTH | End: 2022-04-02
Attending: EMERGENCY MEDICINE | Admitting: HOSPITALIST
Payer: MEDICARE

## 2022-03-31 ENCOUNTER — TELEPHONE (OUTPATIENT)
Dept: INTERNAL MEDICINE CLINIC | Facility: CLINIC | Age: 77
End: 2022-03-31

## 2022-03-31 DIAGNOSIS — N39.0 URINARY TRACT INFECTION WITH HEMATURIA, SITE UNSPECIFIED: Primary | ICD-10-CM

## 2022-03-31 DIAGNOSIS — R53.1 WEAKNESS: ICD-10-CM

## 2022-03-31 DIAGNOSIS — R31.9 URINARY TRACT INFECTION WITH HEMATURIA, SITE UNSPECIFIED: Primary | ICD-10-CM

## 2022-03-31 LAB
BASOPHILS # BLD AUTO: 0.04 X10(3) UL (ref 0–0.2)
BASOPHILS NFR BLD AUTO: 0.2 %
EOSINOPHIL # BLD AUTO: 0.08 X10(3) UL (ref 0–0.7)
EOSINOPHIL NFR BLD AUTO: 0.5 %
ERYTHROCYTE [DISTWIDTH] IN BLOOD BY AUTOMATED COUNT: 14.7 %
HCT VFR BLD AUTO: 38.9 %
HGB BLD-MCNC: 12.3 G/DL
IMM GRANULOCYTES # BLD AUTO: 0.07 X10(3) UL (ref 0–1)
IMM GRANULOCYTES NFR BLD: 0.4 %
LYMPHOCYTES # BLD AUTO: 2.17 X10(3) UL (ref 1–4)
LYMPHOCYTES NFR BLD AUTO: 13.1 %
MCH RBC QN AUTO: 28.9 PG (ref 26–34)
MCHC RBC AUTO-ENTMCNC: 31.6 G/DL (ref 31–37)
MCV RBC AUTO: 91.3 FL
MONOCYTES # BLD AUTO: 1.08 X10(3) UL (ref 0.1–1)
MONOCYTES NFR BLD AUTO: 6.5 %
NEUTROPHILS # BLD AUTO: 13.1 X10 (3) UL (ref 1.5–7.7)
NEUTROPHILS # BLD AUTO: 13.1 X10(3) UL (ref 1.5–7.7)
NEUTROPHILS NFR BLD AUTO: 79.3 %
PLATELET # BLD AUTO: 301 10(3)UL (ref 150–450)
RBC # BLD AUTO: 4.26 X10(6)UL
WBC # BLD AUTO: 16.5 X10(3) UL (ref 4–11)

## 2022-03-31 PROCEDURE — 80053 COMPREHEN METABOLIC PANEL: CPT | Performed by: EMERGENCY MEDICINE

## 2022-03-31 PROCEDURE — 99285 EMERGENCY DEPT VISIT HI MDM: CPT

## 2022-03-31 PROCEDURE — 96375 TX/PRO/DX INJ NEW DRUG ADDON: CPT

## 2022-03-31 PROCEDURE — 96365 THER/PROPH/DIAG IV INF INIT: CPT

## 2022-03-31 PROCEDURE — 85025 COMPLETE CBC W/AUTO DIFF WBC: CPT | Performed by: EMERGENCY MEDICINE

## 2022-03-31 PROCEDURE — 96361 HYDRATE IV INFUSION ADD-ON: CPT

## 2022-04-01 PROBLEM — N39.0 URINARY TRACT INFECTION WITH HEMATURIA, SITE UNSPECIFIED: Status: ACTIVE | Noted: 2022-04-01

## 2022-04-01 PROBLEM — R53.1 WEAKNESS: Status: ACTIVE | Noted: 2022-04-01

## 2022-04-01 PROBLEM — N39.0 UTI (URINARY TRACT INFECTION): Status: ACTIVE | Noted: 2022-04-01

## 2022-04-01 PROBLEM — R31.9 URINARY TRACT INFECTION WITH HEMATURIA, SITE UNSPECIFIED: Status: ACTIVE | Noted: 2022-04-01

## 2022-04-01 LAB
ALBUMIN SERPL-MCNC: 3 G/DL (ref 3.4–5)
ALBUMIN SERPL-MCNC: 3.5 G/DL (ref 3.4–5)
ALBUMIN/GLOB SERPL: 0.9 {RATIO} (ref 1–2)
ALBUMIN/GLOB SERPL: 0.9 {RATIO} (ref 1–2)
ALP LIVER SERPL-CCNC: 121 U/L
ALP LIVER SERPL-CCNC: 139 U/L
ALT SERPL-CCNC: 14 U/L
ALT SERPL-CCNC: 18 U/L
ANION GAP SERPL CALC-SCNC: 7 MMOL/L (ref 0–18)
ANION GAP SERPL CALC-SCNC: 8 MMOL/L (ref 0–18)
AST SERPL-CCNC: 20 U/L (ref 15–37)
AST SERPL-CCNC: 6 U/L (ref 15–37)
BASOPHILS # BLD AUTO: 0.04 X10(3) UL (ref 0–0.2)
BASOPHILS NFR BLD AUTO: 0.3 %
BILIRUB SERPL-MCNC: 0.2 MG/DL (ref 0.1–2)
BILIRUB SERPL-MCNC: 0.2 MG/DL (ref 0.1–2)
BILIRUB UR QL STRIP.AUTO: NEGATIVE
BUN BLD-MCNC: 38 MG/DL (ref 7–18)
BUN BLD-MCNC: 41 MG/DL (ref 7–18)
CALCIUM BLD-MCNC: 8.6 MG/DL (ref 8.5–10.1)
CALCIUM BLD-MCNC: 9.4 MG/DL (ref 8.5–10.1)
CHLORIDE SERPL-SCNC: 103 MMOL/L (ref 98–112)
CHLORIDE SERPL-SCNC: 103 MMOL/L (ref 98–112)
CO2 SERPL-SCNC: 27 MMOL/L (ref 21–32)
CO2 SERPL-SCNC: 28 MMOL/L (ref 21–32)
COLOR UR AUTO: YELLOW
CREAT BLD-MCNC: 2.35 MG/DL
CREAT BLD-MCNC: 2.42 MG/DL
EOSINOPHIL # BLD AUTO: 0.07 X10(3) UL (ref 0–0.7)
EOSINOPHIL NFR BLD AUTO: 0.5 %
ERYTHROCYTE [DISTWIDTH] IN BLOOD BY AUTOMATED COUNT: 14.7 %
EST. AVERAGE GLUCOSE BLD GHB EST-MCNC: 143 MG/DL (ref 68–126)
GLOBULIN PLAS-MCNC: 3.5 G/DL (ref 2.8–4.4)
GLOBULIN PLAS-MCNC: 3.9 G/DL (ref 2.8–4.4)
GLUCOSE BLD-MCNC: 105 MG/DL (ref 70–99)
GLUCOSE BLD-MCNC: 115 MG/DL (ref 70–99)
GLUCOSE BLD-MCNC: 116 MG/DL (ref 70–99)
GLUCOSE BLD-MCNC: 120 MG/DL (ref 70–99)
GLUCOSE BLD-MCNC: 158 MG/DL (ref 70–99)
GLUCOSE BLD-MCNC: 163 MG/DL (ref 70–99)
GLUCOSE BLD-MCNC: 254 MG/DL (ref 70–99)
GLUCOSE UR STRIP.AUTO-MCNC: NEGATIVE MG/DL
HBA1C MFR BLD: 6.6 % (ref ?–5.7)
HCT VFR BLD AUTO: 34.9 %
HGB BLD-MCNC: 10.9 G/DL
IMM GRANULOCYTES # BLD AUTO: 0.07 X10(3) UL (ref 0–1)
IMM GRANULOCYTES NFR BLD: 0.5 %
LYMPHOCYTES # BLD AUTO: 2.64 X10(3) UL (ref 1–4)
LYMPHOCYTES NFR BLD AUTO: 19.2 %
MCH RBC QN AUTO: 28.7 PG (ref 26–34)
MCHC RBC AUTO-ENTMCNC: 31.2 G/DL (ref 31–37)
MCV RBC AUTO: 91.8 FL
MONOCYTES # BLD AUTO: 0.82 X10(3) UL (ref 0.1–1)
MONOCYTES NFR BLD AUTO: 6 %
NEUTROPHILS # BLD AUTO: 10.09 X10 (3) UL (ref 1.5–7.7)
NEUTROPHILS # BLD AUTO: 10.09 X10(3) UL (ref 1.5–7.7)
NEUTROPHILS NFR BLD AUTO: 73.5 %
NITRITE UR QL STRIP.AUTO: NEGATIVE
OSMOLALITY SERPL CALC.SUM OF ELEC: 295 MOSM/KG (ref 275–295)
OSMOLALITY SERPL CALC.SUM OF ELEC: 298 MOSM/KG (ref 275–295)
PH UR STRIP.AUTO: 6 [PH] (ref 5–8)
PLATELET # BLD AUTO: 275 10(3)UL (ref 150–450)
POTASSIUM SERPL-SCNC: 3.7 MMOL/L (ref 3.5–5.1)
POTASSIUM SERPL-SCNC: 4.3 MMOL/L (ref 3.5–5.1)
PROT SERPL-MCNC: 6.5 G/DL (ref 6.4–8.2)
PROT SERPL-MCNC: 7.4 G/DL (ref 6.4–8.2)
PROT UR STRIP.AUTO-MCNC: 100 MG/DL
RBC # BLD AUTO: 3.8 X10(6)UL
RBC #/AREA URNS AUTO: >10 /HPF
SARS-COV-2 RNA RESP QL NAA+PROBE: NOT DETECTED
SODIUM SERPL-SCNC: 137 MMOL/L (ref 136–145)
SODIUM SERPL-SCNC: 139 MMOL/L (ref 136–145)
SP GR UR STRIP.AUTO: 1.01 (ref 1–1.03)
UROBILINOGEN UR STRIP.AUTO-MCNC: <2 MG/DL
WBC # BLD AUTO: 13.7 X10(3) UL (ref 4–11)
WBC #/AREA URNS AUTO: >50 /HPF

## 2022-04-01 PROCEDURE — 85025 COMPLETE CBC W/AUTO DIFF WBC: CPT | Performed by: HOSPITALIST

## 2022-04-01 PROCEDURE — 83036 HEMOGLOBIN GLYCOSYLATED A1C: CPT | Performed by: HOSPITALIST

## 2022-04-01 PROCEDURE — 81001 URINALYSIS AUTO W/SCOPE: CPT | Performed by: EMERGENCY MEDICINE

## 2022-04-01 PROCEDURE — 97530 THERAPEUTIC ACTIVITIES: CPT

## 2022-04-01 PROCEDURE — 96366 THER/PROPH/DIAG IV INF ADDON: CPT

## 2022-04-01 PROCEDURE — 87088 URINE BACTERIA CULTURE: CPT | Performed by: EMERGENCY MEDICINE

## 2022-04-01 PROCEDURE — 87186 SC STD MICRODIL/AGAR DIL: CPT | Performed by: EMERGENCY MEDICINE

## 2022-04-01 PROCEDURE — 87086 URINE CULTURE/COLONY COUNT: CPT | Performed by: EMERGENCY MEDICINE

## 2022-04-01 PROCEDURE — 87040 BLOOD CULTURE FOR BACTERIA: CPT | Performed by: HOSPITALIST

## 2022-04-01 PROCEDURE — 80053 COMPREHEN METABOLIC PANEL: CPT | Performed by: HOSPITALIST

## 2022-04-01 PROCEDURE — 97161 PT EVAL LOW COMPLEX 20 MIN: CPT

## 2022-04-01 PROCEDURE — 82962 GLUCOSE BLOOD TEST: CPT

## 2022-04-01 RX ORDER — LABETALOL HYDROCHLORIDE 5 MG/ML
10 INJECTION, SOLUTION INTRAVENOUS ONCE
Status: COMPLETED | OUTPATIENT
Start: 2022-04-01 | End: 2022-04-01

## 2022-04-01 RX ORDER — SENNOSIDES 8.6 MG
17.2 TABLET ORAL NIGHTLY PRN
Status: DISCONTINUED | OUTPATIENT
Start: 2022-04-01 | End: 2022-04-02

## 2022-04-01 RX ORDER — BISACODYL 10 MG
10 SUPPOSITORY, RECTAL RECTAL
Status: DISCONTINUED | OUTPATIENT
Start: 2022-04-01 | End: 2022-04-02

## 2022-04-01 RX ORDER — MELATONIN
3 NIGHTLY PRN
Status: DISCONTINUED | OUTPATIENT
Start: 2022-04-01 | End: 2022-04-02

## 2022-04-01 RX ORDER — ACETAMINOPHEN 325 MG/1
650 TABLET ORAL EVERY 6 HOURS PRN
Status: DISCONTINUED | OUTPATIENT
Start: 2022-04-01 | End: 2022-04-02

## 2022-04-01 RX ORDER — SODIUM CHLORIDE 9 MG/ML
INJECTION, SOLUTION INTRAVENOUS CONTINUOUS
Status: CANCELLED | OUTPATIENT
Start: 2022-04-01 | End: 2022-04-01

## 2022-04-01 RX ORDER — NICOTINE POLACRILEX 4 MG
15 LOZENGE BUCCAL
Status: DISCONTINUED | OUTPATIENT
Start: 2022-04-01 | End: 2022-04-02

## 2022-04-01 RX ORDER — NICOTINE POLACRILEX 4 MG
30 LOZENGE BUCCAL
Status: DISCONTINUED | OUTPATIENT
Start: 2022-04-01 | End: 2022-04-02

## 2022-04-01 RX ORDER — ROSUVASTATIN CALCIUM 5 MG/1
5 TABLET, COATED ORAL EVERY OTHER DAY
Status: DISCONTINUED | OUTPATIENT
Start: 2022-04-01 | End: 2022-04-02

## 2022-04-01 RX ORDER — ONDANSETRON 2 MG/ML
4 INJECTION INTRAMUSCULAR; INTRAVENOUS EVERY 6 HOURS PRN
Status: DISCONTINUED | OUTPATIENT
Start: 2022-04-01 | End: 2022-04-02

## 2022-04-01 RX ORDER — SODIUM CHLORIDE 9 MG/ML
INJECTION, SOLUTION INTRAVENOUS CONTINUOUS
Status: DISCONTINUED | OUTPATIENT
Start: 2022-04-01 | End: 2022-04-01

## 2022-04-01 RX ORDER — GARLIC EXTRACT 500 MG
1 CAPSULE ORAL DAILY
Status: DISCONTINUED | OUTPATIENT
Start: 2022-04-01 | End: 2022-04-02

## 2022-04-01 RX ORDER — HYDRALAZINE HYDROCHLORIDE 20 MG/ML
10 INJECTION INTRAMUSCULAR; INTRAVENOUS EVERY 4 HOURS PRN
Status: DISCONTINUED | OUTPATIENT
Start: 2022-04-01 | End: 2022-04-02

## 2022-04-01 RX ORDER — DEXTROSE MONOHYDRATE 25 G/50ML
50 INJECTION, SOLUTION INTRAVENOUS
Status: DISCONTINUED | OUTPATIENT
Start: 2022-04-01 | End: 2022-04-02

## 2022-04-01 RX ORDER — METOCLOPRAMIDE HYDROCHLORIDE 5 MG/ML
10 INJECTION INTRAMUSCULAR; INTRAVENOUS EVERY 8 HOURS PRN
Status: DISCONTINUED | OUTPATIENT
Start: 2022-04-01 | End: 2022-04-02

## 2022-04-01 RX ORDER — ONDANSETRON 2 MG/ML
4 INJECTION INTRAMUSCULAR; INTRAVENOUS EVERY 4 HOURS PRN
Status: CANCELLED | OUTPATIENT
Start: 2022-04-01 | End: 2022-04-01

## 2022-04-01 RX ORDER — POLYETHYLENE GLYCOL 3350 17 G/17G
17 POWDER, FOR SOLUTION ORAL DAILY PRN
Status: DISCONTINUED | OUTPATIENT
Start: 2022-04-01 | End: 2022-04-02

## 2022-04-01 NOTE — PROGRESS NOTES
NURSING ADMISSION NOTE      Patient admitted via Cart  Oriented to room 507. Safety precautions initiated. Bed in low position. Call light in reach. Belongings at bedside. Admission mago complete. Home meds placed. No c/o of pain. No signs of respiratory distress. Bed alarm on. Updated patient about plan of care. Needs met. Call light within reach. [facility].

## 2022-04-01 NOTE — ED INITIAL ASSESSMENT (HPI)
Pt brought in by EMS after home healthcare giver called concerned about a new onset of \"brown urine\". Pt denies any urinary pain, denies hematuria.

## 2022-04-01 NOTE — CM/SW NOTE
04/01/22 1600   CM/SW Referral Data   Referral Source Physician   Reason for Referral Discharge planning   Informant Patient;Spouse/Significant Other   Patient Info   Patient's Current Mental Status at Time of Assessment Alert;Oriented   Patient's [address]   Number of Stair in Home 3   Patient lives with Spouse/Significant other   Patient Status Prior to Admission   Services in place prior to admission [address] [facility] Care;DME/Supplies at home;senior care [address] Provider Info   (RESIDENTIAL)   Type of DME/Supplies Maybelle Salm; Wheelchair   senior care Home Care Provider Private Duty   senior care Care hours per day   (3)   senior care Care days per week   (7)   Discharge Needs   Anticipated D/C needs Home health care     Sw met with pt and her  to assess for dc needs. Pt is 63 yo female, admitted due to UTI/hematuria. Pt has hx of ankle fx with complications ( 0/5216). She is mainly wheelchair bound. She had wound issues to the area and had skin graft. She has follow up next week to see if skin graft healed enough so she can resume therapy. Pt is current with [facility] [place] RN. MELECIO in place. Pt will need medicar transport home.  states they have their own vendor they use- if not available at time of dc, they will use [facility]ign[facility] Goss- advised them of costs.     [facility]  /Discharge Planner  [phone]

## 2022-04-01 NOTE — TELEPHONE ENCOUNTER
Patient paged me at 9:41 pm for c/o hematuria since 11:00 am but this evening the bleeding got heavier. Denies, pain, burning, frequency. Pt has had UTI in the past and Pt is on eliquis. Pt wheelchair bound. Both her and her  can not drive. Advised them she needs to be checked out tonight if they can. Pt states she was going to call 911 before she paged me so that is what she is going to do.

## 2022-04-01 NOTE — ED QUICK NOTES
Orders for admission, patient is aware of plan and ready to go upstairs. Any questions, please call ED RN Nayan Cano  at extension [phone]. Vaccinated? Yes  Type of COVID test sent: RAPID PCR-Negative  COVID Suspicion level: Low      Titratable drug(s) infusing:n/a  Rate:    LOC at time of transport: AxOx4    Other pertinent information: Does not ambulate, uses wheelchair.      CIWA score=n/a  NIH score=n/a

## 2022-04-01 NOTE — PROGRESS NOTES
[facility] Pharmacy Note:  Renal Dose Adjustment for Metoclopramide (REGLAN)    Sofia Coleman has been prescribed Metoclopramide (REGLAN) 10 mg every 8 hours as needed for nausea/vomiting,. Estimated Creatinine Clearance: 20 mL/min (A) (based on SCr of 2.42 mg/dL (H)). Calculated creatinine clearance is < 40 ml/min, therefore, the dose of Metoclopramide (REGLAN) has been changed to 5 mg every 8 hours as needed for nausea/vomiting per P&T approved protocol. Pharmacy will continue to follow, and if renal function improves, will resume the original order.        Thank you,  [address] [place] [address]  4/1/2022 9:45 AM

## 2022-04-01 NOTE — PROGRESS NOTES
04/01/22 0403   Provider Notification   Reason for Communication Review case   Provider Name Other (comment)  (Dr. Lela Winslow)   Method of Communication Page   Notification Time 0403   [employer] admission navigator complete, home meds placed and have been reconciled. Pt BP elevated at 163/64 HR 62. Received labetalol in ED with relief. No home BP meds. Wants to sleep. WCTM. Thanks.

## 2022-04-01 NOTE — PLAN OF CARE
See flowsheets. Axo x 4, R eye vision impaired. RA,  sating well. > 90%. No c/o of sob or coughing. No c/o of NVD. Afebrile. Incontinent at times. Briefed, tiffanie, bedpan. WCB. PT/OT. [id number] diet. QID accuchadria, . IVF. IV abx. Updated patient on plan of care. Frequent roundings, needs met. Call light within reach. WCTM. Bed alarm on.      Problem: Diabetes/Glucose Control  Goal: Glucose maintained within prescribed range  Description: INTERVENTIONS:  - Monitor Blood Glucose as ordered  - Assess for signs and symptoms of hyperglycemia and hypoglycemia  - Administer ordered medications to maintain glucose within target range  - Assess barriers to adequate nutritional intake and initiate nutrition consult as needed  - Instruct patient on self management of diabetes  Outcome: Progressing     Problem: Patient/Family Goals  Goal: Patient/Family Long Term Goal  Description: Patient's Long Term Goal:  Discharge home with proper resources    Interventions:  - Follow plan of care  - See additional Care Plan goals for specific interventions  Outcome: Progressing  Goal: Patient/Family Short Term Goal  Description: Patient's Short Term Goal:  3/31 [address] [facility] Able to sleep well    Interventions:   - Dim lighting  -Cluster care  -Offer sleeping aid/kit  - See additional Care Plan goals for specific interventions  Outcome: Progressing

## 2022-04-02 VITALS
TEMPERATURE: 99 F | RESPIRATION RATE: 17 BRPM | WEIGHT: 219.81 LBS | OXYGEN SATURATION: 97 % | HEART RATE: 76 BPM | SYSTOLIC BLOOD PRESSURE: 147 MMHG | BODY MASS INDEX: 32 KG/M2 | DIASTOLIC BLOOD PRESSURE: 65 MMHG

## 2022-04-02 LAB
GLUCOSE BLD-MCNC: 104 MG/DL (ref 70–99)
HGB BLD-MCNC: 11.2 G/DL

## 2022-04-02 PROCEDURE — 82962 GLUCOSE BLOOD TEST: CPT

## 2022-04-02 PROCEDURE — 85018 HEMOGLOBIN: CPT | Performed by: INTERNAL MEDICINE

## 2022-04-02 RX ORDER — AMLODIPINE BESYLATE 5 MG/1
5 TABLET ORAL DAILY
Qty: 30 TABLET | Refills: 0 | Status: SHIPPED | OUTPATIENT
Start: 2022-04-02

## 2022-04-02 RX ORDER — CEFADROXIL 500 MG/1
500 CAPSULE ORAL 2 TIMES DAILY
Qty: 14 CAPSULE | Refills: 0 | Status: SHIPPED | OUTPATIENT
Start: 2022-04-02 | End: 2022-04-09

## 2022-04-02 NOTE — CM/SW NOTE
04/02/22 1100   Discharge disposition   Expected discharge disposition Home-Health   Post Acute Care Provider Residential   Discharge transportation Anthony arranged for dc home today at 3pm,   aware. [facility] notified of dc.     César Love LCSW  /Discharge Planner  [phone]

## 2022-04-02 NOTE — PLAN OF CARE
AO x4. R eye vision impaired. RA. Tele - NSR. Eliquis on hold. Incontinent. Briefed. Purewick in place. Uses bedpan. Urine is a dark/gt color. No reports of pain. L ankle 4x4 dressing c/d/i had a fracture 8/21. Qid accuchecks. IV abx rocephin. Carb controlled diet. Wheelchair bound. Pt updated on POC. Will continue to monitor.

## 2022-04-04 ENCOUNTER — PATIENT OUTREACH (OUTPATIENT)
Dept: CASE MANAGEMENT | Age: 77
End: 2022-04-04

## 2022-04-04 PROCEDURE — 1111F DSCHRG MED/CURRENT MED MERGE: CPT

## 2022-04-06 ENCOUNTER — TELEPHONE (OUTPATIENT)
Dept: INTERNAL MEDICINE CLINIC | Facility: CLINIC | Age: 77
End: 2022-04-06

## 2022-04-06 NOTE — TELEPHONE ENCOUNTER
Pt called stating she was at the hospital over the weekend for high BP. Pt states she was prescribed Amlodipine 5MG but her BP is still high. Pt did not remember the reading during time of call but states when she took it this morning she was in the \"red numbers\". Pt has an appt for next Thursday and pt would like to know if she can get recommendations prior to her appt, pt unsure if the dosage needs to be raised or if there are any other recommendations. Please advise.      Future Appointments   Date Time Provider Alejandro Nguyen   4/14/2022 11:30 AM Suri Mcdermott MD [facility] [place]

## 2022-04-06 NOTE — TELEPHONE ENCOUNTER
Pt contacted. She is currently on Eliquis and not apixaban. States she was instructed to stay on blood thinner for 3 months and will discuss with Dr Domi Coelho when she will be able to discontinue at her visit on 4/14.

## 2022-04-06 NOTE — TELEPHONE ENCOUNTER
See message below. Pt states b/p's running 180's/70's this past week. No c/o headache or dizziness. Asking if Dr Mati Smith has any further recommendations until her visit on 4/14.

## 2022-04-08 ENCOUNTER — TELEPHONE (OUTPATIENT)
Dept: INTERNAL MEDICINE CLINIC | Facility: CLINIC | Age: 77
End: 2022-04-08

## 2022-04-08 NOTE — TELEPHONE ENCOUNTER
Astrid requesting verbal orders for nursing visit for next week, Per Astrid, she thinks pt would benefit from getting education on wearing compression socks. Astrid would also like to recertify pt for another episode with frequency of every other week.      Malinda Greene - [phone]

## 2022-04-11 ENCOUNTER — TELEPHONE (OUTPATIENT)
Dept: INTERNAL MEDICINE CLINIC | Facility: CLINIC | Age: 77
End: 2022-04-11

## 2022-04-14 ENCOUNTER — OFFICE VISIT (OUTPATIENT)
Dept: INTERNAL MEDICINE CLINIC | Facility: CLINIC | Age: 77
End: 2022-04-14
Payer: MEDICARE

## 2022-04-14 VITALS
SYSTOLIC BLOOD PRESSURE: 124 MMHG | TEMPERATURE: 98 F | DIASTOLIC BLOOD PRESSURE: 80 MMHG | HEART RATE: 70 BPM | HEIGHT: 69 IN | RESPIRATION RATE: 15 BRPM | OXYGEN SATURATION: 98 % | BODY MASS INDEX: 32 KG/M2

## 2022-04-14 DIAGNOSIS — I10 PRIMARY HYPERTENSION: ICD-10-CM

## 2022-04-14 DIAGNOSIS — I82.90 VTE (VENOUS THROMBOEMBOLISM): ICD-10-CM

## 2022-04-14 DIAGNOSIS — R26.9 GAIT DISORDER: ICD-10-CM

## 2022-04-14 DIAGNOSIS — N18.4 STAGE 4 CHRONIC KIDNEY DISEASE (HCC): ICD-10-CM

## 2022-04-14 DIAGNOSIS — N30.01 ACUTE CYSTITIS WITH HEMATURIA: Primary | ICD-10-CM

## 2022-04-14 DIAGNOSIS — R32 URINARY INCONTINENCE, UNSPECIFIED TYPE: ICD-10-CM

## 2022-04-14 PROBLEM — N17.9 ACUTE KIDNEY INJURY: Status: RESOLVED | Noted: 2021-12-30 | Resolved: 2022-04-14

## 2022-04-14 PROBLEM — N39.0 UTI (URINARY TRACT INFECTION): Status: RESOLVED | Noted: 2022-04-01 | Resolved: 2022-04-14

## 2022-04-14 PROBLEM — N39.0 URINARY TRACT INFECTION WITH HEMATURIA, SITE UNSPECIFIED: Status: RESOLVED | Noted: 2022-04-01 | Resolved: 2022-04-14

## 2022-04-14 PROBLEM — E11.22 CKD STAGE 3 DUE TO TYPE 2 DIABETES MELLITUS (HCC): Status: RESOLVED | Noted: 2020-11-18 | Resolved: 2022-04-14

## 2022-04-14 PROBLEM — N18.30 CKD STAGE 3 DUE TO TYPE 2 DIABETES MELLITUS (HCC): Status: RESOLVED | Noted: 2020-11-18 | Resolved: 2022-04-14

## 2022-04-14 PROBLEM — R31.9 URINARY TRACT INFECTION WITH HEMATURIA, SITE UNSPECIFIED: Status: RESOLVED | Noted: 2022-04-01 | Resolved: 2022-04-14

## 2022-04-14 PROBLEM — N17.9 ACUTE KIDNEY INJURY (HCC): Status: RESOLVED | Noted: 2021-12-30 | Resolved: 2022-04-14

## 2022-04-14 RX ORDER — GABAPENTIN 300 MG/1
300 CAPSULE ORAL 2 TIMES DAILY
Qty: 180 CAPSULE | Refills: 1 | Status: SHIPPED | OUTPATIENT
Start: 2022-04-14 | End: 2022-04-14

## 2022-04-14 RX ORDER — TOLTERODINE TARTRATE 1 MG/1
1 TABLET, EXTENDED RELEASE ORAL 2 TIMES DAILY
Qty: 60 TABLET | Refills: 0 | Status: SHIPPED | OUTPATIENT
Start: 2022-04-14 | End: 2022-04-18

## 2022-04-14 RX ORDER — GABAPENTIN 300 MG/1
300 CAPSULE ORAL 2 TIMES DAILY
Qty: 180 CAPSULE | Refills: 3 | Status: SHIPPED | OUTPATIENT
Start: 2022-04-14 | End: 2022-05-14

## 2022-04-15 NOTE — TELEPHONE ENCOUNTER
Pt was seen yesterday for OV, was told to contact the office if she needed medication refill.  Per pt she does not want to  rx at [place] that was sent by Yamilet Chamberlain.     Requesting refill to be sent to [facility]     amLODIPine 5 MG Oral Tab    [address], [address] [facility] [phone], [phone]

## 2022-04-16 RX ORDER — AMLODIPINE BESYLATE 5 MG/1
5 TABLET ORAL DAILY
Qty: 90 TABLET | Refills: 1 | Status: SHIPPED | OUTPATIENT
Start: 2022-04-16

## 2022-04-16 NOTE — TELEPHONE ENCOUNTER
LOV:   4/14/22   (I10) Primary hypertension  Plan: stable BP w morvasc    benazapril stopped    FOV scheduled 5/12/22

## 2022-04-18 ENCOUNTER — TELEPHONE (OUTPATIENT)
Dept: INTERNAL MEDICINE CLINIC | Facility: CLINIC | Age: 77
End: 2022-04-18

## 2022-04-18 RX ORDER — TOLTERODINE TARTRATE 1 MG/1
1 TABLET, EXTENDED RELEASE ORAL 2 TIMES DAILY
Qty: 180 TABLET | Refills: 0 | Status: SHIPPED | OUTPATIENT
Start: 2022-04-18

## 2022-04-18 NOTE — TELEPHONE ENCOUNTER
Pt stated that she would like to have [facility]terod[facility].  Per pt [facility] stated they were not able to fill.     tolterodine 1 MG Oral Tab    [facility] [address] [phone], [phone]   [facility] [address]

## 2022-04-18 NOTE — TELEPHONE ENCOUNTER
Incoming fax    Patients PA for Tolterodine Tablets has been denied    Paperwork placed in  in basket for review    Please advise

## 2022-04-19 ENCOUNTER — TELEPHONE (OUTPATIENT)
Dept: INTERNAL MEDICINE CLINIC | Facility: CLINIC | Age: 77
End: 2022-04-19

## 2022-04-21 ENCOUNTER — TELEPHONE (OUTPATIENT)
Dept: INTERNAL MEDICINE CLINIC | Facility: CLINIC | Age: 77
End: 2022-04-21

## 2022-04-21 NOTE — TELEPHONE ENCOUNTER
Faxed paperwork with confirmation    Placed to scan as well as placed in blue accordion folder in Pod 3

## 2022-04-25 ENCOUNTER — TELEPHONE (OUTPATIENT)
Dept: INTERNAL MEDICINE CLINIC | Facility: CLINIC | Age: 77
End: 2022-04-25

## 2022-04-25 NOTE — TELEPHONE ENCOUNTER
Incoming fax from [facility]    Order # [id number]    Placed in  in basket for review

## 2022-05-02 ENCOUNTER — TELEPHONE (OUTPATIENT)
Dept: INTERNAL MEDICINE CLINIC | Facility: CLINIC | Age: 77
End: 2022-05-02

## 2022-05-02 NOTE — TELEPHONE ENCOUNTER
Incoming fax from [facility]    Order # [id number]    Placed in  in basket for review

## 2022-05-12 ENCOUNTER — OFFICE VISIT (OUTPATIENT)
Dept: INTERNAL MEDICINE CLINIC | Facility: CLINIC | Age: 77
End: 2022-05-12
Payer: MEDICARE

## 2022-05-12 ENCOUNTER — LAB ENCOUNTER (OUTPATIENT)
Dept: LAB | Age: 77
End: 2022-05-12
Attending: FAMILY MEDICINE
Payer: MEDICARE

## 2022-05-12 VITALS
RESPIRATION RATE: 16 BRPM | DIASTOLIC BLOOD PRESSURE: 60 MMHG | SYSTOLIC BLOOD PRESSURE: 130 MMHG | HEART RATE: 64 BPM | TEMPERATURE: 99 F | OXYGEN SATURATION: 99 % | HEIGHT: 69 IN | BODY MASS INDEX: 32 KG/M2

## 2022-05-12 DIAGNOSIS — R32 URINARY INCONTINENCE, UNSPECIFIED TYPE: ICD-10-CM

## 2022-05-12 DIAGNOSIS — I10 PRIMARY HYPERTENSION: Primary | ICD-10-CM

## 2022-05-12 DIAGNOSIS — E11.22 TYPE 2 DIABETES MELLITUS WITH STAGE 3 CHRONIC KIDNEY DISEASE, WITHOUT LONG-TERM CURRENT USE OF INSULIN, UNSPECIFIED WHETHER STAGE 3A OR 3B CKD (HCC): ICD-10-CM

## 2022-05-12 DIAGNOSIS — N17.9 ACUTE KIDNEY INJURY SUPERIMPOSED ON CKD (HCC): ICD-10-CM

## 2022-05-12 DIAGNOSIS — I82.90 VTE (VENOUS THROMBOEMBOLISM): ICD-10-CM

## 2022-05-12 DIAGNOSIS — E87.5 HYPERKALEMIA: ICD-10-CM

## 2022-05-12 DIAGNOSIS — N18.9 ACUTE KIDNEY INJURY SUPERIMPOSED ON CKD (HCC): ICD-10-CM

## 2022-05-12 DIAGNOSIS — N18.30 TYPE 2 DIABETES MELLITUS WITH STAGE 3 CHRONIC KIDNEY DISEASE, WITHOUT LONG-TERM CURRENT USE OF INSULIN, UNSPECIFIED WHETHER STAGE 3A OR 3B CKD (HCC): ICD-10-CM

## 2022-05-12 LAB
ANION GAP SERPL CALC-SCNC: 6 MMOL/L (ref 0–18)
BUN BLD-MCNC: 45 MG/DL (ref 7–18)
CALCIUM BLD-MCNC: 9.5 MG/DL (ref 8.5–10.1)
CHLORIDE SERPL-SCNC: 104 MMOL/L (ref 98–112)
CO2 SERPL-SCNC: 28 MMOL/L (ref 21–32)
CREAT BLD-MCNC: 2.35 MG/DL
FASTING STATUS PATIENT QL REPORTED: YES
GLUCOSE BLD-MCNC: 97 MG/DL (ref 70–99)
OSMOLALITY SERPL CALC.SUM OF ELEC: 297 MOSM/KG (ref 275–295)
POTASSIUM SERPL-SCNC: 4.8 MMOL/L (ref 3.5–5.1)
SODIUM SERPL-SCNC: 138 MMOL/L (ref 136–145)

## 2022-05-12 PROCEDURE — 99214 OFFICE O/P EST MOD 30 MIN: CPT | Performed by: FAMILY MEDICINE

## 2022-05-12 PROCEDURE — 80048 BASIC METABOLIC PNL TOTAL CA: CPT

## 2022-05-12 PROCEDURE — 36415 COLL VENOUS BLD VENIPUNCTURE: CPT

## 2022-06-06 ENCOUNTER — TELEPHONE (OUTPATIENT)
Dept: INTERNAL MEDICINE CLINIC | Facility: CLINIC | Age: 77
End: 2022-06-06

## 2022-06-06 LAB
ALBUMIN: 4.2 G/DL
ALKALINE PHOSPHATASE: 220
ALT: 74
ANION GAP: 16 MMOL/L (ref ?–16)
ARTERIAL BLOOD GAS HCO3: 22.8
AST: 38
B TYPE NATRIURETIC$PEPTIDE (BNP): 643
BANDS: 3 %
BASO(ABSOLUTE): 0
BASOPHIL %: 0
BILIRUBIN, TOTAL: 0.6 MG/DL
BLOOD URINE: 0.2
BUN: 54 MG/DL (ref 7–22)
CALCIUM: 9.7
CARBON DIOXIDE: 23
CARBOXYHEMOGLOBIN: <0.7
CHLORIDE: 99
CREATININE: 3.26 MG/DL
EOS (ABSOLUTE): 0 X10E3/UL
EOSINOPHIL %: 0
EST CRCL (CG) CORRECTED: 20.2
GFR (CKD-EPI)CORRECTED: 14.3
GLUCOSE: 150
GLUCOSE: 275
HCT: 40.9
HGB: 12.9
INR: 1.2 (ref 0.8–1.2)
LIPASE: 6
LYMPHOCYTE %: 2
Lab: -2.7
Lab: 30.6
Lab: 69.5
Lab: 7.45
MEAN CELL VOLUME: 91.5
MEAN CORPUSCULAR HEMOGLOBIN: 28.9
MEAN CORPUSCULAR HGB CONC: 31.5
MONOCYTE %: 4
MONOCYTES(ABSOLUTE): 0.63
NEUTROPHIL ABSOLUTE: 29.4
NUCRBC: 0
OXYHEMOGLOBIN: 93.4
PCO2: 30.6
PH, URINE: 8.5
PH: 7.45
PLT: 290
PO2: 69.5
POTASSIUM: 5.1
PT: 14.7 SEC (ref 8.7–11.5)
PTT: 26.2
RED BLOOD COUNT: 4.47
RED CELL DISTRIBUTION WIDTH: 15.1
SARS COV2 COVID19 PCR: NEGATIVE
SEGS: 91
SODIUM: 138
SPECIFIC GRAVITY, URINE: 1.01
TOTAL HEMOGLOBIN: 12.2
TOTAL PROTEIN: 7.9
TROPONIN 1 HIGH SENSITIVITY: 31
URINE BILIRUBIN: NEGATIVE
URINE KETONE: NEGATIVE
URINE UROBILINOGEN: 0.2 MG/DL
WBC: 31.3

## 2022-06-06 NOTE — TELEPHONE ENCOUNTER
Incoming fax from [address] [place] [address]    Patients records from visit on 6/3/2022    Patient has consult notes, operative notes and labs    Labs entered in Epic and paperwork placed in  in basket for review

## 2022-06-14 ENCOUNTER — TELEPHONE (OUTPATIENT)
Dept: INTERNAL MEDICINE CLINIC | Facility: CLINIC | Age: 77
End: 2022-06-14

## 2022-06-15 ENCOUNTER — TELEPHONE (OUTPATIENT)
Dept: INTERNAL MEDICINE CLINIC | Facility: CLINIC | Age: 77
End: 2022-06-15

## 2022-06-15 NOTE — TELEPHONE ENCOUNTER
Incoming fax from [facility]    Patients Discharge-Transfer Summary     Placed in  in basket for review

## 2022-07-13 ENCOUNTER — TELEPHONE (OUTPATIENT)
Dept: INTERNAL MEDICINE CLINIC | Facility: CLINIC | Age: 77
End: 2022-07-13

## 2022-07-13 NOTE — TELEPHONE ENCOUNTER
called. He does not have access to pt (wife's) keenan right now. I explained to him what my mcm said. To stick with the plan of care through [facility] for now, continue with the CT scan tomorrow and follow up with their doctor. Then after this issue is resolved, pt can [facility]  v/u.

## 2022-07-26 ENCOUNTER — HOSPITAL ENCOUNTER (OUTPATIENT)
Dept: CT IMAGING | Facility: HOSPITAL | Age: 77
Discharge: HOME | End: 2022-07-26
Attending: NURSE PRACTITIONER
Payer: MEDICARE

## 2022-07-26 ENCOUNTER — TELEPHONE (OUTPATIENT)
Dept: INTERNAL MEDICINE CLINIC | Facility: CLINIC | Age: 77
End: 2022-07-26

## 2022-07-26 DIAGNOSIS — N20.0 NEPHROLITHIASIS: ICD-10-CM

## 2022-07-26 DIAGNOSIS — R31.9 HEMATURIA: ICD-10-CM

## 2022-07-26 PROCEDURE — 74176 CT ABD & PELVIS W/O CONTRAST: CPT | Performed by: NURSE PRACTITIONER

## 2022-07-26 NOTE — TELEPHONE ENCOUNTER
Carla Branch with [facility] stated pt was admitted into home health on 7/25. Carla Branch stated she will see patient 2x week this week and then 1x week for the next 8 weeks. Carla Branch stated pt rehab discontinued pt gabepetin and wants to know if Dr Noa Cohen would be okay with pt getting tylenol 1000mg every 8 hours for pain and to help with mobility. Carla Branch also wanted to confirm why pt is taking elloquis with no diagnosis on file that she can see.      Confirmed [phone] as best call back for Carla Branch

## 2022-07-27 NOTE — TELEPHONE ENCOUNTER
[id number] Eliz Lou for tylenol     She has been off the eliquis since April unless someone else restarted her on it    on ASA 81mg  Ok to hold gabapentin

## 2022-07-27 NOTE — TELEPHONE ENCOUNTER
Spoke with Sammy Evans at [facility] made aware of TO's recommendations listed in previous note. Sammy Evans verbalized understanding but states patient was discharged from [facility] with a prescription for Eliquis\" Maritza to contact [employer] and investigate who ordered medication. Maritza to contact office with any new information.

## 2022-07-28 ENCOUNTER — TELEPHONE (OUTPATIENT)
Dept: INTERNAL MEDICINE CLINIC | Facility: CLINIC | Age: 77
End: 2022-07-28

## 2022-07-28 NOTE — TELEPHONE ENCOUNTER
Shiv, physical therapist with [facility] called stating pt is requesting to delay the speech therapy eval to net [facility]ues[facility] requesting VO to do so.     Ph: [phone]

## 2022-07-29 ENCOUNTER — HOSPITAL ENCOUNTER (INPATIENT)
Facility: HOSPITAL | Age: 77
LOS: 11 days | Discharge: HOME HEALTH | End: 2022-08-09
Attending: EMERGENCY MEDICINE | Admitting: HOSPITALIST
Payer: MEDICARE

## 2022-07-29 ENCOUNTER — APPOINTMENT (OUTPATIENT)
Dept: GENERAL RADIOLOGY | Facility: HOSPITAL | Age: 77
End: 2022-07-29
Attending: EMERGENCY MEDICINE
Payer: MEDICARE

## 2022-07-29 ENCOUNTER — TELEPHONE (OUTPATIENT)
Dept: INTERNAL MEDICINE CLINIC | Facility: CLINIC | Age: 77
End: 2022-07-29

## 2022-07-29 ENCOUNTER — APPOINTMENT (OUTPATIENT)
Dept: CV DIAGNOSTICS | Facility: HOSPITAL | Age: 77
End: 2022-07-29
Attending: INTERNAL MEDICINE
Payer: MEDICARE

## 2022-07-29 DIAGNOSIS — I48.91 ATRIAL FIBRILLATION WITH RAPID VENTRICULAR RESPONSE (HCC): Primary | ICD-10-CM

## 2022-07-29 DIAGNOSIS — R31.9 HEMATURIA, UNSPECIFIED TYPE: ICD-10-CM

## 2022-07-29 PROBLEM — D64.9 ANEMIA: Status: ACTIVE | Noted: 2022-07-29

## 2022-07-29 PROBLEM — N17.9 ACUTE KIDNEY INJURY: Status: ACTIVE | Noted: 2022-07-29

## 2022-07-29 PROBLEM — R73.9 HYPERGLYCEMIA: Status: ACTIVE | Noted: 2022-07-29

## 2022-07-29 PROBLEM — N17.9 ACUTE RENAL FAILURE (ARF): Status: ACTIVE | Noted: 2022-07-29

## 2022-07-29 PROBLEM — N17.9 ACUTE KIDNEY INJURY (HCC): Status: ACTIVE | Noted: 2022-07-29

## 2022-07-29 PROBLEM — N17.9 ACUTE RENAL FAILURE (ARF) (HCC): Status: ACTIVE | Noted: 2022-07-29

## 2022-07-29 LAB
ALBUMIN SERPL-MCNC: 3.1 G/DL (ref 3.4–5)
ALBUMIN/GLOB SERPL: 0.8 {RATIO} (ref 1–2)
ALP LIVER SERPL-CCNC: 116 U/L
ALT SERPL-CCNC: 15 U/L
ANION GAP SERPL CALC-SCNC: 7 MMOL/L (ref 0–18)
AST SERPL-CCNC: 12 U/L (ref 15–37)
ATRIAL RATE: 241 BPM
BASOPHILS # BLD AUTO: 0.04 X10(3) UL (ref 0–0.2)
BASOPHILS NFR BLD AUTO: 0.4 %
BILIRUB SERPL-MCNC: 0.4 MG/DL (ref 0.1–2)
BUN BLD-MCNC: 33 MG/DL (ref 7–18)
CALCIUM BLD-MCNC: 10 MG/DL (ref 8.5–10.1)
CHLORIDE SERPL-SCNC: 103 MMOL/L (ref 98–112)
CO2 SERPL-SCNC: 28 MMOL/L (ref 21–32)
CREAT BLD-MCNC: 2.71 MG/DL
D DIMER PPP FEU-MCNC: 2.11 UG/ML FEU (ref ?–0.78)
EOSINOPHIL # BLD AUTO: 0.15 X10(3) UL (ref 0–0.7)
EOSINOPHIL NFR BLD AUTO: 1.4 %
ERYTHROCYTE [DISTWIDTH] IN BLOOD BY AUTOMATED COUNT: 15 %
EST. AVERAGE GLUCOSE BLD GHB EST-MCNC: 137 MG/DL (ref 68–126)
GLOBULIN PLAS-MCNC: 4 G/DL (ref 2.8–4.4)
GLUCOSE BLD-MCNC: 111 MG/DL (ref 70–99)
GLUCOSE BLD-MCNC: 134 MG/DL (ref 70–99)
GLUCOSE BLD-MCNC: 140 MG/DL (ref 70–99)
HBA1C MFR BLD: 6.4 % (ref ?–5.7)
HCT VFR BLD AUTO: 36.9 %
HGB BLD-MCNC: 11.7 G/DL
IMM GRANULOCYTES # BLD AUTO: 0.04 X10(3) UL (ref 0–1)
IMM GRANULOCYTES NFR BLD: 0.4 %
INR BLD: 1.57 (ref 0.85–1.16)
LYMPHOCYTES # BLD AUTO: 2.24 X10(3) UL (ref 1–4)
LYMPHOCYTES NFR BLD AUTO: 21 %
MCH RBC QN AUTO: 29.2 PG (ref 26–34)
MCHC RBC AUTO-ENTMCNC: 31.7 G/DL (ref 31–37)
MCV RBC AUTO: 92 FL
MONOCYTES # BLD AUTO: 0.8 X10(3) UL (ref 0.1–1)
MONOCYTES NFR BLD AUTO: 7.5 %
NEUTROPHILS # BLD AUTO: 7.42 X10 (3) UL (ref 1.5–7.7)
NEUTROPHILS # BLD AUTO: 7.42 X10(3) UL (ref 1.5–7.7)
NEUTROPHILS NFR BLD AUTO: 69.3 %
OSMOLALITY SERPL CALC.SUM OF ELEC: 295 MOSM/KG (ref 275–295)
PLATELET # BLD AUTO: 282 10(3)UL (ref 150–450)
POTASSIUM SERPL-SCNC: 4.4 MMOL/L (ref 3.5–5.1)
PROT SERPL-MCNC: 7.1 G/DL (ref 6.4–8.2)
PROTHROMBIN TIME: 18.8 SECONDS (ref 11.6–14.8)
Q-T INTERVAL: 336 MS
QRS DURATION: 80 MS
QTC CALCULATION (BEZET): 504 MS
R AXIS: -31 DEGREES
RBC # BLD AUTO: 4.01 X10(6)UL
SARS-COV-2 RNA RESP QL NAA+PROBE: NOT DETECTED
SODIUM SERPL-SCNC: 138 MMOL/L (ref 136–145)
T AXIS: 259 DEGREES
TROPONIN I HIGH SENSITIVITY: 5 NG/L
TSI SER-ACNC: 1.38 MIU/ML (ref 0.36–3.74)
VENTRICULAR RATE: 135 BPM
WBC # BLD AUTO: 10.7 X10(3) UL (ref 4–11)

## 2022-07-29 PROCEDURE — 99223 1ST HOSP IP/OBS HIGH 75: CPT | Performed by: HOSPITALIST

## 2022-07-29 PROCEDURE — 93306 TTE W/DOPPLER COMPLETE: CPT | Performed by: INTERNAL MEDICINE

## 2022-07-29 PROCEDURE — 71045 X-RAY EXAM CHEST 1 VIEW: CPT | Performed by: EMERGENCY MEDICINE

## 2022-07-29 RX ORDER — METOPROLOL TARTRATE 50 MG/1
50 TABLET, FILM COATED ORAL DAILY
COMMUNITY
End: 2022-08-09

## 2022-07-29 RX ORDER — VIT A/VIT C/VIT E/ZINC/COPPER 7160-113
1 TABLET, DELAYED RELEASE (ENTERIC COATED) ORAL 2 TIMES DAILY
Status: ON HOLD | COMMUNITY

## 2022-07-29 RX ORDER — DILTIAZEM HYDROCHLORIDE 5 MG/ML
10 INJECTION INTRAVENOUS ONCE
Status: COMPLETED | OUTPATIENT
Start: 2022-07-29 | End: 2022-07-29

## 2022-07-29 RX ORDER — TAMSULOSIN HYDROCHLORIDE 0.4 MG/1
CAPSULE ORAL DAILY
COMMUNITY
End: 2022-08-16

## 2022-07-29 RX ORDER — NICOTINE POLACRILEX 4 MG
15 LOZENGE BUCCAL
Status: DISCONTINUED | OUTPATIENT
Start: 2022-07-29 | End: 2022-08-09

## 2022-07-29 RX ORDER — DILTIAZEM HYDROCHLORIDE 5 MG/ML
20 INJECTION INTRAVENOUS ONCE
Status: COMPLETED | OUTPATIENT
Start: 2022-07-29 | End: 2022-07-29

## 2022-07-29 RX ORDER — ACETAMINOPHEN 500 MG
500 TABLET ORAL EVERY 4 HOURS PRN
Status: DISCONTINUED | OUTPATIENT
Start: 2022-07-29 | End: 2022-08-01

## 2022-07-29 RX ORDER — SODIUM CHLORIDE 9 MG/ML
INJECTION, SOLUTION INTRAVENOUS CONTINUOUS
Status: DISCONTINUED | OUTPATIENT
Start: 2022-07-29 | End: 2022-08-05

## 2022-07-29 RX ORDER — NICOTINE POLACRILEX 4 MG
30 LOZENGE BUCCAL
Status: DISCONTINUED | OUTPATIENT
Start: 2022-07-29 | End: 2022-08-09

## 2022-07-29 RX ORDER — MIDODRINE HYDROCHLORIDE 5 MG/1
5 TABLET ORAL EVERY 8 HOURS
COMMUNITY
End: 2022-08-09

## 2022-07-29 RX ORDER — ONDANSETRON 2 MG/ML
4 INJECTION INTRAMUSCULAR; INTRAVENOUS EVERY 6 HOURS PRN
Status: DISCONTINUED | OUTPATIENT
Start: 2022-07-29 | End: 2022-08-09

## 2022-07-29 RX ORDER — DEXTROSE MONOHYDRATE 25 G/50ML
50 INJECTION, SOLUTION INTRAVENOUS
Status: DISCONTINUED | OUTPATIENT
Start: 2022-07-29 | End: 2022-08-09

## 2022-07-29 RX ORDER — METOCLOPRAMIDE HYDROCHLORIDE 5 MG/ML
5 INJECTION INTRAMUSCULAR; INTRAVENOUS EVERY 8 HOURS PRN
Status: DISCONTINUED | OUTPATIENT
Start: 2022-07-29 | End: 2022-08-09

## 2022-07-29 NOTE — ED QUICK NOTES
Pt asleep and in no distress. Home health aid at bedside.  ml bolus complete, cardizem infusing via pump at 10 ml/hr. Pt with reilly in place draining clear urine with very pale pink tinge.

## 2022-07-29 NOTE — PROGRESS NOTES
NURSING ADMISSION NOTE      Patient admitted via Cart   Oriented to room. Safety precautions initiated. Bed in low position. Call light in reach. Admitted from ED, on Cardizem drip @ 15 ml/hr. Denies chest pain. The patient is A/O x4, on RA, no SOB, Afib 110-120's on tele, denies having pain, afebrile.  is at the bedside. Admission navigator completed. PT/OT consult  Vascular access consult (hard stick)  Dietitian is on consult (pt reports weight loss/loss of appetite). QID accuchecks. Cardiology is on the case. Safety precautions in place. Staff will continue to monitor.

## 2022-07-29 NOTE — TELEPHONE ENCOUNTER
NAMITA    Spoke to [place], he states pts  this AM. Has been high last 3 days, highest 160's. Asymptomatic, he states HR sounds Regular. No Afib/ Afib RVR that he can tell. She is on Cardizem,  manages meds but is unavailable to confirm patient isn't missing the med. Patient states she has been taking all her meds. [place] will try to contact  for more info.

## 2022-07-29 NOTE — TELEPHONE ENCOUNTER
Faye Lora.  [phone]    Requesting cb to discuss pt recent tachycardia (heart rate 147) no symptoms, hasn't missed any meds.

## 2022-07-29 NOTE — TELEPHONE ENCOUNTER
Contacted patient to inform her of TO recommendations, patient is agreeable. During call Vic Newell, a home health nurse was there she informed writer patient had a reilly in place for about 30 days, [facility] was unable to successfully change catheter. Maritza informed writer that patient was voiding around the catheter and passing gelatinous clear clot with streaks of blood. She did place a new one but took it out due to above reason, she was asking if she should keep it out. Advised Maritza to keep the catheter out, patient should be evaluated in the ER for voiding/clotting issues as well and for possible infection. Caregiver present as well, all are agreeable for patient to be evaluated.

## 2022-07-29 NOTE — ED INITIAL ASSESSMENT (HPI)
Pt in per EMS from home home health RN noted elevated HR and clots in reilly. No hx Afib but EMS reported Afib. Pt denies CP or SOB. Reilly placed in May.  Patient 154 HR on arrival 98% Orsat RA

## 2022-07-29 NOTE — ED QUICK NOTES
Patient having bloody urine and blood blots (small) in reilly catheter. MD aware. Manually irrigated and clear at this time.  Will continue to monitor

## 2022-07-29 NOTE — TELEPHONE ENCOUNTER
A HR in the 160s is very high     could be A fib or other rhythm issue    rec ER eval   may need EKG, labs more meds etc

## 2022-07-29 NOTE — ED QUICK NOTES
Orders for admission, patient is aware of plan and ready to go upstairs. Any questions, please call ED SAV Barrera  at extension [phone] . Vaccinated? Yes, rapid sentCOVID Suspicion level: Low/High low      Titratable drug(s) infusing:  Rate:  cardizem at 10 mg/hr  LOC at time of transport:alert and oriented x 3    Other pertinent information: pt had reilly removed by home health nurse due to blood clots. Reilly replaced in ED and flushed, now clear.      CIWA score=n/a  NIH score=n/a

## 2022-07-30 LAB
ANION GAP SERPL CALC-SCNC: 5 MMOL/L (ref 0–18)
BASOPHILS # BLD AUTO: 0.04 X10(3) UL (ref 0–0.2)
BASOPHILS NFR BLD AUTO: 0.4 %
BILIRUB UR QL STRIP.AUTO: NEGATIVE
BUN BLD-MCNC: 33 MG/DL (ref 7–18)
CALCIUM BLD-MCNC: 9 MG/DL (ref 8.5–10.1)
CHLORIDE SERPL-SCNC: 107 MMOL/L (ref 98–112)
CLARITY UR REFRACT.AUTO: CLEAR
CO2 SERPL-SCNC: 28 MMOL/L (ref 21–32)
COLOR UR AUTO: YELLOW
CREAT BLD-MCNC: 2.32 MG/DL
EOSINOPHIL # BLD AUTO: 0.2 X10(3) UL (ref 0–0.7)
EOSINOPHIL NFR BLD AUTO: 2.2 %
ERYTHROCYTE [DISTWIDTH] IN BLOOD BY AUTOMATED COUNT: 15.1 %
GLUCOSE BLD-MCNC: 103 MG/DL (ref 70–99)
GLUCOSE BLD-MCNC: 116 MG/DL (ref 70–99)
GLUCOSE BLD-MCNC: 127 MG/DL (ref 70–99)
GLUCOSE BLD-MCNC: 204 MG/DL (ref 70–99)
GLUCOSE BLD-MCNC: 96 MG/DL (ref 70–99)
GLUCOSE UR STRIP.AUTO-MCNC: NEGATIVE MG/DL
HCT VFR BLD AUTO: 34.2 %
HGB BLD-MCNC: 10.7 G/DL
IMM GRANULOCYTES # BLD AUTO: 0.04 X10(3) UL (ref 0–1)
IMM GRANULOCYTES NFR BLD: 0.4 %
KETONES UR STRIP.AUTO-MCNC: NEGATIVE MG/DL
LYMPHOCYTES # BLD AUTO: 1.89 X10(3) UL (ref 1–4)
LYMPHOCYTES NFR BLD AUTO: 20.5 %
MCH RBC QN AUTO: 29.2 PG (ref 26–34)
MCHC RBC AUTO-ENTMCNC: 31.3 G/DL (ref 31–37)
MCV RBC AUTO: 93.4 FL
MONOCYTES # BLD AUTO: 0.69 X10(3) UL (ref 0.1–1)
MONOCYTES NFR BLD AUTO: 7.5 %
NEUTROPHILS # BLD AUTO: 6.38 X10 (3) UL (ref 1.5–7.7)
NEUTROPHILS # BLD AUTO: 6.38 X10(3) UL (ref 1.5–7.7)
NEUTROPHILS NFR BLD AUTO: 69 %
NITRITE UR QL STRIP.AUTO: POSITIVE
OSMOLALITY SERPL CALC.SUM OF ELEC: 297 MOSM/KG (ref 275–295)
PH UR STRIP.AUTO: 7 [PH] (ref 5–8)
PLATELET # BLD AUTO: 242 10(3)UL (ref 150–450)
POTASSIUM SERPL-SCNC: 4 MMOL/L (ref 3.5–5.1)
PROT UR STRIP.AUTO-MCNC: >=300 MG/DL
RBC # BLD AUTO: 3.66 X10(6)UL
RBC #/AREA URNS AUTO: >10 /HPF
SODIUM SERPL-SCNC: 140 MMOL/L (ref 136–145)
SP GR UR STRIP.AUTO: 1.02 (ref 1–1.03)
UROBILINOGEN UR STRIP.AUTO-MCNC: 1 MG/DL
WBC # BLD AUTO: 9.2 X10(3) UL (ref 4–11)
WBC #/AREA URNS AUTO: >50 /HPF
WBC CLUMPS UR QL AUTO: PRESENT /HPF

## 2022-07-30 PROCEDURE — 99233 SBSQ HOSP IP/OBS HIGH 50: CPT | Performed by: HOSPITALIST

## 2022-07-30 PROCEDURE — 99222 1ST HOSP IP/OBS MODERATE 55: CPT | Performed by: UROLOGY

## 2022-07-30 RX ORDER — METOPROLOL SUCCINATE 50 MG/1
50 TABLET, EXTENDED RELEASE ORAL
Status: DISCONTINUED | OUTPATIENT
Start: 2022-07-31 | End: 2022-07-31

## 2022-07-30 RX ORDER — MULTIPLE VITAMINS W/ MINERALS TAB 9MG-400MCG
1 TAB ORAL DAILY
Status: DISCONTINUED | OUTPATIENT
Start: 2022-07-30 | End: 2022-08-09

## 2022-07-30 NOTE — PLAN OF CARE
Dr. Hector Figueroa [facility] . Fax request sent, per voicemail they are only open Monday through Friday.

## 2022-07-30 NOTE — PLAN OF CARE
Assumed patient care at 1200. Patient alert, awake. Breathing unlabored. Denies pain. Tele Afib with rates 90-low 100s on cardizem gtt. Orders to stop cardizem gtt after metoprolol given. Metoprolol ordered to start tomorrow morning. Holm with brown cloudy/sediment urine.

## 2022-07-30 NOTE — PROGRESS NOTES
Cardizem gtt increased to 10mg/hr for HR >120. Will cont to monitor. 1201: Urology paged regarding new consult.

## 2022-07-30 NOTE — PLAN OF CARE
Received patient a/ox4. Cardizem drip at 5mg/hr overnight for Afib. HR currently 70-90's. No new complaints. She was updated on plan of care and verbalizes understanding. Problem: Patient/Family Goals  Goal: Patient/Family Long Term Goal  Description: Patient's Long Term Goal: Go home. Interventions:  - Cardizem drip, cardiology consult, PT/OT, social work. - See additional Care Plan goals for specific interventions  Outcome: Progressing  Goal: Patient/Family Short Term Goal  Description: Patient's Short Term Goal: Sleep. Interventions:   - Provide comfort measures and minimize disturbances. - See additional Care Plan goals for specific interventions  Outcome: Progressing     Problem: CARDIOVASCULAR - ADULT  Goal: Maintains optimal cardiac output and hemodynamic stability  Description: INTERVENTIONS:  - Monitor vital signs, rhythm, and trends  - Monitor for bleeding, hypotension and signs of decreased cardiac output  - Evaluate effectiveness of vasoactive medications to optimize hemodynamic stability  - Monitor arterial and/or venous puncture sites for bleeding and/or hematoma  - Assess quality of pulses, skin color and temperature  - Assess for signs of decreased coronary artery perfusion - ex.  Angina  - Evaluate fluid balance, assess for edema, trend weights  Outcome: Progressing  Goal: Absence of cardiac arrhythmias or at baseline  Description: INTERVENTIONS:  - Continuous cardiac monitoring, monitor vital signs, obtain 12 lead EKG if indicated  - Evaluate effectiveness of antiarrhythmic and heart rate control medications as ordered  - Initiate emergency measures for life threatening arrhythmias  - Monitor electrolytes and administer replacement therapy as ordered  Outcome: Progressing

## 2022-07-31 LAB
GLUCOSE BLD-MCNC: 111 MG/DL (ref 70–99)
GLUCOSE BLD-MCNC: 133 MG/DL (ref 70–99)
GLUCOSE BLD-MCNC: 149 MG/DL (ref 70–99)
GLUCOSE BLD-MCNC: 214 MG/DL (ref 70–99)

## 2022-07-31 PROCEDURE — 99233 SBSQ HOSP IP/OBS HIGH 50: CPT | Performed by: HOSPITALIST

## 2022-07-31 RX ORDER — METOPROLOL SUCCINATE 50 MG/1
200 TABLET, EXTENDED RELEASE ORAL
Status: DISCONTINUED | OUTPATIENT
Start: 2022-08-01 | End: 2022-08-09

## 2022-07-31 RX ORDER — METOPROLOL SUCCINATE 50 MG/1
100 TABLET, EXTENDED RELEASE ORAL ONCE
Status: COMPLETED | OUTPATIENT
Start: 2022-07-31 | End: 2022-07-31

## 2022-07-31 RX ORDER — METOPROLOL TARTRATE 5 MG/5ML
5 INJECTION INTRAVENOUS ONCE
Status: COMPLETED | OUTPATIENT
Start: 2022-07-31 | End: 2022-07-31

## 2022-07-31 RX ORDER — METOPROLOL SUCCINATE 50 MG/1
50 TABLET, EXTENDED RELEASE ORAL ONCE
Status: COMPLETED | OUTPATIENT
Start: 2022-07-31 | End: 2022-07-31

## 2022-07-31 RX ORDER — METOPROLOL SUCCINATE 50 MG/1
100 TABLET, EXTENDED RELEASE ORAL
Status: DISCONTINUED | OUTPATIENT
Start: 2022-08-01 | End: 2022-07-31

## 2022-07-31 NOTE — PLAN OF CARE
Assumed care 0730. Alert and oriented x4. RA  Accu checks  PIV infusing 0.9 @ 75 ml/hr  Tele- A fib. Metoprolol increased  Eliquis added. Chronic reilly- yellow output. No blood noted. C/d/I. Draining to gravity. Electrolyte cardiac protocol. Denies pain. Bed bound  PT to work with patient today. Continue to monitor pt.     1230 increase HR MD notified added 100 mg metoprolol. 1520- HR in 150s. Pt upset could not get a hold of . MD notified. Diltiazem added see mar. 1845: pt laying in bed relaxing HR sustaining 130-150s. MD notified. See orders. Problem: CARDIOVASCULAR - ADULT  Goal: Maintains optimal cardiac output and hemodynamic stability  Description: INTERVENTIONS:  - Monitor vital signs, rhythm, and trends  - Monitor for bleeding, hypotension and signs of decreased cardiac output  - Evaluate effectiveness of vasoactive medications to optimize hemodynamic stability  - Monitor arterial and/or venous puncture sites for bleeding and/or hematoma  - Assess quality of pulses, skin color and temperature  - Assess for signs of decreased coronary artery perfusion - ex.  Angina  - Evaluate fluid balance, assess for edema, trend weights  Outcome: Progressing  Goal: Absence of cardiac arrhythmias or at baseline  Description: INTERVENTIONS:  - Continuous cardiac monitoring, monitor vital signs, obtain 12 lead EKG if indicated  - Evaluate effectiveness of antiarrhythmic and heart rate control medications as ordered  - Initiate emergency measures for life threatening arrhythmias  - Monitor electrolytes and administer replacement therapy as ordered  Outcome: Progressing

## 2022-07-31 NOTE — PLAN OF CARE
Received patient a/ox4. Tele Afib 100-110's overnight on cardizem drip. Orders to give metoprolol this AM and shut drip off one hour later. She is currently Afib 's after drip stopped. No new complaints. She was updated on plan of care and verbalizes understanding. Problem: Patient/Family Goals  Goal: Patient/Family Long Term Goal  Description: Patient's Long Term Goal: Go home. Interventions:  - Cardiology consult, urology consult, social work consult, tele monitoring, cardizem drip, toprol PO  - See additional Care Plan goals for specific interventions  Outcome: Progressing  Goal: Patient/Family Short Term Goal  Description: Patient's Short Term Goal: Sleep    Interventions:   - provide comfort measures and minimize disturbances. - See additional Care Plan goals for specific interventions  Outcome: Progressing     Problem: CARDIOVASCULAR - ADULT  Goal: Maintains optimal cardiac output and hemodynamic stability  Description: INTERVENTIONS:  - Monitor vital signs, rhythm, and trends  - Monitor for bleeding, hypotension and signs of decreased cardiac output  - Evaluate effectiveness of vasoactive medications to optimize hemodynamic stability  - Monitor arterial and/or venous puncture sites for bleeding and/or hematoma  - Assess quality of pulses, skin color and temperature  - Assess for signs of decreased coronary artery perfusion - ex.  Angina  - Evaluate fluid balance, assess for edema, trend weights  Outcome: Progressing  Goal: Absence of cardiac arrhythmias or at baseline  Description: INTERVENTIONS:  - Continuous cardiac monitoring, monitor vital signs, obtain 12 lead EKG if indicated  - Evaluate effectiveness of antiarrhythmic and heart rate control medications as ordered  - Initiate emergency measures for life threatening arrhythmias  - Monitor electrolytes and administer replacement therapy as ordered  Outcome: Progressing

## 2022-08-01 ENCOUNTER — TELEPHONE (OUTPATIENT)
Dept: INTERNAL MEDICINE CLINIC | Facility: CLINIC | Age: 77
End: 2022-08-01

## 2022-08-01 LAB
GLUCOSE BLD-MCNC: 104 MG/DL (ref 70–99)
GLUCOSE BLD-MCNC: 142 MG/DL (ref 70–99)
GLUCOSE BLD-MCNC: 144 MG/DL (ref 70–99)
GLUCOSE BLD-MCNC: 162 MG/DL (ref 70–99)

## 2022-08-01 PROCEDURE — 99233 SBSQ HOSP IP/OBS HIGH 50: CPT | Performed by: HOSPITALIST

## 2022-08-01 RX ORDER — LOSARTAN POTASSIUM 25 MG/1
25 TABLET ORAL DAILY
Status: DISCONTINUED | OUTPATIENT
Start: 2022-08-01 | End: 2022-08-09

## 2022-08-01 RX ORDER — SODIUM CHLORIDE 9 MG/ML
INJECTION, SOLUTION INTRAVENOUS
Status: ACTIVE | OUTPATIENT
Start: 2022-08-02 | End: 2022-08-02

## 2022-08-01 RX ORDER — ACETAMINOPHEN 500 MG
500 TABLET ORAL EVERY 4 HOURS PRN
Status: DISCONTINUED | OUTPATIENT
Start: 2022-08-01 | End: 2022-08-09

## 2022-08-01 RX ORDER — AMOXICILLIN 500 MG/1
500 CAPSULE ORAL 2 TIMES DAILY
Status: DISCONTINUED | OUTPATIENT
Start: 2022-08-01 | End: 2022-08-09

## 2022-08-01 NOTE — PLAN OF CARE
Assumed care of patient @3754. Flat affect. A&Ox4. VSS on RA. Afib on tele. HR in 90's. E-(Card). Carb control diet. QID accucheck. Holm intact and draining well. [employer] infusing via LT AC PIV. Bedbound. All needs met at this time. Hosp/cards/and urology following. Problem: Patient/Family Goals  Goal: Patient/Family Long Term Goal  Description: Patient's Long Term Goal: To go home  Interventions:  - See additional Care Plan goals for specific interventions  Outcome: Progressing  Goal: Patient/Family Short Term Goal  Description: Patient's Short Term Goal: to get some sleep  Interventions:   -   - See additional Care Plan goals for specific interventions  Outcome: Progressing  Problem: CARDIOVASCULAR - ADULT  Goal: Maintains optimal cardiac output and hemodynamic stability  Description: INTERVENTIONS:  - Monitor vital signs, rhythm, and trends  - Monitor for bleeding, hypotension and signs of decreased cardiac output  - Evaluate effectiveness of vasoactive medications to optimize hemodynamic stability  - Monitor arterial and/or venous puncture sites for bleeding and/or hematoma  - Assess quality of pulses, skin color and temperature  - Assess for signs of decreased coronary artery perfusion - ex.  Angina  - Evaluate fluid balance, assess for edema, trend weights  Outcome: Progressing  Goal: Absence of cardiac arrhythmias or at baseline  Description: INTERVENTIONS:  - Continuous cardiac monitoring, monitor vital signs, obtain 12 lead EKG if indicated  - Evaluate effectiveness of antiarrhythmic and heart rate control medications as ordered  - Initiate emergency measures for life threatening arrhythmias  - Monitor electrolytes and administer replacement therapy as ordered  Outcome: Progressing

## 2022-08-01 NOTE — TELEPHONE ENCOUNTER
Refills needed for the pt    Glipizide ER 2.5 MG  Tamsulosin Hcl 0.4MG  Rosuvastatin Calcium 5MG    [address], [address] [facility] [phone], [phone]    Pts spouse dropped off list of pts medications. He would like TO to look over the list and make sure it looks accurate. Pts spouse advised to call the office once pt is out of the hospital to schedule an appt to go through all of their questions.     List placed in TO fax folder

## 2022-08-01 NOTE — CM/SW NOTE
MSW spoke to pt she would like [facility] referral. [facility]   Referrals sent and PASRR completed

## 2022-08-01 NOTE — PROGRESS NOTES
[facility] Pharmacy Note:  Renal Adjustment for amoxicillin (AMOXIL)    Paola Alvarenga is a 64year old patient who has been prescribed amoxicillin (AMOXIL) 500 mg every 8 hrs. The estimated creatinine clearance is 20.9 mL/min (A) (based on SCr of 2.32 mg/dL (H)). The dose has been adjusted to amoxicillin (AMOXIL) 500 mg every 12 hrs per hospital renal dose adjustment protocol for treatment of UTI. Pharmacy will follow and adjust dose as warranted for additional renal function changes.     Thank you,    Yoel Solis, PharmD  8/1/2022  12:28 PM

## 2022-08-01 NOTE — CM/SW NOTE
Department  notified of request for kerry [facility] referrals started. Assigned CM/SW to follow up with pt/family on further discharge planning.      Hany Emanuel  [facility]

## 2022-08-01 NOTE — TELEPHONE ENCOUNTER
It looks like hospital is changing doses of medications/updating. .     Per the chart pt  gave us of medications dose not match what is in pt's chart. Amlodipine, oxybutynin are not in current prescription list that pt  brought in and there are other medications on the list pt  brought in that are not on our prescription chart. Okay to wait till pt is possibly seen to update med. List?     I have the med. Chart in paper work file on my desk.

## 2022-08-01 NOTE — PLAN OF CARE
A&Ox4. On room air, lungs clear. Abdomen soft and nontender, BS active. Denies N&V. Holm catheter in place and draining dark yellow urine. No signs of hematuria noted. Denies pain or discomfort at this time. IVF infusing- 0.9 NS @ 75 mL/hr. Receiving IV Rocephin q24h for UTI- urine culture pending. A-fib on tele, on eliquis. HR anywhere from low 100's to 140's. Receiving scheduled metoprolol and diltiazem. PT recommending CAROL. Needs met. Will monitor. Problem: Patient/Family Goals  Goal: Patient/Family Long Term Goal  Description: Patient's Long Term Goal: To go home    Interventions:  -   - See additional Care Plan goals for specific interventions  7/31/2022 2259 by Paulette Jimenez RN  Outcome: Progressing  7/31/2022 2257 by Paulette Jimenez RN  Outcome: Progressing  Goal: Patient/Family Short Term Goal  Description: Patient's Short Term Goal: to get some sleep    Interventions:   -   - See additional Care Plan goals for specific interventions  7/31/2022 2259 by Paulette Jimenez RN  Outcome: Progressing  7/31/2022 2257 by Paulette Jimenez RN  Outcome: Progressing     Problem: CARDIOVASCULAR - ADULT  Goal: Maintains optimal cardiac output and hemodynamic stability  Description: INTERVENTIONS:  - Monitor vital signs, rhythm, and trends  - Monitor for bleeding, hypotension and signs of decreased cardiac output  - Evaluate effectiveness of vasoactive medications to optimize hemodynamic stability  - Monitor arterial and/or venous puncture sites for bleeding and/or hematoma  - Assess quality of pulses, skin color and temperature  - Assess for signs of decreased coronary artery perfusion - ex.  Angina  - Evaluate fluid balance, assess for edema, trend weights  7/31/2022 2259 by Paulette Jimenez RN  Outcome: Progressing  7/31/2022 2257 by Paulette Jimenez RN  Outcome: Progressing  Goal: Absence of cardiac arrhythmias or at baseline  Description: INTERVENTIONS:  - Continuous cardiac monitoring, monitor vital signs, obtain 12 lead EKG if indicated  - Evaluate effectiveness of antiarrhythmic and heart rate control medications as ordered  - Initiate emergency measures for life threatening arrhythmias  - Monitor electrolytes and administer replacement therapy as ordered  7/31/2022 2259 by Joanne Almeida RN  Outcome: Progressing  7/31/2022 2257 by Joanne Almeida, RN  Outcome: Progressing

## 2022-08-02 ENCOUNTER — TELEPHONE (OUTPATIENT)
Dept: INTERNAL MEDICINE CLINIC | Facility: CLINIC | Age: 77
End: 2022-08-02

## 2022-08-02 ENCOUNTER — APPOINTMENT (OUTPATIENT)
Dept: CV DIAGNOSTICS | Facility: HOSPITAL | Age: 77
End: 2022-08-02
Payer: MEDICARE

## 2022-08-02 ENCOUNTER — APPOINTMENT (OUTPATIENT)
Dept: INTERVENTIONAL RADIOLOGY/VASCULAR | Facility: HOSPITAL | Age: 77
End: 2022-08-02
Payer: MEDICARE

## 2022-08-02 LAB
CREAT BLD-MCNC: 1.72 MG/DL
GFR SERPLBLD BASED ON 1.73 SQ M-ARVRAT: 30 ML/MIN/1.73M2 (ref 60–?)
GLUCOSE BLD-MCNC: 109 MG/DL (ref 70–99)
GLUCOSE BLD-MCNC: 113 MG/DL (ref 70–99)
GLUCOSE BLD-MCNC: 122 MG/DL (ref 70–99)
GLUCOSE BLD-MCNC: 134 MG/DL (ref 70–99)

## 2022-08-02 PROCEDURE — 93320 DOPPLER ECHO COMPLETE: CPT

## 2022-08-02 PROCEDURE — B24BZZ4 ULTRASONOGRAPHY OF HEART WITH AORTA, TRANSESOPHAGEAL: ICD-10-PCS | Performed by: INTERNAL MEDICINE

## 2022-08-02 PROCEDURE — 93325 DOPPLER ECHO COLOR FLOW MAPG: CPT

## 2022-08-02 PROCEDURE — 99232 SBSQ HOSP IP/OBS MODERATE 35: CPT | Performed by: HOSPITALIST

## 2022-08-02 PROCEDURE — 5A2204Z RESTORATION OF CARDIAC RHYTHM, SINGLE: ICD-10-PCS | Performed by: INTERNAL MEDICINE

## 2022-08-02 RX ORDER — MIDAZOLAM HYDROCHLORIDE 1 MG/ML
INJECTION INTRAMUSCULAR; INTRAVENOUS
Status: COMPLETED
Start: 2022-08-02 | End: 2022-08-02

## 2022-08-02 RX ORDER — AMIODARONE HYDROCHLORIDE 200 MG/1
200 TABLET ORAL 2 TIMES DAILY WITH MEALS
Status: DISCONTINUED | OUTPATIENT
Start: 2022-08-02 | End: 2022-08-05

## 2022-08-02 RX ORDER — GLIPIZIDE 2.5 MG/1
2.5 TABLET, EXTENDED RELEASE ORAL
Qty: 90 TABLET | Refills: 0 | OUTPATIENT
Start: 2022-08-02

## 2022-08-02 RX ORDER — ROSUVASTATIN CALCIUM 5 MG/1
5 TABLET, COATED ORAL EVERY OTHER DAY
Qty: 90 TABLET | Refills: 0 | OUTPATIENT
Start: 2022-08-02

## 2022-08-02 RX ORDER — TAMSULOSIN HYDROCHLORIDE 0.4 MG/1
0.4 CAPSULE ORAL DAILY
Qty: 30 CAPSULE | Refills: 0 | OUTPATIENT
Start: 2022-08-02

## 2022-08-02 NOTE — PROGRESS NOTES
1068: Blood tinged urine noted in Holm catheter this morning. No visible clots. Holm draining adequately. Patient denies lower abdominal pain or discomfort. Urology paged- awaiting call back    0700: urine culture came back + for VRE. Hospitalist notifed and awaiting call back. Isolation precautions initiated per protocol.

## 2022-08-02 NOTE — PLAN OF CARE
Assumed care of patient @ 0730. No acute distress noted. A&Ox4. VSS on RA. Afib on tele. Denies pain or discomfort at this time. NPO sips w/ meds for BIENVENIDO. Consent signed. Contact isolation for VRE in urine. Blood noted in patient's reilly, notified MD. Urology to see. Sent new fax to [facility] for records. Safety precautions in place. All needs met at this time. 1626:  Notified urology of hematuria. Patient denies pain or discomfort. Will see in AM  Problem: Patient/Family Goals  Goal: Patient/Family Long Term Goal  Description: Patient's Long Term Goal: To go home  Interventions:  -   - See additional Care Plan goals for specific interventions  Outcome: Progressing  Goal: Patient/Family Short Term Goal  Description: Patient's Short Term Goal: to get some sleep  Interventions:   - See additional Care Plan goals for specific interventions  Outcome: Progressing  Problem: CARDIOVASCULAR - ADULT  Goal: Maintains optimal cardiac output and hemodynamic stability  Description: INTERVENTIONS:  - Monitor vital signs, rhythm, and trends  - Monitor for bleeding, hypotension and signs of decreased cardiac output  - Evaluate effectiveness of vasoactive medications to optimize hemodynamic stability  - Monitor arterial and/or venous puncture sites for bleeding and/or hematoma  - Assess quality of pulses, skin color and temperature  - Assess for signs of decreased coronary artery perfusion - ex.  Angina  - Evaluate fluid balance, assess for edema, trend weights  Outcome: Progressing  Goal: Absence of cardiac arrhythmias or at baseline  Description: INTERVENTIONS:  - Continuous cardiac monitoring, monitor vital signs, obtain 12 lead EKG if indicated  - Evaluate effectiveness of antiarrhythmic and heart rate control medications as ordered  - Initiate emergency measures for life threatening arrhythmias  - Monitor electrolytes and administer replacement therapy as ordered  Outcome: Progressing

## 2022-08-02 NOTE — PROGRESS NOTES
Pt alert and oriented. On tele running afib. On eliquis. RA.  NPO for BIENVENIDO today. Accuchecks. 0.9% NaCl @ 75. PO Amoxicillin. Tylenol given for pain. Ice pack to L knee. Chronic reilly.

## 2022-08-02 NOTE — PROGRESS NOTES
S/P BIENVENIDO and CV with Dr. Rakan Zamorano and Maria Zurita RN, 12 lead complete. Called report to tele SAV Rogers. Back to bed 3611.

## 2022-08-02 NOTE — TELEPHONE ENCOUNTER
Glipizide XL 2.5 mg  Diabetic Medication Protocol Failed 08/01/2022 10:51 AM   Protocol Details  Microalbumin procedure in past 12 months or taking ACE/ARB   Filled 1-10-22  Qty 90  3 refills  To early for refill, 1 year supply was sent    Rosuvastatin 5 mg  Cholesterol Medication Protocol Failed 08/01/2022 10:51 AM   Protocol Details  Lipid panel within past 12 months   Filled 1-3-22  Qty 90  3 refills  To early for refill, 1 year supply was sent    Tamsulosin  There is no refill date

## 2022-08-02 NOTE — PROCEDURES
[facility]  Cardioversion Note    Carlotta CHARLES [id number] MRN [id number]   Admission Date 7/29/2022 Procedure Date 8/2/2022   Attending Physician Naveen Grossman MD Procedure Physician Rosa Garcia MD     Pre-Operative Diagnosis: Atrial fibrillation with rapid ventricular response    Post-Operative Diagnosis: Same as above    Procedure(s) Performed:    1. Cardioversion. 2.    Sedation     Indication:  Atrial fibrillation with rapid ventricular response    Anesthesia: IV Sedation with Brevital    Complications: None    Summary of Case: The patient was brought to the cardiac interventional suites in a fasting and non-sedated state after providing informed consent. IV sedation was administered during continuous ECG, pulse oximeter and non-invasive hemodynamic monitoring from 4:23 PM to 4:28 PM.  After administering a total of 10 mg of IV Brevital in intermittent boluses, the desired level of sedation was achieved. A single 200-joule synchronized biphasic shock was delivered with successful conversion to sinus rhythm. The patient remained on telemetry until fully recovered. There were no complications. Conclusion:  1. Successful cardioversion.     Rosa Garcia MD  8/2/2022  4:48 PM

## 2022-08-03 ENCOUNTER — TELEPHONE (OUTPATIENT)
Dept: INTERNAL MEDICINE CLINIC | Facility: CLINIC | Age: 77
End: 2022-08-03

## 2022-08-03 LAB
GLUCOSE BLD-MCNC: 106 MG/DL (ref 70–99)
GLUCOSE BLD-MCNC: 113 MG/DL (ref 70–99)
GLUCOSE BLD-MCNC: 164 MG/DL (ref 70–99)

## 2022-08-03 PROCEDURE — 99232 SBSQ HOSP IP/OBS MODERATE 35: CPT | Performed by: HOSPITALIST

## 2022-08-03 PROCEDURE — 99231 SBSQ HOSP IP/OBS SF/LOW 25: CPT | Performed by: PHYSICIAN ASSISTANT

## 2022-08-03 RX ORDER — LOSARTAN POTASSIUM 25 MG/1
25 TABLET ORAL DAILY
Qty: 30 TABLET | Refills: 2 | Status: ON HOLD | OUTPATIENT
Start: 2022-08-04

## 2022-08-03 RX ORDER — AMIODARONE HYDROCHLORIDE 200 MG/1
200 TABLET ORAL 2 TIMES DAILY WITH MEALS
Qty: 28 TABLET | Refills: 0 | Status: SHIPPED | OUTPATIENT
Start: 2022-08-03 | End: 2022-08-03

## 2022-08-03 RX ORDER — AMIODARONE HYDROCHLORIDE 200 MG/1
TABLET ORAL
Qty: 42 TABLET | Refills: 0 | Status: SHIPPED | OUTPATIENT
Start: 2022-08-03 | End: 2022-08-08

## 2022-08-03 RX ORDER — METOPROLOL SUCCINATE 200 MG/1
200 TABLET, EXTENDED RELEASE ORAL
Qty: 30 TABLET | Refills: 2 | Status: ON HOLD | OUTPATIENT
Start: 2022-08-04

## 2022-08-03 NOTE — PHYSICAL THERAPY NOTE
IP PT attempted, pt laying supine in bed, refusing to participate today 2/2 L knee pain. Pt with ice packs currently on knee, states pain is currently controlled at rest. Educated pt on benefits of mobility to prevent further deconditioning and encouraged pt to participate in modified session of supine therex for BLE BUE. Pt continues to refuse. Will re-attempt as schedule permits.

## 2022-08-03 NOTE — PROGRESS NOTES
Alert and oriented. On tele running NSR.  RA. Accuchecks WDL.  0.9% NaCl @ 75. PO Amoxicillin. Denied pain. SCDs. Chronic reilly. Nidia colored urine in reilly bag. Can still visualize dark red urine in tubing. WCTM. PO eliquis.

## 2022-08-04 ENCOUNTER — APPOINTMENT (OUTPATIENT)
Dept: GENERAL RADIOLOGY | Facility: HOSPITAL | Age: 77
End: 2022-08-04
Attending: PHYSICIAN ASSISTANT
Payer: MEDICARE

## 2022-08-04 ENCOUNTER — APPOINTMENT (OUTPATIENT)
Dept: GENERAL RADIOLOGY | Facility: HOSPITAL | Age: 77
End: 2022-08-04
Attending: HOSPITALIST
Payer: MEDICARE

## 2022-08-04 LAB
ATRIAL RATE: 62 BPM
CREAT BLD-MCNC: 1.77 MG/DL
GFR SERPLBLD BASED ON 1.73 SQ M-ARVRAT: 29 ML/MIN/1.73M2 (ref 60–?)
GLUCOSE BLD-MCNC: 102 MG/DL (ref 70–99)
GLUCOSE BLD-MCNC: 156 MG/DL (ref 70–99)
GLUCOSE BLD-MCNC: 158 MG/DL (ref 70–99)
GLUCOSE BLD-MCNC: 185 MG/DL (ref 70–99)
P AXIS: -10 DEGREES
P-R INTERVAL: 160 MS
Q-T INTERVAL: 506 MS
QRS DURATION: 96 MS
QTC CALCULATION (BEZET): 513 MS
R AXIS: 68 DEGREES
T AXIS: 142 DEGREES
VENTRICULAR RATE: 62 BPM

## 2022-08-04 PROCEDURE — 74018 RADEX ABDOMEN 1 VIEW: CPT | Performed by: PHYSICIAN ASSISTANT

## 2022-08-04 PROCEDURE — 73560 X-RAY EXAM OF KNEE 1 OR 2: CPT | Performed by: HOSPITALIST

## 2022-08-04 PROCEDURE — 99232 SBSQ HOSP IP/OBS MODERATE 35: CPT | Performed by: PHYSICIAN ASSISTANT

## 2022-08-04 PROCEDURE — 99232 SBSQ HOSP IP/OBS MODERATE 35: CPT | Performed by: HOSPITALIST

## 2022-08-04 RX ORDER — HYDROCODONE BITARTRATE AND ACETAMINOPHEN 5; 325 MG/1; MG/1
1 TABLET ORAL EVERY 6 HOURS PRN
Status: DISCONTINUED | OUTPATIENT
Start: 2022-08-04 | End: 2022-08-09

## 2022-08-04 RX ORDER — AMOXICILLIN 500 MG/1
500 CAPSULE ORAL 2 TIMES DAILY
Qty: 14 CAPSULE | Refills: 0 | Status: ON HOLD | OUTPATIENT
Start: 2022-08-04 | End: 2022-08-11

## 2022-08-04 NOTE — PHYSICAL THERAPY NOTE
Attempted to see pt for PT this am. Patient was medicated for pain but continues to report pain at 12 on scale of maximum 10. Pt refused any out of bed mobility and exercises. Encouragement and education provided on importance of mobility. Pt continued to decline.

## 2022-08-04 NOTE — OCCUPATIONAL THERAPY NOTE
Charts reviewed. OT att'd to see pt this AM. Pt declining therapy at this time reporting 10/10 knee pain. Despite encouragement continues to decline therapy. OT will follow up as able and appropriate.

## 2022-08-04 NOTE — CM/SW NOTE
MSW met with pt at bedside. She declines PT res for [facility]. Pt states she has a caregiver, hospital bed and werner lift at home. She has been to [facility] 2x for a month (+) each time. IM Provided to pt.     DC PLAN:  Home with family and [facility]idential [facility] all set up

## 2022-08-04 NOTE — PLAN OF CARE
64year old female a/o x 4. Medical records from [address] [place] [address] were sent over today, but they did not include the urology information, only cardiology. To follow up in AM to receive correct information. [facility] records told us  that her entire medical record is too long and they will not send it ( so we need to specify what exactly we need).    Problem: Patient/Family Goals  Goal: Patient/Family Long Term Goal  Description: Patient's Long Term Goal: To go home    Interventions:  -   - See additional Care Plan goals for specific interventions  Outcome: Progressing  Goal: Patient/Family Short Term Goal  Description: Patient's Short Term Goal: to get some sleep    Interventions:   -   - See additional Care Plan goals for specific interventions  Outcome: Progressing

## 2022-08-04 NOTE — PLAN OF CARE
Assumed pt care at [address] [place] [address]. A/O X4. Rm air, bilat clear,diminished. NSR on tele, eliquis. Incontinent, reilly in place, c/d/I, draining red urine. L knee pain noted, denies pharm measures, icepack in place. BR, 2 to turn. PO amoxicillin BID(UTI). E-(card). Carb contr 1800, QID accu. R FA, L upper arm Ivs, c/d/I. [email or web address] All safety measures active, all needs met. Will cont to monitor.    -attempted to call [facility] per urology PA to obtain documents. --office to obtain medical records closed. They open at 8am. Will pass info to day RN    Problem: CARDIOVASCULAR - ADULT  Goal: Maintains optimal cardiac output and hemodynamic stability  Description: INTERVENTIONS:  - Monitor vital signs, rhythm, and trends  - Monitor for bleeding, hypotension and signs of decreased cardiac output  - Evaluate effectiveness of vasoactive medications to optimize hemodynamic stability  - Monitor arterial and/or venous puncture sites for bleeding and/or hematoma  - Assess quality of pulses, skin color and temperature  - Assess for signs of decreased coronary artery perfusion - ex.  Angina  - Evaluate fluid balance, assess for edema, trend weights  Outcome: Progressing  Goal: Absence of cardiac arrhythmias or at baseline  Description: INTERVENTIONS:  - Continuous cardiac monitoring, monitor vital signs, obtain 12 lead EKG if indicated  - Evaluate effectiveness of antiarrhythmic and heart rate control medications as ordered  - Initiate emergency measures for life threatening arrhythmias  - Monitor electrolytes and administer replacement therapy as ordered  Outcome: Progressing     Problem: PAIN - ADULT  Goal: Verbalizes/displays adequate comfort level or patient's stated pain goal  Description: INTERVENTIONS:  - Encourage pt to monitor pain and request assistance  - Assess pain using appropriate pain scale  - Administer analgesics based on type and severity of pain and evaluate response  - Implement non-pharmacological measures as appropriate and evaluate response  - Consider cultural and social influences on pain and pain management  - Manage/alleviate anxiety  - Utilize distraction and/or relaxation techniques  - Monitor for opioid side effects  - Notify MD/LIP if interventions unsuccessful or patient reports new pain  - Anticipate increased pain with activity and pre-medicate as appropriate  Outcome: Progressing     Problem: RISK FOR INFECTION - ADULT  Goal: Absence of fever/infection during anticipated neutropenic period  Description: INTERVENTIONS  - Monitor WBC  - Administer growth factors as ordered  - Implement neutropenic guidelines  Outcome: Progressing     Problem: SAFETY ADULT - FALL  Goal: Free from fall injury  Description: INTERVENTIONS:  - Assess pt frequently for physical needs  - Identify cognitive and physical deficits and behaviors that affect risk of falls.   - [place] fall precautions as indicated by assessment.  - Educate pt/family on patient safety including physical limitations  - Instruct pt to call for assistance with activity based on assessment  - Modify environment to reduce risk of injury  - Provide assistive devices as appropriate  - Consider OT/PT consult to assist with strengthening/mobility  - Encourage toileting schedule  Outcome: Progressing     Problem: DISCHARGE PLANNING  Goal: Discharge to home or other facility with appropriate resources  Description: INTERVENTIONS:  - Identify barriers to discharge w/pt and caregiver  - Include patient/family/discharge partner in discharge planning  - Arrange for needed discharge resources and transportation as appropriate  - Identify discharge learning needs (meds, wound care, etc)  - Arrange for interpreters to assist at discharge as needed  - Consider post-discharge preferences of patient/family/discharge partner  - Complete POLST form as appropriate  - Assess patient's ability to be responsible for managing their own health  - Refer to Case Management Department for coordinating discharge planning if the patient needs post-hospital services based on physician/LIP order or complex needs related to functional status, cognitive ability or social support system  Outcome: Progressing

## 2022-08-05 LAB
ANION GAP SERPL CALC-SCNC: 8 MMOL/L (ref 0–18)
ATRIAL RATE: 166 BPM
ATRIAL RATE: 85 BPM
ATRIAL RATE: 89 BPM
ATRIAL RATE: 92 BPM
BASOPHILS # BLD AUTO: 0.06 X10(3) UL (ref 0–0.2)
BASOPHILS NFR BLD AUTO: 0.4 %
BUN BLD-MCNC: 21 MG/DL (ref 7–18)
CALCIUM BLD-MCNC: 8.6 MG/DL (ref 8.5–10.1)
CHLORIDE SERPL-SCNC: 109 MMOL/L (ref 98–112)
CO2 SERPL-SCNC: 22 MMOL/L (ref 21–32)
CREAT BLD-MCNC: 1.61 MG/DL
EOSINOPHIL # BLD AUTO: 0.02 X10(3) UL (ref 0–0.7)
EOSINOPHIL NFR BLD AUTO: 0.1 %
ERYTHROCYTE [DISTWIDTH] IN BLOOD BY AUTOMATED COUNT: 15.4 %
GFR SERPLBLD BASED ON 1.73 SQ M-ARVRAT: 33 ML/MIN/1.73M2 (ref 60–?)
GLUCOSE BLD-MCNC: 137 MG/DL (ref 70–99)
GLUCOSE BLD-MCNC: 151 MG/DL (ref 70–99)
GLUCOSE BLD-MCNC: 157 MG/DL (ref 70–99)
GLUCOSE BLD-MCNC: 160 MG/DL (ref 70–99)
GLUCOSE BLD-MCNC: 172 MG/DL (ref 70–99)
GLUCOSE BLD-MCNC: 208 MG/DL (ref 70–99)
HCT VFR BLD AUTO: 35.1 %
HGB BLD-MCNC: 11 G/DL
IMM GRANULOCYTES # BLD AUTO: 0.09 X10(3) UL (ref 0–1)
IMM GRANULOCYTES NFR BLD: 0.7 %
LYMPHOCYTES # BLD AUTO: 1.4 X10(3) UL (ref 1–4)
LYMPHOCYTES NFR BLD AUTO: 10.2 %
MAGNESIUM SERPL-MCNC: 1.8 MG/DL (ref 1.6–2.6)
MCH RBC QN AUTO: 29 PG (ref 26–34)
MCHC RBC AUTO-ENTMCNC: 31.3 G/DL (ref 31–37)
MCV RBC AUTO: 92.6 FL
MONOCYTES # BLD AUTO: 1.17 X10(3) UL (ref 0.1–1)
MONOCYTES NFR BLD AUTO: 8.6 %
NEUTROPHILS # BLD AUTO: 10.92 X10 (3) UL (ref 1.5–7.7)
NEUTROPHILS # BLD AUTO: 10.92 X10(3) UL (ref 1.5–7.7)
NEUTROPHILS NFR BLD AUTO: 80 %
OSMOLALITY SERPL CALC.SUM OF ELEC: 295 MOSM/KG (ref 275–295)
P AXIS: -16 DEGREES
P AXIS: -28 DEGREES
P-R INTERVAL: 158 MS
P-R INTERVAL: 160 MS
P-R INTERVAL: 164 MS
PLATELET # BLD AUTO: 349 10(3)UL (ref 150–450)
POTASSIUM SERPL-SCNC: 3.9 MMOL/L (ref 3.5–5.1)
Q-T INTERVAL: 248 MS
Q-T INTERVAL: 326 MS
Q-T INTERVAL: 412 MS
Q-T INTERVAL: 502 MS
QRS DURATION: 76 MS
QRS DURATION: 76 MS
QRS DURATION: 82 MS
QRS DURATION: 82 MS
QTC CALCULATION (BEZET): 403 MS
QTC CALCULATION (BEZET): 414 MS
QTC CALCULATION (BEZET): 501 MS
QTC CALCULATION (BEZET): 597 MS
R AXIS: 83 DEGREES
R AXIS: 84 DEGREES
R AXIS: 92 DEGREES
R AXIS: 94 DEGREES
RBC # BLD AUTO: 3.79 X10(6)UL
SODIUM SERPL-SCNC: 139 MMOL/L (ref 136–145)
T AXIS: 166 DEGREES
T AXIS: 170 DEGREES
T AXIS: 182 DEGREES
T AXIS: 201 DEGREES
TROPONIN I HIGH SENSITIVITY: 19 NG/L
VENTRICULAR RATE: 168 BPM
VENTRICULAR RATE: 85 BPM
VENTRICULAR RATE: 89 BPM
VENTRICULAR RATE: 92 BPM
WBC # BLD AUTO: 13.7 X10(3) UL (ref 4–11)

## 2022-08-05 PROCEDURE — 99231 SBSQ HOSP IP/OBS SF/LOW 25: CPT | Performed by: PHYSICIAN ASSISTANT

## 2022-08-05 PROCEDURE — 99232 SBSQ HOSP IP/OBS MODERATE 35: CPT | Performed by: HOSPITALIST

## 2022-08-05 RX ORDER — DILTIAZEM HYDROCHLORIDE 180 MG/1
180 CAPSULE, EXTENDED RELEASE ORAL DAILY
Status: DISCONTINUED | OUTPATIENT
Start: 2022-08-05 | End: 2022-08-05

## 2022-08-05 RX ORDER — DILTIAZEM HYDROCHLORIDE 5 MG/ML
INJECTION INTRAVENOUS
Status: DISPENSED
Start: 2022-08-05 | End: 2022-08-06

## 2022-08-05 RX ORDER — MAGNESIUM SULFATE HEPTAHYDRATE 40 MG/ML
2 INJECTION, SOLUTION INTRAVENOUS ONCE
Status: COMPLETED | OUTPATIENT
Start: 2022-08-05 | End: 2022-08-05

## 2022-08-05 RX ORDER — DILTIAZEM HYDROCHLORIDE 180 MG/1
180 CAPSULE, COATED, EXTENDED RELEASE ORAL DAILY
Qty: 30 CAPSULE | Refills: 1 | Status: SHIPPED | OUTPATIENT
Start: 2022-08-05 | End: 2022-08-08

## 2022-08-05 NOTE — PLAN OF CARE
64year old female a/o x 4. Patient still has blood tinged- urine in reilly catheter. X-ray of abdomen and x-ray of left knee were completed today. Patient refused PT because of left leg pain. PRN norco administered today 2 times. Tele Nsr  HAKAN.    Problem: Patient/Family Goals  Goal: Patient/Family Long Term Goal  Description: Patient's Long Term Goal: To go home    Interventions:  -   - See additional Care Plan goals for specific interventions  Outcome: Progressing  Goal: Patient/Family Short Term Goal  Description: Patient's Short Term Goal: to get some sleep    Interventions:   -   - See additional Care Plan goals for specific interventions  Outcome: Progressing

## 2022-08-05 NOTE — PHYSICAL THERAPY NOTE
Attempted to see Pt this PM - RN aware of attempt. Pt reporting high levels of pain in bilateral knees - awaiting MRI. CT completed 8/5/2022 - no acute fracture. Will sign off at this time. RN aware to re-order if POC changes or Pt wishes to participate in skilled PT services this admission. Per OT Eval on 8/1:  Prior Level of Function: Pt reports being DEP for functional transfers at home and ADLs and has a caregiver present from 9-5 daily to assist with these activities. Pt has a werner lift at home, adjustable bed, uses a slide board for transfers. Pt  is present throughout the day to provide assistance as well. '    Pt refused IP PT multiple times this admission d/t high levels of pain. Rec PT/OT evals re-ordered when pain is better managed and Pt wishes to participate in transfer training with use of slide board. (Pt also has werner lift for home use).

## 2022-08-05 NOTE — PLAN OF CARE
Pt A&Ox4 Room Air  Resting in bed  C/o left Knee pain  NSR on Tele  Meds given per Mar  Tess QID No Coverage at this time  Chronic reilly in place   PO Amoxicillin + UTI  Pt refusing Brissa  Safety precaution maintained  Will continue to monitor     Problem: Patient/Family Goals  Goal: Patient/Family Long Term Goal  Description: Patient's Long Term Goal: To go home    Interventions:  -   - See additional Care Plan goals for specific interventions  Outcome: Progressing  Goal: Patient/Family Short Term Goal  Description: Patient's Short Term Goal: to get some sleep    Interventions:   -   - See additional Care Plan goals for specific interventions  Outcome: Progressing     Problem: CARDIOVASCULAR - ADULT  Goal: Maintains optimal cardiac output and hemodynamic stability  Description: INTERVENTIONS:  - Monitor vital signs, rhythm, and trends  - Monitor for bleeding, hypotension and signs of decreased cardiac output  - Evaluate effectiveness of vasoactive medications to optimize hemodynamic stability  - Monitor arterial and/or venous puncture sites for bleeding and/or hematoma  - Assess quality of pulses, skin color and temperature  - Assess for signs of decreased coronary artery perfusion - ex.  Angina  - Evaluate fluid balance, assess for edema, trend weights  Outcome: Progressing  Goal: Absence of cardiac arrhythmias or at baseline  Description: INTERVENTIONS:  - Continuous cardiac monitoring, monitor vital signs, obtain 12 lead EKG if indicated  - Evaluate effectiveness of antiarrhythmic and heart rate control medications as ordered  - Initiate emergency measures for life threatening arrhythmias  - Monitor electrolytes and administer replacement therapy as ordered  Outcome: Progressing     Problem: PAIN - ADULT  Goal: Verbalizes/displays adequate comfort level or patient's stated pain goal  Description: INTERVENTIONS:  - Encourage pt to monitor pain and request assistance  - Assess pain using appropriate pain scale  - Administer analgesics based on type and severity of pain and evaluate response  - Implement non-pharmacological measures as appropriate and evaluate response  - Consider cultural and social influences on pain and pain management  - Manage/alleviate anxiety  - Utilize distraction and/or relaxation techniques  - Monitor for opioid side effects  - Notify MD/LIP if interventions unsuccessful or patient reports new pain  - Anticipate increased pain with activity and pre-medicate as appropriate  Outcome: Progressing     Problem: RISK FOR INFECTION - ADULT  Goal: Absence of fever/infection during anticipated neutropenic period  Description: INTERVENTIONS  - Monitor WBC  - Administer growth factors as ordered  - Implement neutropenic guidelines  Outcome: Progressing     Problem: DISCHARGE PLANNING  Goal: Discharge to home or other facility with appropriate resources  Description: INTERVENTIONS:  - Identify barriers to discharge w/pt and caregiver  - Include patient/family/discharge partner in discharge planning  - Arrange for needed discharge resources and transportation as appropriate  - Identify discharge learning needs (meds, wound care, etc)  - Arrange for interpreters to assist at discharge as needed  - Consider post-discharge preferences of patient/family/discharge partner  - Complete POLST form as appropriate  - Assess patient's ability to be responsible for managing their own health  - Refer to Case Management Department for coordinating discharge planning if the patient needs post-hospital services based on physician/LIP order or complex needs related to functional status, cognitive ability or social support system  Outcome: Progressing

## 2022-08-06 ENCOUNTER — APPOINTMENT (OUTPATIENT)
Dept: MRI IMAGING | Facility: HOSPITAL | Age: 77
End: 2022-08-06
Attending: HOSPITALIST
Payer: MEDICARE

## 2022-08-06 LAB
ATRIAL RATE: 62 BPM
GLUCOSE BLD-MCNC: 118 MG/DL (ref 70–99)
GLUCOSE BLD-MCNC: 148 MG/DL (ref 70–99)
GLUCOSE BLD-MCNC: 162 MG/DL (ref 70–99)
GLUCOSE BLD-MCNC: 200 MG/DL (ref 70–99)
P AXIS: -25 DEGREES
P-R INTERVAL: 162 MS
Q-T INTERVAL: 530 MS
QRS DURATION: 84 MS
QTC CALCULATION (BEZET): 537 MS
R AXIS: 84 DEGREES
T AXIS: 175 DEGREES
VENTRICULAR RATE: 62 BPM

## 2022-08-06 PROCEDURE — 73721 MRI JNT OF LWR EXTRE W/O DYE: CPT | Performed by: HOSPITALIST

## 2022-08-06 PROCEDURE — 99232 SBSQ HOSP IP/OBS MODERATE 35: CPT | Performed by: HOSPITALIST

## 2022-08-06 NOTE — PLAN OF CARE
Assumed patient care at 2100. Patient is A&Ox4. Room air (baseline), currently on 2L NC, desats at night. NSR, on tele. Patient was on Cardizem gtt at 5ml/hr - Infusion stopped at 2330 due to HR on 60's, see MAR. Carb Controlled diet. Accu checks QID. Denies nausea/vomiting/diarrhea. Patient c/o severe pain on bilateral knee when she moves, declined pain medication at this time. Plan for MRI in AM.  UTI (+) and VRE in urine. On amoxicillin PO BID. Contact Isolation in place. PIV Left UA and Right FA - SL. Medicated per MAR. Holm Catheter, intact, draining dark red urine output. Brief in place. Patient is refusing [facility]. Safety precautions in place. All needs met at this time. Problem: Patient/Family Goals  Goal: Patient/Family Long Term Goal  Description: Patient's Long Term Goal: To go home    Interventions:  - MD Consults  - Follow plan of care  - See additional Care Plan goals for specific interventions  Outcome: Progressing  Goal: Patient/Family Short Term Goal  Description: Patient's Short Term Goal: To monitor HR. Interventions:   - Cardizem gtt. - Tele   - See additional Care Plan goals for specific interventions  Outcome: Progressing     Problem: CARDIOVASCULAR - ADULT  Goal: Maintains optimal cardiac output and hemodynamic stability  Description: INTERVENTIONS:  - Monitor vital signs, rhythm, and trends  - Monitor for bleeding, hypotension and signs of decreased cardiac output  - Evaluate effectiveness of vasoactive medications to optimize hemodynamic stability  - Monitor arterial and/or venous puncture sites for bleeding and/or hematoma  - Assess quality of pulses, skin color and temperature  - Assess for signs of decreased coronary artery perfusion - ex.  Angina  - Evaluate fluid balance, assess for edema, trend weights  Outcome: Progressing  Goal: Absence of cardiac arrhythmias or at baseline  Description: INTERVENTIONS:  - Continuous cardiac monitoring, monitor vital signs, obtain 12 lead EKG if indicated  - Evaluate effectiveness of antiarrhythmic and heart rate control medications as ordered  - Initiate emergency measures for life threatening arrhythmias  - Monitor electrolytes and administer replacement therapy as ordered  Outcome: Progressing     Problem: PAIN - ADULT  Goal: Verbalizes/displays adequate comfort level or patient's stated pain goal  Description: INTERVENTIONS:  - Encourage pt to monitor pain and request assistance  - Assess pain using appropriate pain scale  - Administer analgesics based on type and severity of pain and evaluate response  - Implement non-pharmacological measures as appropriate and evaluate response  - Consider cultural and social influences on pain and pain management  - Manage/alleviate anxiety  - Utilize distraction and/or relaxation techniques  - Monitor for opioid side effects  - Notify MD/LIP if interventions unsuccessful or patient reports new pain  - Anticipate increased pain with activity and pre-medicate as appropriate  Outcome: Progressing     Problem: RISK FOR INFECTION - ADULT  Goal: Absence of fever/infection during anticipated neutropenic period  Description: INTERVENTIONS  - Monitor WBC  - Administer growth factors as ordered  - Implement neutropenic guidelines  Outcome: Progressing     Problem: SAFETY ADULT - FALL  Goal: Free from fall injury  Description: INTERVENTIONS:  - Assess pt frequently for physical needs  - Identify cognitive and physical deficits and behaviors that affect risk of falls.   - [place] fall precautions as indicated by assessment.  - Educate pt/family on patient safety including physical limitations  - Instruct pt to call for assistance with activity based on assessment  - Modify environment to reduce risk of injury  - Provide assistive devices as appropriate  - Consider OT/PT consult to assist with strengthening/mobility  - Encourage toileting schedule  Outcome: Progressing     Problem: DISCHARGE PLANNING  Goal: Discharge to home or other facility with appropriate resources  Description: INTERVENTIONS:  - Identify barriers to discharge w/pt and caregiver  - Include patient/family/discharge partner in discharge planning  - Arrange for needed discharge resources and transportation as appropriate  - Identify discharge learning needs (meds, wound care, etc)  - Arrange for interpreters to assist at discharge as needed  - Consider post-discharge preferences of patient/family/discharge partner  - Complete POLST form as appropriate  - Assess patient's ability to be responsible for managing their own health  - Refer to Case Management Department for coordinating discharge planning if the patient needs post-hospital services based on physician/LIP order or complex needs related to functional status, cognitive ability or social support system  Outcome: Progressing

## 2022-08-06 NOTE — PROGRESS NOTES
Assumed care at 0700, A/Ox4. Put on 2LNC during Rapid response for mild SOB. Sats low to mid 90's. Rapid response called for SVT(180's-190's) and bursts of VTach along with ventricular bigeminy. Cardizem drip currently running at 5mg/hr. Declined PT/OT. MRI of right knee still pending.

## 2022-08-06 NOTE — PLAN OF CARE
Received patient this morning at 0730. Patient is A&Ox4  2L NC (on and off) , VSS,     Tele: NSR, SB Heart rate range 55-70. Tylenol given for Left leg pain, lidocaine patch on. Pt had MRI of the left knee around 1610. Holm, anchor in place, hematuria-dark red. Right AC KVO. QID accuchecks. Bed in lowest position, bed alarm on, call light within reach. Problem: Patient/Family Goals  Goal: Patient/Family Long Term Goal  Description: Patient's Long Term Goal: To go home    Interventions:  - MD Consults  - Follow plan of care  - See additional Care Plan goals for specific interventions  Outcome: Progressing  Goal: Patient/Family Short Term Goal  Description: Patient's Short Term Goal: To monitor HR. Interventions:   - Cardizem gtt.    - Tele   - See additional Care Plan goals for specific interventions  Outcome: Progressing

## 2022-08-07 LAB
ATRIAL RATE: 68 BPM
GLUCOSE BLD-MCNC: 113 MG/DL (ref 70–99)
GLUCOSE BLD-MCNC: 116 MG/DL (ref 70–99)
GLUCOSE BLD-MCNC: 119 MG/DL (ref 70–99)
GLUCOSE BLD-MCNC: 147 MG/DL (ref 70–99)
P-R INTERVAL: 156 MS
Q-T INTERVAL: 486 MS
QRS DURATION: 86 MS
QTC CALCULATION (BEZET): 516 MS
R AXIS: 216 DEGREES
T AXIS: 132 DEGREES
VENTRICULAR RATE: 68 BPM

## 2022-08-07 PROCEDURE — 99232 SBSQ HOSP IP/OBS MODERATE 35: CPT | Performed by: HOSPITALIST

## 2022-08-07 NOTE — PLAN OF CARE
A0 X3, denies pain   On Room air. Normal sinus on tele  Able to roll side to side. Incontinent  Holm intact with hematuria  Poor appetite with meals. Needs encouragement  Contact isolation for VRE   All needs met at this time  Will cont to monitor       Problem: Patient/Family Goals  Goal: Patient/Family Long Term Goal  Description: Patient's Long Term Goal: To go home    Interventions:  - MD Consults  - Follow plan of care  - See additional Care Plan goals for specific interventions  Outcome: Progressing  Goal: Patient/Family Short Term Goal  Description: Patient's Short Term Goal: To monitor HR. Interventions:   - Cardizem gtt. - Tele   - See additional Care Plan goals for specific interventions  Outcome: Progressing     Problem: CARDIOVASCULAR - ADULT  Goal: Maintains optimal cardiac output and hemodynamic stability  Description: INTERVENTIONS:  - Monitor vital signs, rhythm, and trends  - Monitor for bleeding, hypotension and signs of decreased cardiac output  - Evaluate effectiveness of vasoactive medications to optimize hemodynamic stability  - Monitor arterial and/or venous puncture sites for bleeding and/or hematoma  - Assess quality of pulses, skin color and temperature  - Assess for signs of decreased coronary artery perfusion - ex.  Angina  - Evaluate fluid balance, assess for edema, trend weights  Outcome: Progressing  Goal: Absence of cardiac arrhythmias or at baseline  Description: INTERVENTIONS:  - Continuous cardiac monitoring, monitor vital signs, obtain 12 lead EKG if indicated  - Evaluate effectiveness of antiarrhythmic and heart rate control medications as ordered  - Initiate emergency measures for life threatening arrhythmias  - Monitor electrolytes and administer replacement therapy as ordered  Outcome: Progressing     Problem: PAIN - ADULT  Goal: Verbalizes/displays adequate comfort level or patient's stated pain goal  Description: INTERVENTIONS:  - Encourage pt to monitor pain and request assistance  - Assess pain using appropriate pain scale  - Administer analgesics based on type and severity of pain and evaluate response  - Implement non-pharmacological measures as appropriate and evaluate response  - Consider cultural and social influences on pain and pain management  - Manage/alleviate anxiety  - Utilize distraction and/or relaxation techniques  - Monitor for opioid side effects  - Notify MD/LIP if interventions unsuccessful or patient reports new pain  - Anticipate increased pain with activity and pre-medicate as appropriate  Outcome: Progressing     Problem: RISK FOR INFECTION - ADULT  Goal: Absence of fever/infection during anticipated neutropenic period  Description: INTERVENTIONS  - Monitor WBC  - Administer growth factors as ordered  - Implement neutropenic guidelines  Outcome: Progressing     Problem: SAFETY ADULT - FALL  Goal: Free from fall injury  Description: INTERVENTIONS:  - Assess pt frequently for physical needs  - Identify cognitive and physical deficits and behaviors that affect risk of falls.   - [place] fall precautions as indicated by assessment.  - Educate pt/family on patient safety including physical limitations  - Instruct pt to call for assistance with activity based on assessment  - Modify environment to reduce risk of injury  - Provide assistive devices as appropriate  - Consider OT/PT consult to assist with strengthening/mobility  - Encourage toileting schedule  Outcome: Progressing     Problem: DISCHARGE PLANNING  Goal: Discharge to home or other facility with appropriate resources  Description: INTERVENTIONS:  - Identify barriers to discharge w/pt and caregiver  - Include patient/family/discharge partner in discharge planning  - Arrange for needed discharge resources and transportation as appropriate  - Identify discharge learning needs (meds, wound care, etc)  - Arrange for interpreters to assist at discharge as needed  - Consider post-discharge preferences of patient/family/discharge partner  - Complete POLST form as appropriate  - Assess patient's ability to be responsible for managing their own health  - Refer to Case Management Department for coordinating discharge planning if the patient needs post-hospital services based on physician/LIP order or complex needs related to functional status, cognitive ability or social support system  Outcome: Progressing

## 2022-08-07 NOTE — PLAN OF CARE
Assumed patient care at [address] [place] [address]. Patient is A&Ox4. On 2L NC, desats at night. NSR, on tele. HR 60's. Carb Controlled diet. Accu checks QID. Denies nausea/vomiting/diarrhea/pain. UTI (+) and VRE in urine. On amoxicillin PO BID. PIV Left UA- SL. PIV Right AC -Kvngharis Olson. Medicated per MAR. Holm Catheter, intact, draining dark red urine output. Brief in place. Patient is refusing CAROL. Contact Isolation and Safety precautions in place. All needs met at this time. Problem: Patient/Family Goals  Goal: Patient/Family Long Term Goal  Description: Patient's Long Term Goal: To go home    Interventions:  - MD Consults  - Follow plan of care  - See additional Care Plan goals for specific interventions  Outcome: Progressing  Goal: Patient/Family Short Term Goal  Description: Patient's Short Term Goal: To monitor HR. Interventions:   - Cardizem gtt. - Tele   - See additional Care Plan goals for specific interventions  Outcome: Progressing     Problem: CARDIOVASCULAR - ADULT  Goal: Maintains optimal cardiac output and hemodynamic stability  Description: INTERVENTIONS:  - Monitor vital signs, rhythm, and trends  - Monitor for bleeding, hypotension and signs of decreased cardiac output  - Evaluate effectiveness of vasoactive medications to optimize hemodynamic stability  - Monitor arterial and/or venous puncture sites for bleeding and/or hematoma  - Assess quality of pulses, skin color and temperature  - Assess for signs of decreased coronary artery perfusion - ex.  Angina  - Evaluate fluid balance, assess for edema, trend weights  Outcome: Progressing  Goal: Absence of cardiac arrhythmias or at baseline  Description: INTERVENTIONS:  - Continuous cardiac monitoring, monitor vital signs, obtain 12 lead EKG if indicated  - Evaluate effectiveness of antiarrhythmic and heart rate control medications as ordered  - Initiate emergency measures for life threatening arrhythmias  - Monitor electrolytes and administer replacement therapy as ordered  Outcome: Progressing     Problem: PAIN - ADULT  Goal: Verbalizes/displays adequate comfort level or patient's stated pain goal  Description: INTERVENTIONS:  - Encourage pt to monitor pain and request assistance  - Assess pain using appropriate pain scale  - Administer analgesics based on type and severity of pain and evaluate response  - Implement non-pharmacological measures as appropriate and evaluate response  - Consider cultural and social influences on pain and pain management  - Manage/alleviate anxiety  - Utilize distraction and/or relaxation techniques  - Monitor for opioid side effects  - Notify MD/LIP if interventions unsuccessful or patient reports new pain  - Anticipate increased pain with activity and pre-medicate as appropriate  Outcome: Progressing     Problem: RISK FOR INFECTION - ADULT  Goal: Absence of fever/infection during anticipated neutropenic period  Description: INTERVENTIONS  - Monitor WBC  - Administer growth factors as ordered  - Implement neutropenic guidelines  Outcome: Progressing     Problem: SAFETY ADULT - FALL  Goal: Free from fall injury  Description: INTERVENTIONS:  - Assess pt frequently for physical needs  - Identify cognitive and physical deficits and behaviors that affect risk of falls.   - [place] fall precautions as indicated by assessment.  - Educate pt/family on patient safety including physical limitations  - Instruct pt to call for assistance with activity based on assessment  - Modify environment to reduce risk of injury  - Provide assistive devices as appropriate  - Consider OT/PT consult to assist with strengthening/mobility  - Encourage toileting schedule  Outcome: Progressing     Problem: DISCHARGE PLANNING  Goal: Discharge to home or other facility with appropriate resources  Description: INTERVENTIONS:  - Identify barriers to discharge w/pt and caregiver  - Include patient/family/discharge partner in discharge planning  - Arrange for needed discharge resources and transportation as appropriate  - Identify discharge learning needs (meds, wound care, etc)  - Arrange for interpreters to assist at discharge as needed  - Consider post-discharge preferences of patient/family/discharge partner  - Complete POLST form as appropriate  - Assess patient's ability to be responsible for managing their own health  - Refer to Case Management Department for coordinating discharge planning if the patient needs post-hospital services based on physician/LIP order or complex needs related to functional status, cognitive ability or social support system  Outcome: Progressing

## 2022-08-08 ENCOUNTER — TELEPHONE (OUTPATIENT)
Dept: SURGERY | Facility: CLINIC | Age: 77
End: 2022-08-08

## 2022-08-08 LAB
ATRIAL RATE: 61 BPM
GLUCOSE BLD-MCNC: 113 MG/DL (ref 70–99)
GLUCOSE BLD-MCNC: 137 MG/DL (ref 70–99)
GLUCOSE BLD-MCNC: 143 MG/DL (ref 70–99)
GLUCOSE BLD-MCNC: 145 MG/DL (ref 70–99)
P AXIS: 3 DEGREES
P-R INTERVAL: 162 MS
Q-T INTERVAL: 510 MS
QRS DURATION: 86 MS
QTC CALCULATION (BEZET): 513 MS
R AXIS: 74 DEGREES
T AXIS: 150 DEGREES
VENTRICULAR RATE: 61 BPM

## 2022-08-08 PROCEDURE — 99231 SBSQ HOSP IP/OBS SF/LOW 25: CPT | Performed by: UROLOGY

## 2022-08-08 PROCEDURE — 99232 SBSQ HOSP IP/OBS MODERATE 35: CPT | Performed by: HOSPITALIST

## 2022-08-08 NOTE — PLAN OF CARE
Assumed patient care at [address] [place] [address]. Patient is A&Ox4. On room air, denies SOB. NSR/SB, on tele. HR 50-60's. Carb Controlled diet. Accu checks QID. Denies nausea/vomiting/diarrhea/pain. UTI (+) and VRE in urine. On amoxicillin PO BID. PIV Left UA- SL. PIV Right AC -Roz Temple. Medicated per MAR. Holm Catheter, intact, draining dark red urine output. Brief in place. Patient is refusing CAROL. Contact Isolation and Safety precautions in place. All needs met at this time. Problem: Patient/Family Goals  Goal: Patient/Family Long Term Goal  Description: Patient's Long Term Goal: To go home    Interventions:  - MD Consults  - Follow plan of care  - See additional Care Plan goals for specific interventions  Outcome: Progressing  Goal: Patient/Family Short Term Goal  Description: Patient's Short Term Goal: To monitor HR. Interventions:   - Cardizem gtt. - Tele   - See additional Care Plan goals for specific interventions  Outcome: Progressing     Problem: CARDIOVASCULAR - ADULT  Goal: Maintains optimal cardiac output and hemodynamic stability  Description: INTERVENTIONS:  - Monitor vital signs, rhythm, and trends  - Monitor for bleeding, hypotension and signs of decreased cardiac output  - Evaluate effectiveness of vasoactive medications to optimize hemodynamic stability  - Monitor arterial and/or venous puncture sites for bleeding and/or hematoma  - Assess quality of pulses, skin color and temperature  - Assess for signs of decreased coronary artery perfusion - ex.  Angina  - Evaluate fluid balance, assess for edema, trend weights  Outcome: Progressing  Goal: Absence of cardiac arrhythmias or at baseline  Description: INTERVENTIONS:  - Continuous cardiac monitoring, monitor vital signs, obtain 12 lead EKG if indicated  - Evaluate effectiveness of antiarrhythmic and heart rate control medications as ordered  - Initiate emergency measures for life threatening arrhythmias  - Monitor electrolytes and administer replacement therapy as ordered  Outcome: Progressing     Problem: PAIN - ADULT  Goal: Verbalizes/displays adequate comfort level or patient's stated pain goal  Description: INTERVENTIONS:  - Encourage pt to monitor pain and request assistance  - Assess pain using appropriate pain scale  - Administer analgesics based on type and severity of pain and evaluate response  - Implement non-pharmacological measures as appropriate and evaluate response  - Consider cultural and social influences on pain and pain management  - Manage/alleviate anxiety  - Utilize distraction and/or relaxation techniques  - Monitor for opioid side effects  - Notify MD/LIP if interventions unsuccessful or patient reports new pain  - Anticipate increased pain with activity and pre-medicate as appropriate  Outcome: Progressing     Problem: RISK FOR INFECTION - ADULT  Goal: Absence of fever/infection during anticipated neutropenic period  Description: INTERVENTIONS  - Monitor WBC  - Administer growth factors as ordered  - Implement neutropenic guidelines  Outcome: Progressing     Problem: SAFETY ADULT - FALL  Goal: Free from fall injury  Description: INTERVENTIONS:  - Assess pt frequently for physical needs  - Identify cognitive and physical deficits and behaviors that affect risk of falls.   - [place] fall precautions as indicated by assessment.  - Educate pt/family on patient safety including physical limitations  - Instruct pt to call for assistance with activity based on assessment  - Modify environment to reduce risk of injury  - Provide assistive devices as appropriate  - Consider OT/PT consult to assist with strengthening/mobility  - Encourage toileting schedule  Outcome: Progressing     Problem: DISCHARGE PLANNING  Goal: Discharge to home or other facility with appropriate resources  Description: INTERVENTIONS:  - Identify barriers to discharge w/pt and caregiver  - Include patient/family/discharge partner in discharge planning  - Arrange for needed discharge resources and transportation as appropriate  - Identify discharge learning needs (meds, wound care, etc)  - Arrange for interpreters to assist at discharge as needed  - Consider post-discharge preferences of patient/family/discharge partner  - Complete POLST form as appropriate  - Assess patient's ability to be responsible for managing their own health  - Refer to Case Management Department for coordinating discharge planning if the patient needs post-hospital services based on physician/LIP order or complex needs related to functional status, cognitive ability or social support system  Outcome: Progressing

## 2022-08-08 NOTE — CM/SW NOTE
9:24am  MSW met with pt, we discussed that MSW can arrange medicar when pt ready to dc. She does not wear home 02, Rn will try to wean pt. Pt states her spouse has HD today, MSW states she can set up DC after his HD if pt is dc today. Pt wants to dc tues. Pt still declines CAROL, she only wants HHC. IM provided to pt.    2:31pm  MSW spoke to pt and she states she completed medicare appeal. MD puente, DND, IM and F/S sent to Medical records and [facility]..  Pt already provided IM this morning, DND provided by MSW at 3:10pm.

## 2022-08-08 NOTE — CM/SW NOTE
Care Progression Note:  Length of stay: 10  GMLOS: 2.4  Avoidable Delays:   Code Status: full    Acute Medical Issue/Factors:   Atrial fibrillation with rapid ventricular response [facility])       Discharge Barriers: cleared for discharge  Expected discharge date: today  Expected next site of care: therapy recommending CAROL patient declining. Home with [facility]    / available for discharge planning.

## 2022-08-08 NOTE — TELEPHONE ENCOUNTER
Patient needs a follow-up with Dr. Mo Pretty or myself about 10 to 14 days.   She needs to have a family member present so we can figure out how she got this right ureteral stent placed and when

## 2022-08-08 NOTE — TELEPHONE ENCOUNTER
This encounter is now closed. Patient is on the schedule on 8/11 with Dr Cody Emerson. Note, she was at [place] [address] 7/29 - 7/30 d/t A fib with RVR, and dislodged rt ureteral stent    * RN faxed the request of medical records from [place] [address] Records at [phone]    RN also mailed the Authorization RHI to patient to review, sign and return to office.

## 2022-08-09 VITALS
RESPIRATION RATE: 16 BRPM | OXYGEN SATURATION: 97 % | DIASTOLIC BLOOD PRESSURE: 84 MMHG | WEIGHT: 201 LBS | SYSTOLIC BLOOD PRESSURE: 162 MMHG | TEMPERATURE: 98 F | HEART RATE: 67 BPM | BODY MASS INDEX: 30 KG/M2

## 2022-08-09 LAB — GLUCOSE BLD-MCNC: 118 MG/DL (ref 70–99)

## 2022-08-09 PROCEDURE — 99239 HOSP IP/OBS DSCHRG MGMT >30: CPT | Performed by: HOSPITALIST

## 2022-08-09 NOTE — CM/SW NOTE
08/09/22 1000   Discharge disposition   Expected discharge disposition Home-Health   Post Acute Care Provider [facility]idential   Discharge transportation [facility]     PT told RN she wants to dc,  Present.  Appeal pending  DC Time: 1pm

## 2022-08-09 NOTE — PLAN OF CARE
Problem: Patient/Family Goals  Goal: Patient/Family Long Term Goal  Description: Patient's Long Term Goal: To go home    Interventions:  - MD Consults  - Follow plan of care  - See additional Care Plan goals for specific interventions  Outcome: Progressing     Problem: CARDIOVASCULAR - ADULT  Goal: Maintains optimal cardiac output and hemodynamic stability  Description: INTERVENTIONS:  - Monitor vital signs, rhythm, and trends  - Monitor for bleeding, hypotension and signs of decreased cardiac output  - Evaluate effectiveness of vasoactive medications to optimize hemodynamic stability  - Monitor arterial and/or venous puncture sites for bleeding and/or hematoma  - Assess quality of pulses, skin color and temperature  - Assess for signs of decreased coronary artery perfusion - ex. Angina  - Evaluate fluid balance, assess for edema, trend weights  Outcome: Progressing     Problem: PAIN - ADULT  Goal: Verbalizes/displays adequate comfort level or patient's stated pain goal  Description: INTERVENTIONS:  - Encourage pt to monitor pain and request assistance  - Assess pain using appropriate pain scale  - Administer analgesics based on type and severity of pain and evaluate response  - Implement non-pharmacological measures as appropriate and evaluate response  - Consider cultural and social influences on pain and pain management  - Manage/alleviate anxiety  - Utilize distraction and/or relaxation techniques  - Monitor for opioid side effects  - Notify MD/LIP if interventions unsuccessful or patient reports new pain  - Anticipate increased pain with activity and pre-medicate as appropriate  Outcome: Progressing  Note: Patient has no complaints of pain thus far in shift, RN will continue to reassess for pain.

## 2022-08-10 ENCOUNTER — TELEPHONE (OUTPATIENT)
Dept: INTERNAL MEDICINE CLINIC | Facility: CLINIC | Age: 77
End: 2022-08-10

## 2022-08-10 ENCOUNTER — PATIENT OUTREACH (OUTPATIENT)
Dept: CASE MANAGEMENT | Age: 77
End: 2022-08-10

## 2022-08-10 DIAGNOSIS — Z02.9 ENCOUNTERS FOR UNSPECIFIED ADMINISTRATIVE PURPOSE: ICD-10-CM

## 2022-08-10 NOTE — TELEPHONE ENCOUNTER
Spoke to pt for TCM today. Pt does not have HFU appt scheduled at this time. Pt declined an appt when offered. TCM/HFU appt recommended by 8/16/22 as pt is a high risk for readmission. Please advise. BOOK BY DATE (last date for TCM): 8/23/22    Clinical staff:  Please f/u with pt and try to get them to schedule as pt would greatly benefit from TCM/HFU appt. Thank you!

## 2022-08-11 NOTE — TELEPHONE ENCOUNTER
Attempted to reach patient by phone, no answer. Unable to leave voicemail, will try to reach pt again.

## 2022-08-12 ENCOUNTER — TELEPHONE (OUTPATIENT)
Dept: INTERNAL MEDICINE CLINIC | Facility: CLINIC | Age: 77
End: 2022-08-12

## 2022-08-12 DIAGNOSIS — N30.01 ACUTE CYSTITIS WITH HEMATURIA: Primary | ICD-10-CM

## 2022-08-12 NOTE — TELEPHONE ENCOUNTER
Mamta physical therapist with [facility] stated she completed pt evaluation today with pt. Per Diya Miranda, pt stated \"I do not want to do rehab\" and is not motivated for physical therapy. Diya Miranda stated pt heart rate today was 47. Per Diya Miranda, she thinks pt would benefit from skilled nursing facility. Diya Miranda stated that pt missed her urology appointment yesterday due to transportation issues and pt caregiver company ended up calling [phone] to get pt into the chair in their home instead of helping pt themselves.      Diya Miranda [phone]

## 2022-08-12 NOTE — TELEPHONE ENCOUNTER
Orders faxed to [facility], confirmation recieved. LVM with nurse Roland to collect urine orders.

## 2022-08-12 NOTE — TELEPHONE ENCOUNTER
Spoke to  with [facility], she states patient is now requiring a werner lift. Refusing PT/OT, does not want to go to rehab.  stating pt needs a skilled facility. Vitals: HR 47, B/P 100/60, O2 98%, T was normal but patient was drinking cold beverage. No SOB per Ally. Patient appeared pale, tired. Holm in place with maroon colored output. No clots. Spoke to TO about it, TO wants to do U/A culture. Spoke to patient patient states she feels fine, a little weakness and fatigue. She is eating and drinking okay. She confirmed she is taking her antibiotics. Urology appt rescheduled for 9/13/22. Advised patient to proceed to ER if feels worse. Patient verbalized understanding. Attempted to call [facility] to have a nurse collect urine sample. Voicemail left.

## 2022-08-15 ENCOUNTER — TELEPHONE (OUTPATIENT)
Dept: INTERNAL MEDICINE CLINIC | Facility: CLINIC | Age: 77
End: 2022-08-15

## 2022-08-15 ENCOUNTER — LAB ENCOUNTER (OUTPATIENT)
Dept: LAB | Age: 77
End: 2022-08-15
Attending: FAMILY MEDICINE
Payer: MEDICARE

## 2022-08-15 NOTE — TELEPHONE ENCOUNTER
Reece-nurse/[facility] calling to update Dr. Ariane Cameron. He was scheduled to see patient tomorrow but saw patient today due to increased blood in urine. Other than being very sleepy patient is feeling fine. He states output is good, it was 400cc this morning. Urine is very dark. Family states urine has been getting worse in the last 4 days. He collected urine and just dropped off [facility] [place]. He states if any additional orders his voicemail is hippa secure ok to leave orders on voicemail if necessary or fax is ok as well.

## 2022-08-16 ENCOUNTER — TELEPHONE (OUTPATIENT)
Dept: INTERNAL MEDICINE CLINIC | Facility: CLINIC | Age: 77
End: 2022-08-16

## 2022-08-16 RX ORDER — TAMSULOSIN HYDROCHLORIDE 0.4 MG/1
1 CAPSULE ORAL DAILY
Status: ON HOLD | COMMUNITY
Start: 2022-07-24

## 2022-08-16 RX ORDER — ONDANSETRON 4 MG/1
1 TABLET, ORALLY DISINTEGRATING ORAL AS NEEDED
Status: ON HOLD | COMMUNITY
Start: 2022-07-24

## 2022-08-16 NOTE — TELEPHONE ENCOUNTER
Spoke to Juan Jose relayed SM recommendations. Informed him that we do not have the culture results yet unable to prescribe anything. Will inform on TO recommendation and VO once given.

## 2022-08-16 NOTE — TELEPHONE ENCOUNTER
Reviewed medications, Placed checks next to meds on list we have on the chart. Medications not entered where:    Amoiodarone 200 mg BID  Diltiazem 180 mg daily  Gabapentin 300 mg Caps. 1 capsule BID  Meclizine 25 mg 1 tab. TID for 7 days    Medications in Epic that were not on medication list brought in:    Amlodipine 5 mg  Oxybutynin 5 mg    Please review list/epic, placed in TO basket.

## 2022-08-16 NOTE — TELEPHONE ENCOUNTER
Rajesh Mccall, [facility]  Ph - [phone]    Currently with gross hematuria in reilly, small diffused clots/matter visible in bag. Family reports last night approx 9p.m. pt was hypertensive, up to 410'Z systolic with intermittent confusion. Asymptomatic now.     TO - please review urine cx result and advise    Rajesh Mccall needs VO for one PRN visit - OK to leave VO on secure voicemail if no answer

## 2022-08-17 ENCOUNTER — TELEPHONE (OUTPATIENT)
Dept: INTERNAL MEDICINE CLINIC | Facility: CLINIC | Age: 77
End: 2022-08-17

## 2022-08-17 NOTE — TELEPHONE ENCOUNTER
Spoke to patient's  Cat Kwon. He states whatever is not on the top section of the list patient is not taking. Spoke to TO, he states he updated patient's med list. He is okay with the list patient's  provided. Chelsea Watson made aware. Asking that we send refills if due to [facility].

## 2022-08-17 NOTE — TELEPHONE ENCOUNTER
The med list pt supplied does not have the amlodipine    it was replaced by diltaizem  Also the oxybutynin is not on the list    was it stopped?

## 2022-08-17 NOTE — TELEPHONE ENCOUNTER
Per Meds last fill dates, no refills needed.    Oxybutynin 5 mg, was not at top of the list, per  if it is not on the top of the list is not taking

## 2022-08-17 NOTE — TELEPHONE ENCOUNTER
[id number] Marycarmen Martinez orders to #[phone]. Signed and dated by Dr. Ric Allen 8/17/2022. Order #[id number].

## 2022-08-18 RX ORDER — CEPHALEXIN 500 MG/1
500 CAPSULE ORAL 2 TIMES DAILY
Qty: 14 CAPSULE | Refills: 0 | Status: SHIPPED | OUTPATIENT
Start: 2022-08-18 | End: 2022-08-25

## 2022-08-18 NOTE — TELEPHONE ENCOUNTER
Its hard to say exactly what is going on w her    will try keflex 500mg BID for 7 d even though UC was negative       if s/s do get worse, ER jamar seems appropriate

## 2022-08-18 NOTE — TELEPHONE ENCOUNTER
Elizabeth Main from [address] [facility] stating he received a call from patient's  that her reilly became occluded and was having leaking around the reilly. Luan sent patient to the ER giving worsening symptoms and this happening a recently.

## 2022-08-18 NOTE — TELEPHONE ENCOUNTER
[facility] notified of TO message below. LMTCB on patient's phone.

## 2022-08-18 NOTE — TELEPHONE ENCOUNTER
Luan from [facility] health called gain.  did not send patient to ER as advised. Per Sabrina Jara  stated reilly tube was kinked is now flowing. Patient's urine is still red and she has intermittent confusion.  Do you recommend ER?

## 2022-08-19 ENCOUNTER — HOSPITAL ENCOUNTER (INPATIENT)
Facility: HOSPITAL | Age: 77
LOS: 6 days | Discharge: HOME HEALTH | End: 2022-08-25
Attending: EMERGENCY MEDICINE | Admitting: HOSPITALIST
Payer: MEDICARE

## 2022-08-19 ENCOUNTER — APPOINTMENT (OUTPATIENT)
Dept: GENERAL RADIOLOGY | Facility: HOSPITAL | Age: 77
End: 2022-08-19
Attending: UROLOGY
Payer: MEDICARE

## 2022-08-19 ENCOUNTER — APPOINTMENT (OUTPATIENT)
Dept: GENERAL RADIOLOGY | Facility: HOSPITAL | Age: 77
DRG: 660 | End: 2022-08-19
Attending: UROLOGY
Payer: MEDICARE

## 2022-08-19 ENCOUNTER — HOSPITAL ENCOUNTER (INPATIENT)
Facility: HOSPITAL | Age: 77
LOS: 6 days | Discharge: HOME HEALTH | DRG: 660 | End: 2022-08-25
Attending: EMERGENCY MEDICINE | Admitting: HOSPITALIST
Payer: MEDICARE

## 2022-08-19 DIAGNOSIS — Z96.0 RETAINED URETERAL STENT: ICD-10-CM

## 2022-08-19 DIAGNOSIS — N20.0 NEPHROLITHIASIS: ICD-10-CM

## 2022-08-19 DIAGNOSIS — N39.0 URINARY TRACT INFECTION WITH HEMATURIA, SITE UNSPECIFIED: ICD-10-CM

## 2022-08-19 DIAGNOSIS — D64.9 ANEMIA, UNSPECIFIED TYPE: ICD-10-CM

## 2022-08-19 DIAGNOSIS — R31.9 HEMATURIA, UNSPECIFIED TYPE: Primary | ICD-10-CM

## 2022-08-19 DIAGNOSIS — N13.30 HYDRONEPHROSIS, UNSPECIFIED HYDRONEPHROSIS TYPE: ICD-10-CM

## 2022-08-19 DIAGNOSIS — R31.9 URINARY TRACT INFECTION WITH HEMATURIA, SITE UNSPECIFIED: ICD-10-CM

## 2022-08-19 LAB
ALBUMIN SERPL-MCNC: 2.2 G/DL (ref 3.4–5)
ALBUMIN/GLOB SERPL: 0.7 {RATIO} (ref 1–2)
ALP LIVER SERPL-CCNC: 100 U/L
ALT SERPL-CCNC: 16 U/L
ANION GAP SERPL CALC-SCNC: 2 MMOL/L (ref 0–18)
APTT PPP: 42.4 SECONDS (ref 23.3–35.6)
AST SERPL-CCNC: 16 U/L (ref 15–37)
BASOPHILS # BLD AUTO: 0.03 X10(3) UL (ref 0–0.2)
BASOPHILS NFR BLD AUTO: 0.3 %
BILIRUB SERPL-MCNC: 0.3 MG/DL (ref 0.1–2)
BILIRUB UR QL STRIP.AUTO: NEGATIVE
BUN BLD-MCNC: 31 MG/DL (ref 7–18)
CALCIUM BLD-MCNC: 8.6 MG/DL (ref 8.5–10.1)
CHLORIDE SERPL-SCNC: 109 MMOL/L (ref 98–112)
CO2 SERPL-SCNC: 28 MMOL/L (ref 21–32)
CREAT BLD-MCNC: 1.77 MG/DL
EOSINOPHIL # BLD AUTO: 0.15 X10(3) UL (ref 0–0.7)
EOSINOPHIL NFR BLD AUTO: 1.5 %
ERYTHROCYTE [DISTWIDTH] IN BLOOD BY AUTOMATED COUNT: 14.9 %
GFR SERPLBLD BASED ON 1.73 SQ M-ARVRAT: 29 ML/MIN/1.73M2 (ref 60–?)
GLOBULIN PLAS-MCNC: 3.2 G/DL (ref 2.8–4.4)
GLUCOSE BLD-MCNC: 102 MG/DL (ref 70–99)
GLUCOSE BLD-MCNC: 85 MG/DL (ref 70–99)
GLUCOSE UR STRIP.AUTO-MCNC: NEGATIVE MG/DL
HCT VFR BLD AUTO: 27.2 %
HGB BLD-MCNC: 8.4 G/DL
IMM GRANULOCYTES # BLD AUTO: 0.05 X10(3) UL (ref 0–1)
IMM GRANULOCYTES NFR BLD: 0.5 %
INR BLD: 2.26 (ref 0.85–1.16)
KETONES UR STRIP.AUTO-MCNC: NEGATIVE MG/DL
LYMPHOCYTES # BLD AUTO: 1.94 X10(3) UL (ref 1–4)
LYMPHOCYTES NFR BLD AUTO: 19.5 %
MCH RBC QN AUTO: 28.1 PG (ref 26–34)
MCHC RBC AUTO-ENTMCNC: 30.9 G/DL (ref 31–37)
MCV RBC AUTO: 91 FL
MONOCYTES # BLD AUTO: 0.84 X10(3) UL (ref 0.1–1)
MONOCYTES NFR BLD AUTO: 8.4 %
NEUTROPHILS # BLD AUTO: 6.95 X10 (3) UL (ref 1.5–7.7)
NEUTROPHILS # BLD AUTO: 6.95 X10(3) UL (ref 1.5–7.7)
NEUTROPHILS NFR BLD AUTO: 69.8 %
NITRITE UR QL STRIP.AUTO: NEGATIVE
OSMOLALITY SERPL CALC.SUM OF ELEC: 295 MOSM/KG (ref 275–295)
PH UR STRIP.AUTO: 6 [PH] (ref 5–8)
PLATELET # BLD AUTO: 372 10(3)UL (ref 150–450)
POTASSIUM SERPL-SCNC: 4.1 MMOL/L (ref 3.5–5.1)
PROT SERPL-MCNC: 5.4 G/DL (ref 6.4–8.2)
PROT UR STRIP.AUTO-MCNC: NEGATIVE MG/DL
PROTHROMBIN TIME: 24.7 SECONDS (ref 11.6–14.8)
RBC # BLD AUTO: 2.99 X10(6)UL
RBC #/AREA URNS AUTO: >10 /HPF
SARS-COV-2 RNA RESP QL NAA+PROBE: NOT DETECTED
SODIUM SERPL-SCNC: 139 MMOL/L (ref 136–145)
SP GR UR STRIP.AUTO: 1 (ref 1–1.03)
UROBILINOGEN UR STRIP.AUTO-MCNC: <2 MG/DL
WBC # BLD AUTO: 10 X10(3) UL (ref 4–11)

## 2022-08-19 PROCEDURE — 99223 1ST HOSP IP/OBS HIGH 75: CPT | Performed by: HOSPITALIST

## 2022-08-19 RX ORDER — NICOTINE POLACRILEX 4 MG
30 LOZENGE BUCCAL
Status: DISCONTINUED | OUTPATIENT
Start: 2022-08-19 | End: 2022-08-25

## 2022-08-19 RX ORDER — METOCLOPRAMIDE HYDROCHLORIDE 5 MG/ML
5 INJECTION INTRAMUSCULAR; INTRAVENOUS EVERY 8 HOURS PRN
Status: DISCONTINUED | OUTPATIENT
Start: 2022-08-19 | End: 2022-08-25

## 2022-08-19 RX ORDER — ONDANSETRON 2 MG/ML
4 INJECTION INTRAMUSCULAR; INTRAVENOUS EVERY 4 HOURS PRN
Status: DISCONTINUED | OUTPATIENT
Start: 2022-08-19 | End: 2022-08-19 | Stop reason: ALTCHOICE

## 2022-08-19 RX ORDER — DEXTROSE MONOHYDRATE 25 G/50ML
50 INJECTION, SOLUTION INTRAVENOUS
Status: DISCONTINUED | OUTPATIENT
Start: 2022-08-19 | End: 2022-08-25

## 2022-08-19 RX ORDER — METOPROLOL SUCCINATE 100 MG/1
200 TABLET, EXTENDED RELEASE ORAL
Status: DISCONTINUED | OUTPATIENT
Start: 2022-08-20 | End: 2022-08-25

## 2022-08-19 RX ORDER — POLYETHYLENE GLYCOL 3350 17 G/17G
17 POWDER, FOR SOLUTION ORAL DAILY PRN
Status: DISCONTINUED | OUTPATIENT
Start: 2022-08-19 | End: 2022-08-25

## 2022-08-19 RX ORDER — ACETAMINOPHEN 500 MG
1000 TABLET ORAL EVERY 4 HOURS PRN
Status: DISCONTINUED | OUTPATIENT
Start: 2022-08-19 | End: 2022-08-25

## 2022-08-19 RX ORDER — ECHINACEA PURPUREA EXTRACT 125 MG
1 TABLET ORAL
Status: DISCONTINUED | OUTPATIENT
Start: 2022-08-19 | End: 2022-08-25

## 2022-08-19 RX ORDER — OXYBUTYNIN CHLORIDE 5 MG/1
5 TABLET ORAL 2 TIMES DAILY
Qty: 180 TABLET | Refills: 0 | Status: SHIPPED | OUTPATIENT
Start: 2022-08-19 | End: 2023-03-19

## 2022-08-19 RX ORDER — GABAPENTIN 300 MG/1
300 CAPSULE ORAL 2 TIMES DAILY
Status: DISCONTINUED | OUTPATIENT
Start: 2022-08-19 | End: 2022-08-25

## 2022-08-19 RX ORDER — CEPHALEXIN 500 MG/1
500 CAPSULE ORAL 2 TIMES DAILY
Qty: 14 CAPSULE | Refills: 0 | OUTPATIENT
Start: 2022-08-19 | End: 2022-08-26

## 2022-08-19 RX ORDER — GABAPENTIN 300 MG/1
300 CAPSULE ORAL 2 TIMES DAILY
Qty: 180 CAPSULE | Refills: 0 | Status: SHIPPED | OUTPATIENT
Start: 2022-08-19 | End: 2023-09-05

## 2022-08-19 RX ORDER — ROSUVASTATIN CALCIUM 5 MG/1
5 TABLET, COATED ORAL EVERY OTHER DAY
Status: DISCONTINUED | OUTPATIENT
Start: 2022-08-19 | End: 2022-08-25

## 2022-08-19 RX ORDER — MAGNESIUM HYDROXIDE 1200 MG/15ML
3000 LIQUID ORAL CONTINUOUS
Status: DISCONTINUED | OUTPATIENT
Start: 2022-08-19 | End: 2022-08-25

## 2022-08-19 RX ORDER — GARLIC EXTRACT 500 MG
1 CAPSULE ORAL DAILY
Status: DISCONTINUED | OUTPATIENT
Start: 2022-08-19 | End: 2022-08-25

## 2022-08-19 RX ORDER — SODIUM CHLORIDE, SODIUM LACTATE, POTASSIUM CHLORIDE, CALCIUM CHLORIDE 600; 310; 30; 20 MG/100ML; MG/100ML; MG/100ML; MG/100ML
INJECTION, SOLUTION INTRAVENOUS CONTINUOUS
Status: DISCONTINUED | OUTPATIENT
Start: 2022-08-19 | End: 2022-08-24

## 2022-08-19 RX ORDER — ONDANSETRON 2 MG/ML
4 INJECTION INTRAMUSCULAR; INTRAVENOUS EVERY 6 HOURS PRN
Status: DISCONTINUED | OUTPATIENT
Start: 2022-08-19 | End: 2022-08-25

## 2022-08-19 RX ORDER — BISACODYL 10 MG
10 SUPPOSITORY, RECTAL RECTAL
Status: DISCONTINUED | OUTPATIENT
Start: 2022-08-19 | End: 2022-08-25

## 2022-08-19 RX ORDER — GLIPIZIDE 2.5 MG/1
2.5 TABLET, EXTENDED RELEASE ORAL
Qty: 90 TABLET | Refills: 0 | Status: SHIPPED | OUTPATIENT
Start: 2022-08-19 | End: 2022-09-15

## 2022-08-19 RX ORDER — SENNOSIDES 8.6 MG
17.2 TABLET ORAL NIGHTLY PRN
Status: DISCONTINUED | OUTPATIENT
Start: 2022-08-19 | End: 2022-08-25

## 2022-08-19 RX ORDER — MELATONIN
3 NIGHTLY PRN
Status: DISCONTINUED | OUTPATIENT
Start: 2022-08-19 | End: 2022-08-25

## 2022-08-19 RX ORDER — NICOTINE POLACRILEX 4 MG
15 LOZENGE BUCCAL
Status: DISCONTINUED | OUTPATIENT
Start: 2022-08-19 | End: 2022-08-25

## 2022-08-19 NOTE — TELEPHONE ENCOUNTER
Luan with [facility] called reporting worsening clots, Gross hematuria. Patient symptoms are not worsening however he is concerned. TO able to speak to [facility] and advised that patient be sent back to emergency room for worsening of the clot. Patient needs evaluation. Luan verbalized understanding and will contact family.

## 2022-08-19 NOTE — ED INITIAL ASSESSMENT (HPI)
Pt presents to ED via ambulance c/o hematuria for a couple months. Pt has reilly and receives home health care. Pt reports she has an appointment with her urologist in September. Per pt, when home health nurse changed her bag she noticed clumps and reported to pt MD and recommended to come here. No other complaints. Pt A&Ox4.

## 2022-08-20 ENCOUNTER — PATIENT MESSAGE (OUTPATIENT)
Dept: INTERNAL MEDICINE CLINIC | Facility: CLINIC | Age: 77
End: 2022-08-20

## 2022-08-20 ENCOUNTER — APPOINTMENT (OUTPATIENT)
Dept: GENERAL RADIOLOGY | Facility: HOSPITAL | Age: 77
End: 2022-08-20
Attending: UROLOGY
Payer: MEDICARE

## 2022-08-20 ENCOUNTER — APPOINTMENT (OUTPATIENT)
Dept: GENERAL RADIOLOGY | Facility: HOSPITAL | Age: 77
DRG: 660 | End: 2022-08-20
Attending: UROLOGY
Payer: MEDICARE

## 2022-08-20 LAB
ANION GAP SERPL CALC-SCNC: 3 MMOL/L (ref 0–18)
BASOPHILS # BLD AUTO: 0.04 X10(3) UL (ref 0–0.2)
BASOPHILS NFR BLD AUTO: 0.4 %
BUN BLD-MCNC: 28 MG/DL (ref 7–18)
CALCIUM BLD-MCNC: 8.6 MG/DL (ref 8.5–10.1)
CHLORIDE SERPL-SCNC: 110 MMOL/L (ref 98–112)
CO2 SERPL-SCNC: 27 MMOL/L (ref 21–32)
CREAT BLD-MCNC: 1.8 MG/DL
EOSINOPHIL # BLD AUTO: 0.21 X10(3) UL (ref 0–0.7)
EOSINOPHIL NFR BLD AUTO: 2.1 %
ERYTHROCYTE [DISTWIDTH] IN BLOOD BY AUTOMATED COUNT: 15 %
GFR SERPLBLD BASED ON 1.73 SQ M-ARVRAT: 28 ML/MIN/1.73M2 (ref 60–?)
GLUCOSE BLD-MCNC: 100 MG/DL (ref 70–99)
GLUCOSE BLD-MCNC: 103 MG/DL (ref 70–99)
GLUCOSE BLD-MCNC: 129 MG/DL (ref 70–99)
GLUCOSE BLD-MCNC: 147 MG/DL (ref 70–99)
GLUCOSE BLD-MCNC: 97 MG/DL (ref 70–99)
HCT VFR BLD AUTO: 29.7 %
HGB BLD-MCNC: 9.2 G/DL
IMM GRANULOCYTES # BLD AUTO: 0.04 X10(3) UL (ref 0–1)
IMM GRANULOCYTES NFR BLD: 0.4 %
LYMPHOCYTES # BLD AUTO: 1.48 X10(3) UL (ref 1–4)
LYMPHOCYTES NFR BLD AUTO: 14.9 %
MAGNESIUM SERPL-MCNC: 2 MG/DL (ref 1.6–2.6)
MCH RBC QN AUTO: 27.9 PG (ref 26–34)
MCHC RBC AUTO-ENTMCNC: 31 G/DL (ref 31–37)
MCV RBC AUTO: 90 FL
MONOCYTES # BLD AUTO: 0.86 X10(3) UL (ref 0.1–1)
MONOCYTES NFR BLD AUTO: 8.7 %
NEUTROPHILS # BLD AUTO: 7.29 X10 (3) UL (ref 1.5–7.7)
NEUTROPHILS # BLD AUTO: 7.29 X10(3) UL (ref 1.5–7.7)
NEUTROPHILS NFR BLD AUTO: 73.5 %
OSMOLALITY SERPL CALC.SUM OF ELEC: 295 MOSM/KG (ref 275–295)
PLATELET # BLD AUTO: 364 10(3)UL (ref 150–450)
POTASSIUM SERPL-SCNC: 3.9 MMOL/L (ref 3.5–5.1)
RBC # BLD AUTO: 3.3 X10(6)UL
SODIUM SERPL-SCNC: 140 MMOL/L (ref 136–145)
WBC # BLD AUTO: 9.9 X10(3) UL (ref 4–11)

## 2022-08-20 PROCEDURE — 99223 1ST HOSP IP/OBS HIGH 75: CPT | Performed by: UROLOGY

## 2022-08-20 PROCEDURE — 99232 SBSQ HOSP IP/OBS MODERATE 35: CPT | Performed by: HOSPITALIST

## 2022-08-20 PROCEDURE — 74018 RADEX ABDOMEN 1 VIEW: CPT | Performed by: UROLOGY

## 2022-08-20 NOTE — PLAN OF CARE
Problem: Patient/Family Goals  Goal: Patient/Family Long Term Goal  Description: Patient's Long Term Goal:discharge home    Interventions:  - IVF  -CBI  - See additional Care Plan goals for specific interventions  Outcome: Progressing  Goal: Patient/Family Short Term Goal  Description: Patient's Short Term Goal: VS stable    Interventions:   - VS  -IVF  - See additional Care Plan goals for specific interventions  Outcome: Progressing     Problem: RISK FOR INFECTION - ADULT  Goal: Absence of fever/infection during anticipated neutropenic period  Description: INTERVENTIONS  - Monitor WBC  - Administer growth factors as ordered  - Implement neutropenic guidelines  Outcome: Progressing     Problem: DISCHARGE PLANNING  Goal: Discharge to home or other facility with appropriate resources  Description: INTERVENTIONS:  - Identify barriers to discharge w/pt and caregiver  - Include patient/family/discharge partner in discharge planning  - Arrange for needed discharge resources and transportation as appropriate  - Identify discharge learning needs (meds, wound care, etc)  - Arrange for interpreters to assist at discharge as needed  - Consider post-discharge preferences of patient/family/discharge partner  - Complete POLST form as appropriate  - Assess patient's ability to be responsible for managing their own health  - Refer to Case Management Department for coordinating discharge planning if the patient needs post-hospital services based on physician/LIP order or complex needs related to functional status, cognitive ability or social support system  Outcome: Progressing

## 2022-08-20 NOTE — PROGRESS NOTES
[facility] Pharmacy Note:  Renal Adjustment for cefazolin (ANCEF)    Baltazar Dela Cruz is a 64year old patient who has been prescribed cefazolin (ANCEF) 2000 mg every 8 hrs. The estimated creatinine clearance is 27.4 mL/min (A) (based on SCr of 1.77 mg/dL (H)). The dose has been adjusted to cefazolin (ANCEF) 1000 mg every 12 hrs per hospital renal dose adjustment protocol for treatment of UTI. Pharmacy will follow and adjust dose as warranted for additional renal function changes.     Thank you,    Elena Guaman, PharmD  8/19/2022  7:57 PM

## 2022-08-20 NOTE — PLAN OF CARE
Patient A&Ox4, RA, bedfast. IVF infusing per mar. CBI running at slow rate. Urine clear, light pink colored. Denies pain. Tolerating diet. QID accucheck. Receiving IV abx. Plan of care discussed w/ patient and family at bedside. Problem: Patient/Family Goals  Goal: Patient/Family Long Term Goal  Description: Patient's Long Term Goal:discharge home    Interventions:  - IVF  -CBI  - See additional Care Plan goals for specific interventions  Outcome: Progressing  Goal: Patient/Family Short Term Goal  Description: Patient's Short Term Goal: VS stable    Interventions:   - VS  -IVF  - See additional Care Plan goals for specific interventions  Outcome: Progressing     Problem: RISK FOR INFECTION - ADULT  Goal: Absence of fever/infection during anticipated neutropenic period  Description: INTERVENTIONS  - Monitor WBC  - Administer growth factors as ordered  - Implement neutropenic guidelines  Outcome: Progressing     Problem: SAFETY ADULT - FALL  Goal: Free from fall injury  Description: INTERVENTIONS:  - Assess pt frequently for physical needs  - Identify cognitive and physical deficits and behaviors that affect risk of falls.   - [place] fall precautions as indicated by assessment.  - Educate pt/family on patient safety including physical limitations  - Instruct pt to call for assistance with activity based on assessment  - Modify environment to reduce risk of injury  - Provide assistive devices as appropriate  - Consider OT/PT consult to assist with strengthening/mobility  - Encourage toileting schedule  Outcome: Progressing     Problem: DISCHARGE PLANNING  Goal: Discharge to home or other facility with appropriate resources  Description: INTERVENTIONS:  - Identify barriers to discharge w/pt and caregiver  - Include patient/family/discharge partner in discharge planning  - Arrange for needed discharge resources and transportation as appropriate  - Identify discharge learning needs (meds, wound care, etc)  - Arrange for interpreters to assist at discharge as needed  - Consider post-discharge preferences of patient/family/discharge partner  - Complete POLST form as appropriate  - Assess patient's ability to be responsible for managing their own health  - Refer to Case Management Department for coordinating discharge planning if the patient needs post-hospital services based on physician/LIP order or complex needs related to functional status, cognitive ability or social support system  Outcome: Progressing

## 2022-08-20 NOTE — PROGRESS NOTES
NURSING ADMISSION NOTE      Patient admitted via bed  Oriented to room. Safety precautions initiated. Bed in low position. Call light in reach. Patient alert and oriented x4, on room air, vss,with a 3 way reilly, CBI going at a slow rate, urine cherry red, no clots noted,   Pt with a brief/incontinent,   IVF infusing,  Pt denies any pain, plan of care discussed with patient, safety precautions in place, call light in reach.    Call received from Urology,orders place, will implement and follow

## 2022-08-21 LAB
GLUCOSE BLD-MCNC: 116 MG/DL (ref 70–99)
GLUCOSE BLD-MCNC: 158 MG/DL (ref 70–99)
GLUCOSE BLD-MCNC: 197 MG/DL (ref 70–99)
GLUCOSE BLD-MCNC: 220 MG/DL (ref 70–99)

## 2022-08-21 PROCEDURE — 99233 SBSQ HOSP IP/OBS HIGH 50: CPT | Performed by: UROLOGY

## 2022-08-21 PROCEDURE — 99232 SBSQ HOSP IP/OBS MODERATE 35: CPT | Performed by: HOSPITALIST

## 2022-08-21 NOTE — PLAN OF CARE
A&Ox4. RA. NSR on Tele. HR in the 60s. Educated pt on SCDs, pt refused. Tolerating diet. Incontinent of bowels. CBI running slow w/ pink tinged output. Denies SOB, CP, N/V. C/o chronic pain to the L Leg, managed per MAR. Wheel chair bound. IVF infusing. POC discussed w/ pt, all questions answered and concerns addressed. Safety precautions in place. Frequent rounds performed. Problem: Patient/Family Goals  Goal: Patient/Family Long Term Goal  Description: Patient's Long Term Goal:discharge home    Interventions:  - IVF  - CBI  - See additional Care Plan goals for specific interventions  Outcome: Progressing  Goal: Patient/Family Short Term Goal  Description: Patient's Short Term Goal: VS stable    Interventions:   - VS  - IVF  - See additional Care Plan goals for specific interventions  Outcome: Progressing     Problem: RISK FOR INFECTION - ADULT  Goal: Absence of fever/infection during anticipated neutropenic period  Description: INTERVENTIONS  - Monitor WBC  - Administer growth factors as ordered  - Implement neutropenic guidelines  Outcome: Progressing     Problem: SAFETY ADULT - FALL  Goal: Free from fall injury  Description: INTERVENTIONS:  - Assess pt frequently for physical needs  - Identify cognitive and physical deficits and behaviors that affect risk of falls.   - [place] fall precautions as indicated by assessment.  - Educate pt/family on patient safety including physical limitations  - Instruct pt to call for assistance with activity based on assessment  - Modify environment to reduce risk of injury  - Provide assistive devices as appropriate  - Consider OT/PT consult to assist with strengthening/mobility  - Encourage toileting schedule  Outcome: Progressing     Problem: DISCHARGE PLANNING  Goal: Discharge to home or other facility with appropriate resources  Description: INTERVENTIONS:  - Identify barriers to discharge w/pt and caregiver  - Include patient/family/discharge partner in discharge planning  - Arrange for needed discharge resources and transportation as appropriate  - Identify discharge learning needs (meds, wound care, etc)  - Arrange for interpreters to assist at discharge as needed  - Consider post-discharge preferences of patient/family/discharge partner  - Complete POLST form as appropriate  - Assess patient's ability to be responsible for managing their own health  - Refer to Case Management Department for coordinating discharge planning if the patient needs post-hospital services based on physician/LIP order or complex needs related to functional status, cognitive ability or social support system  Outcome: Progressing

## 2022-08-21 NOTE — PLAN OF CARE
A&O x4. Pt denies pain. RA. NSR via tele. Abdomen is soft non distended. CBI running slow putting out light pink clear urine. IVF infusing per mar. poc discussed, pt verbalized understanding. Pt resting in bed call light within reach. Problem: Patient/Family Goals  Goal: Patient/Family Long Term Goal  Description: Patient's Long Term Goal:discharge home    Interventions:  - IVF  -CBI  - See additional Care Plan goals for specific interventions  Outcome: Progressing  Goal: Patient/Family Short Term Goal  Description: Patient's Short Term Goal: VS stable    Interventions:   - VS  -IVF  - See additional Care Plan goals for specific interventions  Outcome: Progressing     Problem: RISK FOR INFECTION - ADULT  Goal: Absence of fever/infection during anticipated neutropenic period  Description: INTERVENTIONS  - Monitor WBC  - Administer growth factors as ordered  - Implement neutropenic guidelines  Outcome: Progressing     Problem: SAFETY ADULT - FALL  Goal: Free from fall injury  Description: INTERVENTIONS:  - Assess pt frequently for physical needs  - Identify cognitive and physical deficits and behaviors that affect risk of falls.   - [place] fall precautions as indicated by assessment.  - Educate pt/family on patient safety including physical limitations  - Instruct pt to call for assistance with activity based on assessment  - Modify environment to reduce risk of injury  - Provide assistive devices as appropriate  - Consider OT/PT consult to assist with strengthening/mobility  - Encourage toileting schedule  Outcome: Progressing     Problem: DISCHARGE PLANNING  Goal: Discharge to home or other facility with appropriate resources  Description: INTERVENTIONS:  - Identify barriers to discharge w/pt and caregiver  - Include patient/family/discharge partner in discharge planning  - Arrange for needed discharge resources and transportation as appropriate  - Identify discharge learning needs (meds, wound care, etc)  - Arrange for interpreters to assist at discharge as needed  - Consider post-discharge preferences of patient/family/discharge partner  - Complete POLST form as appropriate  - Assess patient's ability to be responsible for managing their own health  - Refer to Case Management Department for coordinating discharge planning if the patient needs post-hospital services based on physician/LIP order or complex needs related to functional status, cognitive ability or social support system  Outcome: Progressing

## 2022-08-21 NOTE — CM/SW NOTE
Chart reviewed for discharge needs. PT is recommending home health. [facility] [place]. Order/F2F entered. Await acceptances to provide choice list. Per chart review, pt has had a recent history w/ [facility].      MARYANN Salomon, [facility]  Discharge [address] [facility].

## 2022-08-22 ENCOUNTER — APPOINTMENT (OUTPATIENT)
Dept: ULTRASOUND IMAGING | Facility: HOSPITAL | Age: 77
End: 2022-08-22
Attending: UROLOGY
Payer: MEDICARE

## 2022-08-22 ENCOUNTER — APPOINTMENT (OUTPATIENT)
Dept: ULTRASOUND IMAGING | Facility: HOSPITAL | Age: 77
DRG: 660 | End: 2022-08-22
Attending: UROLOGY
Payer: MEDICARE

## 2022-08-22 ENCOUNTER — ANESTHESIA EVENT (OUTPATIENT)
Dept: SURGERY | Facility: HOSPITAL | Age: 77
End: 2022-08-22
Payer: MEDICARE

## 2022-08-22 LAB
GLUCOSE BLD-MCNC: 101 MG/DL (ref 70–99)
GLUCOSE BLD-MCNC: 103 MG/DL (ref 70–99)
GLUCOSE BLD-MCNC: 115 MG/DL (ref 70–99)
GLUCOSE BLD-MCNC: 124 MG/DL (ref 70–99)
URATE SERPL-MCNC: 6.3 MG/DL

## 2022-08-22 PROCEDURE — 76775 US EXAM ABDO BACK WALL LIM: CPT | Performed by: UROLOGY

## 2022-08-22 PROCEDURE — 99232 SBSQ HOSP IP/OBS MODERATE 35: CPT | Performed by: PHYSICIAN ASSISTANT

## 2022-08-22 PROCEDURE — 99232 SBSQ HOSP IP/OBS MODERATE 35: CPT | Performed by: HOSPITALIST

## 2022-08-22 RX ORDER — HYDRALAZINE HYDROCHLORIDE 20 MG/ML
10 INJECTION INTRAMUSCULAR; INTRAVENOUS EVERY 4 HOURS PRN
Status: DISCONTINUED | OUTPATIENT
Start: 2022-08-22 | End: 2022-08-25

## 2022-08-22 RX ORDER — LEVOFLOXACIN 5 MG/ML
750 INJECTION, SOLUTION INTRAVENOUS
Status: COMPLETED | OUTPATIENT
Start: 2022-08-23 | End: 2022-08-23

## 2022-08-22 NOTE — PLAN OF CARE
A&Ox4. RA.  WDL. NSR on TELE. HR in the 60s. Tolerating diet. Incontinent of bowel. Active bowel sounds. CBI running slow w/ clear, yellow output. Denies CINTHYA, CP, N/V, and pain at this time. W.C.bound. Repositioning frequently. IVF. POC discussed, all questions answered and concerns addressed. Safety precautions in place. Frequent rounds performed. 1613 - /62, HR 50s. MD paged, see orders. 3844 - Consent for the release of medical records signed and faxed. Records obtained and in patient's chart. Problem: Patient/Family Goals  Goal: Patient/Family Long Term Goal  Description: Patient's Long Term Goal:discharge home    Interventions:  - IVF  - CBI  - See additional Care Plan goals for specific interventions  Outcome: Progressing  Goal: Patient/Family Short Term Goal  Description: Patient's Short Term Goal: VS stable    Interventions:   - VS  - IVF  - See additional Care Plan goals for specific interventions  Outcome: Progressing     Problem: RISK FOR INFECTION - ADULT  Goal: Absence of fever/infection during anticipated neutropenic period  Description: INTERVENTIONS  - Monitor WBC  - Administer growth factors as ordered  - Implement neutropenic guidelines  Outcome: Progressing     Problem: SAFETY ADULT - FALL  Goal: Free from fall injury  Description: INTERVENTIONS:  - Assess pt frequently for physical needs  - Identify cognitive and physical deficits and behaviors that affect risk of falls.   - [place] fall precautions as indicated by assessment.  - Educate pt/family on patient safety including physical limitations  - Instruct pt to call for assistance with activity based on assessment  - Modify environment to reduce risk of injury  - Provide assistive devices as appropriate  - Consider OT/PT consult to assist with strengthening/mobility  - Encourage toileting schedule  Outcome: Progressing     Problem: DISCHARGE PLANNING  Goal: Discharge to home or other facility with appropriate resources  Description: INTERVENTIONS:  - Identify barriers to discharge w/pt and caregiver  - Include patient/family/discharge partner in discharge planning  - Arrange for needed discharge resources and transportation as appropriate  - Identify discharge learning needs (meds, wound care, etc)  - Arrange for interpreters to assist at discharge as needed  - Consider post-discharge preferences of patient/family/discharge partner  - Complete POLST form as appropriate  - Assess patient's ability to be responsible for managing their own health  - Refer to Case Management Department for coordinating discharge planning if the patient needs post-hospital services based on physician/LIP order or complex needs related to functional status, cognitive ability or social support system  Outcome: Progressing

## 2022-08-22 NOTE — CM/SW NOTE
[address] [facility] reserved in [address] [place] [address] Road. No further discharge needs anticipated at this time. SW will continue to remain available for any additional discharge needs.     NAGA Howard  Discharge Planner

## 2022-08-22 NOTE — PROGRESS NOTES
UROLOGY NOTE    S/w nursing staff. No records were obtained by patient , consent form left with medical records who advised would take 3-4 days to complete. Personally called [facility] and spoke with supervisor. Nursing staff to obtain signed consent for medical record release and fax to #[phone], Attn: Betsy Dominguez. Nursing staff aware of above and will complete.      Kristie Matute PA-C  [facility]

## 2022-08-22 NOTE — PLAN OF CARE
A&O x4. Pt states she feels comfortable and declines needs for pain meds. On RA. NSR via tele. Abdomen is soft non distended. Bowel sounds active, pt tolerating diet. CBI running at a slow rate, clear pink urine draining. IVF infusing per mar. poc discussed, pt verbalized understanding, pt resting in bed call light within reach. Problem: Patient/Family Goals  Goal: Patient/Family Long Term Goal  Description: Patient's Long Term Goal:discharge home    Interventions:  - IVF  - CBI  - See additional Care Plan goals for specific interventions  Outcome: Progressing  Goal: Patient/Family Short Term Goal  Description: Patient's Short Term Goal: VS stable    Interventions:   - VS  - IVF  - See additional Care Plan goals for specific interventions  Outcome: Progressing     Problem: RISK FOR INFECTION - ADULT  Goal: Absence of fever/infection during anticipated neutropenic period  Description: INTERVENTIONS  - Monitor WBC  - Administer growth factors as ordered  - Implement neutropenic guidelines  Outcome: Progressing     Problem: SAFETY ADULT - FALL  Goal: Free from fall injury  Description: INTERVENTIONS:  - Assess pt frequently for physical needs  - Identify cognitive and physical deficits and behaviors that affect risk of falls.   - [place] fall precautions as indicated by assessment.  - Educate pt/family on patient safety including physical limitations  - Instruct pt to call for assistance with activity based on assessment  - Modify environment to reduce risk of injury  - Provide assistive devices as appropriate  - Consider OT/PT consult to assist with strengthening/mobility  - Encourage toileting schedule  Outcome: Progressing     Problem: DISCHARGE PLANNING  Goal: Discharge to home or other facility with appropriate resources  Description: INTERVENTIONS:  - Identify barriers to discharge w/pt and caregiver  - Include patient/family/discharge partner in discharge planning  - Arrange for needed discharge resources and transportation as appropriate  - Identify discharge learning needs (meds, wound care, etc)  - Arrange for interpreters to assist at discharge as needed  - Consider post-discharge preferences of patient/family/discharge partner  - Complete POLST form as appropriate  - Assess patient's ability to be responsible for managing their own health  - Refer to Case Management Department for coordinating discharge planning if the patient needs post-hospital services based on physician/LIP order or complex needs related to functional status, cognitive ability or social support system  Outcome: Progressing

## 2022-08-23 ENCOUNTER — ANESTHESIA (OUTPATIENT)
Dept: SURGERY | Facility: HOSPITAL | Age: 77
End: 2022-08-23
Payer: MEDICARE

## 2022-08-23 ENCOUNTER — APPOINTMENT (OUTPATIENT)
Dept: GENERAL RADIOLOGY | Facility: HOSPITAL | Age: 77
End: 2022-08-23
Attending: UROLOGY
Payer: MEDICARE

## 2022-08-23 ENCOUNTER — APPOINTMENT (OUTPATIENT)
Dept: GENERAL RADIOLOGY | Facility: HOSPITAL | Age: 77
DRG: 660 | End: 2022-08-23
Attending: UROLOGY
Payer: MEDICARE

## 2022-08-23 LAB
GLUCOSE BLD-MCNC: 130 MG/DL (ref 70–99)
GLUCOSE BLD-MCNC: 131 MG/DL (ref 70–99)
GLUCOSE BLD-MCNC: 147 MG/DL (ref 70–99)
GLUCOSE BLD-MCNC: 154 MG/DL (ref 70–99)
GLUCOSE BLD-MCNC: 164 MG/DL (ref 70–99)

## 2022-08-23 PROCEDURE — 0T768DZ DILATION OF RIGHT URETER WITH INTRALUMINAL DEVICE, VIA NATURAL OR ARTIFICIAL OPENING ENDOSCOPIC: ICD-10-PCS | Performed by: UROLOGY

## 2022-08-23 PROCEDURE — 0TF38ZZ FRAGMENTATION IN RIGHT KIDNEY PELVIS, VIA NATURAL OR ARTIFICIAL OPENING ENDOSCOPIC: ICD-10-PCS | Performed by: UROLOGY

## 2022-08-23 PROCEDURE — 0TP98DZ REMOVAL OF INTRALUMINAL DEVICE FROM URETER, VIA NATURAL OR ARTIFICIAL OPENING ENDOSCOPIC: ICD-10-PCS | Performed by: UROLOGY

## 2022-08-23 PROCEDURE — 52356 CYSTO/URETERO W/LITHOTRIPSY: CPT | Performed by: UROLOGY

## 2022-08-23 PROCEDURE — 0TCB8ZZ EXTIRPATION OF MATTER FROM BLADDER, VIA NATURAL OR ARTIFICIAL OPENING ENDOSCOPIC: ICD-10-PCS | Performed by: UROLOGY

## 2022-08-23 PROCEDURE — 99232 SBSQ HOSP IP/OBS MODERATE 35: CPT | Performed by: HOSPITALIST

## 2022-08-23 DEVICE — URETERAL STENT
Type: IMPLANTABLE DEVICE | Status: FUNCTIONAL
Brand: ASCERTA™

## 2022-08-23 RX ORDER — NEOSTIGMINE METHYLSULFATE 1 MG/ML
INJECTION, SOLUTION INTRAVENOUS AS NEEDED
Status: DISCONTINUED | OUTPATIENT
Start: 2022-08-23 | End: 2022-08-23 | Stop reason: SURG

## 2022-08-23 RX ORDER — NALOXONE HYDROCHLORIDE 0.4 MG/ML
80 INJECTION, SOLUTION INTRAMUSCULAR; INTRAVENOUS; SUBCUTANEOUS AS NEEDED
Status: DISCONTINUED | OUTPATIENT
Start: 2022-08-23 | End: 2022-08-23 | Stop reason: HOSPADM

## 2022-08-23 RX ORDER — SODIUM CHLORIDE, SODIUM LACTATE, POTASSIUM CHLORIDE, CALCIUM CHLORIDE 600; 310; 30; 20 MG/100ML; MG/100ML; MG/100ML; MG/100ML
INJECTION, SOLUTION INTRAVENOUS CONTINUOUS
Status: DISCONTINUED | OUTPATIENT
Start: 2022-08-23 | End: 2022-08-23 | Stop reason: HOSPADM

## 2022-08-23 RX ORDER — EPHEDRINE SULFATE 50 MG/ML
INJECTION INTRAVENOUS AS NEEDED
Status: DISCONTINUED | OUTPATIENT
Start: 2022-08-23 | End: 2022-08-23 | Stop reason: SURG

## 2022-08-23 RX ORDER — HYDROMORPHONE HYDROCHLORIDE 1 MG/ML
0.2 INJECTION, SOLUTION INTRAMUSCULAR; INTRAVENOUS; SUBCUTANEOUS EVERY 5 MIN PRN
Status: DISCONTINUED | OUTPATIENT
Start: 2022-08-23 | End: 2022-08-23 | Stop reason: HOSPADM

## 2022-08-23 RX ORDER — HYDROMORPHONE HYDROCHLORIDE 1 MG/ML
0.6 INJECTION, SOLUTION INTRAMUSCULAR; INTRAVENOUS; SUBCUTANEOUS EVERY 5 MIN PRN
Status: DISCONTINUED | OUTPATIENT
Start: 2022-08-23 | End: 2022-08-23 | Stop reason: HOSPADM

## 2022-08-23 RX ORDER — METOPROLOL TARTRATE 5 MG/5ML
2.5 INJECTION INTRAVENOUS ONCE
Status: DISCONTINUED | OUTPATIENT
Start: 2022-08-23 | End: 2022-08-23 | Stop reason: HOSPADM

## 2022-08-23 RX ORDER — HYDROCODONE BITARTRATE AND ACETAMINOPHEN 5; 325 MG/1; MG/1
1 TABLET ORAL ONCE AS NEEDED
Status: DISCONTINUED | OUTPATIENT
Start: 2022-08-23 | End: 2022-08-23 | Stop reason: HOSPADM

## 2022-08-23 RX ORDER — ROCURONIUM BROMIDE 10 MG/ML
INJECTION, SOLUTION INTRAVENOUS AS NEEDED
Status: DISCONTINUED | OUTPATIENT
Start: 2022-08-23 | End: 2022-08-23 | Stop reason: SURG

## 2022-08-23 RX ORDER — INSULIN ASPART 100 [IU]/ML
INJECTION, SOLUTION INTRAVENOUS; SUBCUTANEOUS ONCE
Status: DISCONTINUED | OUTPATIENT
Start: 2022-08-23 | End: 2022-08-23 | Stop reason: HOSPADM

## 2022-08-23 RX ORDER — HYDROCODONE BITARTRATE AND ACETAMINOPHEN 5; 325 MG/1; MG/1
2 TABLET ORAL ONCE AS NEEDED
Status: DISCONTINUED | OUTPATIENT
Start: 2022-08-23 | End: 2022-08-23 | Stop reason: HOSPADM

## 2022-08-23 RX ORDER — ONDANSETRON 2 MG/ML
INJECTION INTRAMUSCULAR; INTRAVENOUS AS NEEDED
Status: DISCONTINUED | OUTPATIENT
Start: 2022-08-23 | End: 2022-08-23 | Stop reason: SURG

## 2022-08-23 RX ORDER — LIDOCAINE HYDROCHLORIDE 10 MG/ML
INJECTION, SOLUTION EPIDURAL; INFILTRATION; INTRACAUDAL; PERINEURAL AS NEEDED
Status: DISCONTINUED | OUTPATIENT
Start: 2022-08-23 | End: 2022-08-23 | Stop reason: SURG

## 2022-08-23 RX ORDER — ONDANSETRON 2 MG/ML
4 INJECTION INTRAMUSCULAR; INTRAVENOUS EVERY 6 HOURS PRN
Status: DISCONTINUED | OUTPATIENT
Start: 2022-08-23 | End: 2022-08-23 | Stop reason: HOSPADM

## 2022-08-23 RX ORDER — PHENYLEPHRINE HCL 10 MG/ML
VIAL (ML) INJECTION AS NEEDED
Status: DISCONTINUED | OUTPATIENT
Start: 2022-08-23 | End: 2022-08-23 | Stop reason: SURG

## 2022-08-23 RX ORDER — HYDROMORPHONE HYDROCHLORIDE 1 MG/ML
0.4 INJECTION, SOLUTION INTRAMUSCULAR; INTRAVENOUS; SUBCUTANEOUS EVERY 5 MIN PRN
Status: DISCONTINUED | OUTPATIENT
Start: 2022-08-23 | End: 2022-08-23 | Stop reason: HOSPADM

## 2022-08-23 RX ORDER — DEXAMETHASONE SODIUM PHOSPHATE 4 MG/ML
VIAL (ML) INJECTION AS NEEDED
Status: DISCONTINUED | OUTPATIENT
Start: 2022-08-23 | End: 2022-08-23 | Stop reason: SURG

## 2022-08-23 RX ORDER — GLYCOPYRROLATE 0.2 MG/ML
INJECTION, SOLUTION INTRAMUSCULAR; INTRAVENOUS AS NEEDED
Status: DISCONTINUED | OUTPATIENT
Start: 2022-08-23 | End: 2022-08-23 | Stop reason: SURG

## 2022-08-23 RX ORDER — ACETAMINOPHEN 500 MG
1000 TABLET ORAL ONCE AS NEEDED
Status: DISCONTINUED | OUTPATIENT
Start: 2022-08-23 | End: 2022-08-23 | Stop reason: HOSPADM

## 2022-08-23 RX ORDER — METOCLOPRAMIDE HYDROCHLORIDE 5 MG/ML
5 INJECTION INTRAMUSCULAR; INTRAVENOUS EVERY 8 HOURS PRN
Status: DISCONTINUED | OUTPATIENT
Start: 2022-08-23 | End: 2022-08-23 | Stop reason: HOSPADM

## 2022-08-23 RX ADMIN — EPHEDRINE SULFATE 10 MG: 50 INJECTION INTRAVENOUS at 09:22:00

## 2022-08-23 RX ADMIN — LIDOCAINE HYDROCHLORIDE 50 MG: 10 INJECTION, SOLUTION EPIDURAL; INFILTRATION; INTRACAUDAL; PERINEURAL at 09:02:00

## 2022-08-23 RX ADMIN — ROCURONIUM BROMIDE 30 MG: 10 INJECTION, SOLUTION INTRAVENOUS at 09:02:00

## 2022-08-23 RX ADMIN — GLYCOPYRROLATE 0.8 MG: 0.2 INJECTION, SOLUTION INTRAMUSCULAR; INTRAVENOUS at 10:09:00

## 2022-08-23 RX ADMIN — ROCURONIUM BROMIDE 10 MG: 10 INJECTION, SOLUTION INTRAVENOUS at 09:30:00

## 2022-08-23 RX ADMIN — SODIUM CHLORIDE, SODIUM LACTATE, POTASSIUM CHLORIDE, CALCIUM CHLORIDE: 600; 310; 30; 20 INJECTION, SOLUTION INTRAVENOUS at 10:17:00

## 2022-08-23 RX ADMIN — NEOSTIGMINE METHYLSULFATE 5 MG: 1 INJECTION, SOLUTION INTRAVENOUS at 10:09:00

## 2022-08-23 RX ADMIN — PHENYLEPHRINE HCL 100 MCG: 10 MG/ML VIAL (ML) INJECTION at 09:12:00

## 2022-08-23 RX ADMIN — DEXAMETHASONE SODIUM PHOSPHATE 4 MG: 4 MG/ML VIAL (ML) INJECTION at 09:06:00

## 2022-08-23 RX ADMIN — SODIUM CHLORIDE, SODIUM LACTATE, POTASSIUM CHLORIDE, CALCIUM CHLORIDE: 600; 310; 30; 20 INJECTION, SOLUTION INTRAVENOUS at 08:55:00

## 2022-08-23 RX ADMIN — PHENYLEPHRINE HCL 100 MCG: 10 MG/ML VIAL (ML) INJECTION at 09:06:00

## 2022-08-23 RX ADMIN — ROCURONIUM BROMIDE 10 MG: 10 INJECTION, SOLUTION INTRAVENOUS at 09:45:00

## 2022-08-23 RX ADMIN — ONDANSETRON 4 MG: 2 INJECTION INTRAMUSCULAR; INTRAVENOUS at 09:49:00

## 2022-08-23 NOTE — CM/SW NOTE
Received call from [facility] at [place] [address] [place] [address]. [phone]. they are requesting a call 1-2 days prior to discharge in order to have a caregiver available.      Victor Hugo Fortune RN,   [phone]

## 2022-08-23 NOTE — OPERATIVE REPORT
Operative Note    Patient Name: Alberto Mcfarland    Date of Procedure: 8/23/2022    Preoperative Diagnosis:   Retained right ureteral stent, right renal calculus    Postoperative Diagnosis: Same    Primary Surgeon: Jackie Pardo. Michelle oJseph M.D. Procedure Performed: Cystoscopy, ureteroscopic extraction of retained right ureteral stent, right ureteral renoscopy with holmium laser lithotripsy of right renal calculus with reinsertion of 6 Greek 26 cm double-J dangle stent and reinsertion of Holm catheter    Anesthesia: General    Indications: This 70-year-old lady has a history of recurrent urinary tract infections and urinary calculi and was treated at [facility] for UTI with sepsis and an obstructing distal right ureteral calculus which was extracted and a stent was inserted in May, 2022. Patient was then subsequently lost to follow-up at [facility] has been admitted into [facility] with urinary infection recently. Was noted that the stent was retained and appeared that the distal pigtail was withdrawn into the distal right ureter. Patient was recently readmitted into the hospital with hematuria and at this time is brought to the operating room for cystoscopy, ureteroscopy with extraction of retained right ureteral stent and possible right ureteral renoscopy and laser lithotripsy of remaining right renal calculus. The procedure was reviewed with the patient and spouse yesterday by the physician assistant and I reviewed the procedure with the patient this morning she understands and wishes to proceed. Procedure: The patient was brought into the operating room placed on the operating table supine position given general anesthesia without difficulty or complication. Then her legs were placed on dorsolithotomy position at which time the Holm catheter was removed. Her perineum and genitalia were prepped and draped in usual fashion.   Standard universal intraoperative timeout was taken at which time all members of the operative team paused to review the procedure be performed noting that the patient had been marked on the right, the correct side. Then the 25 Kosovan cystoscope was inserted into the bladder and there was no evidence of pigtail of the right ureteral stent. Fluoroscopy demonstrated had been retracted into the distal right ureter. Then the ureteral hiatus was dilated with the inner core of an 11/13 Western Kathy navigator sheath and the semirigid ACMI ureteroscope was inserted in the distal ureter and the distal stent was visualized. Ultimately we backloaded the [place] biopsy forceps into the 6.9 Kosovan ureteroscope and advanced it into the distal ureter at which time the stent was visualized grasped and extracted. Over the existing Glidewire the 11/13 Western Kathy ureteral access sheath was inserted into the distal ureter and advanced up into the proximal ureter. The flexible disposable clinical view ureteroscope was inserted into the sheath and advanced up into the kidney and after period of time the stone was identified in the lower pole as anticipated and fragmented using a holmium laser. Pieces were quite small using the dusting mode. None were extracted. Stone from bladder was extracted and sent for chemical analysis. The ureteroscope was removed. Glidewire was reinserted and the sheath was then removed. Over the existing Glidewire a 6 Western Kathy 26 cm double-J dangle stent was inserted with the proximal pigtail within the kidney and the distal pigtail within the bladder. 12 Kosovan Holm catheter inserted in the bladder for drainage and the dangle stent was then anchored to the suprapubic area with Tegaderm dressing. Patient tolerated the procedure well will be returned to the recovery room and then to her room in stable and satisfactory condition with anticipation of removal of the dangle stent in about 48 hours. Holm catheter may be removed at the time of discharge.     Denise Martinez. Ever Mcmahon M.D.

## 2022-08-23 NOTE — ANESTHESIA PROCEDURE NOTES
Airway  Date/Time: 8/23/2022 9:04 AM  Urgency: elective    Airway not difficult    General Information and Staff    Patient location during procedure: OR  Anesthesiologist: Leann Singh MD  Resident/CRNA: Christy Saba CRNA  Performed: CRNA     Indications and Patient Condition  Indications for airway management: anesthesia  Spontaneous Ventilation: absent  Sedation level: deep  Preoxygenated: yes  Patient position: sniffing  Mask difficulty assessment: 1 - vent by mask    Final Airway Details  Final airway type: endotracheal airway      Successful airway: ETT  Cuffed: yes   Successful intubation technique: Video laryngoscopy  Facilitating devices/methods: intubating stylet  Endotracheal tube insertion site: oral  Blade: GlideScope  Blade size: #3  ETT size (mm): 7.0    Cormack-Lehane Classification: grade I - full view of glottis  Placement verified by: chest auscultation and capnometry   Measured from: lips  ETT to lips (cm): 21  Number of attempts at approach: 1  Number of other approaches attempted: 0    Additional Comments  Dentition per pre op

## 2022-08-23 NOTE — PLAN OF CARE
A&Ox4. RA.  WDL. NSR on Tele. HR in the 60s. Tolerating clears. Incontinent of bowel. Holm draining clear, yellow. Danglers intact. Denies SOB, CP, N/V, and pain. WC bound. Repositioning pt frequently. IVF. Wound to L Heal noted, redressed, no drainage. Pt's souse updated. POC discussed, all questions answered and concerns addressed. Safety precautions in place. Frequent rounds performed. Problem: Patient/Family Goals  Goal: Patient/Family Long Term Goal  Description: Patient's Long Term Goal:discharge home    Interventions:  - IVF  - CBI  - See additional Care Plan goals for specific interventions  Outcome: Progressing  Goal: Patient/Family Short Term Goal  Description: Patient's Short Term Goal: VS stable    Interventions:   - VS  - IVF  - See additional Care Plan goals for specific interventions  Outcome: Progressing     Problem: RISK FOR INFECTION - ADULT  Goal: Absence of fever/infection during anticipated neutropenic period  Description: INTERVENTIONS  - Monitor WBC  - Administer growth factors as ordered  - Implement neutropenic guidelines  Outcome: Progressing     Problem: SAFETY ADULT - FALL  Goal: Free from fall injury  Description: INTERVENTIONS:  - Assess pt frequently for physical needs  - Identify cognitive and physical deficits and behaviors that affect risk of falls.   - [place] fall precautions as indicated by assessment.  - Educate pt/family on patient safety including physical limitations  - Instruct pt to call for assistance with activity based on assessment  - Modify environment to reduce risk of injury  - Provide assistive devices as appropriate  - Consider OT/PT consult to assist with strengthening/mobility  - Encourage toileting schedule  Outcome: Progressing     Problem: DISCHARGE PLANNING  Goal: Discharge to home or other facility with appropriate resources  Description: INTERVENTIONS:  - Identify barriers to discharge w/pt and caregiver  - Include patient/family/discharge partner in discharge planning  - Arrange for needed discharge resources and transportation as appropriate  - Identify discharge learning needs (meds, wound care, etc)  - Arrange for interpreters to assist at discharge as needed  - Consider post-discharge preferences of patient/family/discharge partner  - Complete POLST form as appropriate  - Assess patient's ability to be responsible for managing their own health  - Refer to Case Management Department for coordinating discharge planning if the patient needs post-hospital services based on physician/LIP order or complex needs related to functional status, cognitive ability or social support system  Outcome: Progressing

## 2022-08-23 NOTE — ANESTHESIA POSTPROCEDURE EVALUATION
[address] [facility] 264 Patient Status:  Inpatient   Age/Gender 64year old female MRN [id number]   [facility] SURGERY Attending Jojo áVzquez MD   Hosp Day # 4 PCP Daphne Han MD       Anesthesia Post-op Note    CYSTOSCOPY RIGHT RETROGRADE PYELOGRAM, RIGHT URETEROSCOPY, LASER LITHOTRIPSY, STENT EXCHANGE     Procedure Summary     Date: 08/23/22 Room / Location: [facility] MAIN OR 07 / [facility]    Anesthesia Start: 4476 Anesthesia Stop: 2018    Procedure: CYSTOSCOPY RIGHT RETROGRADE PYELOGRAM, RIGHT URETEROSCOPY, LASER LITHOTRIPSY, STENT EXCHANGE (Right ) Diagnosis:       Hematuria, unspecified type      Hydronephrosis, unspecified hydronephrosis type      Nephrolithiasis      Retained ureteral stent      (Hematuria, unspecified type [R31.9])      (Hydronephrosis, unspecified hydronephrosis type [N13.30])      (Nephrolithiasis [N20.0])      (Retained ureteral stent [Z96.0])    Surgeons: Saul Chang MD Anesthesiologist: Haley See MD    Anesthesia Type: general ASA Status: 2          Anesthesia Type: general    Vitals Value Taken Time   /65 08/23/22 1028   Temp 96.5 08/23/22 1028   Pulse 66 08/23/22 1028   Resp 14 08/23/22 1028   SpO2 96 08/23/22 1028       Patient Location: PACU    Anesthesia Type: general    Airway Patency: patent and extubated    Postop Pain Control: adequate    Mental Status: preanesthetic baseline    Nausea/Vomiting: none    Cardiopulmonary/Hydration status: stable euvolemic    Complications: no apparent anesthesia related complications    Postop vital signs: stable    Dental Exam: Unchanged from Preop    Patient to be discharged from PACU when criteria met.

## 2022-08-23 NOTE — PLAN OF CARE
Patient alert and oriented x4. On room air. NSR on telemetry. Hydralazine administered PRN. Patient denies pain at this time. CBI running at a slow rate with clear yellow urine. +1 edema noted to bilateral ankles, socks removed. IVF Infusing per order. Safety and contact precautions in placed. Call light within reach. 0000 8/23/2022: Patient c/o of the feeling of having to vomit but denies nausea. Pt also NPO at midnight for plan cytoscopy ureteroscopy. Problem: Patient/Family Goals  Goal: Patient/Family Long Term Goal  Description: Patient's Long Term Goal:discharge home    Interventions:  - IVF  - CBI  - See additional Care Plan goals for specific interventions  Outcome: Progressing  Goal: Patient/Family Short Term Goal  Description: Patient's Short Term Goal: VS stable    Interventions:   - VS  - IVF  - See additional Care Plan goals for specific interventions  Outcome: Progressing     Problem: RISK FOR INFECTION - ADULT  Goal: Absence of fever/infection during anticipated neutropenic period  Description: INTERVENTIONS  - Monitor WBC  - Administer growth factors as ordered  - Implement neutropenic guidelines  Outcome: Progressing     Problem: SAFETY ADULT - FALL  Goal: Free from fall injury  Description: INTERVENTIONS:  - Assess pt frequently for physical needs  - Identify cognitive and physical deficits and behaviors that affect risk of falls.   - [place] fall precautions as indicated by assessment.  - Educate pt/family on patient safety including physical limitations  - Instruct pt to call for assistance with activity based on assessment  - Modify environment to reduce risk of injury  - Provide assistive devices as appropriate  - Consider OT/PT consult to assist with strengthening/mobility  - Encourage toileting schedule  Outcome: Progressing     Problem: DISCHARGE PLANNING  Goal: Discharge to home or other facility with appropriate resources  Description: INTERVENTIONS:  - Identify barriers to discharge w/pt and caregiver  - Include patient/family/discharge partner in discharge planning  - Arrange for needed discharge resources and transportation as appropriate  - Identify discharge learning needs (meds, wound care, etc)  - Arrange for interpreters to assist at discharge as needed  - Consider post-discharge preferences of patient/family/discharge partner  - Complete POLST form as appropriate  - Assess patient's ability to be responsible for managing their own health  - Refer to Case Management Department for coordinating discharge planning if the patient needs post-hospital services based on physician/LIP order or complex needs related to functional status, cognitive ability or social support system  Outcome: Progressing

## 2022-08-23 NOTE — OR NURSING
Dark red smear was noted on the bed sheets. Pts heel was noted to have a sore on her left heel that was weeping. Dr. Elva Srinivasan was made aware and he asked us to bandage it. We suggested he place orders for wound consult.

## 2022-08-24 LAB
GLUCOSE BLD-MCNC: 135 MG/DL (ref 70–99)
GLUCOSE BLD-MCNC: 180 MG/DL (ref 70–99)
GLUCOSE BLD-MCNC: 190 MG/DL (ref 70–99)
GLUCOSE BLD-MCNC: 192 MG/DL (ref 70–99)

## 2022-08-24 PROCEDURE — 99232 SBSQ HOSP IP/OBS MODERATE 35: CPT | Performed by: INTERNAL MEDICINE

## 2022-08-24 PROCEDURE — 99231 SBSQ HOSP IP/OBS SF/LOW 25: CPT | Performed by: PHYSICIAN ASSISTANT

## 2022-08-24 NOTE — PLAN OF CARE
VSS, AOX4, afebrile, Tele-SB, RA,   Pt denies any pain, no cp/cp/sob  Holm in place, stent with 2 danglers noted taped with Tegaderm, urine clear pink,  IVF infusing , pt tolerating diet, no nausea,   Left heel wound with foam and Kerlix dressing in place, c/d/I  Pt updated on the POC, call light in reach, will keep monitoring    Problem: Patient/Family Goals  Goal: Patient/Family Long Term Goal  Description: Patient's Long Term Goal:discharge home    Interventions:  - IVF  - CBI  - See additional Care Plan goals for specific interventions  Outcome: Progressing  Goal: Patient/Family Short Term Goal  Description: Patient's Short Term Goal: VS stable    Interventions:   - VS  - IVF  - See additional Care Plan goals for specific interventions  Outcome: Progressing     Problem: RISK FOR INFECTION - ADULT  Goal: Absence of fever/infection during anticipated neutropenic period  Description: INTERVENTIONS  - Monitor WBC  - Administer growth factors as ordered  - Implement neutropenic guidelines  Outcome: Progressing     Problem: SAFETY ADULT - FALL  Goal: Free from fall injury  Description: INTERVENTIONS:  - Assess pt frequently for physical needs  - Identify cognitive and physical deficits and behaviors that affect risk of falls.   - [place] fall precautions as indicated by assessment.  - Educate pt/family on patient safety including physical limitations  - Instruct pt to call for assistance with activity based on assessment  - Modify environment to reduce risk of injury  - Provide assistive devices as appropriate  - Consider OT/PT consult to assist with strengthening/mobility  - Encourage toileting schedule  Outcome: Progressing     Problem: DISCHARGE PLANNING  Goal: Discharge to home or other facility with appropriate resources  Description: INTERVENTIONS:  - Identify barriers to discharge w/pt and caregiver  - Include patient/family/discharge partner in discharge planning  - Arrange for needed discharge resources and transportation as appropriate  - Identify discharge learning needs (meds, wound care, etc)  - Arrange for interpreters to assist at discharge as needed  - Consider post-discharge preferences of patient/family/discharge partner  - Complete POLST form as appropriate  - Assess patient's ability to be responsible for managing their own health  - Refer to Case Management Department for coordinating discharge planning if the patient needs post-hospital services based on physician/LIP order or complex needs related to functional status, cognitive ability or social support system  Outcome: Progressing

## 2022-08-25 ENCOUNTER — PATIENT OUTREACH (OUTPATIENT)
Dept: CASE MANAGEMENT | Age: 77
End: 2022-08-25

## 2022-08-25 ENCOUNTER — PATIENT MESSAGE (OUTPATIENT)
Dept: INTERNAL MEDICINE CLINIC | Facility: CLINIC | Age: 77
End: 2022-08-25

## 2022-08-25 VITALS
SYSTOLIC BLOOD PRESSURE: 158 MMHG | HEIGHT: 69 IN | DIASTOLIC BLOOD PRESSURE: 70 MMHG | BODY MASS INDEX: 29.62 KG/M2 | WEIGHT: 200 LBS | TEMPERATURE: 98 F | HEART RATE: 53 BPM | RESPIRATION RATE: 18 BRPM | OXYGEN SATURATION: 92 %

## 2022-08-25 LAB
GLUCOSE BLD-MCNC: 123 MG/DL (ref 70–99)
GLUCOSE BLD-MCNC: 131 MG/DL (ref 70–99)

## 2022-08-25 PROCEDURE — 99239 HOSP IP/OBS DSCHRG MGMT >30: CPT | Performed by: INTERNAL MEDICINE

## 2022-08-25 PROCEDURE — 99231 SBSQ HOSP IP/OBS SF/LOW 25: CPT | Performed by: PHYSICIAN ASSISTANT

## 2022-08-25 NOTE — PROGRESS NOTES
NURSING DISCHARGE NOTE    Discharged Home via Ambulance. Accompanied by Spouse and Support staff  Belongings Taken by patient/family. Pt in stable condition and reports feeling ready to go home. Discharge instructions given to pt and , they verbalize understanding. All questions answered. Transfer form and PCS given to EMS.

## 2022-08-25 NOTE — CM/SW NOTE
CM notified by RN during discharge rounds that pt likely to be medically cleared for discharge today. RN stated that she contacted pt's Caregiver Agency this morning (see note) to update. Pt lives with spouse, whom per RN, will provide transportation home at discharge. Ml Rodriguez ([phone]) at [facility] notified of pt's likely discharge home today.      ROBINA DejesusN, RN-BC    [id number]

## 2022-08-25 NOTE — CM/SW NOTE
08/25/22 1000   Discharge disposition   Expected discharge disposition Home-Health   Additional Home Care/Hospice Provider Residential   Discharge transportation [address] Replaced by [facility] notified by RN that pt needs transportation home at discharge. Pt's  present at bedside, but states that his wife is bed-bound at baseline and requires a werner lift for any transfers at home. Pt's  states that the pt usually travels via ambulance to/from the hospital because she cannot maintain a sitting position in the wheelchair for the duration of the ride and requires a stretcher. CM reviewed PT/OT notes and noticed that pt is bed confined, does not ambulate, and is not able to maintain a sitting position for the duration of transport, therefore ambulance is appropriate. MARCI contacted [facility] who confirmed that pt was transported via ambulance at last discharge earlier this month. Ambulance arranged for 3:15 pm. RN updated on plan of care. PCS form updated and placed on chart.     [facility]: [id number]    LATRELL Sebastian, RN-BC    [id number]

## 2022-08-25 NOTE — PROGRESS NOTES
6878: Call placed to [address] [place] [address] @ [phone] to notify re: possible discharge today for pt. They stated that pt's  has notified them already.

## 2022-08-25 NOTE — PLAN OF CARE
A&O x4. Denies any CP, CINTHYA, or calf pain at present. Lungs clear bilaterally. Abdomen soft, nontender, nondistended. Bowel sounds active, pt tolerating diet with no c/o nausea, states appetite is starting to get better. Holm and stents out per Urology, pt DTV. Incontinent of bowel and bladder, brief and external catheter in place. Pt denies any pain at this time. POC discussed and pt verbalizes understanding. Pt resting in bed, call light within reach, safety precautions in place. 1150: Discharge AVS faxed to [address] per their request @[phone]. Fax confirmation placed on physical chart. Problem: Patient/Family Goals  Goal: Patient/Family Long Term Goal  Description: Patient's Long Term Goal:discharge home    Interventions:  - IVF  - CBI  - See additional Care Plan goals for specific interventions  Outcome: Progressing  Goal: Patient/Family Short Term Goal  Description: Patient's Short Term Goal: VS stable    Interventions:   - VS  - IVF  - See additional Care Plan goals for specific interventions  Outcome: Progressing     Problem: RISK FOR INFECTION - ADULT  Goal: Absence of fever/infection during anticipated neutropenic period  Description: INTERVENTIONS  - Monitor WBC  - Administer growth factors as ordered  - Implement neutropenic guidelines  Outcome: Progressing     Problem: SAFETY ADULT - FALL  Goal: Free from fall injury  Description: INTERVENTIONS:  - Assess pt frequently for physical needs  - Identify cognitive and physical deficits and behaviors that affect risk of falls.   - [place] fall precautions as indicated by assessment.  - Educate pt/family on patient safety including physical limitations  - Instruct pt to call for assistance with activity based on assessment  - Modify environment to reduce risk of injury  - Provide assistive devices as appropriate  - Consider OT/PT consult to assist with strengthening/mobility  - Encourage toileting schedule  Outcome: Progressing Problem: DISCHARGE PLANNING  Goal: Discharge to home or other facility with appropriate resources  Description: INTERVENTIONS:  - Identify barriers to discharge w/pt and caregiver  - Include patient/family/discharge partner in discharge planning  - Arrange for needed discharge resources and transportation as appropriate  - Identify discharge learning needs (meds, wound care, etc)  - Arrange for interpreters to assist at discharge as needed  - Consider post-discharge preferences of patient/family/discharge partner  - Complete POLST form as appropriate  - Assess patient's ability to be responsible for managing their own health  - Refer to Case Management Department for coordinating discharge planning if the patient needs post-hospital services based on physician/LIP order or complex needs related to functional status, cognitive ability or social support system  Outcome: Progressing

## 2022-08-25 NOTE — TELEPHONE ENCOUNTER
Future Appointments   Date Time Provider Alejandro Nguyen   8/30/2022 10:00 AM VERNON Acosta [facility]   9/13/2022  2:15 PM Antonio Frances MD [facility] [address]

## 2022-08-25 NOTE — PLAN OF CARE
Problem: Patient/Family Goals  Goal: Patient/Family Long Term Goal  Description: Patient's Long Term Goal:discharge home    Interventions:  - IVF  - CBI  - See additional Care Plan goals for specific interventions  Outcome: Progressing  Goal: Patient/Family Short Term Goal  Description: Patient's Short Term Goal: VS stable    Interventions:   - VS  - IVF  - See additional Care Plan goals for specific interventions  Outcome: Progressing     Problem: RISK FOR INFECTION - ADULT  Goal: Absence of fever/infection during anticipated neutropenic period  Description: INTERVENTIONS  - Monitor WBC  - Administer growth factors as ordered  - Implement neutropenic guidelines  Outcome: Progressing     Problem: SAFETY ADULT - FALL  Goal: Free from fall injury  Description: INTERVENTIONS:  - Assess pt frequently for physical needs  - Identify cognitive and physical deficits and behaviors that affect risk of falls.   - [place] fall precautions as indicated by assessment.  - Educate pt/family on patient safety including physical limitations  - Instruct pt to call for assistance with activity based on assessment  - Modify environment to reduce risk of injury  - Provide assistive devices as appropriate  - Consider OT/PT consult to assist with strengthening/mobility  - Encourage toileting schedule  Outcome: Progressing     Problem: DISCHARGE PLANNING  Goal: Discharge to home or other facility with appropriate resources  Description: INTERVENTIONS:  - Identify barriers to discharge w/pt and caregiver  - Include patient/family/discharge partner in discharge planning  - Arrange for needed discharge resources and transportation as appropriate  - Identify discharge learning needs (meds, wound care, etc)  - Arrange for interpreters to assist at discharge as needed  - Consider post-discharge preferences of patient/family/discharge partner  - Complete POLST form as appropriate  - Assess patient's ability to be responsible for managing their own health  - Refer to Case Management Department for coordinating discharge planning if the patient needs post-hospital services based on physician/LIP order or complex needs related to functional status, cognitive ability or social support system  Outcome: Progressing   The patient participated in Physical Therapy today. She has declined to be turned in bed this shift. She has blanchable redness to her coccyx area, no skin breakdown observed. Waffle cushion placed underneath buttocks.

## 2022-08-25 NOTE — PROGRESS NOTES
1st attempt [facility] apt request    [facility]  [address] [phone]  Apt made for Tues.  Aug. 30th @10am    Patients  Godfrey Chang notified

## 2022-08-25 NOTE — PLAN OF CARE
Patient alert x4. Room air. Tele intact. Holm intact draining clear yellow urine. Patient denies pain. Patient max assist, every 2 hour turn. Patient tolerating diet, denies nausea. Call light within reach. Safety precautions maintained. Problem: Patient/Family Goals  Goal: Patient/Family Long Term Goal  Description: Patient's Long Term Goal:discharge home    Interventions:  - IVF  - CBI  - See additional Care Plan goals for specific interventions  Outcome: Progressing  Goal: Patient/Family Short Term Goal  Description: Patient's Short Term Goal: VS stable    Interventions:   - VS  - IVF  - See additional Care Plan goals for specific interventions  Outcome: Progressing     Problem: RISK FOR INFECTION - ADULT  Goal: Absence of fever/infection during anticipated neutropenic period  Description: INTERVENTIONS  - Monitor WBC  - Administer growth factors as ordered  - Implement neutropenic guidelines  Outcome: Progressing     Problem: SAFETY ADULT - FALL  Goal: Free from fall injury  Description: INTERVENTIONS:  - Assess pt frequently for physical needs  - Identify cognitive and physical deficits and behaviors that affect risk of falls.   - [place] fall precautions as indicated by assessment.  - Educate pt/family on patient safety including physical limitations  - Instruct pt to call for assistance with activity based on assessment  - Modify environment to reduce risk of injury  - Provide assistive devices as appropriate  - Consider OT/PT consult to assist with strengthening/mobility  - Encourage toileting schedule  Outcome: Progressing     Problem: DISCHARGE PLANNING  Goal: Discharge to home or other facility with appropriate resources  Description: INTERVENTIONS:  - Identify barriers to discharge w/pt and caregiver  - Include patient/family/discharge partner in discharge planning  - Arrange for needed discharge resources and transportation as appropriate  - Identify discharge learning needs (meds, wound care, etc)  - Arrange for interpreters to assist at discharge as needed  - Consider post-discharge preferences of patient/family/discharge partner  - Complete POLST form as appropriate  - Assess patient's ability to be responsible for managing their own health  - Refer to Case Management Department for coordinating discharge planning if the patient needs post-hospital services based on physician/LIP order or complex needs related to functional status, cognitive ability or social support system  Outcome: Progressing

## 2022-08-26 ENCOUNTER — PATIENT OUTREACH (OUTPATIENT)
Dept: CASE MANAGEMENT | Age: 77
End: 2022-08-26

## 2022-08-26 DIAGNOSIS — Z02.9 ENCOUNTERS FOR ADMINISTRATIVE PURPOSE: ICD-10-CM

## 2022-08-26 LAB — CALCULI MASS: 64 MG

## 2022-08-26 PROCEDURE — 1111F DSCHRG MED/CURRENT MED MERGE: CPT

## 2022-08-26 RX ORDER — TAMSULOSIN HYDROCHLORIDE 0.4 MG/1
0.4 CAPSULE ORAL DAILY
Qty: 30 CAPSULE | Refills: 0 | Status: SHIPPED | OUTPATIENT
Start: 2022-08-26 | End: 2022-08-26

## 2022-08-26 RX ORDER — ROSUVASTATIN CALCIUM 5 MG/1
5 TABLET, COATED ORAL EVERY OTHER DAY
Qty: 90 TABLET | Refills: 3 | Status: SHIPPED | OUTPATIENT
Start: 2022-08-26

## 2022-08-27 RX ORDER — ROSUVASTATIN CALCIUM 5 MG/1
5 TABLET, COATED ORAL EVERY OTHER DAY
Qty: 90 TABLET | Refills: 3 | OUTPATIENT
Start: 2022-08-27

## 2022-08-27 RX ORDER — TAMSULOSIN HYDROCHLORIDE 0.4 MG/1
0.4 CAPSULE ORAL DAILY
Qty: 90 CAPSULE | Refills: 0 | Status: SHIPPED | OUTPATIENT
Start: 2022-08-27

## 2022-08-27 NOTE — TELEPHONE ENCOUNTER
Rosuvastatin 5 mg  Filled 8-26-22  Qty 90  3 refills    Tamsulosin   Filled 8-26-22  Qty 30  0 refills  Need to be 90 day supply

## 2022-08-30 ENCOUNTER — APPOINTMENT (OUTPATIENT)
Dept: GENERAL RADIOLOGY | Facility: HOSPITAL | Age: 77
DRG: 481 | End: 2022-08-30
Attending: EMERGENCY MEDICINE
Payer: MEDICARE

## 2022-08-30 ENCOUNTER — OFFICE VISIT (OUTPATIENT)
Dept: INTERNAL MEDICINE CLINIC | Facility: CLINIC | Age: 77
End: 2022-08-30
Payer: MEDICARE

## 2022-08-30 ENCOUNTER — TELEPHONE (OUTPATIENT)
Dept: INTERNAL MEDICINE CLINIC | Facility: CLINIC | Age: 77
End: 2022-08-30

## 2022-08-30 ENCOUNTER — HOSPITAL ENCOUNTER (INPATIENT)
Facility: HOSPITAL | Age: 77
LOS: 2 days | Discharge: NURSING, REHAB OR LONG-TERM CARE | DRG: 481 | End: 2022-09-03
Attending: EMERGENCY MEDICINE | Admitting: HOSPITALIST
Payer: MEDICARE

## 2022-08-30 VITALS
OXYGEN SATURATION: 96 % | HEIGHT: 69 IN | SYSTOLIC BLOOD PRESSURE: 135 MMHG | BODY MASS INDEX: 30 KG/M2 | RESPIRATION RATE: 18 BRPM | DIASTOLIC BLOOD PRESSURE: 63 MMHG | HEART RATE: 52 BPM | TEMPERATURE: 97 F

## 2022-08-30 DIAGNOSIS — R31.9 URINARY TRACT INFECTION WITH HEMATURIA, SITE UNSPECIFIED: Primary | ICD-10-CM

## 2022-08-30 DIAGNOSIS — D72.829 LEUKOCYTOSIS, UNSPECIFIED TYPE: ICD-10-CM

## 2022-08-30 DIAGNOSIS — Z79.01 ANTICOAGULATED BY ANTICOAGULATION TREATMENT: ICD-10-CM

## 2022-08-30 DIAGNOSIS — E11.22 TYPE 2 DIABETES MELLITUS WITH STAGE 3 CHRONIC KIDNEY DISEASE, WITHOUT LONG-TERM CURRENT USE OF INSULIN, UNSPECIFIED WHETHER STAGE 3A OR 3B CKD (HCC): ICD-10-CM

## 2022-08-30 DIAGNOSIS — M25.552 PAIN OF LEFT HIP: ICD-10-CM

## 2022-08-30 DIAGNOSIS — D64.9 ANEMIA, UNSPECIFIED TYPE: ICD-10-CM

## 2022-08-30 DIAGNOSIS — N18.30 TYPE 2 DIABETES MELLITUS WITH STAGE 3 CHRONIC KIDNEY DISEASE, WITHOUT LONG-TERM CURRENT USE OF INSULIN, UNSPECIFIED WHETHER STAGE 3A OR 3B CKD (HCC): ICD-10-CM

## 2022-08-30 DIAGNOSIS — M25.562 ACUTE PAIN OF LEFT KNEE: ICD-10-CM

## 2022-08-30 DIAGNOSIS — R52 PAIN: ICD-10-CM

## 2022-08-30 DIAGNOSIS — N20.0 BILATERAL KIDNEY STONES: ICD-10-CM

## 2022-08-30 DIAGNOSIS — N18.4 STAGE 4 CHRONIC KIDNEY DISEASE (HCC): ICD-10-CM

## 2022-08-30 DIAGNOSIS — I48.91 ATRIAL FIBRILLATION, UNSPECIFIED TYPE (HCC): ICD-10-CM

## 2022-08-30 DIAGNOSIS — S72.402A CLOSED FRACTURE OF DISTAL END OF LEFT FEMUR, UNSPECIFIED FRACTURE MORPHOLOGY, INITIAL ENCOUNTER (HCC): Primary | ICD-10-CM

## 2022-08-30 DIAGNOSIS — N39.0 URINARY TRACT INFECTION WITH HEMATURIA, SITE UNSPECIFIED: Primary | ICD-10-CM

## 2022-08-30 LAB
ALBUMIN SERPL-MCNC: 2.5 G/DL (ref 3.4–5)
ALBUMIN/GLOB SERPL: 0.6 {RATIO} (ref 1–2)
ALP LIVER SERPL-CCNC: 128 U/L
ALT SERPL-CCNC: 13 U/L
ANION GAP SERPL CALC-SCNC: 4 MMOL/L (ref 0–18)
ANTIBODY SCREEN: NEGATIVE
APTT PPP: 44.1 SECONDS (ref 23.3–35.6)
AST SERPL-CCNC: 12 U/L (ref 15–37)
BASOPHILS # BLD AUTO: 0.04 X10(3) UL (ref 0–0.2)
BASOPHILS NFR BLD AUTO: 0.2 %
BILIRUB SERPL-MCNC: 0.4 MG/DL (ref 0.1–2)
BUN BLD-MCNC: 30 MG/DL (ref 7–18)
CALCIUM BLD-MCNC: 9 MG/DL (ref 8.5–10.1)
CHLORIDE SERPL-SCNC: 108 MMOL/L (ref 98–112)
CO2 SERPL-SCNC: 26 MMOL/L (ref 21–32)
CREAT BLD-MCNC: 1.92 MG/DL
EOSINOPHIL # BLD AUTO: 0.01 X10(3) UL (ref 0–0.7)
EOSINOPHIL NFR BLD AUTO: 0.1 %
ERYTHROCYTE [DISTWIDTH] IN BLOOD BY AUTOMATED COUNT: 14.9 %
GFR SERPLBLD BASED ON 1.73 SQ M-ARVRAT: 26 ML/MIN/1.73M2 (ref 60–?)
GLOBULIN PLAS-MCNC: 4.2 G/DL (ref 2.8–4.4)
GLUCOSE BLD-MCNC: 115 MG/DL (ref 70–99)
GLUCOSE BLD-MCNC: 155 MG/DL (ref 70–99)
GLUCOSE BLD-MCNC: 166 MG/DL (ref 70–99)
HCT VFR BLD AUTO: 29.5 %
HGB BLD-MCNC: 9.3 G/DL
IMM GRANULOCYTES # BLD AUTO: 0.11 X10(3) UL (ref 0–1)
IMM GRANULOCYTES NFR BLD: 0.7 %
INR BLD: 2 (ref 0.85–1.16)
LYMPHOCYTES # BLD AUTO: 0.79 X10(3) UL (ref 1–4)
LYMPHOCYTES NFR BLD AUTO: 4.8 %
MCH RBC QN AUTO: 27.9 PG (ref 26–34)
MCHC RBC AUTO-ENTMCNC: 31.5 G/DL (ref 31–37)
MCV RBC AUTO: 88.6 FL
MONOCYTES # BLD AUTO: 1.08 X10(3) UL (ref 0.1–1)
MONOCYTES NFR BLD AUTO: 6.5 %
NEUTROPHILS # BLD AUTO: 14.6 X10 (3) UL (ref 1.5–7.7)
NEUTROPHILS # BLD AUTO: 14.6 X10(3) UL (ref 1.5–7.7)
NEUTROPHILS NFR BLD AUTO: 87.7 %
OSMOLALITY SERPL CALC.SUM OF ELEC: 296 MOSM/KG (ref 275–295)
PLATELET # BLD AUTO: 487 10(3)UL (ref 150–450)
POTASSIUM SERPL-SCNC: 4.8 MMOL/L (ref 3.5–5.1)
PROT SERPL-MCNC: 6.7 G/DL (ref 6.4–8.2)
PROTHROMBIN TIME: 22.5 SECONDS (ref 11.6–14.8)
RBC # BLD AUTO: 3.33 X10(6)UL
RH BLOOD TYPE: POSITIVE
SARS-COV-2 RNA RESP QL NAA+PROBE: NOT DETECTED
SODIUM SERPL-SCNC: 138 MMOL/L (ref 136–145)
WBC # BLD AUTO: 16.6 X10(3) UL (ref 4–11)

## 2022-08-30 PROCEDURE — 99223 1ST HOSP IP/OBS HIGH 75: CPT | Performed by: ORTHOPAEDIC SURGERY

## 2022-08-30 PROCEDURE — 73552 X-RAY EXAM OF FEMUR 2/>: CPT | Performed by: EMERGENCY MEDICINE

## 2022-08-30 PROCEDURE — 71045 X-RAY EXAM CHEST 1 VIEW: CPT | Performed by: EMERGENCY MEDICINE

## 2022-08-30 PROCEDURE — 1111F DSCHRG MED/CURRENT MED MERGE: CPT | Performed by: NURSE PRACTITIONER

## 2022-08-30 PROCEDURE — 1111F DSCHRG MED/CURRENT MED MERGE: CPT | Performed by: HOSPITALIST

## 2022-08-30 PROCEDURE — 99495 TRANSJ CARE MGMT MOD F2F 14D: CPT | Performed by: NURSE PRACTITIONER

## 2022-08-30 PROCEDURE — 99220 INITIAL OBSERVATION CARE,LEVL III: CPT | Performed by: HOSPITALIST

## 2022-08-30 RX ORDER — ACETAMINOPHEN 500 MG
1000 TABLET ORAL EVERY 6 HOURS PRN
Status: ON HOLD | COMMUNITY

## 2022-08-30 RX ORDER — MORPHINE SULFATE 4 MG/ML
4 INJECTION, SOLUTION INTRAMUSCULAR; INTRAVENOUS ONCE
Status: COMPLETED | OUTPATIENT
Start: 2022-08-30 | End: 2022-08-30

## 2022-08-30 RX ORDER — NICOTINE POLACRILEX 4 MG
15 LOZENGE BUCCAL
Status: DISCONTINUED | OUTPATIENT
Start: 2022-08-30 | End: 2022-09-03

## 2022-08-30 RX ORDER — MORPHINE SULFATE 2 MG/ML
1 INJECTION, SOLUTION INTRAMUSCULAR; INTRAVENOUS EVERY 2 HOUR PRN
Status: DISCONTINUED | OUTPATIENT
Start: 2022-08-30 | End: 2022-09-03

## 2022-08-30 RX ORDER — MORPHINE SULFATE 4 MG/ML
4 INJECTION, SOLUTION INTRAMUSCULAR; INTRAVENOUS EVERY 2 HOUR PRN
Status: DISCONTINUED | OUTPATIENT
Start: 2022-08-30 | End: 2022-09-03

## 2022-08-30 RX ORDER — METOPROLOL SUCCINATE 100 MG/1
200 TABLET, EXTENDED RELEASE ORAL
Status: DISCONTINUED | OUTPATIENT
Start: 2022-08-31 | End: 2022-09-01

## 2022-08-30 RX ORDER — ONDANSETRON 2 MG/ML
4 INJECTION INTRAMUSCULAR; INTRAVENOUS EVERY 6 HOURS PRN
Status: DISCONTINUED | OUTPATIENT
Start: 2022-08-30 | End: 2022-09-03

## 2022-08-30 RX ORDER — LOSARTAN POTASSIUM 25 MG/1
25 TABLET ORAL DAILY
Status: DISCONTINUED | OUTPATIENT
Start: 2022-08-30 | End: 2022-08-31

## 2022-08-30 RX ORDER — OXYBUTYNIN CHLORIDE 5 MG/1
5 TABLET ORAL 2 TIMES DAILY
Status: DISCONTINUED | OUTPATIENT
Start: 2022-08-30 | End: 2022-09-03

## 2022-08-30 RX ORDER — BISACODYL 10 MG
10 SUPPOSITORY, RECTAL RECTAL
Status: DISCONTINUED | OUTPATIENT
Start: 2022-08-30 | End: 2022-09-03

## 2022-08-30 RX ORDER — HYDROCODONE BITARTRATE AND ACETAMINOPHEN 5; 325 MG/1; MG/1
1 TABLET ORAL EVERY 4 HOURS PRN
Status: DISCONTINUED | OUTPATIENT
Start: 2022-08-30 | End: 2022-09-03

## 2022-08-30 RX ORDER — POLYETHYLENE GLYCOL 3350 17 G/17G
17 POWDER, FOR SOLUTION ORAL DAILY PRN
Status: DISCONTINUED | OUTPATIENT
Start: 2022-08-30 | End: 2022-09-03

## 2022-08-30 RX ORDER — GARLIC EXTRACT 500 MG
1 CAPSULE ORAL DAILY
Status: DISCONTINUED | OUTPATIENT
Start: 2022-08-30 | End: 2022-09-03

## 2022-08-30 RX ORDER — GABAPENTIN 300 MG/1
300 CAPSULE ORAL 2 TIMES DAILY
Status: DISCONTINUED | OUTPATIENT
Start: 2022-08-30 | End: 2022-09-03

## 2022-08-30 RX ORDER — MORPHINE SULFATE 2 MG/ML
2 INJECTION, SOLUTION INTRAMUSCULAR; INTRAVENOUS EVERY 2 HOUR PRN
Status: DISCONTINUED | OUTPATIENT
Start: 2022-08-30 | End: 2022-09-03

## 2022-08-30 RX ORDER — METOCLOPRAMIDE HYDROCHLORIDE 5 MG/ML
5 INJECTION INTRAMUSCULAR; INTRAVENOUS EVERY 8 HOURS PRN
Status: DISCONTINUED | OUTPATIENT
Start: 2022-08-30 | End: 2022-09-03

## 2022-08-30 RX ORDER — HYDROCODONE BITARTRATE AND ACETAMINOPHEN 5; 325 MG/1; MG/1
2 TABLET ORAL EVERY 4 HOURS PRN
Status: DISCONTINUED | OUTPATIENT
Start: 2022-08-30 | End: 2022-09-03

## 2022-08-30 RX ORDER — ACETAMINOPHEN 325 MG/1
650 TABLET ORAL EVERY 4 HOURS PRN
Status: DISCONTINUED | OUTPATIENT
Start: 2022-08-30 | End: 2022-09-03

## 2022-08-30 RX ORDER — HYDROCODONE BITARTRATE AND ACETAMINOPHEN 5; 325 MG/1; MG/1
1 TABLET ORAL EVERY 6 HOURS PRN
Qty: 56 TABLET | Refills: 0 | Status: ON HOLD | OUTPATIENT
Start: 2022-08-30 | End: 2022-09-03

## 2022-08-30 RX ORDER — DEXTROSE MONOHYDRATE 25 G/50ML
50 INJECTION, SOLUTION INTRAVENOUS
Status: DISCONTINUED | OUTPATIENT
Start: 2022-08-30 | End: 2022-09-03

## 2022-08-30 RX ORDER — MELATONIN
3 NIGHTLY PRN
Status: DISCONTINUED | OUTPATIENT
Start: 2022-08-30 | End: 2022-09-03

## 2022-08-30 RX ORDER — SENNOSIDES 8.6 MG
17.2 TABLET ORAL NIGHTLY PRN
Status: DISCONTINUED | OUTPATIENT
Start: 2022-08-30 | End: 2022-09-03

## 2022-08-30 RX ORDER — SODIUM CHLORIDE 9 MG/ML
INJECTION, SOLUTION INTRAVENOUS CONTINUOUS
Status: DISCONTINUED | OUTPATIENT
Start: 2022-08-30 | End: 2022-09-03

## 2022-08-30 RX ORDER — NICOTINE POLACRILEX 4 MG
30 LOZENGE BUCCAL
Status: DISCONTINUED | OUTPATIENT
Start: 2022-08-30 | End: 2022-09-03

## 2022-08-30 NOTE — ED INITIAL ASSESSMENT (HPI)
Patient is wheelchair bound, while transferring from chair to couch, slid and fell down, left leg abnormality.  Also reports falling 6 days ago

## 2022-08-30 NOTE — ED QUICK NOTES
Orders for admission, patient is aware of plan and ready to go upstairs. Any questions, please call ED RN Jasmin Rowell at extension [phone].      Patient Covid vaccination status: Fully vaccinated     COVID Test Ordered in ED: Rapid SARS-CoV-2 by PCR    COVID Suspicion at Admission: Low clinical suspicion for COVID    Running Infusions:  None    Mental Status/LOC at time of transport: AOX4    Other pertinent information:   CIWA score: N/A   NIH score:  N/A

## 2022-08-30 NOTE — PROGRESS NOTES
Home sleep test results -snoring, no [facility] ADMISSION NOTE      Patient admitted via Cart  Oriented to room. Safety precautions initiated. Bed in low position. Call light in reach. Assumed care at [phone]. Patient is A+Ox4. Arrived from ED on 3L NC, will wean as tolerated (baseline RA). SB on tele. Purewick in place. Patient is WC bound at baseline. QID accu check, okay for cardiac diet but NPO at midnight. Cardiology consult placed. Admission Navigator completed, Awaiting PTA med list.  at bedside reported that pharmacy will call him to confirm medication list, list is at home.

## 2022-08-31 ENCOUNTER — ANESTHESIA EVENT (OUTPATIENT)
Dept: SURGERY | Facility: HOSPITAL | Age: 77
DRG: 481 | End: 2022-08-31
Payer: MEDICARE

## 2022-08-31 ENCOUNTER — APPOINTMENT (OUTPATIENT)
Dept: GENERAL RADIOLOGY | Facility: HOSPITAL | Age: 77
DRG: 481 | End: 2022-08-31
Attending: ORTHOPAEDIC SURGERY
Payer: MEDICARE

## 2022-08-31 ENCOUNTER — ANESTHESIA (OUTPATIENT)
Dept: SURGERY | Facility: HOSPITAL | Age: 77
DRG: 481 | End: 2022-08-31
Payer: MEDICARE

## 2022-08-31 LAB
ALBUMIN SERPL-MCNC: 2.2 G/DL (ref 3.4–5)
ALBUMIN/GLOB SERPL: 0.6 {RATIO} (ref 1–2)
ALP LIVER SERPL-CCNC: 101 U/L
ALT SERPL-CCNC: 11 U/L
ANION GAP SERPL CALC-SCNC: 3 MMOL/L (ref 0–18)
AST SERPL-CCNC: 10 U/L (ref 15–37)
BASOPHILS # BLD AUTO: 0.05 X10(3) UL (ref 0–0.2)
BASOPHILS NFR BLD AUTO: 0.4 %
BILIRUB SERPL-MCNC: 0.4 MG/DL (ref 0.1–2)
BUN BLD-MCNC: 30 MG/DL (ref 7–18)
CALCIUM BLD-MCNC: 8.8 MG/DL (ref 8.5–10.1)
CHLORIDE SERPL-SCNC: 109 MMOL/L (ref 98–112)
CO2 SERPL-SCNC: 27 MMOL/L (ref 21–32)
CREAT BLD-MCNC: 1.72 MG/DL
EOSINOPHIL # BLD AUTO: 0.11 X10(3) UL (ref 0–0.7)
EOSINOPHIL NFR BLD AUTO: 0.9 %
ERYTHROCYTE [DISTWIDTH] IN BLOOD BY AUTOMATED COUNT: 14.9 %
ERYTHROCYTE [DISTWIDTH] IN BLOOD BY AUTOMATED COUNT: 15.1 %
GFR SERPLBLD BASED ON 1.73 SQ M-ARVRAT: 30 ML/MIN/1.73M2 (ref 60–?)
GLOBULIN PLAS-MCNC: 3.5 G/DL (ref 2.8–4.4)
GLUCOSE BLD-MCNC: 116 MG/DL (ref 70–99)
GLUCOSE BLD-MCNC: 129 MG/DL (ref 70–99)
GLUCOSE BLD-MCNC: 86 MG/DL (ref 70–99)
GLUCOSE BLD-MCNC: 91 MG/DL (ref 70–99)
HCT VFR BLD AUTO: 24.3 %
HCT VFR BLD AUTO: 25.9 %
HGB BLD-MCNC: 7.5 G/DL
HGB BLD-MCNC: 7.9 G/DL
IMM GRANULOCYTES # BLD AUTO: 0.07 X10(3) UL (ref 0–1)
IMM GRANULOCYTES NFR BLD: 0.6 %
INR BLD: 1.33 (ref 0.85–1.16)
INR BLD: 1.94 (ref 0.85–1.16)
LYMPHOCYTES # BLD AUTO: 2.18 X10(3) UL (ref 1–4)
LYMPHOCYTES NFR BLD AUTO: 18.4 %
MCH RBC QN AUTO: 27.5 PG (ref 26–34)
MCH RBC QN AUTO: 28 PG (ref 26–34)
MCHC RBC AUTO-ENTMCNC: 30.5 G/DL (ref 31–37)
MCHC RBC AUTO-ENTMCNC: 30.9 G/DL (ref 31–37)
MCV RBC AUTO: 90.2 FL
MCV RBC AUTO: 90.7 FL
MONOCYTES # BLD AUTO: 1.21 X10(3) UL (ref 0.1–1)
MONOCYTES NFR BLD AUTO: 10.2 %
NEUTROPHILS # BLD AUTO: 8.23 X10 (3) UL (ref 1.5–7.7)
NEUTROPHILS # BLD AUTO: 8.23 X10(3) UL (ref 1.5–7.7)
NEUTROPHILS NFR BLD AUTO: 69.5 %
OSMOLALITY SERPL CALC.SUM OF ELEC: 293 MOSM/KG (ref 275–295)
PLATELET # BLD AUTO: 395 10(3)UL (ref 150–450)
PLATELET # BLD AUTO: 423 10(3)UL (ref 150–450)
POTASSIUM SERPL-SCNC: 4.3 MMOL/L (ref 3.5–5.1)
PROT SERPL-MCNC: 5.7 G/DL (ref 6.4–8.2)
PROTHROMBIN TIME: 16.4 SECONDS (ref 11.6–14.8)
PROTHROMBIN TIME: 22 SECONDS (ref 11.6–14.8)
RBC # BLD AUTO: 2.68 X10(6)UL
RBC # BLD AUTO: 2.87 X10(6)UL
SODIUM SERPL-SCNC: 139 MMOL/L (ref 136–145)
WBC # BLD AUTO: 11.7 X10(3) UL (ref 4–11)
WBC # BLD AUTO: 11.9 X10(3) UL (ref 4–11)

## 2022-08-31 PROCEDURE — 76942 ECHO GUIDE FOR BIOPSY: CPT | Performed by: ANESTHESIOLOGY

## 2022-08-31 PROCEDURE — 30233N1 TRANSFUSION OF NONAUTOLOGOUS RED BLOOD CELLS INTO PERIPHERAL VEIN, PERCUTANEOUS APPROACH: ICD-10-PCS | Performed by: HOSPITALIST

## 2022-08-31 PROCEDURE — 0QS936Z REPOSITION LEFT FEMORAL SHAFT WITH INTRAMEDULLARY INTERNAL FIXATION DEVICE, PERCUTANEOUS APPROACH: ICD-10-PCS | Performed by: ORTHOPAEDIC SURGERY

## 2022-08-31 PROCEDURE — 3E0T3BZ INTRODUCTION OF ANESTHETIC AGENT INTO PERIPHERAL NERVES AND PLEXI, PERCUTANEOUS APPROACH: ICD-10-PCS | Performed by: ANESTHESIOLOGY

## 2022-08-31 PROCEDURE — 27506 TREATMENT OF THIGH FRACTURE: CPT | Performed by: ORTHOPAEDIC SURGERY

## 2022-08-31 PROCEDURE — 76000 FLUOROSCOPY <1 HR PHYS/QHP: CPT | Performed by: ORTHOPAEDIC SURGERY

## 2022-08-31 PROCEDURE — 99226 SUBSEQUENT OBSERVATION CARE: CPT | Performed by: HOSPITALIST

## 2022-08-31 PROCEDURE — 27506 TREATMENT OF THIGH FRACTURE: CPT | Performed by: PHYSICIAN ASSISTANT

## 2022-08-31 DEVICE — ZNN CMN NAIL 11.5MMX42CM 130 L
Type: IMPLANTABLE DEVICE | Site: HIP | Status: FUNCTIONAL
Brand: ZIMMER® NATURAL NAIL® SYSTEM

## 2022-08-31 DEVICE — IMPLANTABLE DEVICE
Type: IMPLANTABLE DEVICE | Site: HIP | Status: FUNCTIONAL
Brand: NATURAL NAIL®

## 2022-08-31 DEVICE — ZNN CMN LAG SCREW 10.5X90
Type: IMPLANTABLE DEVICE | Site: HIP | Status: FUNCTIONAL
Brand: ZIMMER® NATURAL NAIL® SYSTEM

## 2022-08-31 RX ORDER — MEPERIDINE HYDROCHLORIDE 25 MG/ML
12.5 INJECTION INTRAMUSCULAR; INTRAVENOUS; SUBCUTANEOUS AS NEEDED
Status: DISCONTINUED | OUTPATIENT
Start: 2022-08-31 | End: 2022-08-31 | Stop reason: HOSPADM

## 2022-08-31 RX ORDER — METOPROLOL TARTRATE 5 MG/5ML
2.5 INJECTION INTRAVENOUS ONCE
Status: DISCONTINUED | OUTPATIENT
Start: 2022-08-31 | End: 2022-08-31 | Stop reason: HOSPADM

## 2022-08-31 RX ORDER — TRANEXAMIC ACID 10 MG/ML
INJECTION, SOLUTION INTRAVENOUS AS NEEDED
Status: DISCONTINUED | OUTPATIENT
Start: 2022-08-31 | End: 2022-08-31 | Stop reason: SURG

## 2022-08-31 RX ORDER — NEOSTIGMINE METHYLSULFATE 1 MG/ML
INJECTION, SOLUTION INTRAVENOUS AS NEEDED
Status: DISCONTINUED | OUTPATIENT
Start: 2022-08-31 | End: 2022-08-31 | Stop reason: SURG

## 2022-08-31 RX ORDER — HYDROMORPHONE HYDROCHLORIDE 1 MG/ML
INJECTION, SOLUTION INTRAMUSCULAR; INTRAVENOUS; SUBCUTANEOUS
Status: COMPLETED
Start: 2022-08-31 | End: 2022-08-31

## 2022-08-31 RX ORDER — GLYCOPYRROLATE 0.2 MG/ML
INJECTION, SOLUTION INTRAMUSCULAR; INTRAVENOUS AS NEEDED
Status: DISCONTINUED | OUTPATIENT
Start: 2022-08-31 | End: 2022-08-31 | Stop reason: SURG

## 2022-08-31 RX ORDER — ROCURONIUM BROMIDE 10 MG/ML
INJECTION, SOLUTION INTRAVENOUS AS NEEDED
Status: DISCONTINUED | OUTPATIENT
Start: 2022-08-31 | End: 2022-08-31 | Stop reason: SURG

## 2022-08-31 RX ORDER — MIDAZOLAM HYDROCHLORIDE 1 MG/ML
1 INJECTION INTRAMUSCULAR; INTRAVENOUS EVERY 5 MIN PRN
Status: DISCONTINUED | OUTPATIENT
Start: 2022-08-31 | End: 2022-08-31 | Stop reason: HOSPADM

## 2022-08-31 RX ORDER — HYDROMORPHONE HYDROCHLORIDE 1 MG/ML
0.2 INJECTION, SOLUTION INTRAMUSCULAR; INTRAVENOUS; SUBCUTANEOUS EVERY 5 MIN PRN
Status: DISCONTINUED | OUTPATIENT
Start: 2022-08-31 | End: 2022-08-31 | Stop reason: HOSPADM

## 2022-08-31 RX ORDER — HYDROMORPHONE HYDROCHLORIDE 1 MG/ML
0.6 INJECTION, SOLUTION INTRAMUSCULAR; INTRAVENOUS; SUBCUTANEOUS EVERY 5 MIN PRN
Status: DISCONTINUED | OUTPATIENT
Start: 2022-08-31 | End: 2022-08-31 | Stop reason: HOSPADM

## 2022-08-31 RX ORDER — CEFAZOLIN SODIUM/WATER 2 G/20 ML
SYRINGE (ML) INTRAVENOUS AS NEEDED
Status: DISCONTINUED | OUTPATIENT
Start: 2022-08-31 | End: 2022-08-31 | Stop reason: SURG

## 2022-08-31 RX ORDER — SODIUM CHLORIDE, SODIUM LACTATE, POTASSIUM CHLORIDE, CALCIUM CHLORIDE 600; 310; 30; 20 MG/100ML; MG/100ML; MG/100ML; MG/100ML
INJECTION, SOLUTION INTRAVENOUS CONTINUOUS
Status: DISCONTINUED | OUTPATIENT
Start: 2022-08-31 | End: 2022-08-31 | Stop reason: HOSPADM

## 2022-08-31 RX ORDER — ONDANSETRON 2 MG/ML
4 INJECTION INTRAMUSCULAR; INTRAVENOUS EVERY 6 HOURS PRN
Status: DISCONTINUED | OUTPATIENT
Start: 2022-08-31 | End: 2022-08-31 | Stop reason: HOSPADM

## 2022-08-31 RX ORDER — NALOXONE HYDROCHLORIDE 0.4 MG/ML
80 INJECTION, SOLUTION INTRAMUSCULAR; INTRAVENOUS; SUBCUTANEOUS AS NEEDED
Status: DISCONTINUED | OUTPATIENT
Start: 2022-08-31 | End: 2022-08-31 | Stop reason: HOSPADM

## 2022-08-31 RX ORDER — METOCLOPRAMIDE HYDROCHLORIDE 5 MG/ML
5 INJECTION INTRAMUSCULAR; INTRAVENOUS EVERY 8 HOURS PRN
Status: DISCONTINUED | OUTPATIENT
Start: 2022-08-31 | End: 2022-08-31 | Stop reason: HOSPADM

## 2022-08-31 RX ORDER — SENNOSIDES 8.6 MG
17.2 TABLET ORAL NIGHTLY
Status: DISCONTINUED | OUTPATIENT
Start: 2022-08-31 | End: 2022-09-03

## 2022-08-31 RX ORDER — HYDROMORPHONE HYDROCHLORIDE 1 MG/ML
0.4 INJECTION, SOLUTION INTRAMUSCULAR; INTRAVENOUS; SUBCUTANEOUS EVERY 5 MIN PRN
Status: DISCONTINUED | OUTPATIENT
Start: 2022-08-31 | End: 2022-08-31 | Stop reason: HOSPADM

## 2022-08-31 RX ORDER — ONDANSETRON 2 MG/ML
INJECTION INTRAMUSCULAR; INTRAVENOUS AS NEEDED
Status: DISCONTINUED | OUTPATIENT
Start: 2022-08-31 | End: 2022-08-31 | Stop reason: SURG

## 2022-08-31 RX ORDER — SODIUM CHLORIDE 9 MG/ML
INJECTION, SOLUTION INTRAVENOUS ONCE
Status: DISCONTINUED | OUTPATIENT
Start: 2022-08-31 | End: 2022-08-31

## 2022-08-31 RX ORDER — CEFAZOLIN SODIUM/WATER 2 G/20 ML
2 SYRINGE (ML) INTRAVENOUS EVERY 8 HOURS
Status: COMPLETED | OUTPATIENT
Start: 2022-08-31 | End: 2022-09-01

## 2022-08-31 RX ORDER — DEXAMETHASONE SODIUM PHOSPHATE 4 MG/ML
VIAL (ML) INJECTION AS NEEDED
Status: DISCONTINUED | OUTPATIENT
Start: 2022-08-31 | End: 2022-08-31 | Stop reason: SURG

## 2022-08-31 RX ORDER — DOXEPIN HYDROCHLORIDE 50 MG/1
1 CAPSULE ORAL DAILY
Status: DISCONTINUED | OUTPATIENT
Start: 2022-09-01 | End: 2022-09-03

## 2022-08-31 RX ORDER — PHENYLEPHRINE HCL 10 MG/ML
VIAL (ML) INJECTION AS NEEDED
Status: DISCONTINUED | OUTPATIENT
Start: 2022-08-31 | End: 2022-08-31 | Stop reason: SURG

## 2022-08-31 RX ADMIN — DEXAMETHASONE SODIUM PHOSPHATE 4 MG: 4 MG/ML VIAL (ML) INJECTION at 19:41:00

## 2022-08-31 RX ADMIN — NEOSTIGMINE METHYLSULFATE 4 MG: 1 INJECTION, SOLUTION INTRAVENOUS at 21:15:00

## 2022-08-31 RX ADMIN — ONDANSETRON 4 MG: 2 INJECTION INTRAMUSCULAR; INTRAVENOUS at 21:15:00

## 2022-08-31 RX ADMIN — SODIUM CHLORIDE: 9 INJECTION, SOLUTION INTRAVENOUS at 21:50:00

## 2022-08-31 RX ADMIN — GLYCOPYRROLATE 0.6 MG: 0.2 INJECTION, SOLUTION INTRAMUSCULAR; INTRAVENOUS at 21:15:00

## 2022-08-31 RX ADMIN — CEFAZOLIN SODIUM/WATER 2 G: 2 G/20 ML SYRINGE (ML) INTRAVENOUS at 18:10:00

## 2022-08-31 RX ADMIN — PHENYLEPHRINE HCL 100 MCG: 10 MG/ML VIAL (ML) INJECTION at 18:02:00

## 2022-08-31 RX ADMIN — TRANEXAMIC ACID 1000 MG: 10 INJECTION, SOLUTION INTRAVENOUS at 18:10:00

## 2022-08-31 RX ADMIN — ROCURONIUM BROMIDE 30 MG: 10 INJECTION, SOLUTION INTRAVENOUS at 18:02:00

## 2022-08-31 NOTE — ANESTHESIA PROCEDURE NOTES
Airway  Date/Time: 8/31/2022 6:03 PM  Urgency: elective    Airway not difficult    General Information and Staff    Patient location during procedure: OR  Anesthesiologist: Alonzo Dawkins MD  Performed: anesthesiologist     Indications and Patient Condition  Indications for airway management: anesthesia  Sedation level: deep  Preoxygenated: yes  Patient position: sniffing  Mask difficulty assessment: 1 - vent by mask    Final Airway Details  Final airway type: endotracheal airway      Successful airway: ETT  Cuffed: yes   Successful intubation technique: direct laryngoscopy  Endotracheal tube insertion site: oral  Blade: GlideScope  Blade size: #4  ETT size (mm): 7.5    Cormack-Lehane Classification: grade IIA - partial view of glottis  Placement verified by: chest auscultation and capnometry   Measured from: lips  ETT to lips (cm): 22  Number of attempts at approach: 1

## 2022-08-31 NOTE — PLAN OF CARE
Pt is alert and oriented x4. Contact precautions. Weaned off of oxygen back to baseline RA. Pt is on bedrest, tele (sinus florencio), SCD on RLE, long leg splint on LLE. Pt is NPO and IVF are infusing. Pt denies numbness/tingling LLE. Plan: Surgery scheduled for today at 1700.

## 2022-08-31 NOTE — PHYSICAL THERAPY NOTE
PT order received, chart reviewed, presented after fall out of a werner lift, dx displaced femoral shaft fracture with ortho recommending surgical stabilization. Per EMR has been nonambulatory since ankle fracture 8/2021, previously using slide board and CG assist for transfer to w/c but has since transitioned to werner lift. Will cont to follow and eval as appropriate.

## 2022-08-31 NOTE — ANESTHESIA PROCEDURE NOTES
Regional Block  Performed by: Bethel Pollock MD  Authorized by: Bethel Pollock MD       General Information and Staff    Start Time:  8/31/2022 5:50 PM  End Time:  8/31/2022 5:58 PM  Anesthesiologist:  Bethel Pollock MD  Performed by: Anesthesiologist  Patient Location:  OR    Block Placement: Pre Induction  Site Identification: real time ultrasound guided and image stored and retrievable    Block site/laterality marked before start: site marked  Reason for Block: at surgeon's request and post-op pain management    Preanesthetic Checklist: 2 patient identifers, IV checked, risks and benefits discussed, monitors and equipment checked, pre-op evaluation, timeout performed, anesthesia consent, sterile technique used, no prohibitive neurological deficits and no local skin infection at insertion site      Procedure Details    Patient Position:  Supine  Prep: ChloraPrep    Monitoring:  Cardiac monitor, continuous pulse ox and blood pressure cuff  Block Type:  Femoral  Laterality:  Left  Injection Technique:  Single-shot    Needle    Needle Type:  Short-bevel and echogenic  Needle Gauge:  21 G  Needle Length:  100 mm  Needle Localization:  Ultrasound guidance and nerve stimulator  Reason for Ultrasound Use: appropriate spread of the medication was noted in real time and no ultrasound evidence of intravascular and/or intraneural injection    Nerve Stimulator: 0.5 amps    Muscle Twitch Response Comment: no response    Assessment    Injection Assessment:  Good spread noted, negative resistance, negative aspiration for heme, incremental injection and low pressure  Heart Rate Change: No    - Patient tolerated block procedure well without evidence of immediate block related complications.      Medications      Additional Comments    Medication:  Ropivacaine 0.375% 20mL

## 2022-08-31 NOTE — CM/SW NOTE
08/31/22 1100   CM/SW Referral Data   Referral Source Social Work (self-referral)   Reason for Referral Discharge planning   Informant Patient;EMR;Clinical Staff Member   Patient Info   Patient's Current Mental Status at Time of Assessment Alert;Oriented   Patient's [address]   Number of Levels in Home 1   Number of Stair in Home 2 steps through garage   Patient lives with Spouse/Significant other   Patient Status Prior to Admission   Independent with ADLs and Mobility No   Services in place prior to admission [address] [facility] Care;DME/Supplies at home;long-term [address] [facility] [place] Provider Info    Type of DME/Supplies Wheelchair;Hospital Bed;[facility]   [address] [facility] hours per day 4 hours/day plus additional hours MWF   long-term Care days per week daily   Discharge Needs   Anticipated D/C needs To be determined       Patient is a 63 y/o woman admitted with femur fracture. Plan for OR later today if INR < 1.5. Met with pt to discuss DC planning. Pt lives with her  in a one level home. She stated that she has been using [employer] lifts for transfers for the past 2-3 weeks. She received hospital bed one month ago. She has caregiver services daily - 2 hours in AM, 1 hour in PM and during the entire day MWF while her  is at HD. Previous notes indicate caregiver provided by [facility] of [phone]. Pt is current with . Pt stated her  is her POA. Her niece, Georgia Castaneda is secondary POA. Pt does not have children, but does have 2 stepchildren, including a stepson who lives locally. Discussed concerns about multiple falls at home during transfers. Pt stated that she does feel safe at home and feels her  and caregivers are able to meet her needs. Discussed possible DC planning needs, including [facility] vs home. Pt stated she has been to [facility] in the past and feels that [facility] is useless. \"  She prefers to return to home and would like to resume [facility]. No other DC needs/concerns identified at this time. Await post op MD and therapy recommendations for further DC planning. / to remain available for support and/or discharge planning.      Elisabeth Barrios, [facility]  Discharge Planner  [phone]

## 2022-08-31 NOTE — ANESTHESIA PROCEDURE NOTES
Peripheral IV  Date/Time: 8/31/2022 6:41 PM  Inserted by: Leann Singh MD    Placement  Needle size: 18 G  Laterality: left  Location: hand  Local anesthetic: none  Site prep: alcohol  Technique: anatomical landmarks  Attempts: 2

## 2022-08-31 NOTE — OCCUPATIONAL THERAPY NOTE
Problem: Pressure Injury, Risk for  Goal: # Skin remains intact  Outcome: Outcome Met, Continue evaluating goal progress toward completion     Problem: VTE, Risk for  Goal: # No s/s of VTE  Outcome: Outcome Met, Continue evaluating goal progress toward completion     Problem: At Risk for Falls  Goal: # Patient does not fall  Outcome: Outcome Met, Continue evaluating goal progress toward completion      OCCUPATIONAL THERAPY                           OT order received and chart reviewed. Patient is admitted with femur fracture and is scheduled for surgical repair later today. Will hold OT evaluation for today and re-attempt 9/1 as appropriate.

## 2022-08-31 NOTE — PLAN OF CARE
PT DROWSY, EASILY AROUSABLE, DISORIENTED, 100% ON 2L, PALE, SB, HR 48-50'S, SCD ON RT, VOIDING PER PURE WICK, IVF INFUSING, LT LEG DEFORMED, ACE, D/I, NEURO VASCULAR CHECKS Q 2 HOURS, CMS GOOD, WIGGLES TOES, MINIMUM PAIN, ISOLATION, LABS THIS AM, NPO AFTER 0800, PLAN FOR [facility] AT 1400, PT, OR AT 1700    Problem: PAIN - ADULT  Goal: Verbalizes/displays adequate comfort level or patient's stated pain goal  Description: INTERVENTIONS:  - Encourage pt to monitor pain and request assistance  - Assess pain using appropriate pain scale  - Administer analgesics based on type and severity of pain and evaluate response  - Implement non-pharmacological measures as appropriate and evaluate response  - Consider cultural and social influences on pain and pain management  - Manage/alleviate anxiety  - Utilize distraction and/or relaxation techniques  - Monitor for opioid side effects  - Notify MD/LIP if interventions unsuccessful or patient reports new pain  - Anticipate increased pain with activity and pre-medicate as appropriate  Outcome: Progressing

## 2022-09-01 LAB
ANION GAP SERPL CALC-SCNC: 6 MMOL/L (ref 0–18)
APTT PPP: 36.8 SECONDS (ref 23.3–35.6)
ATRIAL RATE: 55 BPM
BASOPHILS # BLD AUTO: 0.02 X10(3) UL (ref 0–0.2)
BASOPHILS NFR BLD AUTO: 0.1 %
BLOOD TYPE BARCODE: 5100
BLOOD TYPE BARCODE: 5100
BUN BLD-MCNC: 29 MG/DL (ref 7–18)
CALCIUM BLD-MCNC: 8.7 MG/DL (ref 8.5–10.1)
CHLORIDE SERPL-SCNC: 109 MMOL/L (ref 98–112)
CO2 SERPL-SCNC: 23 MMOL/L (ref 21–32)
CREAT BLD-MCNC: 1.7 MG/DL
EOSINOPHIL # BLD AUTO: 0 X10(3) UL (ref 0–0.7)
EOSINOPHIL NFR BLD AUTO: 0 %
ERYTHROCYTE [DISTWIDTH] IN BLOOD BY AUTOMATED COUNT: 15.2 %
GFR SERPLBLD BASED ON 1.73 SQ M-ARVRAT: 30 ML/MIN/1.73M2 (ref 60–?)
GLUCOSE BLD-MCNC: 161 MG/DL (ref 70–99)
GLUCOSE BLD-MCNC: 177 MG/DL (ref 70–99)
GLUCOSE BLD-MCNC: 180 MG/DL (ref 70–99)
GLUCOSE BLD-MCNC: 196 MG/DL (ref 70–99)
GLUCOSE BLD-MCNC: 208 MG/DL (ref 70–99)
HCT VFR BLD AUTO: 28.1 %
HGB BLD-MCNC: 8.5 G/DL
HGB BLD-MCNC: 8.6 G/DL
IMM GRANULOCYTES # BLD AUTO: 0.13 X10(3) UL (ref 0–1)
IMM GRANULOCYTES NFR BLD: 1 %
INR BLD: 1.32 (ref 0.85–1.16)
LYMPHOCYTES # BLD AUTO: 0.65 X10(3) UL (ref 1–4)
LYMPHOCYTES NFR BLD AUTO: 4.8 %
MCH RBC QN AUTO: 28 PG (ref 26–34)
MCHC RBC AUTO-ENTMCNC: 30.2 G/DL (ref 31–37)
MCV RBC AUTO: 92.4 FL
MONOCYTES # BLD AUTO: 0.57 X10(3) UL (ref 0.1–1)
MONOCYTES NFR BLD AUTO: 4.2 %
NEUTROPHILS # BLD AUTO: 12.14 X10 (3) UL (ref 1.5–7.7)
NEUTROPHILS # BLD AUTO: 12.14 X10(3) UL (ref 1.5–7.7)
NEUTROPHILS NFR BLD AUTO: 89.9 %
OSMOLALITY SERPL CALC.SUM OF ELEC: 295 MOSM/KG (ref 275–295)
P AXIS: 69 DEGREES
P-R INTERVAL: 166 MS
PLATELET # BLD AUTO: 420 10(3)UL (ref 150–450)
POTASSIUM SERPL-SCNC: 5.1 MMOL/L (ref 3.5–5.1)
PROTHROMBIN TIME: 16.4 SECONDS (ref 11.6–14.8)
Q-T INTERVAL: 428 MS
QRS DURATION: 84 MS
QTC CALCULATION (BEZET): 409 MS
R AXIS: -3 DEGREES
RBC # BLD AUTO: 3.04 X10(6)UL
SODIUM SERPL-SCNC: 138 MMOL/L (ref 136–145)
T AXIS: -15 DEGREES
VENTRICULAR RATE: 55 BPM
WBC # BLD AUTO: 13.5 X10(3) UL (ref 4–11)

## 2022-09-01 PROCEDURE — 99232 SBSQ HOSP IP/OBS MODERATE 35: CPT | Performed by: HOSPITALIST

## 2022-09-01 RX ORDER — METOPROLOL SUCCINATE 100 MG/1
100 TABLET, EXTENDED RELEASE ORAL 2 TIMES DAILY
Status: DISCONTINUED | OUTPATIENT
Start: 2022-09-02 | End: 2022-09-03

## 2022-09-01 NOTE — BRIEF OP NOTE
Pre-Operative Diagnosis: Femur fracture, left ([facility]) [S72.92XA]     Post-Operative Diagnosis: Femur fracture, left ([address] [S72.92XA]      Procedure Performed:    FEMUR IM NAIL ANTEGRADE LEFT    Surgeon(s) and Role:     Norberto Pacheco MD - Primary    Assistant(s):  PA: Amara Horn PA-C     Surgical Findings: See operative report      Specimen: na      Estimated Blood Loss: Dusty Dos Santos PA-C  8/31/2022  9:28 PM

## 2022-09-01 NOTE — PLAN OF CARE
Patient alert and oriented x3-4. VSS, on RA. Tele in place; NSR. Mild pain reported to left leg. Aquacel dressings x3 to surgical sites, CDI. Ice packs applied. Pt reports tingling to left foot. Tolerating diet, denies N/V. IV infusing. Voiding via purewick. Plan: PT/OT to see.

## 2022-09-01 NOTE — OPERATIVE REPORT
[facility]    PATIENT'S NAME: Johan Peterson   ATTENDING PHYSICIAN: Karl Ruiz MD   OPERATING PHYSICIAN: Mary Chang M.D. PATIENT ACCOUNT#:   [de-identified]    LOCATION:  PACU [address] [place], [place] [id number] 2 EDW [address] #:   [id number]       YOB: 1945  ADMISSION DATE:       08/30/2022      OPERATION DATE:  08/31/2022    OPERATIVE REPORT    PREOPERATIVE DIAGNOSIS:  Left femoral shaft fracture, spiral with comminution. POSTOPERATIVE DIAGNOSIS:  Left femoral shaft fracture, spiral with comminution. PROCEDURE:  Intramedullary anterograde nailing, left femoral shaft fracture. ASSISTANT:  Nigel Simpson PA-C. ANESTHESIA:  General with femoral nerve block. COMPLICATIONS:  None. DRAINS:  None. SPECIMENS:  None. CONDITION:  Stable. BLOOD LOSS:  100 mL. IMPLANTS:  [employer] CMN femoral nail 130-degree neck angle measuring 11.5 mm x 42 cm, 90 mm lag screw, appropriate length 5.0 mm distal interlocking screws x2. INDICATIONS:  The patient is a 40-year-old female with multiple medical problems, who was seen in the ED yesterday after she unfortunately fell at home. An original injury was described 6 days ago when she fell off the [employer]. This most recent injury was a fall onto the buttocks from a chair to the floor. She was unable to tolerate being transferred or lifted, and she was brought to the ED. A femoral shaft fracture was identified and she was provisionally immobilized and splinted with a long leg posterior splint and admitted. She was neurovascularly intact and this was a closed injury. She was unfortunately anticoagulated on Eliquis with an INR of 2.0.  Neurovascular checks were obtained frequently to monitor closely. She was pain controlled with a plan for operative fixation once her INR was normalized. I had a long discussion with the patient and her  who holds power of .   My recommendation was for IM nailing of this unstable spiral fracture of the distal femoral diaphysis. Risks, benefits, and alternatives to surgery were discussed including but not limited to, possible infection, bleeding, neurovascular injury, as well as postop stiffness, delayed union, nonunion, malunion, or failure of fixation. Hardware complications are always a possibility. The risks of anesthesia were also reviewed including possible pulmonary, cardiac, or cerebrovascular complications, any of which could be serious or life threatening. In light of the patient's cardiac history, Cardiology provided input as to her risks. She was felt to be acceptable and optimized from a cardiac and medical standpoint, other than her anticoagulated state. She was, therefore, administered a reversal agent and followup INR was felt to be acceptable to proceed with surgery today. Informed consent was obtained. OPERATIVE TECHNIQUE:  Patient was identified in the preop holding area on her hospital bed and the left lower extremity was initialed by me. Informed consent was confirmed, and she was taken to the operating room. She was then placed under general anesthesia on the hospital bed after being administered a femoral nerve block. She was then carefully transferred with adequate multi-person lift and assist to the [place] fracture table, taking great care to stabilize the entire left lower extremity in the long leg splint with gentle traction to maintain alignment. She was then carefully transferred into the well-padded boots for traction. A well-padded perineal post was placed. Arm was draped across the abdomen, and all bony prominences were well padded. There was a preexisting chronic-appearing decubitus ulcer at the posterior aspect of the left heel. This was well covered and dressed. After confirming consent, side, and prophylactic antibiotics, as well as TXA for hemostasis, left lower extremity was placed in longitudinal traction along the fracture table. Right lower extremity was slightly extended to allow for a lateral view. Image intensification confirmed that there was a spiral oblique fracture just distal to the isthmus of the mid femoral canal at the junction of the middle and distal third. There were longitudinal comminuted fracture lines seen at the distal fragment. A large posterolateral spike was associated with the proximal shaft, with a large anteromedial spike with the distal fragment. All the comminution was with the distal half. There was significant degenerative change seen at the knee with valgus deformity. Image intensification was also confirmed to provide adequate AP and lateral views of the proximal femur for anterograde nail insertion. She was then prepped and draped in usual sterile fashion with a wall drape. I did place a foam bump underneath a posterior thigh support which helped with alignment of the fracture under image intensification to as anatomic as possible. AP and lateral views were felt to be adequately aligned. After confirming consent, side, and prophylactic antibiotics with an appropriate time-out, a longitudinal incision was made beginning at the tip of the trochanter extending proximally about 10 cm. This was incised through skin and a significant adipose layer. The wound was extremely deep, given the patient's BMI of 30. There was nearly 6 inches of adipose tissue before we engaged the vastus lateralis and deep fascia. This made the case extremely challenging in terms of the approach to the proximal femur. A longitudinal split was made in the gluteal fascia and IT band to allow access to the tip of the trochanter. Image intensification was then used to ideally place a guide pin at the tip of the trochanter and at the junction of the anterior third and middle third on the lateral view. It was advanced, but there was a significant angulation given the soft tissue sleeve.   I was able to advance it just proximal to the lesser trochanter and at the base of the neck. We therefore elected to use an alternative reaming technique. A small 8 mm reamer was used to open a  hole. The flexible ball-tipped guide pin was then passed down the femoral shaft, past the fracture on both AP and lateral views into the suprapatellar region. Sequential reaming was then initiated using flexible reamers to allow further curvature from the wound opening to the tip of the trochanter. This was extremely challenging at every level of reaming, given her soft tissue sleeve at the hip and buttock area. Image intensification was used to very carefully direct the tip of the flexible reamer more medial to ensure that we did not erode the lateral cortex of the trochanter tip. This was extremely challenging at every level of reaming. I sequentially reamed up from 8 mm through the femoral canal, beyond the fracture, to ultimately encountered chatter at about 12 mm. This was relatively mild and given that there was an 11.5 nail, we ended up reaming it to about 13.5 in 0.5 mm sequences with the flexible head reamers. Great care was taken to ensure that reaming occurred beyond the fracture site to the suprapatellar region without comminution of the cortical ends. The reduction was improved with sequential reaming. We then selected an appropriate length left cephalomedullary nail measuring 11.5 mm x 42 cm. This was mounted on the insertion jig handle and carefully passed after appropriate proximal reaming to the appropriate diameter to the level of the lesser trochanter. The nail was initially inserted to take advantage of the anterior bow with the guide handle anterior. It was passed across the fracture site without comminuting the cortical edges under image intensification. Orthogonal views confirmed this.   The nail was impacted to an ideal level where the lag screw into the femoral head and neck would be ideally placed in the center-center position. At this point in the procedure, we were satisfied with the reduction. There was no significant angulation on the AP view, and trace loss of anterior femoral bow on the lateral view. Proximal fixation was then carried out using the lag screw guide for 130 degree cephalomedullary nail. A 2 cm percutaneous incision was made through the IT band to the lateral cortex of the proximal femur to allow for placement of the guide pin. This was confirmed to be center-center on AP and lateral views. An appropriate length lag screw was then measured and felt to be ideal at 90 mm. Appropriate reaming took place to the femoral head neck area, followed by placement of the lag screw. Adequate bone density was felt to be present. Once lag screw fixation was adequate, it was statically locked by the proximal nylon screw. The nail insertion jig was then removed. Traction was then released and maintenance of reduction was felt to be well within acceptable limits. Distal fixation was then carried out. With some effort, it was extremely difficult to incorporate increased anterior femoral bow. Instead, her final lateral view demonstrated a rather straight femur rather than the typical slight curvature. I felt this was well within acceptable limits and unlikely to affect the function or outcome for the patient. We, therefore, carried out distal fixation using perfect lateral circles from lateral to medial.  Two static interlocking 5.0 mm screws were placed using standard technique with a radiolucent drill bit. It was appropriately placed under image intensification to ensure we engaged the nail at both interlocking holes. AP and lateral views confirmed this. Adequate bone density and fixation was achieved with the distal interlocking screws. Traction was completely released and image intensification confirmed adequate placement of the hardware with near anatomic reduction on AP and lateral views.   I was satisfied with the fixation and bone density. Wounds were copiously irrigated and layered closure was performed. The deep fascia was closed with absorbable 0 Vicryl along with the subcutaneous layer. Multiple layers of 0 Vicryl were used to reapproximate the cavity of adipose tissue from the deep fascia to the subdermal level at the proximal incision. Time required to complete the case was extended due to her significant soft tissue sleeve proximally about the hip. Although her BMI was elevated at 30, most of her excess adipose tissue was about the hip and pelvic region. This made every part of nail insertion extremely difficult and significantly extended the time required. Retraction provided by Annel Sotelo PA-C was paramount along with management of the hardware insertion. This including meticulous sequential use of the reamers and the ball-tipped guide pin to prevent proximal lateral cortical blowout at the trochanteric entry portal.  She also assisted with positioning and during implantation of the distal interlocking screws. The distal percutaneous incisions were reapproximated with 2-0 Vicryl and skin staples. Proximal incision was also closed with 2-0 Vicryl and staples at the dermal layer. Aquacel dressings were applied. All sponge, needle, and instrument counts were correct. The patient was awoken without complication and taken to the postanesthesia recovery room in stable condition. She was carefully transferred from the [facility] table with appropriate number of assistants.     Dictated By Zane Oliva M.D.  d: 08/31/2022 22:09:27  t: 08/31/2022 22:53:01  Job [id number]  TI/

## 2022-09-01 NOTE — PLAN OF CARE
Patient A&O X3-4 on RA. VSS, /IS/telemetry- NSR. SCDs, eliquis. DTV by 0600- tiffanie in place, LBM unknown. Regular/cardiac diet with accu checks QID. IVF infusing per orders. Surgical incision to left hip covered with aquacel x3, c/d/i. Denies n/t. Pain controlled with PO medication. Ice as needed. PT/OT eval- TTWB. Fall precautions in place and call light within reach.

## 2022-09-01 NOTE — CM/SW NOTE
Patient is s/p femur ORIF. PT eval is pending. Patient may benefit from specialized DME - H250 convertible transfer chair for home. Farnaz Louie from [facility] ([phone], fax: [phone]) who stated pt may qualify for Medicare coverage for chair. Faxed facesheet and H&P for her review. She will call back in AM if pt qualifies. MD documentation and order would be needed. Pt would need to return werner lift to DME provider if they elect to have transfer chair as insurance considers them to be duplicate DME. Spoke with pt's  regarding DC planning. Pt's  stated preference to care for pt at home at DC, rather than [facility]. Discussed possible option for specialized convertible transfer chair to replace need for werner at home. Pt's  confirmed they have werner rental from [facility]. He expressed interest in convertible transfer chair as potentially easier to use and safer for pt at home. However, he is concerned if werner is returned they will not be able to transfer pt to wheelchair for medical appointments out of the home. If pt meets criteria for chair he would like to speak with liaison for more information. Updated Lisa Whitaker from PT. Await therapy recommendations for further DC planning. / to remain available for support and/or discharge planning.      Elian Fowler [facility]  Discharge Planner  [phone]

## 2022-09-01 NOTE — CM/SW NOTE
SW spoke with pt and pt's spouse over the phone to discuss discharge recommendations of [facility]. Pt agreeable to [facility]. Spouse inquiring about [facility]. SW explained the referral process. Will provide [facility] options in the morning. SW sent [facility] referrals in Aidin. PASRR completed/uploaded. Unit SW to present [facility] options when available.      Lorrie Hatchet, MSW, [facility]  Discharge [address] [facility].

## 2022-09-02 LAB
BLOOD TYPE BARCODE: 5100
BLOOD TYPE BARCODE: 5100
GLUCOSE BLD-MCNC: 128 MG/DL (ref 70–99)
GLUCOSE BLD-MCNC: 151 MG/DL (ref 70–99)
GLUCOSE BLD-MCNC: 156 MG/DL (ref 70–99)
GLUCOSE BLD-MCNC: 163 MG/DL (ref 70–99)

## 2022-09-02 PROCEDURE — 99232 SBSQ HOSP IP/OBS MODERATE 35: CPT | Performed by: HOSPITALIST

## 2022-09-02 RX ORDER — METOPROLOL SUCCINATE 100 MG/1
100 TABLET, EXTENDED RELEASE ORAL 2 TIMES DAILY
Qty: 60 TABLET | Refills: 5 | Status: ON HOLD | OUTPATIENT
Start: 2022-09-02

## 2022-09-02 NOTE — PLAN OF CARE
Assumed care of patient at 0730, on tele and hr in the 60's at this time. On r/a with o2 sats in mid to high 90's. [facility] has seen patient today and addressed heel and thigh wound. Patient has refused to get out of bed today and she stated she will get up after dinner. The importance of ambulated reviewed with patient. Dressing to left leg is c/d/I with +pp. Call light in reach and patient informed to call for assistance, will continue to monitor.        Problem: Diabetes/Glucose Control  Goal: Glucose maintained within prescribed range  Description: INTERVENTIONS:  - Monitor Blood Glucose as ordered  - Assess for signs and symptoms of hyperglycemia and hypoglycemia  - Administer ordered medications to maintain glucose within target range  - Assess barriers to adequate nutritional intake and initiate nutrition consult as needed  - Instruct patient on self management of diabetes  Outcome: Progressing     Problem: PAIN - ADULT  Goal: Verbalizes/displays adequate comfort level or patient's stated pain goal  Description: INTERVENTIONS:  - Encourage pt to monitor pain and request assistance  - Assess pain using appropriate pain scale  - Administer analgesics based on type and severity of pain and evaluate response  - Implement non-pharmacological measures as appropriate and evaluate response  - Consider cultural and social influences on pain and pain management  - Manage/alleviate anxiety  - Utilize distraction and/or relaxation techniques  - Monitor for opioid side effects  - Notify MD/LIP if interventions unsuccessful or patient reports new pain  - Anticipate increased pain with activity and pre-medicate as appropriate  Outcome: Progressing

## 2022-09-02 NOTE — CM/SW NOTE
MSW left another message for the patient's spouse to discuss rehab. Awaiting return call.      Paige Johnson LCSW

## 2022-09-02 NOTE — CM/SW NOTE
MSW met with the patient at bedside to discuss choice of [facility]. She stated that her spouse wanted her to dc to [facility]  [phone], however, they are not available. MSW provided patient with a list of 4 SARs. She will discuss with her spouse. Per patient, spouse will be at HD today until 3pm.  MSW left him a voicemail to discuss.      Michael Rae LCSW

## 2022-09-03 VITALS
WEIGHT: 199.94 LBS | TEMPERATURE: 97 F | SYSTOLIC BLOOD PRESSURE: 106 MMHG | BODY MASS INDEX: 29.61 KG/M2 | DIASTOLIC BLOOD PRESSURE: 63 MMHG | HEIGHT: 69 IN | HEART RATE: 59 BPM | OXYGEN SATURATION: 96 % | RESPIRATION RATE: 14 BRPM

## 2022-09-03 LAB
ANION GAP SERPL CALC-SCNC: 6 MMOL/L (ref 0–18)
BUN BLD-MCNC: 30 MG/DL (ref 7–18)
CALCIUM BLD-MCNC: 8.8 MG/DL (ref 8.5–10.1)
CHLORIDE SERPL-SCNC: 107 MMOL/L (ref 98–112)
CO2 SERPL-SCNC: 25 MMOL/L (ref 21–32)
CREAT BLD-MCNC: 1.55 MG/DL
ERYTHROCYTE [DISTWIDTH] IN BLOOD BY AUTOMATED COUNT: 15.4 %
GFR SERPLBLD BASED ON 1.73 SQ M-ARVRAT: 34 ML/MIN/1.73M2 (ref 60–?)
GLUCOSE BLD-MCNC: 109 MG/DL (ref 70–99)
GLUCOSE BLD-MCNC: 138 MG/DL (ref 70–99)
GLUCOSE BLD-MCNC: 98 MG/DL (ref 70–99)
HCT VFR BLD AUTO: 25.7 %
HGB BLD-MCNC: 8 G/DL
MCH RBC QN AUTO: 28 PG (ref 26–34)
MCHC RBC AUTO-ENTMCNC: 31.1 G/DL (ref 31–37)
MCV RBC AUTO: 89.9 FL
OSMOLALITY SERPL CALC.SUM OF ELEC: 292 MOSM/KG (ref 275–295)
PLATELET # BLD AUTO: 425 10(3)UL (ref 150–450)
POTASSIUM SERPL-SCNC: 4.2 MMOL/L (ref 3.5–5.1)
RBC # BLD AUTO: 2.86 X10(6)UL
SODIUM SERPL-SCNC: 138 MMOL/L (ref 136–145)
WBC # BLD AUTO: 11 X10(3) UL (ref 4–11)

## 2022-09-03 PROCEDURE — 99239 HOSP IP/OBS DSCHRG MGMT >30: CPT | Performed by: HOSPITALIST

## 2022-09-03 RX ORDER — MELATONIN
325
Qty: 30 TABLET | Refills: 0 | Status: SHIPPED | OUTPATIENT
Start: 2022-09-03 | End: 2022-09-03

## 2022-09-03 RX ORDER — HYDROCODONE BITARTRATE AND ACETAMINOPHEN 5; 325 MG/1; MG/1
1 TABLET ORAL EVERY 6 HOURS PRN
Qty: 20 TABLET | Refills: 0 | Status: SHIPPED | OUTPATIENT
Start: 2022-09-03 | End: 2022-09-03

## 2022-09-03 RX ORDER — HYDROCODONE BITARTRATE AND ACETAMINOPHEN 5; 325 MG/1; MG/1
1 TABLET ORAL EVERY 6 HOURS PRN
Qty: 20 TABLET | Refills: 0 | Status: SHIPPED | OUTPATIENT
Start: 2022-09-03 | End: 2022-09-08

## 2022-09-03 RX ORDER — MELATONIN
325
Qty: 30 TABLET | Refills: 0 | Status: ON HOLD | OUTPATIENT
Start: 2022-09-03

## 2022-09-03 NOTE — PLAN OF CARE
NURSING DISCHARGE NOTE    Discharged Rehab facility via Ambulance. Accompanied by Support staff  Belongings Taken by patient/family. AVS printed and discussed, IV's removed, Rx's for Norco and Iron given to EMS. Pt ready to discharge to [facility] rehab.     Attempted to call [facility] was on hold for 10 mis will retry

## 2022-09-03 NOTE — PLAN OF CARE
POD3, AOx4, VSS on RA, , SCDs, IS and ankle pumps encouraged. On tele-NSR. X4 Aquacel dressing to L leg, necrotic left heel noted Mepilex in place, Mepilex placed to right lateral thigh. Pain managed with PRN medication with relief. Denies sob, chest pain, n/v. IVF. Cardiac diet as tolerated. Last BM 09/01, voids via pureiwck, TTWB. Reminded to \"call, don't fall\", call light placed within reach, safety precaution in place. POC discussed with patient. Plan: discharge to [facility]. Pat's when cleared  Will continue to monitor.

## 2022-09-03 NOTE — PLAN OF CARE
POD 3 Lt leg IM nailing, Pt is AAOX3-4, VSS, TELE, room air, voiding freely to [facility], glucose monitored and treated per orders, TTWB, PO meds for pain, see MAR, Mepilex to heel and foot wound, Aquacel dressings remain CDI, ice as needed, plan for discharge to [facility] today, Pt doing well, all needs met, all safety measures in place, call light within reach, will CTM.

## 2022-09-03 NOTE — CM/SW NOTE
[facility]  X:[phone]   Q:[phone] is the chosen facility. MSW arranged discharge by Haja Saenz today at 2pm.  PCS completed and spouse aware.     Bhumika Moore LCSW

## 2022-09-05 ENCOUNTER — EXTERNAL FACILITY (OUTPATIENT)
Dept: FAMILY MEDICINE CLINIC | Facility: CLINIC | Age: 77
End: 2022-09-05

## 2022-09-05 DIAGNOSIS — S72.402S CLOSED FRACTURE OF DISTAL END OF LEFT FEMUR, UNSPECIFIED FRACTURE MORPHOLOGY, SEQUELA: ICD-10-CM

## 2022-09-05 DIAGNOSIS — I10 PRIMARY HYPERTENSION: ICD-10-CM

## 2022-09-05 DIAGNOSIS — Z47.89 ORTHOPEDIC AFTERCARE: Primary | ICD-10-CM

## 2022-09-05 DIAGNOSIS — I48.0 PAROXYSMAL ATRIAL FIBRILLATION (HCC): ICD-10-CM

## 2022-09-05 DIAGNOSIS — D64.9 ANEMIA, UNSPECIFIED TYPE: ICD-10-CM

## 2022-09-05 DIAGNOSIS — M48.061 SPINAL STENOSIS OF LUMBAR REGION WITHOUT NEUROGENIC CLAUDICATION: ICD-10-CM

## 2022-09-05 PROCEDURE — 99306 1ST NF CARE HIGH MDM 50: CPT | Performed by: FAMILY MEDICINE

## 2022-09-05 PROCEDURE — 1111F DSCHRG MED/CURRENT MED MERGE: CPT | Performed by: FAMILY MEDICINE

## 2022-09-06 ENCOUNTER — TELEPHONE (OUTPATIENT)
Dept: INTERNAL MEDICINE CLINIC | Facility: CLINIC | Age: 77
End: 2022-09-06

## 2022-09-06 ENCOUNTER — SNF ADMIT/H&P (OUTPATIENT)
Dept: INTERNAL MEDICINE CLINIC | Age: 77
End: 2022-09-06

## 2022-09-06 VITALS
RESPIRATION RATE: 18 BRPM | HEART RATE: 59 BPM | OXYGEN SATURATION: 97 % | DIASTOLIC BLOOD PRESSURE: 70 MMHG | SYSTOLIC BLOOD PRESSURE: 113 MMHG | TEMPERATURE: 97 F

## 2022-09-06 DIAGNOSIS — Z86.79 ATRIAL FIBRILLATION, CURRENTLY IN SINUS RHYTHM: ICD-10-CM

## 2022-09-06 DIAGNOSIS — Z98.890 H/O LITHOTRIPSY: ICD-10-CM

## 2022-09-06 DIAGNOSIS — I82.90 VTE (VENOUS THROMBOEMBOLISM): ICD-10-CM

## 2022-09-06 DIAGNOSIS — E78.00 PURE HYPERCHOLESTEROLEMIA: ICD-10-CM

## 2022-09-06 DIAGNOSIS — N20.0 RENAL STONES: ICD-10-CM

## 2022-09-06 DIAGNOSIS — I10 ESSENTIAL HYPERTENSION, BENIGN: ICD-10-CM

## 2022-09-06 DIAGNOSIS — N18.30 TYPE 2 DIABETES MELLITUS WITH STAGE 3 CHRONIC KIDNEY DISEASE, WITHOUT LONG-TERM CURRENT USE OF INSULIN, UNSPECIFIED WHETHER STAGE 3A OR 3B CKD (HCC): ICD-10-CM

## 2022-09-06 DIAGNOSIS — N18.30 CKD STAGE 3 DUE TO TYPE 2 DIABETES MELLITUS (HCC): ICD-10-CM

## 2022-09-06 DIAGNOSIS — S82.892G CLOSED FRACTURE OF LEFT ANKLE WITH DELAYED HEALING, SUBSEQUENT ENCOUNTER: ICD-10-CM

## 2022-09-06 DIAGNOSIS — I10 PRIMARY HYPERTENSION: ICD-10-CM

## 2022-09-06 DIAGNOSIS — R52 PAIN: Primary | ICD-10-CM

## 2022-09-06 DIAGNOSIS — Z74.01 BEDBOUND: ICD-10-CM

## 2022-09-06 DIAGNOSIS — N18.4 STAGE 4 CHRONIC KIDNEY DISEASE (HCC): ICD-10-CM

## 2022-09-06 DIAGNOSIS — E11.22 TYPE 2 DIABETES MELLITUS WITH STAGE 3 CHRONIC KIDNEY DISEASE, WITHOUT LONG-TERM CURRENT USE OF INSULIN, UNSPECIFIED WHETHER STAGE 3A OR 3B CKD (HCC): ICD-10-CM

## 2022-09-06 DIAGNOSIS — E11.22 CKD STAGE 3 DUE TO TYPE 2 DIABETES MELLITUS (HCC): ICD-10-CM

## 2022-09-06 DIAGNOSIS — R32 URINARY INCONTINENCE, UNSPECIFIED TYPE: ICD-10-CM

## 2022-09-06 DIAGNOSIS — Z99.3 WHEELCHAIR BOUND: ICD-10-CM

## 2022-09-06 PROCEDURE — 99310 SBSQ NF CARE HIGH MDM 45: CPT | Performed by: NURSE PRACTITIONER

## 2022-09-06 PROCEDURE — 1111F DSCHRG MED/CURRENT MED MERGE: CPT | Performed by: NURSE PRACTITIONER

## 2022-09-07 PROBLEM — M25.552 PAIN OF LEFT HIP: Status: ACTIVE | Noted: 2022-09-07

## 2022-09-07 PROBLEM — R52 PAIN: Status: ACTIVE | Noted: 2022-09-07

## 2022-09-08 ENCOUNTER — SNF VISIT (OUTPATIENT)
Dept: INTERNAL MEDICINE CLINIC | Age: 77
End: 2022-09-08

## 2022-09-08 DIAGNOSIS — R91.8 PULMONARY INFILTRATE ON CHEST X-RAY: ICD-10-CM

## 2022-09-08 DIAGNOSIS — Z79.899 MEDICATION MANAGEMENT: ICD-10-CM

## 2022-09-08 DIAGNOSIS — Z87.81 HISTORY OF FEMUR FRACTURE: ICD-10-CM

## 2022-09-08 DIAGNOSIS — R52 PAIN: ICD-10-CM

## 2022-09-08 DIAGNOSIS — Z74.3 REQUIRES CONTINUOUS SUPERVISION FOR ACTIVITIES OF DAILY LIVING (ADL): ICD-10-CM

## 2022-09-08 RX ORDER — HYDROCODONE BITARTRATE AND ACETAMINOPHEN 5; 325 MG/1; MG/1
1 TABLET ORAL EVERY 6 HOURS PRN
Qty: 60 TABLET | Refills: 0 | Status: ON HOLD | OUTPATIENT
Start: 2022-09-08

## 2022-09-12 ENCOUNTER — APPOINTMENT (OUTPATIENT)
Dept: NUCLEAR MEDICINE | Facility: HOSPITAL | Age: 77
DRG: 312 | End: 2022-09-12
Attending: EMERGENCY MEDICINE

## 2022-09-12 ENCOUNTER — SNF VISIT (OUTPATIENT)
Dept: INTERNAL MEDICINE CLINIC | Age: 77
End: 2022-09-12

## 2022-09-12 ENCOUNTER — HOSPITAL ENCOUNTER (INPATIENT)
Facility: HOSPITAL | Age: 77
LOS: 1 days | Discharge: NURSING, REHAB OR LONG-TERM CARE | DRG: 312 | End: 2022-09-15
Attending: EMERGENCY MEDICINE | Admitting: HOSPITALIST

## 2022-09-12 ENCOUNTER — APPOINTMENT (OUTPATIENT)
Dept: GENERAL RADIOLOGY | Facility: HOSPITAL | Age: 77
DRG: 312 | End: 2022-09-12
Attending: EMERGENCY MEDICINE

## 2022-09-12 VITALS
TEMPERATURE: 98 F | RESPIRATION RATE: 17 BRPM | OXYGEN SATURATION: 97 % | SYSTOLIC BLOOD PRESSURE: 116 MMHG | DIASTOLIC BLOOD PRESSURE: 78 MMHG | HEART RATE: 72 BPM

## 2022-09-12 VITALS
DIASTOLIC BLOOD PRESSURE: 41 MMHG | HEART RATE: 66 BPM | TEMPERATURE: 98 F | SYSTOLIC BLOOD PRESSURE: 73 MMHG | RESPIRATION RATE: 18 BRPM | OXYGEN SATURATION: 97 %

## 2022-09-12 DIAGNOSIS — R40.4 TRANSIENT ALTERATION OF AWARENESS: ICD-10-CM

## 2022-09-12 DIAGNOSIS — R55 SYNCOPE AND COLLAPSE: Primary | ICD-10-CM

## 2022-09-12 DIAGNOSIS — S82.892G CLOSED FRACTURE OF LEFT ANKLE WITH DELAYED HEALING, SUBSEQUENT ENCOUNTER: ICD-10-CM

## 2022-09-12 DIAGNOSIS — S72.402S CLOSED FRACTURE OF DISTAL END OF LEFT FEMUR, UNSPECIFIED FRACTURE MORPHOLOGY, SEQUELA: ICD-10-CM

## 2022-09-12 DIAGNOSIS — D64.9 ANEMIA, UNSPECIFIED TYPE: ICD-10-CM

## 2022-09-12 DIAGNOSIS — N28.9 RENAL INSUFFICIENCY: ICD-10-CM

## 2022-09-12 DIAGNOSIS — Z79.899 MEDICATION MANAGEMENT: ICD-10-CM

## 2022-09-12 DIAGNOSIS — I10 PRIMARY HYPERTENSION: ICD-10-CM

## 2022-09-12 DIAGNOSIS — R91.8 PULMONARY INFILTRATE ON CHEST X-RAY: Primary | ICD-10-CM

## 2022-09-12 LAB
ALBUMIN SERPL-MCNC: 2.6 G/DL (ref 3.4–5)
ALBUMIN/GLOB SERPL: 0.6 {RATIO} (ref 1–2)
ALP LIVER SERPL-CCNC: 247 U/L
ALT SERPL-CCNC: 12 U/L
ANION GAP SERPL CALC-SCNC: 7 MMOL/L (ref 0–18)
APTT PPP: 44.4 SECONDS (ref 23.3–35.6)
AST SERPL-CCNC: 22 U/L (ref 15–37)
ATRIAL RATE: 82 BPM
BASOPHILS # BLD AUTO: 0.05 X10(3) UL (ref 0–0.2)
BASOPHILS NFR BLD AUTO: 0.4 %
BILIRUB SERPL-MCNC: 0.4 MG/DL (ref 0.1–2)
BILIRUB UR QL STRIP.AUTO: NEGATIVE
BUN BLD-MCNC: 31 MG/DL (ref 7–18)
CALCIUM BLD-MCNC: 8.8 MG/DL (ref 8.5–10.1)
CHLORIDE SERPL-SCNC: 105 MMOL/L (ref 98–112)
CO2 SERPL-SCNC: 25 MMOL/L (ref 21–32)
CREAT BLD-MCNC: 1.98 MG/DL
DEPRECATED HBV CORE AB SER IA-ACNC: 341.6 NG/ML
EOSINOPHIL # BLD AUTO: 0.09 X10(3) UL (ref 0–0.7)
EOSINOPHIL NFR BLD AUTO: 0.7 %
ERYTHROCYTE [DISTWIDTH] IN BLOOD BY AUTOMATED COUNT: 17.5 %
GFR SERPLBLD BASED ON 1.73 SQ M-ARVRAT: 25 ML/MIN/1.73M2 (ref 60–?)
GLOBULIN PLAS-MCNC: 4.3 G/DL (ref 2.8–4.4)
GLUCOSE BLD-MCNC: 123 MG/DL (ref 70–99)
GLUCOSE BLD-MCNC: 99 MG/DL (ref 70–99)
GLUCOSE UR STRIP.AUTO-MCNC: NEGATIVE MG/DL
HCT VFR BLD AUTO: 28.8 %
HGB BLD-MCNC: 8.9 G/DL
HGB RETIC QN AUTO: 32.3 PG (ref 28.2–36.6)
IMM GRANULOCYTES # BLD AUTO: 0.1 X10(3) UL (ref 0–1)
IMM GRANULOCYTES NFR BLD: 0.8 %
IMM RETICS NFR: 0.28 RATIO (ref 0.1–0.3)
IRON SATN MFR SERPL: 17 %
IRON SERPL-MCNC: 45 UG/DL
KETONES UR STRIP.AUTO-MCNC: NEGATIVE MG/DL
LDH SERPL L TO P-CCNC: 206 U/L
LYMPHOCYTES # BLD AUTO: 1.37 X10(3) UL (ref 1–4)
LYMPHOCYTES NFR BLD AUTO: 11.3 %
MCH RBC QN AUTO: 28.2 PG (ref 26–34)
MCHC RBC AUTO-ENTMCNC: 30.9 G/DL (ref 31–37)
MCV RBC AUTO: 91.1 FL
MONOCYTES # BLD AUTO: 0.94 X10(3) UL (ref 0.1–1)
MONOCYTES NFR BLD AUTO: 7.8 %
NEUTROPHILS # BLD AUTO: 9.55 X10 (3) UL (ref 1.5–7.7)
NEUTROPHILS # BLD AUTO: 9.55 X10(3) UL (ref 1.5–7.7)
NEUTROPHILS NFR BLD AUTO: 79 %
NITRITE UR QL STRIP.AUTO: NEGATIVE
OSMOLALITY SERPL CALC.SUM OF ELEC: 292 MOSM/KG (ref 275–295)
P AXIS: 83 DEGREES
P-R INTERVAL: 164 MS
PH UR STRIP.AUTO: 6 [PH] (ref 5–8)
PLATELET # BLD AUTO: 427 10(3)UL (ref 150–450)
POTASSIUM SERPL-SCNC: 4.4 MMOL/L (ref 3.5–5.1)
PROT SERPL-MCNC: 6.9 G/DL (ref 6.4–8.2)
PROT UR STRIP.AUTO-MCNC: 100 MG/DL
Q-T INTERVAL: 386 MS
QRS DURATION: 82 MS
QTC CALCULATION (BEZET): 450 MS
R AXIS: -7 DEGREES
RBC # BLD AUTO: 3.16 X10(6)UL
RBC #/AREA URNS AUTO: >10 /HPF
RETICS # AUTO: 100.2 X10(3) UL (ref 22.5–147.5)
RETICS/RBC NFR AUTO: 3.2 %
SARS-COV-2 RNA RESP QL NAA+PROBE: NOT DETECTED
SODIUM SERPL-SCNC: 137 MMOL/L (ref 136–145)
SP GR UR STRIP.AUTO: 1.01 (ref 1–1.03)
T AXIS: 60 DEGREES
TIBC SERPL-MCNC: 261 UG/DL (ref 240–450)
TRANSFERRIN SERPL-MCNC: 175 MG/DL (ref 200–360)
TROPONIN I HIGH SENSITIVITY: 4 NG/L
UROBILINOGEN UR STRIP.AUTO-MCNC: <2 MG/DL
VENTRICULAR RATE: 82 BPM
VIT B12 SERPL-MCNC: 333 PG/ML (ref 193–986)
WBC # BLD AUTO: 12.1 X10(3) UL (ref 4–11)
WBC #/AREA URNS AUTO: >50 /HPF
WBC CLUMPS UR QL AUTO: PRESENT /HPF

## 2022-09-12 PROCEDURE — 78582 LUNG VENTILAT&PERFUS IMAGING: CPT | Performed by: EMERGENCY MEDICINE

## 2022-09-12 PROCEDURE — 99497 ADVNCD CARE PLAN 30 MIN: CPT | Performed by: HOSPITALIST

## 2022-09-12 PROCEDURE — 99223 1ST HOSP IP/OBS HIGH 75: CPT | Performed by: HOSPITALIST

## 2022-09-12 PROCEDURE — 71045 X-RAY EXAM CHEST 1 VIEW: CPT | Performed by: EMERGENCY MEDICINE

## 2022-09-12 RX ORDER — TAMSULOSIN HYDROCHLORIDE 0.4 MG/1
0.4 CAPSULE ORAL DAILY
Status: DISCONTINUED | OUTPATIENT
Start: 2022-09-13 | End: 2022-09-15

## 2022-09-12 RX ORDER — ACETAMINOPHEN 500 MG
1000 TABLET ORAL EVERY 4 HOURS PRN
Status: DISCONTINUED | OUTPATIENT
Start: 2022-09-12 | End: 2022-09-15

## 2022-09-12 RX ORDER — LINEZOLID 600 MG/1
600 TABLET, FILM COATED ORAL 2 TIMES DAILY
COMMUNITY
Start: 2022-09-12 | End: 2022-09-15

## 2022-09-12 RX ORDER — ONDANSETRON 2 MG/ML
4 INJECTION INTRAMUSCULAR; INTRAVENOUS EVERY 6 HOURS PRN
Status: DISCONTINUED | OUTPATIENT
Start: 2022-09-12 | End: 2022-09-15

## 2022-09-12 RX ORDER — ONDANSETRON 2 MG/ML
4 INJECTION INTRAMUSCULAR; INTRAVENOUS EVERY 4 HOURS PRN
Status: DISCONTINUED | OUTPATIENT
Start: 2022-09-12 | End: 2022-09-12

## 2022-09-12 RX ORDER — GABAPENTIN 300 MG/1
300 CAPSULE ORAL 2 TIMES DAILY
Status: DISCONTINUED | OUTPATIENT
Start: 2022-09-12 | End: 2022-09-15

## 2022-09-12 RX ORDER — CEFDINIR 300 MG/1
300 CAPSULE ORAL 2 TIMES DAILY
Status: ON HOLD | COMMUNITY
Start: 2022-09-07 | End: 2022-09-14

## 2022-09-12 RX ORDER — MELATONIN
325
Status: DISCONTINUED | OUTPATIENT
Start: 2022-09-13 | End: 2022-09-15

## 2022-09-12 RX ORDER — METOPROLOL SUCCINATE 50 MG/1
100 TABLET, EXTENDED RELEASE ORAL
Status: DISCONTINUED | OUTPATIENT
Start: 2022-09-12 | End: 2022-09-15

## 2022-09-12 RX ORDER — MELATONIN
3 NIGHTLY PRN
Status: DISCONTINUED | OUTPATIENT
Start: 2022-09-12 | End: 2022-09-15

## 2022-09-12 RX ORDER — METOCLOPRAMIDE HYDROCHLORIDE 5 MG/ML
5 INJECTION INTRAMUSCULAR; INTRAVENOUS EVERY 8 HOURS PRN
Status: DISCONTINUED | OUTPATIENT
Start: 2022-09-12 | End: 2022-09-15

## 2022-09-12 RX ORDER — HEPARIN SODIUM 1000 [USP'U]/ML
80 INJECTION, SOLUTION INTRAVENOUS; SUBCUTANEOUS ONCE
Status: COMPLETED | OUTPATIENT
Start: 2022-09-12 | End: 2022-09-12

## 2022-09-12 RX ORDER — ATORVASTATIN CALCIUM 20 MG/1
20 TABLET, FILM COATED ORAL DAILY
Status: DISCONTINUED | OUTPATIENT
Start: 2022-09-13 | End: 2022-09-14

## 2022-09-12 RX ORDER — ECHINACEA PURPUREA EXTRACT 125 MG
1 TABLET ORAL
Status: DISCONTINUED | OUTPATIENT
Start: 2022-09-12 | End: 2022-09-15

## 2022-09-12 RX ORDER — ATORVASTATIN CALCIUM 20 MG/1
20 TABLET, FILM COATED ORAL DAILY
COMMUNITY

## 2022-09-12 RX ORDER — LOSARTAN POTASSIUM 25 MG/1
25 TABLET ORAL DAILY
Status: DISCONTINUED | OUTPATIENT
Start: 2022-09-13 | End: 2022-09-15

## 2022-09-12 RX ORDER — HYDROCODONE BITARTRATE AND ACETAMINOPHEN 5; 325 MG/1; MG/1
1 TABLET ORAL EVERY 6 HOURS PRN
Status: DISCONTINUED | OUTPATIENT
Start: 2022-09-12 | End: 2022-09-15

## 2022-09-12 RX ORDER — LINEZOLID 600 MG/1
600 TABLET, FILM COATED ORAL 2 TIMES DAILY
Status: DISCONTINUED | OUTPATIENT
Start: 2022-09-12 | End: 2022-09-13

## 2022-09-12 RX ORDER — SODIUM CHLORIDE 9 MG/ML
INJECTION, SOLUTION INTRAVENOUS CONTINUOUS
Status: ACTIVE | OUTPATIENT
Start: 2022-09-12 | End: 2022-09-13

## 2022-09-12 RX ORDER — HEPARIN SODIUM AND DEXTROSE 10000; 5 [USP'U]/100ML; G/100ML
18 INJECTION INTRAVENOUS ONCE
Status: DISCONTINUED | OUTPATIENT
Start: 2022-09-12 | End: 2022-09-13

## 2022-09-12 RX ORDER — HYDROMORPHONE HYDROCHLORIDE 1 MG/ML
0.5 INJECTION, SOLUTION INTRAMUSCULAR; INTRAVENOUS; SUBCUTANEOUS EVERY 30 MIN PRN
Status: DISCONTINUED | OUTPATIENT
Start: 2022-09-12 | End: 2022-09-12

## 2022-09-12 RX ORDER — LINEZOLID 600 MG/1
600 TABLET, FILM COATED ORAL 2 TIMES DAILY
COMMUNITY
Start: 2022-09-12 | End: 2022-09-12

## 2022-09-12 RX ORDER — SODIUM CHLORIDE 9 MG/ML
INJECTION, SOLUTION INTRAVENOUS CONTINUOUS
Status: DISCONTINUED | OUTPATIENT
Start: 2022-09-12 | End: 2022-09-12

## 2022-09-12 RX ORDER — HEPARIN SODIUM AND DEXTROSE 10000; 5 [USP'U]/100ML; G/100ML
INJECTION INTRAVENOUS CONTINUOUS
Status: DISCONTINUED | OUTPATIENT
Start: 2022-09-13 | End: 2022-09-12

## 2022-09-12 NOTE — ED QUICK NOTES
Orders for admission, patient is aware of plan and ready to go upstairs. Any questions, please call ED RN Alfred Rivera  at extension [phone]. Vaccinated?  yes  Type of COVID test sent:rapid  COVID Suspicion level: Low/High  low    Titratable drug(s) infusing:  Rate:  Heparin 1600 units/hr  LOC at time of transport:  aox4  Other pertinent information:    CIWA score=  NIH score=

## 2022-09-12 NOTE — ED INITIAL ASSESSMENT (HPI)
Patient was being transferred to the chair at the nursing facility and patient had a syncopal episode and her BP went into the 70s.  Patient back to baseline upon arrival to the facility

## 2022-09-13 ENCOUNTER — APPOINTMENT (OUTPATIENT)
Dept: ULTRASOUND IMAGING | Facility: HOSPITAL | Age: 77
DRG: 312 | End: 2022-09-13
Attending: INTERNAL MEDICINE

## 2022-09-13 ENCOUNTER — APPOINTMENT (OUTPATIENT)
Dept: CT IMAGING | Facility: HOSPITAL | Age: 77
DRG: 312 | End: 2022-09-13
Attending: INTERNAL MEDICINE

## 2022-09-13 LAB
ANION GAP SERPL CALC-SCNC: 4 MMOL/L (ref 0–18)
ANION GAP SERPL CALC-SCNC: 6 MMOL/L (ref 0–18)
APTT PPP: 43.5 SECONDS (ref 23.3–35.6)
APTT PPP: >240 SECONDS (ref 23.3–35.6)
BASOPHILS # BLD AUTO: 0.04 X10(3) UL (ref 0–0.2)
BASOPHILS NFR BLD AUTO: 0.5 %
BUN BLD-MCNC: 25 MG/DL (ref 7–18)
BUN BLD-MCNC: 25 MG/DL (ref 7–18)
CALCIUM BLD-MCNC: 8.6 MG/DL (ref 8.5–10.1)
CALCIUM BLD-MCNC: 8.7 MG/DL (ref 8.5–10.1)
CHLORIDE SERPL-SCNC: 109 MMOL/L (ref 98–112)
CHLORIDE SERPL-SCNC: 111 MMOL/L (ref 98–112)
CO2 SERPL-SCNC: 25 MMOL/L (ref 21–32)
CO2 SERPL-SCNC: 25 MMOL/L (ref 21–32)
CREAT BLD-MCNC: 1.56 MG/DL
CREAT BLD-MCNC: 1.61 MG/DL
EOSINOPHIL # BLD AUTO: 0.13 X10(3) UL (ref 0–0.7)
EOSINOPHIL NFR BLD AUTO: 1.6 %
ERYTHROCYTE [DISTWIDTH] IN BLOOD BY AUTOMATED COUNT: 17.3 %
ERYTHROCYTE [DISTWIDTH] IN BLOOD BY AUTOMATED COUNT: 17.4 %
GFR SERPLBLD BASED ON 1.73 SQ M-ARVRAT: 32 ML/MIN/1.73M2 (ref 60–?)
GFR SERPLBLD BASED ON 1.73 SQ M-ARVRAT: 34 ML/MIN/1.73M2 (ref 60–?)
GLUCOSE BLD-MCNC: 107 MG/DL (ref 70–99)
GLUCOSE BLD-MCNC: 85 MG/DL (ref 70–99)
GLUCOSE BLD-MCNC: 88 MG/DL (ref 70–99)
HAPTOGLOB SERPL-MCNC: 177 MG/DL (ref 30–200)
HCT VFR BLD AUTO: 27.1 %
HCT VFR BLD AUTO: 27.8 %
HGB BLD-MCNC: 8.3 G/DL
HGB BLD-MCNC: 8.4 G/DL
IMM GRANULOCYTES # BLD AUTO: 0.05 X10(3) UL (ref 0–1)
IMM GRANULOCYTES NFR BLD: 0.6 %
LYMPHOCYTES # BLD AUTO: 2.2 X10(3) UL (ref 1–4)
LYMPHOCYTES NFR BLD AUTO: 26.6 %
MCH RBC QN AUTO: 28.1 PG (ref 26–34)
MCH RBC QN AUTO: 28.2 PG (ref 26–34)
MCHC RBC AUTO-ENTMCNC: 30.2 G/DL (ref 31–37)
MCHC RBC AUTO-ENTMCNC: 30.6 G/DL (ref 31–37)
MCV RBC AUTO: 92.2 FL
MCV RBC AUTO: 93 FL
MONOCYTES # BLD AUTO: 0.77 X10(3) UL (ref 0.1–1)
MONOCYTES NFR BLD AUTO: 9.3 %
NEUTROPHILS # BLD AUTO: 5.07 X10 (3) UL (ref 1.5–7.7)
NEUTROPHILS # BLD AUTO: 5.07 X10(3) UL (ref 1.5–7.7)
NEUTROPHILS NFR BLD AUTO: 61.4 %
OSMOLALITY SERPL CALC.SUM OF ELEC: 294 MOSM/KG (ref 275–295)
OSMOLALITY SERPL CALC.SUM OF ELEC: 295 MOSM/KG (ref 275–295)
PLATELET # BLD AUTO: 379 10(3)UL (ref 150–450)
PLATELET # BLD AUTO: 381 10(3)UL (ref 150–450)
POTASSIUM SERPL-SCNC: 4 MMOL/L (ref 3.5–5.1)
POTASSIUM SERPL-SCNC: 4.3 MMOL/L (ref 3.5–5.1)
RBC # BLD AUTO: 2.94 X10(6)UL
RBC # BLD AUTO: 2.99 X10(6)UL
SODIUM SERPL-SCNC: 140 MMOL/L (ref 136–145)
SODIUM SERPL-SCNC: 140 MMOL/L (ref 136–145)
WBC # BLD AUTO: 8.3 X10(3) UL (ref 4–11)
WBC # BLD AUTO: 8.3 X10(3) UL (ref 4–11)

## 2022-09-13 PROCEDURE — 76775 US EXAM ABDO BACK WALL LIM: CPT | Performed by: INTERNAL MEDICINE

## 2022-09-13 PROCEDURE — 71275 CT ANGIOGRAPHY CHEST: CPT | Performed by: INTERNAL MEDICINE

## 2022-09-13 PROCEDURE — 93970 EXTREMITY STUDY: CPT | Performed by: INTERNAL MEDICINE

## 2022-09-13 PROCEDURE — 99232 SBSQ HOSP IP/OBS MODERATE 35: CPT | Performed by: INTERNAL MEDICINE

## 2022-09-13 RX ORDER — HEPARIN SODIUM 1000 [USP'U]/ML
80 INJECTION, SOLUTION INTRAVENOUS; SUBCUTANEOUS ONCE
Status: COMPLETED | OUTPATIENT
Start: 2022-09-13 | End: 2022-09-13

## 2022-09-13 RX ORDER — HEPARIN SODIUM AND DEXTROSE 10000; 5 [USP'U]/100ML; G/100ML
INJECTION INTRAVENOUS CONTINUOUS
Status: DISCONTINUED | OUTPATIENT
Start: 2022-09-14 | End: 2022-09-15

## 2022-09-13 RX ORDER — IOHEXOL 350 MG/ML
100 INJECTION, SOLUTION INTRAVENOUS
Status: COMPLETED | OUTPATIENT
Start: 2022-09-13 | End: 2022-09-13

## 2022-09-13 RX ORDER — HEPARIN SODIUM AND DEXTROSE 10000; 5 [USP'U]/100ML; G/100ML
18 INJECTION INTRAVENOUS ONCE
Status: COMPLETED | OUTPATIENT
Start: 2022-09-13 | End: 2022-09-14

## 2022-09-13 NOTE — PLAN OF CARE
Received patient in bed around 2345. Alert and oriented. Denies pain. No shortness of breath. Room air with oxygen saturation 95%. Heparin drip discontinued per MD notes. eliquis given by previous nurse. Repositioned as needed. Kept clean and dry. Problem: CARDIOVASCULAR - ADULT  Goal: Maintains optimal cardiac output and hemodynamic stability  Description: INTERVENTIONS:  - Monitor vital signs, rhythm, and trends  - Monitor for bleeding, hypotension and signs of decreased cardiac output  - Evaluate effectiveness of vasoactive medications to optimize hemodynamic stability  - Monitor arterial and/or venous puncture sites for bleeding and/or hematoma  - Assess quality of pulses, skin color and temperature  - Assess for signs of decreased coronary artery perfusion - ex. Angina  - Evaluate fluid balance, assess for edema, trend weights  Outcome: Progressing  Goal: Absence of cardiac arrhythmias or at baseline  Description: INTERVENTIONS:  - Continuous cardiac monitoring, monitor vital signs, obtain 12 lead EKG if indicated  - Evaluate effectiveness of antiarrhythmic and heart rate control medications as ordered  - Initiate emergency measures for life threatening arrhythmias  - Monitor electrolytes and administer replacement therapy as ordered  Outcome: Progressing     Problem: SAFETY ADULT - FALL  Goal: Free from fall injury  Description: INTERVENTIONS:  - Assess pt frequently for physical needs  - Identify cognitive and physical deficits and behaviors that affect risk of falls.   - [place] fall precautions as indicated by assessment.  - Educate pt/family on patient safety including physical limitations  - Instruct pt to call for assistance with activity based on assessment  - Modify environment to reduce risk of injury  - Provide assistive devices as appropriate  - Consider OT/PT consult to assist with strengthening/mobility  - Encourage toileting schedule  Outcome: Progressing

## 2022-09-13 NOTE — PROGRESS NOTES
Received patient around 2000 via cart. Pt in ED for syncope episode, pt is from [facility]. Pt had a fall on 8/30. Pt was recently in [facility] for femoral fracture. Pt has staples on left knee and left thigh (dressing on). Has left hip wound (dressing on). Sacrum stage three covered with a mepilex 4\"x4\". RA, VSS  Tele: NSR,SB   Contact precautions for VRE in the urine. Pt had a VQ scan, intermediate score, placed on heparin drip at 16mL. Redraw aPTT at midnight. Infectious disease was consulted, no orders given. Po meds whole. Cardiac regular diet, liquid thins. Refused SCD's. Pt can move side to side, bed bound. Bed in lowest position, call light within reach, bed alarm on.

## 2022-09-13 NOTE — CM/SW NOTE
Department  notified of request for [facility]erry [facility] referrals started. Assigned CM/SW to follow up with pt/family on further discharge planning.      Paul NEVAREZ [facility]

## 2022-09-13 NOTE — PLAN OF CARE
Assumed patient care at 7:30. A/Ox3-4. Room air. Normal sinus rhythm on tele. PT/OT to eval. Electrolyte protocol (noncardiac). Contact isolation for VRE. Regular diet with poor appetite. PIV right ac and left hand c/d/i. All safety precautions are in place and will continue to monitor. Problem: CARDIOVASCULAR - ADULT  Goal: Maintains optimal cardiac output and hemodynamic stability  Description: INTERVENTIONS:  - Monitor vital signs, rhythm, and trends  - Monitor for bleeding, hypotension and signs of decreased cardiac output  - Evaluate effectiveness of vasoactive medications to optimize hemodynamic stability  - Monitor arterial and/or venous puncture sites for bleeding and/or hematoma  - Assess quality of pulses, skin color and temperature  - Assess for signs of decreased coronary artery perfusion - ex. Angina  - Evaluate fluid balance, assess for edema, trend weights  Outcome: Progressing  Goal: Absence of cardiac arrhythmias or at baseline  Description: INTERVENTIONS:  - Continuous cardiac monitoring, monitor vital signs, obtain 12 lead EKG if indicated  - Evaluate effectiveness of antiarrhythmic and heart rate control medications as ordered  - Initiate emergency measures for life threatening arrhythmias  - Monitor electrolytes and administer replacement therapy as ordered  Outcome: Progressing     Problem: SAFETY ADULT - FALL  Goal: Free from fall injury  Description: INTERVENTIONS:  - Assess pt frequently for physical needs  - Identify cognitive and physical deficits and behaviors that affect risk of falls.   - [place] fall precautions as indicated by assessment.  - Educate pt/family on patient safety including physical limitations  - Instruct pt to call for assistance with activity based on assessment  - Modify environment to reduce risk of injury  - Provide assistive devices as appropriate  - Consider OT/PT consult to assist with strengthening/mobility  - Encourage toileting schedule  Outcome: Progressing

## 2022-09-14 LAB
ANION GAP SERPL CALC-SCNC: 6 MMOL/L (ref 0–18)
APTT PPP: 124.9 SECONDS (ref 23.3–35.6)
APTT PPP: 230.3 SECONDS (ref 23.3–35.6)
APTT PPP: >240 SECONDS (ref 23.3–35.6)
BASOPHILS # BLD AUTO: 0.05 X10(3) UL (ref 0–0.2)
BASOPHILS NFR BLD AUTO: 0.5 %
BUN BLD-MCNC: 23 MG/DL (ref 7–18)
CALCIUM BLD-MCNC: 8.9 MG/DL (ref 8.5–10.1)
CHLORIDE SERPL-SCNC: 108 MMOL/L (ref 98–112)
CO2 SERPL-SCNC: 24 MMOL/L (ref 21–32)
CREAT BLD-MCNC: 1.53 MG/DL
EOSINOPHIL # BLD AUTO: 0.23 X10(3) UL (ref 0–0.7)
EOSINOPHIL NFR BLD AUTO: 2.4 %
ERYTHROCYTE [DISTWIDTH] IN BLOOD BY AUTOMATED COUNT: 17.6 %
GFR SERPLBLD BASED ON 1.73 SQ M-ARVRAT: 34 ML/MIN/1.73M2 (ref 60–?)
GLUCOSE BLD-MCNC: 89 MG/DL (ref 70–99)
HCT VFR BLD AUTO: 28.2 %
HGB BLD-MCNC: 8.4 G/DL
IMM GRANULOCYTES # BLD AUTO: 0.06 X10(3) UL (ref 0–1)
IMM GRANULOCYTES NFR BLD: 0.6 %
LYMPHOCYTES # BLD AUTO: 1.75 X10(3) UL (ref 1–4)
LYMPHOCYTES NFR BLD AUTO: 18.6 %
MCH RBC QN AUTO: 27.7 PG (ref 26–34)
MCHC RBC AUTO-ENTMCNC: 29.8 G/DL (ref 31–37)
MCV RBC AUTO: 93.1 FL
MONOCYTES # BLD AUTO: 0.77 X10(3) UL (ref 0.1–1)
MONOCYTES NFR BLD AUTO: 8.2 %
NEUTROPHILS # BLD AUTO: 6.57 X10 (3) UL (ref 1.5–7.7)
NEUTROPHILS # BLD AUTO: 6.57 X10(3) UL (ref 1.5–7.7)
NEUTROPHILS NFR BLD AUTO: 69.7 %
OSMOLALITY SERPL CALC.SUM OF ELEC: 289 MOSM/KG (ref 275–295)
PLATELET # BLD AUTO: 381 10(3)UL (ref 150–450)
PLATELET # BLD AUTO: 381 10(3)UL (ref 150–450)
POTASSIUM SERPL-SCNC: 4.1 MMOL/L (ref 3.5–5.1)
RBC # BLD AUTO: 3.03 X10(6)UL
SODIUM SERPL-SCNC: 138 MMOL/L (ref 136–145)
WBC # BLD AUTO: 9.4 X10(3) UL (ref 4–11)

## 2022-09-14 PROCEDURE — 99223 1ST HOSP IP/OBS HIGH 75: CPT | Performed by: INTERNAL MEDICINE

## 2022-09-14 PROCEDURE — 99233 SBSQ HOSP IP/OBS HIGH 50: CPT | Performed by: INTERNAL MEDICINE

## 2022-09-14 RX ORDER — ATORVASTATIN CALCIUM 20 MG/1
20 TABLET, FILM COATED ORAL NIGHTLY
Status: DISCONTINUED | OUTPATIENT
Start: 2022-09-14 | End: 2022-09-15

## 2022-09-14 NOTE — CM/SW NOTE
12pm  MSW met with pt at bedside and then called pt's spouse. Plan is for pt to return to [facility] at [place]. Updates sent. Spouse wants a special chair that folds, however has questions about the size before it's ordered. MSW will have DME company call him before msw moves forward. He also wants to know cost to rent a werner if medicare will provide chair but make him return werner.

## 2022-09-14 NOTE — PLAN OF CARE
Assumed patient care at [address] [facility]. Patient is A/Ox3. Room air. Vital signs stable. Afebrile  NSR on tele. Electrolyte protocol (noncardiac). Regular diet with poor appetite. Denies nausea/vomiting/diarrhea. Patient c/o pain to bilateral leg. PRN Norco given, patient reports improvement. Turn x 2. Cushion/pillow support in place. PT/OT for eval.   Pressure injury noted to sacrum, Mepilex changed. Continent of bowel. Incontinent of bladder, purewick in place. Wound dressings to Right hip and lower extremity C/D/I. PIV Right FA- SL. C/D/I. Left AC removed d/t infiltration. Warm pack applied. All safety precautions are in place. All needs met at this time. 2320: CTA Chest done. Results: (-) of PE. Patches of ground-glass density particularly involves the lower lobes are nonspecific and can be seen in atelectasis. Peribronchial thickening suggests bronchitis. 0320: PTT redraw >240 at 0100. Heparin gtt stopped for an hour and decreased dose per DVT/PE protocol. PIV Left hand infusing at 13.5ml/hr. PTT redraw at 0820. Problem: CARDIOVASCULAR - ADULT  Goal: Maintains optimal cardiac output and hemodynamic stability  Description: INTERVENTIONS:  - Monitor vital signs, rhythm, and trends  - Monitor for bleeding, hypotension and signs of decreased cardiac output  - Evaluate effectiveness of vasoactive medications to optimize hemodynamic stability  - Monitor arterial and/or venous puncture sites for bleeding and/or hematoma  - Assess quality of pulses, skin color and temperature  - Assess for signs of decreased coronary artery perfusion - ex.  Angina  - Evaluate fluid balance, assess for edema, trend weights  Outcome: Progressing  Goal: Absence of cardiac arrhythmias or at baseline  Description: INTERVENTIONS:  - Continuous cardiac monitoring, monitor vital signs, obtain 12 lead EKG if indicated  - Evaluate effectiveness of antiarrhythmic and heart rate control medications as ordered  - Initiate emergency measures for life threatening arrhythmias  - Monitor electrolytes and administer replacement therapy as ordered  Outcome: Progressing     Problem: SAFETY ADULT - FALL  Goal: Free from fall injury  Description: INTERVENTIONS:  - Assess pt frequently for physical needs  - Identify cognitive and physical deficits and behaviors that affect risk of falls.   - [place] fall precautions as indicated by assessment.  - Educate pt/family on patient safety including physical limitations  - Instruct pt to call for assistance with activity based on assessment  - Modify environment to reduce risk of injury  - Provide assistive devices as appropriate  - Consider OT/PT consult to assist with strengthening/mobility  - Encourage toileting schedule  Outcome: Progressing

## 2022-09-14 NOTE — PLAN OF CARE
Received patient this morning at 0730. Pt is A&Ox3   RA, VSS   Tele: NSR, SB  Denies having pain. Contact isolation for VRE in the urine. Pt has poor appetite. Pt is on heparin drip at 13. 5mL for left femoral DVT, redraw was done around 0820 pending results. Pt has staples on left knee/outer leg, and left hip wound, and sacral wound. Purewick in place. Bed in lowest position, call light within reach, bed alarm on. Heparin decreased to 11.0 based on aPTT, hospitalist paged about results. Problem: CARDIOVASCULAR - ADULT  Goal: Maintains optimal cardiac output and hemodynamic stability  Description: INTERVENTIONS:  - Monitor vital signs, rhythm, and trends  - Monitor for bleeding, hypotension and signs of decreased cardiac output  - Evaluate effectiveness of vasoactive medications to optimize hemodynamic stability  - Monitor arterial and/or venous puncture sites for bleeding and/or hematoma  - Assess quality of pulses, skin color and temperature  - Assess for signs of decreased coronary artery perfusion - ex. Angina  - Evaluate fluid balance, assess for edema, trend weights  Outcome: Progressing     Problem: SAFETY ADULT - FALL  Goal: Free from fall injury  Description: INTERVENTIONS:  - Assess pt frequently for physical needs  - Identify cognitive and physical deficits and behaviors that affect risk of falls.   - [place] fall precautions as indicated by assessment.  - Educate pt/family on patient safety including physical limitations  - Instruct pt to call for assistance with activity based on assessment  - Modify environment to reduce risk of injury  - Provide assistive devices as appropriate  - Consider OT/PT consult to assist with strengthening/mobility  - Encourage toileting schedule  Outcome: Progressing

## 2022-09-15 VITALS
DIASTOLIC BLOOD PRESSURE: 53 MMHG | TEMPERATURE: 98 F | OXYGEN SATURATION: 97 % | SYSTOLIC BLOOD PRESSURE: 137 MMHG | WEIGHT: 199.94 LBS | RESPIRATION RATE: 17 BRPM | HEART RATE: 60 BPM | BODY MASS INDEX: 30 KG/M2

## 2022-09-15 LAB
APTT PPP: 61.2 SECONDS (ref 23.3–35.6)
BASOPHILS # BLD AUTO: 0.03 X10(3) UL (ref 0–0.2)
BASOPHILS NFR BLD AUTO: 0.3 %
EOSINOPHIL # BLD AUTO: 0.16 X10(3) UL (ref 0–0.7)
EOSINOPHIL NFR BLD AUTO: 1.6 %
ERYTHROCYTE [DISTWIDTH] IN BLOOD BY AUTOMATED COUNT: 17.8 %
HCT VFR BLD AUTO: 27.6 %
HGB BLD-MCNC: 8.3 G/DL
IMM GRANULOCYTES # BLD AUTO: 0.09 X10(3) UL (ref 0–1)
IMM GRANULOCYTES NFR BLD: 0.9 %
LYMPHOCYTES # BLD AUTO: 2.04 X10(3) UL (ref 1–4)
LYMPHOCYTES NFR BLD AUTO: 20.9 %
MCH RBC QN AUTO: 28 PG (ref 26–34)
MCHC RBC AUTO-ENTMCNC: 30.1 G/DL (ref 31–37)
MCV RBC AUTO: 93.2 FL
MONOCYTES # BLD AUTO: 0.79 X10(3) UL (ref 0.1–1)
MONOCYTES NFR BLD AUTO: 8.1 %
NEUTROPHILS # BLD AUTO: 6.67 X10 (3) UL (ref 1.5–7.7)
NEUTROPHILS # BLD AUTO: 6.67 X10(3) UL (ref 1.5–7.7)
NEUTROPHILS NFR BLD AUTO: 68.2 %
PLATELET # BLD AUTO: 396 10(3)UL (ref 150–450)
RBC # BLD AUTO: 2.96 X10(6)UL
WBC # BLD AUTO: 9.8 X10(3) UL (ref 4–11)

## 2022-09-15 PROCEDURE — 99232 SBSQ HOSP IP/OBS MODERATE 35: CPT | Performed by: NURSE PRACTITIONER

## 2022-09-15 PROCEDURE — 99239 HOSP IP/OBS DSCHRG MGMT >30: CPT | Performed by: INTERNAL MEDICINE

## 2022-09-15 RX ORDER — AMOXICILLIN 500 MG/1
1000 CAPSULE ORAL 3 TIMES DAILY
Qty: 60 CAPSULE | Refills: 0 | Status: SHIPPED | OUTPATIENT
Start: 2022-09-15 | End: 2022-09-25

## 2022-09-15 RX ORDER — AMOXICILLIN 500 MG/1
1000 CAPSULE ORAL EVERY 8 HOURS SCHEDULED
Status: DISCONTINUED | OUTPATIENT
Start: 2022-09-15 | End: 2022-09-15

## 2022-09-15 NOTE — PROGRESS NOTES
Called and gave nurse to nurse report to SAV PARKER. Answered all questions. IV and tele was taken off.

## 2022-09-15 NOTE — PLAN OF CARE
Assumed patient care at 7:30. A/Ox4. Room air. NSR/SB on tele. Purwick on place. No pain noted by the patient. Bedrest. Electrolyte protocol (non cardiac). Left wrist and right forearm C/D/I. Regular diet. Wound noted on the sacrum. All safety precautions are in place and will continue to monitor the patient    Problem: SAFETY ADULT - FALL  Goal: Free from fall injury  Description: INTERVENTIONS:  - Assess pt frequently for physical needs  - Identify cognitive and physical deficits and behaviors that affect risk of falls.   - [place] fall precautions as indicated by assessment.  - Educate pt/family on patient safety including physical limitations  - Instruct pt to call for assistance with activity based on assessment  - Modify environment to reduce risk of injury  - Provide assistive devices as appropriate  - Consider OT/PT consult to assist with strengthening/mobility  - Encourage toileting schedule  Outcome: Progressing

## 2022-09-15 NOTE — PLAN OF CARE
Assumed patient care at [address] [place] [address]. Patient is A/Ox2-3. Room air. Vital signs stable. Afebrile  NSR/SB on tele. Electrolyte protocol (noncardiac). PIV Left hand was infusing Heparin gtt at 11ml/hr (LLE DVT). Decreased to 9.5ml/hr based on aPTT redraw. Next redraw at [facility]. PIV Right FA- SL.C/D/I. Regular diet with poor appetite. Denies nausea/vomiting/diarrhea/pain at this time. Turn x 2. Cushion/pillow support in place. Pressure injury noted to sacrum, Mepilex changed. Continent of bowel. Incontinent of bladder, purewick in place. Wound dressings to Right hip and lower extremity C/D/I. All safety precautions are in place. All needs met at this time. Problem: CARDIOVASCULAR - ADULT  Goal: Maintains optimal cardiac output and hemodynamic stability  Description: INTERVENTIONS:  - Monitor vital signs, rhythm, and trends  - Monitor for bleeding, hypotension and signs of decreased cardiac output  - Evaluate effectiveness of vasoactive medications to optimize hemodynamic stability  - Monitor arterial and/or venous puncture sites for bleeding and/or hematoma  - Assess quality of pulses, skin color and temperature  - Assess for signs of decreased coronary artery perfusion - ex. Angina  - Evaluate fluid balance, assess for edema, trend weights  Outcome: Progressing  Goal: Absence of cardiac arrhythmias or at baseline  Description: INTERVENTIONS:  - Continuous cardiac monitoring, monitor vital signs, obtain 12 lead EKG if indicated  - Evaluate effectiveness of antiarrhythmic and heart rate control medications as ordered  - Initiate emergency measures for life threatening arrhythmias  - Monitor electrolytes and administer replacement therapy as ordered  Outcome: Progressing     Problem: SAFETY ADULT - FALL  Goal: Free from fall injury  Description: INTERVENTIONS:  - Assess pt frequently for physical needs  - Identify cognitive and physical deficits and behaviors that affect risk of falls.   - [place] fall precautions as indicated by assessment.  - Educate pt/family on patient safety including physical limitations  - Instruct pt to call for assistance with activity based on assessment  - Modify environment to reduce risk of injury  - Provide assistive devices as appropriate  - Consider OT/PT consult to assist with strengthening/mobility  - Encourage toileting schedule  Outcome: Progressing

## 2022-09-15 NOTE — CM/SW NOTE
MSW updated by Rn that pt may dc.      DC Plan:   [facility]/[address]  [address] [place], [address]  Phone: [phone]    DC TIME: Will [facility]-[phone]

## 2022-09-19 ENCOUNTER — INITIAL APN SNF VISIT (OUTPATIENT)
Dept: INTERNAL MEDICINE CLINIC | Age: 77
End: 2022-09-19

## 2022-09-19 VITALS
SYSTOLIC BLOOD PRESSURE: 118 MMHG | WEIGHT: 198.19 LBS | OXYGEN SATURATION: 95 % | RESPIRATION RATE: 18 BRPM | HEART RATE: 56 BPM | TEMPERATURE: 98 F | BODY MASS INDEX: 29 KG/M2 | DIASTOLIC BLOOD PRESSURE: 50 MMHG

## 2022-09-19 DIAGNOSIS — Z86.79 ATRIAL FIBRILLATION, CURRENTLY IN SINUS RHYTHM: ICD-10-CM

## 2022-09-19 DIAGNOSIS — Z74.01 BEDBOUND: ICD-10-CM

## 2022-09-19 DIAGNOSIS — Z79.899 MEDICATION MANAGEMENT: Primary | ICD-10-CM

## 2022-09-19 DIAGNOSIS — Z99.3 WHEELCHAIR BOUND: ICD-10-CM

## 2022-09-19 DIAGNOSIS — E11.22 CKD STAGE 3 DUE TO TYPE 2 DIABETES MELLITUS (HCC): ICD-10-CM

## 2022-09-19 DIAGNOSIS — N18.30 CKD STAGE 3 DUE TO TYPE 2 DIABETES MELLITUS (HCC): ICD-10-CM

## 2022-09-19 DIAGNOSIS — I10 PRIMARY HYPERTENSION: ICD-10-CM

## 2022-09-19 DIAGNOSIS — N18.4 STAGE 4 CHRONIC KIDNEY DISEASE (HCC): ICD-10-CM

## 2022-09-19 DIAGNOSIS — Z87.81 HISTORY OF FEMUR FRACTURE: ICD-10-CM

## 2022-09-19 DIAGNOSIS — R40.4 TRANSIENT ALTERATION OF AWARENESS: ICD-10-CM

## 2022-09-19 DIAGNOSIS — N18.30 TYPE 2 DIABETES MELLITUS WITH STAGE 3 CHRONIC KIDNEY DISEASE, WITHOUT LONG-TERM CURRENT USE OF INSULIN, UNSPECIFIED WHETHER STAGE 3A OR 3B CKD (HCC): ICD-10-CM

## 2022-09-19 DIAGNOSIS — I82.412 ACUTE DEEP VEIN THROMBOSIS (DVT) OF FEMORAL VEIN OF LEFT LOWER EXTREMITY (HCC): ICD-10-CM

## 2022-09-19 DIAGNOSIS — E78.00 PURE HYPERCHOLESTEROLEMIA: ICD-10-CM

## 2022-09-19 DIAGNOSIS — Z98.890 H/O LITHOTRIPSY: ICD-10-CM

## 2022-09-19 DIAGNOSIS — E11.22 TYPE 2 DIABETES MELLITUS WITH STAGE 3 CHRONIC KIDNEY DISEASE, WITHOUT LONG-TERM CURRENT USE OF INSULIN, UNSPECIFIED WHETHER STAGE 3A OR 3B CKD (HCC): ICD-10-CM

## 2022-09-19 DIAGNOSIS — R31.9 URINARY TRACT INFECTION WITH HEMATURIA, SITE UNSPECIFIED: ICD-10-CM

## 2022-09-19 DIAGNOSIS — I48.91 ATRIAL FIBRILLATION, UNSPECIFIED TYPE (HCC): ICD-10-CM

## 2022-09-19 DIAGNOSIS — E11.00 TYPE 2 DIABETES MELLITUS WITH HYPEROSMOLARITY WITHOUT COMA, UNSPECIFIED WHETHER LONG TERM INSULIN USE (HCC): ICD-10-CM

## 2022-09-19 DIAGNOSIS — N39.0 URINARY TRACT INFECTION WITH HEMATURIA, SITE UNSPECIFIED: ICD-10-CM

## 2022-09-19 DIAGNOSIS — N18.9 ACUTE KIDNEY INJURY SUPERIMPOSED ON CKD (HCC): ICD-10-CM

## 2022-09-19 DIAGNOSIS — N20.0 RENAL STONES: ICD-10-CM

## 2022-09-19 DIAGNOSIS — I82.90 VTE (VENOUS THROMBOEMBOLISM): ICD-10-CM

## 2022-09-19 DIAGNOSIS — N17.9 ACUTE KIDNEY INJURY SUPERIMPOSED ON CKD (HCC): ICD-10-CM

## 2022-09-19 DIAGNOSIS — I10 ESSENTIAL HYPERTENSION, BENIGN: ICD-10-CM

## 2022-09-19 RX ORDER — DOCUSATE SODIUM 100 MG/1
100 CAPSULE, LIQUID FILLED ORAL DAILY PRN
COMMUNITY

## 2022-09-19 RX ORDER — POLYETHYLENE GLYCOL 3350 17 G/17G
17 POWDER, FOR SOLUTION ORAL DAILY PRN
COMMUNITY

## 2022-09-19 RX ORDER — OMEPRAZOLE 20 MG/1
20 CAPSULE, DELAYED RELEASE ORAL EVERY MORNING
COMMUNITY
Start: 2022-09-19

## 2022-09-20 ENCOUNTER — APPOINTMENT (OUTPATIENT)
Dept: GENERAL RADIOLOGY | Facility: HOSPITAL | Age: 77
End: 2022-09-20
Payer: MEDICARE

## 2022-09-20 ENCOUNTER — EXTERNAL FACILITY (OUTPATIENT)
Dept: FAMILY MEDICINE CLINIC | Facility: CLINIC | Age: 77
End: 2022-09-20

## 2022-09-20 DIAGNOSIS — I48.91 ATRIAL FIBRILLATION, UNSPECIFIED TYPE (HCC): ICD-10-CM

## 2022-09-20 DIAGNOSIS — E11.22 TYPE 2 DIABETES MELLITUS WITH STAGE 3 CHRONIC KIDNEY DISEASE, WITHOUT LONG-TERM CURRENT USE OF INSULIN, UNSPECIFIED WHETHER STAGE 3A OR 3B CKD (HCC): ICD-10-CM

## 2022-09-20 DIAGNOSIS — R55 VASOVAGAL SYNCOPE: Primary | ICD-10-CM

## 2022-09-20 DIAGNOSIS — N18.30 TYPE 2 DIABETES MELLITUS WITH STAGE 3 CHRONIC KIDNEY DISEASE, WITHOUT LONG-TERM CURRENT USE OF INSULIN, UNSPECIFIED WHETHER STAGE 3A OR 3B CKD (HCC): ICD-10-CM

## 2022-09-20 DIAGNOSIS — Z47.89 ORTHOPEDIC AFTERCARE: ICD-10-CM

## 2022-09-20 DIAGNOSIS — R31.9 HEMATURIA, UNSPECIFIED TYPE: ICD-10-CM

## 2022-09-20 DIAGNOSIS — D64.9 ANEMIA, UNSPECIFIED TYPE: ICD-10-CM

## 2022-09-20 DIAGNOSIS — S72.402S CLOSED FRACTURE OF DISTAL END OF LEFT FEMUR, UNSPECIFIED FRACTURE MORPHOLOGY, SEQUELA: ICD-10-CM

## 2022-09-20 DIAGNOSIS — I82.90 VTE (VENOUS THROMBOEMBOLISM): ICD-10-CM

## 2022-09-20 PROBLEM — N18.9 ACUTE RENAL FAILURE SUPERIMPOSED ON CHRONIC KIDNEY DISEASE, UNSPECIFIED CKD STAGE, UNSPECIFIED ACUTE RENAL FAILURE TYPE: Status: ACTIVE | Noted: 2022-09-20

## 2022-09-20 PROBLEM — N18.9 ACUTE RENAL FAILURE SUPERIMPOSED ON CHRONIC KIDNEY DISEASE, UNSPECIFIED CKD STAGE, UNSPECIFIED ACUTE RENAL FAILURE TYPE (HCC): Status: ACTIVE | Noted: 2022-09-20

## 2022-09-20 PROBLEM — R40.4 UNRESPONSIVE EPISODE: Status: ACTIVE | Noted: 2022-09-20

## 2022-09-20 PROBLEM — N17.9 ACUTE RENAL FAILURE SUPERIMPOSED ON CHRONIC KIDNEY DISEASE, UNSPECIFIED CKD STAGE, UNSPECIFIED ACUTE RENAL FAILURE TYPE: Status: ACTIVE | Noted: 2022-09-20

## 2022-09-20 PROBLEM — R41.89 UNRESPONSIVE EPISODE: Status: ACTIVE | Noted: 2022-09-20

## 2022-09-20 PROBLEM — R00.1 BRADYCARDIA: Status: ACTIVE | Noted: 2022-09-20

## 2022-09-20 PROBLEM — N17.9 ACUTE RENAL FAILURE SUPERIMPOSED ON CHRONIC KIDNEY DISEASE, UNSPECIFIED CKD STAGE, UNSPECIFIED ACUTE RENAL FAILURE TYPE (HCC): Status: ACTIVE | Noted: 2022-09-20

## 2022-09-20 PROCEDURE — 99310 SBSQ NF CARE HIGH MDM 45: CPT | Performed by: FAMILY MEDICINE

## 2022-09-20 PROCEDURE — 1111F DSCHRG MED/CURRENT MED MERGE: CPT | Performed by: FAMILY MEDICINE

## 2022-09-20 PROCEDURE — 71045 X-RAY EXAM CHEST 1 VIEW: CPT

## 2022-09-20 NOTE — ED INITIAL ASSESSMENT (HPI)
A&Ox3 patient bib ems from SNF for syncope    Patient was sitting in chair and had syncopal event    Did not fall, no injuries noted, does not recall event    Reports was recently seen on 9/12 for the same complaint, per notes, patient found to have a +PE    Currently denies any pain, no cp/sob/n/v/d/c/f/n/t/LH/HA/vision changes at this time. RR even/NL, speaking in full clear sentences.

## 2022-09-21 ENCOUNTER — APPOINTMENT (OUTPATIENT)
Dept: CV DIAGNOSTICS | Facility: HOSPITAL | Age: 77
End: 2022-09-21
Attending: INTERNAL MEDICINE
Payer: MEDICARE

## 2022-09-21 PROCEDURE — 93306 TTE W/DOPPLER COMPLETE: CPT | Performed by: INTERNAL MEDICINE

## 2022-09-21 NOTE — PLAN OF CARE
Rec'd pt drowsy, arosuabler to verbal and tactile stimuli. Pt respondes verbally, but is reluctant to open eyes. Pt denies any pain or discomfort at thsi time. IVF of LR infusion completed. PT/OT saw pt and reccomend return to [facility] at [facility]. Isolation precautions in place for VRE in the urine. Pt has Hx of (L) Femur Fx and orthopedic dressing in place. Skin tears noted to sacrm, (R) hip, (L) heel and (R) ankle. Pt sees Dr Sen Corona cardiology, outpatient, consult noted. Echo completed showing EF 45-50%. Teklemetry showing SR with frequent PAC's. Bed in low locked position with call l;giht and personal belongings in reach. Will continue to monitor.       Problem: Patient/Family Goals  Goal: Patient/Family Long Term Goal  Description: Patient's Long Term Goal: Discharge back to [facility]    Interventions:  - appropriate consults, echo, taking prescribed medications  - See additional Care Plan goals for specific interventions  Outcome: Progressing  Goal: Patient/Family Short Term Goal  Description: Patient's Short Term Goal: feel better    Interventions:   - appropriate consults, taking prescribed medications, echo, IVF  - See additional Care Plan goals for specific interventions  Outcome: Progressing

## 2022-09-21 NOTE — PLAN OF CARE
Assumed care of patient around [address] [facility]. Admission Navigator completed. Med rec completed with pt and nurse from [facility] over the phone. Pt A/Ox 4. O2 sats maintained on room air. NSR with frequent PAC's on tele. Pt denies any dizziness or lightheadedness. Last BM 9/19 per pt. Purewick in place. History of VRE in urine, isolation precautions in place. Pt reports mild-moderate left leg pain, PRN pain med given. Bedrest with Q2 turns, pt states she is non-weight bearing to left leg from recent femur fracture. Skin assessment completed with Rubina KINGSTON. See flowsheet, left leg incisions from orthopedic surgery x3. Skin tears to sacrum, right hip, left heel, right ankle. Pt updated on plan of care. Care needs met. Bed in lowest position, Call light within reach. Bed alarm on. IVF infusing per MD orders.      POC: Cardiology consult, echo, PT eval    Problem: Patient/Family Goals  Goal: Patient/Family Long Term Goal  Description: Patient's Long Term Goal: Discharge back to [facility]    Interventions:  - appropriate consults, echo, taking prescribed medications  - See additional Care Plan goals for specific interventions  Outcome: Progressing  Goal: Patient/Family Short Term Goal  Description: Patient's Short Term Goal: feel better    Interventions:   - appropriate consults, taking prescribed medications, echo, IVF  - See additional Care Plan goals for specific interventions  Outcome: Progressing

## 2022-09-21 NOTE — ED QUICK NOTES
Orders for admission, patient is aware of plan and ready to go upstairs. Any questions, please call ED RN Yue at extension [phone].      Patient Covid vaccination status: Fully vaccinated     COVID Test Ordered in ED: Rapid SARS-CoV-2 by PCR    COVID Suspicion at Admission: negative covid    Running Infusions:  none    Mental Status/LOC at time of transport: A&Ox3    Other pertinent information:   CIWA score: N/A   NIH score:  N/A

## 2022-09-21 NOTE — CM/SW NOTE
ALKA spoke with pt at bedside to discuss discharge planning. The pt is alert/oriented. Pt admitted to hospital from [address]. Discussed return to [facility] at hospital discharge. Pt in agreement to returning. PT/OT to evaluated pt. Initial updates sent to [facility] in [facility] [place]. Await discharge clearance.      [address]    Olive Shaw, MARYANN, [facility]  Discharge [address] [facility].

## 2022-09-21 NOTE — ED QUICK NOTES
Attempted to call primary RN and charge RN of the admitting unit to notify of patient transport, no answer at this time.

## 2022-09-21 NOTE — PROGRESS NOTES
Pharmacy Note: Renal dose adjustment of Amoxicillin    Nna Kwong is a 76year old female who has been prescribed Amoxicillin 1000mg every 8 hours. CrCl is 21.5 ml/min so the dose has been adjusted  to 1000mg every 12 hours per hospital renal dose adjustment protocol. Pharmacy will follow and adjust dose as warranted for renal function changes.      Thank you,   Lacy Saunders, [facility]  9/21/2022  5:48 AM

## 2022-09-22 ENCOUNTER — APPOINTMENT (OUTPATIENT)
Dept: MRI IMAGING | Facility: HOSPITAL | Age: 77
End: 2022-09-22
Attending: Other
Payer: MEDICARE

## 2022-09-22 ENCOUNTER — NURSE ONLY (OUTPATIENT)
Dept: ELECTROPHYSIOLOGY | Facility: HOSPITAL | Age: 77
DRG: 884 | End: 2022-09-22
Attending: Other
Payer: MEDICARE

## 2022-09-22 ENCOUNTER — NURSE ONLY (OUTPATIENT)
Dept: ELECTROPHYSIOLOGY | Facility: HOSPITAL | Age: 77
End: 2022-09-22
Attending: Other
Payer: MEDICARE

## 2022-09-22 PROCEDURE — 70551 MRI BRAIN STEM W/O DYE: CPT | Performed by: OTHER

## 2022-09-22 NOTE — PROCEDURES
Date of Procedure: 9/22/2022    Procedure: EEG (ELECTROENCEPHALOGRAM)     78 Y/O FEMALE WAS ADMITTED ON 9/20/22 FROM SUBACUTE REHAB WITH SYNCOPAL EPISODE.  DESCRIBED IT AS AN EPISODE OF UNRESPONSIVENESS. SHE WAS ALSO HERE AFTER SYNCOPAL EPISODE AND DIAGNOSED WITH UTI AND DVT. PMH: HTN, DM2, HIGH CHOLESTEROL  RX: ELIQUIS, LEXAPRO, NOVOLOG,DITROPAN, PROTONIX    BACKGROUND ACTIVITY: Posterior rhythm was in the range of 7-9 Hz, reactive to eye opening; symmetrical and synchronous. Noted also are intermittent slowing of background activity. Drowsiness is characterized by intermittent theta waves bitemporally. Occasional sharp transients noted in posterior region. EPILEPTIFORM DISCHARGES: There were no epileptiform discharges or periodic lateralized epileptiform discharges (PLEDs) recorded throughout the recording. HYPERVENTILATION: Hyperventilation was not performed. PHOTIC STIMULATION: Photic stimulation was not performed   Stage II sleep was not reached. IMPRESSION: Unremarkable EEG without any interictal discharges, electrographic seizure activity or epileptiform activity. Clinical correlation is advised.     Darius Welch MD   Neurology  [facility]  9/22/2022, 4:14 PM  CC: Nolvia Lawrence MD

## 2022-09-22 NOTE — PLAN OF CARE
Pt rec'd awake and alert. No distress or discomfort at this time. Neuro consult noted, new orders for EEG and MRI brain orderd. MRI screeening completed. Contact isloation precautions for VRE urine maintained. Wound care to see pt for wound care dressing recomendations. Pt willing to get up to chair, using Total lift. Pt more awake and alert today. Telemetry showing SR. Bedin low locked position with call light and personal belongings in reach. Will continue to monitor.       Problem: Patient/Family Goals  Goal: Patient/Family Long Term Goal  Description: Patient's Long Term Goal: Discharge back to [facility]    Interventions:  - appropriate consults, echo, taking prescribed medications  - See additional Care Plan goals for specific interventions  Outcome: Progressing  Goal: Patient/Family Short Term Goal  Description: Patient's Short Term Goal: feel better    Interventions:   - appropriate consults, taking prescribed medications, echo, IVF  - See additional Care Plan goals for specific interventions  Outcome: Progressing     Problem: MUSCULOSKELETAL - ADULT  Goal: Return mobility to safest level of function  Description: INTERVENTIONS:  - Assess patient stability and activity tolerance for standing, transferring and ambulating w/ or w/o assistive devices  - Assist with transfers and ambulation using safe patient handling equipment as needed  - Ensure adequate protection for wounds/incisions during mobilization  - Obtain PT/OT consults as needed  - Advance activity as appropriate  - Communicate ordered activity level and limitations with patient/family  Outcome: Progressing  Goal: Maintain proper alignment of affected body part  Description: INTERVENTIONS:  - Support and protect limb and body alignment per provider's orders  - Instruct and reinforce with patient and family use of appropriate assistive device and precautions (e.g. spinal or hip dislocation precautions)  Outcome: Progressing

## 2022-09-22 NOTE — PLAN OF CARE
Assumed care of patient at 1900, pt awake, alert and responds to questions appropriately. Pt denies pain, SOB or any discomfort. Bedrest with Q2 turns, O2 sats maintained on room air. External urinary catheter utilized.      Problem: Patient/Family Goals  Goal: Patient/Family Long Term Goal  Description: Patient's Long Term Goal: Discharge back to [facility]    Interventions:  - appropriate consults, echo, taking prescribed medications  - See additional Care Plan goals for specific interventions  Outcome: Progressing  Goal: Patient/Family Short Term Goal  Description: Patient's Short Term Goal: feel better    Interventions:   - appropriate consults, taking prescribed medications, echo, IVF  - See additional Care Plan goals for specific interventions  Outcome: Progressing

## 2022-09-22 NOTE — CONSULTS
[facility]  Inpatient Wound Care Contact Note    Marilu Alonso Patient Status:  Inpatient    1945 MRN [id number]   [facility] 8NE-A Attending Gordon Gilford, MD   Hosp Day # 0 PCP Errol Dallas MD     Attempted to see patient for follow up wound care assessment/session. Patient is currently unavailable secondary to having EEG. We will continue to follow this patient while in-house and assist with wound care discharge planning. Please page #[phone] if any questions. Thank you,    Indira Campbell.  Cornelia Oconnor, PT, MPT  [address] [facility]  Tucker Duong [address]  [place], [address]  [phone]

## 2022-09-23 PROBLEM — R29.898 BILATERAL LEG WEAKNESS: Status: ACTIVE | Noted: 2022-09-23

## 2022-09-23 NOTE — PLAN OF CARE
Assumed pt care at 0730. Alert and Oriented x 4. RA. Denies pain/sob, lung sounds clear and diminished, VSS, normal sinus rhythm on Tele. Briefed and PureWick. Total assist and uses Juris Fus at [facility]. POC stabilize blood pressure and maintain SBP less than 150. plan of care updated with patient. Questions answered. Verbalized understanding. Will continue to monitor. Problem: Patient/Family Goals  Goal: Patient/Family Long Term Goal  Description: Patient's Long Term Goal: Discharge back to [facility]    Interventions:  - appropriate consults, echo, taking prescribed medications  - See additional Care Plan goals for specific interventions  Outcome: Progressing  Note: Patient's long term goal: to stay out of hospital.     Interventions: take meds as prescribed  Attend f/u appointments  Eat healthy/exercise    See additional care plan goals for specific interventions. Goal: Patient/Family Short Term Goal  Description: Patient's Short Term Goal: feel better    Interventions:   - appropriate consults, taking prescribed medications, echo, IVF  - See additional Care Plan goals for specific interventions  Outcome: Progressing  Note: Patient's short term goal: to go home    Interventions: follow medication regimen, therapeutic labs/procedures, walk TID. See additional care plan goals for specific interventions.       Problem: MUSCULOSKELETAL - ADULT  Goal: Return mobility to safest level of function  Description: INTERVENTIONS:  - Assess patient stability and activity tolerance for standing, transferring and ambulating w/ or w/o assistive devices  - Assist with transfers and ambulation using safe patient handling equipment as needed  - Ensure adequate protection for wounds/incisions during mobilization  - Obtain PT/OT consults as needed  - Advance activity as appropriate  - Communicate ordered activity level and limitations with patient/family  Outcome: Progressing  Goal: Maintain proper alignment of affected body part  Description: INTERVENTIONS:  - Support and protect limb and body alignment per provider's orders  - Instruct and reinforce with patient and family use of appropriate assistive device and precautions (e.g. spinal or hip dislocation precautions)  Outcome: Progressing

## 2022-09-23 NOTE — DISCHARGE PLANNING
Explained discharge instructions including medications and follow-up appointments to primary care. Verbalized understanding. IV removed. Tele monitor discontinued. Will be transported via ambulance to [facility]. Report given to nurse, Tom Noble.

## 2022-09-23 NOTE — PLAN OF CARE
Patient is alert & oriented x4. Pt is a total lift, x2 assist turning. Breath sounds are clear;diminished bilateral. On room air. Normal sinus rhythm w/ arrhythmia. No pain reported. Multiple wounds charted. Q2 turn in bed. Contact iso for VRE urine. Bed in low position and call light within reach. Reviewed plan of care and patient verbalizes understanding.        Problem: Patient/Family Goals  Goal: Patient/Family Long Term Goal  Description: Patient's Long Term Goal: Discharge back to [facility]    Interventions:  - appropriate consults, echo, taking prescribed medications  - See additional Care Plan goals for specific interventions  Outcome: Progressing  Goal: Patient/Family Short Term Goal  Description: Patient's Short Term Goal: feel better    Interventions:   - appropriate consults, taking prescribed medications, echo, IVF  - See additional Care Plan goals for specific interventions  Outcome: Progressing     Problem: MUSCULOSKELETAL - ADULT  Goal: Return mobility to safest level of function  Description: INTERVENTIONS:  - Assess patient stability and activity tolerance for standing, transferring and ambulating w/ or w/o assistive devices  - Assist with transfers and ambulation using safe patient handling equipment as needed  - Ensure adequate protection for wounds/incisions during mobilization  - Obtain PT/OT consults as needed  - Advance activity as appropriate  - Communicate ordered activity level and limitations with patient/family  Outcome: Progressing  Goal: Maintain proper alignment of affected body part  Description: INTERVENTIONS:  - Support and protect limb and body alignment per provider's orders  - Instruct and reinforce with patient and family use of appropriate assistive device and precautions (e.g. spinal or hip dislocation precautions)  Outcome: Progressing

## 2022-09-23 NOTE — CM/SW NOTE
09/23/22 1413   Discharge disposition   Expected discharge disposition [address] [facility] Provider [facility]   Discharge transportation [facility] with RN. Pt cleared for discharge. Updates sent to [address] in [address] [place] [address]. Confirmed w/ Sofia at [facility] return this evening. BLS set up for 5:00pm. PCS form completed/printed. Spoke with spouse, Carroll Medrano, informed of discharge. In agreement.      RN to call report: [facility]  [address] Ambulance: [address] [place], [facility], [facility]  Discharge [facility].

## 2022-09-26 ENCOUNTER — PATIENT OUTREACH (OUTPATIENT)
Dept: CASE MANAGEMENT | Age: 77
End: 2022-09-26

## 2022-09-27 ENCOUNTER — PATIENT OUTREACH (OUTPATIENT)
Dept: CASE MANAGEMENT | Age: 77
End: 2022-09-27

## 2022-09-27 ENCOUNTER — EXTERNAL FACILITY (OUTPATIENT)
Dept: FAMILY MEDICINE CLINIC | Facility: CLINIC | Age: 77
End: 2022-09-27

## 2022-09-27 DIAGNOSIS — I82.90 VTE (VENOUS THROMBOEMBOLISM): ICD-10-CM

## 2022-09-27 DIAGNOSIS — S72.402S CLOSED FRACTURE OF DISTAL END OF LEFT FEMUR, UNSPECIFIED FRACTURE MORPHOLOGY, SEQUELA: ICD-10-CM

## 2022-09-27 DIAGNOSIS — S72.402S CLOSED FRACTURE OF DISTAL END OF LEFT FEMUR, UNSPECIFIED FRACTURE MORPHOLOGY, SEQUELA: Primary | ICD-10-CM

## 2022-09-27 DIAGNOSIS — Z47.89 ORTHOPEDIC AFTERCARE: ICD-10-CM

## 2022-09-27 DIAGNOSIS — R55 VASOVAGAL SYNCOPE: Primary | ICD-10-CM

## 2022-09-27 DIAGNOSIS — D64.9 ANEMIA, UNSPECIFIED TYPE: ICD-10-CM

## 2022-09-27 DIAGNOSIS — N18.30 TYPE 2 DIABETES MELLITUS WITH STAGE 3 CHRONIC KIDNEY DISEASE, WITHOUT LONG-TERM CURRENT USE OF INSULIN, UNSPECIFIED WHETHER STAGE 3A OR 3B CKD (HCC): ICD-10-CM

## 2022-09-27 DIAGNOSIS — I48.91 ATRIAL FIBRILLATION, UNSPECIFIED TYPE (HCC): ICD-10-CM

## 2022-09-27 DIAGNOSIS — I10 PRIMARY HYPERTENSION: ICD-10-CM

## 2022-09-27 DIAGNOSIS — E11.22 TYPE 2 DIABETES MELLITUS WITH STAGE 3 CHRONIC KIDNEY DISEASE, WITHOUT LONG-TERM CURRENT USE OF INSULIN, UNSPECIFIED WHETHER STAGE 3A OR 3B CKD (HCC): ICD-10-CM

## 2022-09-27 PROCEDURE — 99310 SBSQ NF CARE HIGH MDM 45: CPT | Performed by: FAMILY MEDICINE

## 2022-09-28 ENCOUNTER — HOSPITAL ENCOUNTER (OUTPATIENT)
Dept: GENERAL RADIOLOGY | Age: 77
Discharge: HOME | End: 2022-09-28
Attending: ORTHOPAEDIC SURGERY
Payer: MEDICARE

## 2022-09-28 ENCOUNTER — OFFICE VISIT (OUTPATIENT)
Dept: ORTHOPEDICS CLINIC | Facility: CLINIC | Age: 77
End: 2022-09-28

## 2022-09-28 DIAGNOSIS — S72.402S CLOSED FRACTURE OF DISTAL END OF LEFT FEMUR, UNSPECIFIED FRACTURE MORPHOLOGY, SEQUELA: ICD-10-CM

## 2022-09-28 DIAGNOSIS — S72.343D: ICD-10-CM

## 2022-09-28 DIAGNOSIS — Z48.89 AFTERCARE FOLLOWING SURGERY: Primary | ICD-10-CM

## 2022-09-28 PROCEDURE — 73552 X-RAY EXAM OF FEMUR 2/>: CPT | Performed by: ORTHOPAEDIC SURGERY

## 2022-09-28 PROCEDURE — 99024 POSTOP FOLLOW-UP VISIT: CPT | Performed by: ORTHOPAEDIC SURGERY

## 2022-09-28 NOTE — PROGRESS NOTES
[facility] Fracture follow-up Progress Note      Date of Injury: 8/30/2022    History: The patient is a 61-year-old female returning from [facility] after undergoing IM nailing of a left femoral shaft fracture. She denies specific pain about the hip or thigh but is still struggling with deconditioning and has not been able to ambulate prior to her fracture for many months. Her injury occurred while use of a Jamshid lift at home. Physical Exam: Surgical wounds demonstrate some irritation and erythema around with staples which are removed today. No drainage noted. Passive and active assisted motion about the hip and knee is adequately tolerated. Calf is nontender. Distal exam is intact. Imaging Results:  Multiple views left femur personally viewed, demonstrating no interval change in an IM nail traversing a spiral fracture of the femoral diaphysis. Hardware position and fracture alignment are unchanged. Assessment: Diagnoses and all orders for this visit:    Aftercare following surgery    Closed displaced spiral fracture of shaft of femur with routine healing, subsequent encounter      Plan: X-rays demonstrate no interval change in position of a diaphyseal spiral fracture of the left femur. Hardware is intact and her pain is well controlled. Given the patient's obesity and deconditioning she has been unable to independently ambulate for many months requiring a Jamshid lift prior to her fracture. She is at a skilled nursing facility where she may partially weight-bear 25%. DVT prophylaxis should also continue. Repeat x-ray is recommended in 6 to 8 weeks at which point if there is callus formation we should be able to progress her weightbearing status. All questions were answered and the patient verbalized understanding and appreciation. Mary Chang MD  [address] [place] [address] Surgery    The dictation was partially prepared using [employer] [facility] voice recognition software.   Although every attempt is made to correct errors where identified, discrepancies may still exist.

## 2022-09-29 ENCOUNTER — INITIAL APN SNF VISIT (OUTPATIENT)
Dept: INTERNAL MEDICINE CLINIC | Age: 77
End: 2022-09-29

## 2022-09-29 DIAGNOSIS — R26.9 GAIT DISORDER: Primary | ICD-10-CM

## 2022-09-29 PROCEDURE — 1126F AMNT PAIN NOTED NONE PRSNT: CPT | Performed by: NURSE PRACTITIONER

## 2022-09-30 ENCOUNTER — SNF VISIT (OUTPATIENT)
Dept: INTERNAL MEDICINE CLINIC | Age: 77
End: 2022-09-30

## 2022-09-30 DIAGNOSIS — Z87.81 HISTORY OF FEMUR FRACTURE: ICD-10-CM

## 2022-09-30 DIAGNOSIS — E11.22 TYPE 2 DIABETES MELLITUS WITH STAGE 3 CHRONIC KIDNEY DISEASE, WITHOUT LONG-TERM CURRENT USE OF INSULIN, UNSPECIFIED WHETHER STAGE 3A OR 3B CKD (HCC): ICD-10-CM

## 2022-09-30 DIAGNOSIS — N18.30 TYPE 2 DIABETES MELLITUS WITH STAGE 3 CHRONIC KIDNEY DISEASE, WITHOUT LONG-TERM CURRENT USE OF INSULIN, UNSPECIFIED WHETHER STAGE 3A OR 3B CKD (HCC): ICD-10-CM

## 2022-09-30 DIAGNOSIS — N18.4 STAGE 4 CHRONIC KIDNEY DISEASE (HCC): ICD-10-CM

## 2022-09-30 DIAGNOSIS — I82.90 VTE (VENOUS THROMBOEMBOLISM): ICD-10-CM

## 2022-09-30 DIAGNOSIS — Z86.79 ATRIAL FIBRILLATION, CURRENTLY IN SINUS RHYTHM: ICD-10-CM

## 2022-09-30 DIAGNOSIS — Z79.899 MEDICATION MANAGEMENT: ICD-10-CM

## 2022-09-30 DIAGNOSIS — I10 PRIMARY HYPERTENSION: ICD-10-CM

## 2022-09-30 DIAGNOSIS — R26.9 GAIT DISORDER: Primary | ICD-10-CM

## 2022-09-30 DIAGNOSIS — Z74.01 BEDBOUND: ICD-10-CM

## 2022-09-30 DIAGNOSIS — R55 VASOVAGAL EPISODE: ICD-10-CM

## 2022-10-01 VITALS
HEART RATE: 58 BPM | WEIGHT: 191.88 LBS | BODY MASS INDEX: 28 KG/M2 | DIASTOLIC BLOOD PRESSURE: 86 MMHG | TEMPERATURE: 98 F | SYSTOLIC BLOOD PRESSURE: 144 MMHG | OXYGEN SATURATION: 96 % | RESPIRATION RATE: 20 BRPM

## 2022-10-01 VITALS
TEMPERATURE: 97 F | DIASTOLIC BLOOD PRESSURE: 86 MMHG | HEART RATE: 58 BPM | WEIGHT: 191.88 LBS | SYSTOLIC BLOOD PRESSURE: 144 MMHG | RESPIRATION RATE: 20 BRPM | OXYGEN SATURATION: 96 % | BODY MASS INDEX: 28 KG/M2

## 2022-10-04 ENCOUNTER — SNF VISIT (OUTPATIENT)
Dept: INTERNAL MEDICINE CLINIC | Age: 77
End: 2022-10-04

## 2022-10-04 VITALS
SYSTOLIC BLOOD PRESSURE: 128 MMHG | WEIGHT: 191 LBS | TEMPERATURE: 97 F | BODY MASS INDEX: 28 KG/M2 | RESPIRATION RATE: 18 BRPM | HEART RATE: 60 BPM | OXYGEN SATURATION: 96 % | DIASTOLIC BLOOD PRESSURE: 60 MMHG

## 2022-10-04 DIAGNOSIS — Z79.899 MEDICATION MANAGEMENT: ICD-10-CM

## 2022-10-04 DIAGNOSIS — Z87.81 HISTORY OF FEMUR FRACTURE: ICD-10-CM

## 2022-10-04 DIAGNOSIS — R63.0 POOR APPETITE: ICD-10-CM

## 2022-10-04 DIAGNOSIS — I82.90 VTE (VENOUS THROMBOEMBOLISM): ICD-10-CM

## 2022-10-04 DIAGNOSIS — R26.9 GAIT DISORDER: Primary | ICD-10-CM

## 2022-10-04 DIAGNOSIS — Z74.01 BEDBOUND: ICD-10-CM

## 2022-10-04 DIAGNOSIS — N18.30 TYPE 2 DIABETES MELLITUS WITH STAGE 3 CHRONIC KIDNEY DISEASE, WITHOUT LONG-TERM CURRENT USE OF INSULIN, UNSPECIFIED WHETHER STAGE 3A OR 3B CKD (HCC): ICD-10-CM

## 2022-10-04 DIAGNOSIS — N18.4 STAGE 4 CHRONIC KIDNEY DISEASE (HCC): ICD-10-CM

## 2022-10-04 DIAGNOSIS — E11.22 TYPE 2 DIABETES MELLITUS WITH STAGE 3 CHRONIC KIDNEY DISEASE, WITHOUT LONG-TERM CURRENT USE OF INSULIN, UNSPECIFIED WHETHER STAGE 3A OR 3B CKD (HCC): ICD-10-CM

## 2022-10-04 PROCEDURE — 99309 SBSQ NF CARE MODERATE MDM 30: CPT | Performed by: NURSE PRACTITIONER

## 2022-10-04 PROCEDURE — 1126F AMNT PAIN NOTED NONE PRSNT: CPT | Performed by: NURSE PRACTITIONER

## 2022-10-04 RX ORDER — MIRTAZAPINE 7.5 MG/1
7.5 TABLET, FILM COATED ORAL NIGHTLY
COMMUNITY
Start: 2022-10-04

## 2022-10-11 ENCOUNTER — SNF DISCHARGE (OUTPATIENT)
Dept: INTERNAL MEDICINE CLINIC | Age: 77
End: 2022-10-11

## 2022-10-11 VITALS
RESPIRATION RATE: 20 BRPM | OXYGEN SATURATION: 96 % | HEART RATE: 90 BPM | BODY MASS INDEX: 28 KG/M2 | TEMPERATURE: 97 F | WEIGHT: 191.31 LBS | SYSTOLIC BLOOD PRESSURE: 135 MMHG | DIASTOLIC BLOOD PRESSURE: 62 MMHG

## 2022-10-11 DIAGNOSIS — Z79.899 MEDICATION MANAGEMENT: ICD-10-CM

## 2022-10-11 DIAGNOSIS — N39.0 URINARY TRACT INFECTION WITH HEMATURIA, SITE UNSPECIFIED: ICD-10-CM

## 2022-10-11 DIAGNOSIS — E11.00 TYPE 2 DIABETES MELLITUS WITH HYPEROSMOLARITY WITHOUT COMA, UNSPECIFIED WHETHER LONG TERM INSULIN USE (HCC): ICD-10-CM

## 2022-10-11 DIAGNOSIS — I82.90 VTE (VENOUS THROMBOEMBOLISM): ICD-10-CM

## 2022-10-11 DIAGNOSIS — Z86.79 ATRIAL FIBRILLATION, CURRENTLY IN SINUS RHYTHM: ICD-10-CM

## 2022-10-11 DIAGNOSIS — R31.9 URINARY TRACT INFECTION WITH HEMATURIA, SITE UNSPECIFIED: ICD-10-CM

## 2022-10-11 DIAGNOSIS — N18.30 TYPE 2 DIABETES MELLITUS WITH STAGE 3 CHRONIC KIDNEY DISEASE, WITHOUT LONG-TERM CURRENT USE OF INSULIN, UNSPECIFIED WHETHER STAGE 3A OR 3B CKD (HCC): ICD-10-CM

## 2022-10-11 DIAGNOSIS — R55 VASOVAGAL EPISODE: ICD-10-CM

## 2022-10-11 DIAGNOSIS — I10 PRIMARY HYPERTENSION: ICD-10-CM

## 2022-10-11 DIAGNOSIS — R26.9 GAIT DISORDER: Primary | ICD-10-CM

## 2022-10-11 DIAGNOSIS — Z74.01 BEDBOUND: ICD-10-CM

## 2022-10-11 DIAGNOSIS — E11.22 TYPE 2 DIABETES MELLITUS WITH STAGE 3 CHRONIC KIDNEY DISEASE, WITHOUT LONG-TERM CURRENT USE OF INSULIN, UNSPECIFIED WHETHER STAGE 3A OR 3B CKD (HCC): ICD-10-CM

## 2022-10-11 DIAGNOSIS — Z87.81 HISTORY OF FEMUR FRACTURE: ICD-10-CM

## 2022-10-11 PROCEDURE — 99316 NF DSCHRG MGMT 30 MIN+: CPT | Performed by: NURSE PRACTITIONER

## 2022-10-17 ENCOUNTER — TELEPHONE (OUTPATIENT)
Dept: INTERNAL MEDICINE CLINIC | Facility: CLINIC | Age: 77
End: 2022-10-17

## 2022-10-17 NOTE — TELEPHONE ENCOUNTER
Lara/PT - [facility] - [phone]    Patient was seen for PT eval on 10/15  POC:  1xweek for 4 weeks starting 10/15  1/week for 3 weeks starting 11/7  Total of 7 visits    Faxed orders to follow, return call not necessary

## 2022-10-20 ENCOUNTER — TELEPHONE (OUTPATIENT)
Dept: INTERNAL MEDICINE CLINIC | Facility: CLINIC | Age: 77
End: 2022-10-20

## 2022-10-20 NOTE — TELEPHONE ENCOUNTER
Colin Segovia with [facility] stated pt will be missing OT evaluation this week due to scheduling conflict. Colin Segovia is requesting verbal orders to reschedule pt for next week.     Confirmed [phone] as best call back for [facility] to give orders

## 2022-10-21 RX ORDER — METOPROLOL SUCCINATE 25 MG/1
TABLET, EXTENDED RELEASE ORAL
Qty: 90 TABLET | Refills: 0 | Status: SHIPPED | OUTPATIENT
Start: 2022-10-21

## 2022-10-24 ENCOUNTER — TELEPHONE (OUTPATIENT)
Dept: INTERNAL MEDICINE CLINIC | Facility: CLINIC | Age: 77
End: 2022-10-24

## 2022-10-24 NOTE — TELEPHONE ENCOUNTER
[facility] - [phone]    Request HH aid (aid portion only) be removed.   Pt has private care givers to assist with bathing    Request change to wound care, Verbal Orders needed:    Current care for both wounds: medi honey with gauze covering    Coccyx wound:  Change to hydrocolloid dressing    Left heel wound: paint with iodine and cover

## 2022-10-25 ENCOUNTER — TELEPHONE (OUTPATIENT)
Dept: INTERNAL MEDICINE CLINIC | Facility: CLINIC | Age: 77
End: 2022-10-25

## 2022-10-25 RX ORDER — LIDOCAINE HYDROCHLORIDE 20 MG/ML
1 SOLUTION ORAL; TOPICAL
Qty: 90 TABLET | Refills: 0 | Status: SHIPPED | OUTPATIENT
Start: 2022-10-25

## 2022-10-25 RX ORDER — MIRTAZAPINE 7.5 MG/1
7.5 TABLET, FILM COATED ORAL NIGHTLY
Qty: 90 TABLET | Refills: 0 | Status: SHIPPED | OUTPATIENT
Start: 2022-10-25

## 2022-10-25 RX ORDER — LIDOCAINE HYDROCHLORIDE 20 MG/ML
1 SOLUTION ORAL; TOPICAL
COMMUNITY
Start: 2022-10-21 | End: 2022-10-25

## 2022-10-25 NOTE — TELEPHONE ENCOUNTER
Hi/patient spouse requesting refills  Previously prescribed while in hospital     [facility]    ferrous sulfate 325 (65 FE) MG Oral Tab EC    mirtazapine 7.5 MG Oral Tab

## 2022-10-25 NOTE — TELEPHONE ENCOUNTER
Ferosul 325 mg  Filled 10-21-22  Qty 30   0 refills  No upcoming appt. LOV 5-12-22    Mirtazapine 7.5 mg  Filled 10-4-22  Qty 30  0 refills  No upcoming appt.   LOV 5-12-22

## 2022-10-26 ENCOUNTER — TELEPHONE (OUTPATIENT)
Dept: INTERNAL MEDICINE CLINIC | Facility: CLINIC | Age: 77
End: 2022-10-26

## 2022-10-27 ENCOUNTER — TELEPHONE (OUTPATIENT)
Dept: INTERNAL MEDICINE CLINIC | Facility: CLINIC | Age: 77
End: 2022-10-27

## 2022-10-28 ENCOUNTER — TELEPHONE (OUTPATIENT)
Dept: INTERNAL MEDICINE CLINIC | Facility: CLINIC | Age: 77
End: 2022-10-28

## 2022-10-28 ENCOUNTER — PATIENT MESSAGE (OUTPATIENT)
Dept: INTERNAL MEDICINE CLINIC | Facility: CLINIC | Age: 77
End: 2022-10-28

## 2022-10-28 NOTE — TELEPHONE ENCOUNTER
[facility] is calling to inform Dr. Brian Crnoin that at today's visit patient's HR was 53 bpm at rest.    She will also be faxing orders for Physical Therapy 1x per week x 5 weeks.

## 2022-10-31 ENCOUNTER — TELEPHONE (OUTPATIENT)
Dept: INTERNAL MEDICINE CLINIC | Facility: CLINIC | Age: 77
End: 2022-10-31

## 2022-10-31 RX ORDER — APIXABAN 5 MG/1
TABLET, FILM COATED ORAL
Qty: 180 TABLET | Refills: 1 | Status: SHIPPED | OUTPATIENT
Start: 2022-10-31

## 2022-11-08 ENCOUNTER — PATIENT MESSAGE (OUTPATIENT)
Dept: INTERNAL MEDICINE CLINIC | Facility: CLINIC | Age: 77
End: 2022-11-08

## 2022-11-09 NOTE — TELEPHONE ENCOUNTER
From: Che Younger  To:  Amilcar Baig MD  Sent: 11/8/2022 2:16 PM CST  Subject: Prescriptions     I would like these refilled through   [facility]     Ferrous Sulfate 325mg,   Omeprazole 2mg  Losartan 25mg  Thank you very much,  Damion Causey

## 2022-11-10 RX ORDER — LOSARTAN POTASSIUM 25 MG/1
25 TABLET ORAL DAILY
Qty: 30 TABLET | Refills: 2 | Status: SHIPPED | OUTPATIENT
Start: 2022-11-10

## 2022-11-10 RX ORDER — LIDOCAINE HYDROCHLORIDE 20 MG/ML
1 SOLUTION ORAL; TOPICAL
Qty: 90 TABLET | Refills: 0 | Status: SHIPPED | OUTPATIENT
Start: 2022-11-10

## 2022-11-10 RX ORDER — OMEPRAZOLE 20 MG/1
20 CAPSULE, DELAYED RELEASE ORAL EVERY MORNING
Qty: 90 CAPSULE | Refills: 0 | Status: SHIPPED | OUTPATIENT
Start: 2022-11-10

## 2022-11-14 ENCOUNTER — TELEPHONE (OUTPATIENT)
Dept: INTERNAL MEDICINE CLINIC | Facility: CLINIC | Age: 77
End: 2022-11-14

## 2022-11-14 NOTE — TELEPHONE ENCOUNTER
Madalyn/nurse [facility] called requesting a VO for a urine sample. She states the caregiver has concerns for a poss uti due to pts urine appearing cloudier and darker than usual. Please advise.

## 2022-11-14 NOTE — TELEPHONE ENCOUNTER
Spoke with HECTOR. Gave VO from TO for urine sample, either clean catch or straight cath is OK, per TO. Madalyn norton/mirta.

## 2022-11-15 ENCOUNTER — LAB REQUISITION (OUTPATIENT)
Dept: LAB | Facility: HOSPITAL | Age: 77
End: 2022-11-15
Payer: MEDICARE

## 2022-11-15 ENCOUNTER — TELEPHONE (OUTPATIENT)
Dept: INTERNAL MEDICINE CLINIC | Facility: CLINIC | Age: 77
End: 2022-11-15

## 2022-11-15 DIAGNOSIS — N39.0 URINARY TRACT INFECTION, SITE NOT SPECIFIED: ICD-10-CM

## 2022-11-15 LAB
BILIRUB UR QL STRIP.AUTO: NEGATIVE
COLOR UR AUTO: YELLOW
GLUCOSE UR STRIP.AUTO-MCNC: NEGATIVE MG/DL
KETONES UR STRIP.AUTO-MCNC: NEGATIVE MG/DL
NITRITE UR QL STRIP.AUTO: NEGATIVE
PH UR STRIP.AUTO: 7 [PH] (ref 5–8)
PROT UR STRIP.AUTO-MCNC: 30 MG/DL
RBC UR QL AUTO: NEGATIVE
SP GR UR STRIP.AUTO: 1.01 (ref 1–1.03)
UROBILINOGEN UR STRIP.AUTO-MCNC: <2 MG/DL

## 2022-11-15 PROCEDURE — 87186 SC STD MICRODIL/AGAR DIL: CPT | Performed by: FAMILY MEDICINE

## 2022-11-15 PROCEDURE — 87077 CULTURE AEROBIC IDENTIFY: CPT | Performed by: FAMILY MEDICINE

## 2022-11-15 PROCEDURE — 87086 URINE CULTURE/COLONY COUNT: CPT | Performed by: FAMILY MEDICINE

## 2022-11-15 PROCEDURE — 81001 URINALYSIS AUTO W/SCOPE: CPT | Performed by: FAMILY MEDICINE

## 2022-11-21 ENCOUNTER — TELEPHONE (OUTPATIENT)
Dept: INTERNAL MEDICINE CLINIC | Facility: CLINIC | Age: 77
End: 2022-11-21

## 2022-11-21 DIAGNOSIS — N39.0 RECURRENT UTI: Primary | ICD-10-CM

## 2022-11-21 RX ORDER — CEPHALEXIN 500 MG/1
500 CAPSULE ORAL 2 TIMES DAILY
Qty: 20 CAPSULE | Refills: 0 | Status: SHIPPED | OUTPATIENT
Start: 2022-11-21 | End: 2022-12-01

## 2022-11-21 NOTE — TELEPHONE ENCOUNTER
Pt notified and documented under result note. Keflex order placed. Sent [facility] to pt about updating her verbal release form.

## 2022-11-21 NOTE — TELEPHONE ENCOUNTER
----- Message from Celestina Bateman MD sent at 11/17/2022  7:50 PM CST -----  +UTI   rec keflex 500mg BID for 10 days

## 2022-12-07 ENCOUNTER — TELEPHONE (OUTPATIENT)
Dept: INTERNAL MEDICINE CLINIC | Facility: CLINIC | Age: 77
End: 2022-12-07

## 2022-12-07 NOTE — TELEPHONE ENCOUNTER
Madalyn/ nurse [facility] called stating they are coming to the end of Medicare episode for [facility] and they do plan on continuing services for wound care. She would like to know if TO is ok for them to continue skilled nursing for wound care.

## 2022-12-11 ENCOUNTER — PATIENT MESSAGE (OUTPATIENT)
Dept: INTERNAL MEDICINE CLINIC | Facility: CLINIC | Age: 77
End: 2022-12-11

## 2022-12-12 ENCOUNTER — TELEPHONE (OUTPATIENT)
Dept: INTERNAL MEDICINE CLINIC | Facility: CLINIC | Age: 77
End: 2022-12-12

## 2022-12-12 NOTE — TELEPHONE ENCOUNTER
From: Joann Barahona  To: Gisselle Horowitz MD  Sent: 12/11/2022 8:43 PM CST  Subject: Flu shot    Dr Ramirez Terrell can you Rx the flu shot for the home health nurse for [facility]?    Thank you    PS: Cheikh 18 to everyone

## 2022-12-13 RX ORDER — LIDOCAINE HYDROCHLORIDE 20 MG/ML
SOLUTION ORAL; TOPICAL
Qty: 30 TABLET | Refills: 0 | Status: SHIPPED | OUTPATIENT
Start: 2022-12-13

## 2022-12-13 RX ORDER — MIRTAZAPINE 7.5 MG/1
TABLET, FILM COATED ORAL
Qty: 30 TABLET | Refills: 0 | Status: SHIPPED | OUTPATIENT
Start: 2022-12-13

## 2022-12-13 RX ORDER — OMEPRAZOLE 20 MG/1
CAPSULE, DELAYED RELEASE ORAL
Qty: 30 CAPSULE | Refills: 0 | Status: SHIPPED | OUTPATIENT
Start: 2022-12-13

## 2022-12-13 NOTE — TELEPHONE ENCOUNTER
TO:  Pended letter to [facility] to provide pt a high dose flu vaccine in the home. Please addend and sign if you approve. Thanks!

## 2022-12-15 ENCOUNTER — TELEPHONE (OUTPATIENT)
Dept: INTERNAL MEDICINE CLINIC | Facility: CLINIC | Age: 77
End: 2022-12-15

## 2022-12-15 NOTE — TELEPHONE ENCOUNTER
[facility] is calling to request an order for Hemoglobin A1C and any labs Dr. Nina Souza needs updated.     Call back # [phone]

## 2022-12-21 ENCOUNTER — LAB REQUISITION (OUTPATIENT)
Dept: LAB | Facility: HOSPITAL | Age: 77
End: 2022-12-21
Payer: MEDICARE

## 2022-12-21 ENCOUNTER — TELEPHONE (OUTPATIENT)
Dept: INTERNAL MEDICINE CLINIC | Facility: CLINIC | Age: 77
End: 2022-12-21

## 2022-12-21 DIAGNOSIS — E11.40 TYPE 2 DIABETES MELLITUS WITH DIABETIC NEUROPATHY, UNSPECIFIED (HCC): ICD-10-CM

## 2022-12-21 DIAGNOSIS — E78.01 FAMILIAL HYPERCHOLESTEROLEMIA: ICD-10-CM

## 2022-12-21 LAB
ALBUMIN SERPL-MCNC: 3 G/DL (ref 3.4–5)
ALBUMIN/GLOB SERPL: 0.9 {RATIO} (ref 1–2)
ALP LIVER SERPL-CCNC: 140 U/L
ALT SERPL-CCNC: 12 U/L
ANION GAP SERPL CALC-SCNC: 6 MMOL/L (ref 0–18)
AST SERPL-CCNC: 9 U/L (ref 15–37)
BILIRUB SERPL-MCNC: 0.3 MG/DL (ref 0.1–2)
BUN BLD-MCNC: 48 MG/DL (ref 7–18)
BUN/CREAT SERPL: 25.5 (ref 10–20)
CALCIUM BLD-MCNC: 9.5 MG/DL (ref 8.5–10.1)
CHLORIDE SERPL-SCNC: 106 MMOL/L (ref 98–112)
CHOLEST SERPL-MCNC: 119 MG/DL (ref ?–200)
CO2 SERPL-SCNC: 27 MMOL/L (ref 21–32)
CREAT BLD-MCNC: 1.88 MG/DL
EST. AVERAGE GLUCOSE BLD GHB EST-MCNC: 154 MG/DL (ref 68–126)
GFR SERPLBLD BASED ON 1.73 SQ M-ARVRAT: 27 ML/MIN/1.73M2 (ref 60–?)
GLOBULIN PLAS-MCNC: 3.3 G/DL (ref 2.8–4.4)
GLUCOSE BLD-MCNC: 95 MG/DL (ref 70–99)
HBA1C MFR BLD: 7 % (ref ?–5.7)
HDLC SERPL-MCNC: 33 MG/DL (ref 40–59)
LDLC SERPL CALC-MCNC: 57 MG/DL (ref ?–100)
NONHDLC SERPL-MCNC: 86 MG/DL (ref ?–130)
OSMOLALITY SERPL CALC.SUM OF ELEC: 300 MOSM/KG (ref 275–295)
POTASSIUM SERPL-SCNC: 4.8 MMOL/L (ref 3.5–5.1)
PROT SERPL-MCNC: 6.3 G/DL (ref 6.4–8.2)
SODIUM SERPL-SCNC: 139 MMOL/L (ref 136–145)
TRIGL SERPL-MCNC: 168 MG/DL (ref 30–149)
VLDLC SERPL CALC-MCNC: 25 MG/DL (ref 0–30)

## 2022-12-21 PROCEDURE — 83036 HEMOGLOBIN GLYCOSYLATED A1C: CPT | Performed by: FAMILY MEDICINE

## 2022-12-21 PROCEDURE — 80061 LIPID PANEL: CPT | Performed by: FAMILY MEDICINE

## 2022-12-21 PROCEDURE — 80053 COMPREHEN METABOLIC PANEL: CPT | Performed by: FAMILY MEDICINE

## 2022-12-22 ENCOUNTER — TELEPHONE (OUTPATIENT)
Dept: INTERNAL MEDICINE CLINIC | Facility: CLINIC | Age: 77
End: 2022-12-22

## 2023-01-02 ENCOUNTER — LAB REQUISITION (OUTPATIENT)
Dept: LAB | Facility: HOSPITAL | Age: 78
End: 2023-01-02
Payer: MEDICARE

## 2023-01-02 DIAGNOSIS — E11.40 TYPE 2 DIABETES MELLITUS WITH DIABETIC NEUROPATHY, UNSPECIFIED (HCC): ICD-10-CM

## 2023-01-02 DIAGNOSIS — E78.00 PURE HYPERCHOLESTEROLEMIA, UNSPECIFIED: ICD-10-CM

## 2023-01-02 LAB
CREAT UR-SCNC: 44.4 MG/DL
MICROALBUMIN UR-MCNC: 16.8 MG/DL
MICROALBUMIN/CREAT 24H UR-RTO: 378.4 UG/MG (ref ?–30)

## 2023-01-02 PROCEDURE — 82043 UR ALBUMIN QUANTITATIVE: CPT | Performed by: FAMILY MEDICINE

## 2023-01-02 PROCEDURE — 82570 ASSAY OF URINE CREATININE: CPT | Performed by: FAMILY MEDICINE

## 2023-01-02 RX ORDER — METOPROLOL SUCCINATE 25 MG/1
25 TABLET, EXTENDED RELEASE ORAL DAILY
Qty: 30 TABLET | Refills: 0 | Status: SHIPPED | OUTPATIENT
Start: 2023-01-02

## 2023-01-09 ENCOUNTER — TELEPHONE (OUTPATIENT)
Dept: INTERNAL MEDICINE CLINIC | Facility: CLINIC | Age: 78
End: 2023-01-09

## 2023-01-09 NOTE — TELEPHONE ENCOUNTER
[address] - RN   - [phone]    VO needed for additional 2 visits per week for next 4 weeks due to wound deterioration

## 2023-02-03 ENCOUNTER — TELEPHONE (OUTPATIENT)
Dept: INTERNAL MEDICINE CLINIC | Facility: CLINIC | Age: 78
End: 2023-02-03

## 2023-02-03 NOTE — TELEPHONE ENCOUNTER
Incoming fax from [address] the following:    --Patient lift sling or seat  -- Patient lift hydraulic    Placed in TO in basket for review

## 2023-02-08 ENCOUNTER — TELEPHONE (OUTPATIENT)
Dept: INTERNAL MEDICINE CLINIC | Facility: CLINIC | Age: 78
End: 2023-02-08

## 2023-02-08 NOTE — TELEPHONE ENCOUNTER
Incoming fax from [address] [facility] # [phone]    Placed in TO in basket for review

## 2023-02-13 RX ORDER — OMEPRAZOLE 20 MG/1
CAPSULE, DELAYED RELEASE ORAL
Qty: 90 CAPSULE | Refills: 0 | Status: SHIPPED | OUTPATIENT
Start: 2023-02-13

## 2023-02-13 RX ORDER — MIRTAZAPINE 7.5 MG/1
TABLET, FILM COATED ORAL
Qty: 90 TABLET | Refills: 0 | Status: SHIPPED | OUTPATIENT
Start: 2023-02-13

## 2023-02-14 ENCOUNTER — TELEPHONE (OUTPATIENT)
Dept: INTERNAL MEDICINE CLINIC | Facility: CLINIC | Age: 78
End: 2023-02-14

## 2023-03-05 ENCOUNTER — APPOINTMENT (OUTPATIENT)
Dept: GENERAL RADIOLOGY | Facility: HOSPITAL | Age: 78
End: 2023-03-05
Attending: EMERGENCY MEDICINE
Payer: MEDICARE

## 2023-03-05 ENCOUNTER — HOSPITAL ENCOUNTER (INPATIENT)
Facility: HOSPITAL | Age: 78
LOS: 14 days | Discharge: NURSING, REHAB OR LONG-TERM CARE | End: 2023-03-19
Attending: EMERGENCY MEDICINE | Admitting: HOSPITALIST
Payer: MEDICARE

## 2023-03-05 DIAGNOSIS — I82.512 CHRONIC DEEP VEIN THROMBOSIS (DVT) OF FEMORAL VEIN OF LEFT LOWER EXTREMITY (HCC): ICD-10-CM

## 2023-03-05 DIAGNOSIS — L97.422: Primary | ICD-10-CM

## 2023-03-05 LAB
ANION GAP SERPL CALC-SCNC: 2 MMOL/L (ref 0–18)
BASOPHILS # BLD AUTO: 0.05 X10(3) UL (ref 0–0.2)
BASOPHILS NFR BLD AUTO: 0.4 %
BUN BLD-MCNC: 41 MG/DL (ref 7–18)
CALCIUM BLD-MCNC: 9.5 MG/DL (ref 8.5–10.1)
CHLORIDE SERPL-SCNC: 107 MMOL/L (ref 98–112)
CO2 SERPL-SCNC: 29 MMOL/L (ref 21–32)
CREAT BLD-MCNC: 1.55 MG/DL
EOSINOPHIL # BLD AUTO: 0.17 X10(3) UL (ref 0–0.7)
EOSINOPHIL NFR BLD AUTO: 1.4 %
ERYTHROCYTE [DISTWIDTH] IN BLOOD BY AUTOMATED COUNT: 15 %
GFR SERPLBLD BASED ON 1.73 SQ M-ARVRAT: 34 ML/MIN/1.73M2 (ref 60–?)
GLUCOSE BLD-MCNC: 138 MG/DL (ref 70–99)
GLUCOSE BLD-MCNC: 163 MG/DL (ref 70–99)
HCT VFR BLD AUTO: 42.4 %
HGB BLD-MCNC: 13.4 G/DL
IMM GRANULOCYTES # BLD AUTO: 0.04 X10(3) UL (ref 0–1)
IMM GRANULOCYTES NFR BLD: 0.3 %
LYMPHOCYTES # BLD AUTO: 2.56 X10(3) UL (ref 1–4)
LYMPHOCYTES NFR BLD AUTO: 20.7 %
MCH RBC QN AUTO: 29.6 PG (ref 26–34)
MCHC RBC AUTO-ENTMCNC: 31.6 G/DL (ref 31–37)
MCV RBC AUTO: 93.6 FL
MONOCYTES # BLD AUTO: 0.82 X10(3) UL (ref 0.1–1)
MONOCYTES NFR BLD AUTO: 6.6 %
NEUTROPHILS # BLD AUTO: 8.73 X10 (3) UL (ref 1.5–7.7)
NEUTROPHILS # BLD AUTO: 8.73 X10(3) UL (ref 1.5–7.7)
NEUTROPHILS NFR BLD AUTO: 70.6 %
OSMOLALITY SERPL CALC.SUM OF ELEC: 298 MOSM/KG (ref 275–295)
PLATELET # BLD AUTO: 335 10(3)UL (ref 150–450)
POTASSIUM SERPL-SCNC: 5 MMOL/L (ref 3.5–5.1)
RBC # BLD AUTO: 4.53 X10(6)UL
SARS-COV-2 RNA RESP QL NAA+PROBE: NOT DETECTED
SODIUM SERPL-SCNC: 138 MMOL/L (ref 136–145)
WBC # BLD AUTO: 12.4 X10(3) UL (ref 4–11)

## 2023-03-05 PROCEDURE — 73610 X-RAY EXAM OF ANKLE: CPT | Performed by: EMERGENCY MEDICINE

## 2023-03-05 PROCEDURE — 73650 X-RAY EXAM OF HEEL: CPT | Performed by: EMERGENCY MEDICINE

## 2023-03-05 PROCEDURE — 99233 SBSQ HOSP IP/OBS HIGH 50: CPT | Performed by: HOSPITALIST

## 2023-03-05 RX ORDER — MIRTAZAPINE 7.5 MG/1
7.5 TABLET, FILM COATED ORAL NIGHTLY
Status: DISCONTINUED | OUTPATIENT
Start: 2023-03-05 | End: 2023-03-19

## 2023-03-05 RX ORDER — SODIUM CHLORIDE 9 MG/ML
INJECTION, SOLUTION INTRAVENOUS CONTINUOUS
Status: DISCONTINUED | OUTPATIENT
Start: 2023-03-05 | End: 2023-03-08

## 2023-03-05 RX ORDER — NICOTINE POLACRILEX 4 MG
30 LOZENGE BUCCAL
Status: DISCONTINUED | OUTPATIENT
Start: 2023-03-05 | End: 2023-03-19

## 2023-03-05 RX ORDER — ONDANSETRON 2 MG/ML
4 INJECTION INTRAMUSCULAR; INTRAVENOUS EVERY 6 HOURS PRN
Status: DISCONTINUED | OUTPATIENT
Start: 2023-03-05 | End: 2023-03-19

## 2023-03-05 RX ORDER — LOSARTAN POTASSIUM 25 MG/1
25 TABLET ORAL DAILY
Status: DISCONTINUED | OUTPATIENT
Start: 2023-03-06 | End: 2023-03-19

## 2023-03-05 RX ORDER — ROSUVASTATIN CALCIUM 5 MG/1
5 TABLET, COATED ORAL EVERY OTHER DAY
COMMUNITY
Start: 2022-11-19 | End: 2023-03-19

## 2023-03-05 RX ORDER — METOPROLOL SUCCINATE 25 MG/1
25 TABLET, EXTENDED RELEASE ORAL DAILY
Status: DISCONTINUED | OUTPATIENT
Start: 2023-03-06 | End: 2023-03-19

## 2023-03-05 RX ORDER — METOCLOPRAMIDE HYDROCHLORIDE 5 MG/ML
5 INJECTION INTRAMUSCULAR; INTRAVENOUS EVERY 8 HOURS PRN
Status: DISCONTINUED | OUTPATIENT
Start: 2023-03-05 | End: 2023-03-19

## 2023-03-05 RX ORDER — MELATONIN
3 NIGHTLY PRN
Status: DISCONTINUED | OUTPATIENT
Start: 2023-03-05 | End: 2023-03-19

## 2023-03-05 RX ORDER — MELATONIN
325
Status: DISCONTINUED | OUTPATIENT
Start: 2023-03-06 | End: 2023-03-19

## 2023-03-05 RX ORDER — OXYBUTYNIN CHLORIDE 5 MG/1
5 TABLET ORAL 2 TIMES DAILY
Status: DISCONTINUED | OUTPATIENT
Start: 2023-03-05 | End: 2023-03-17

## 2023-03-05 RX ORDER — PANTOPRAZOLE SODIUM 20 MG/1
20 TABLET, DELAYED RELEASE ORAL
Status: DISCONTINUED | OUTPATIENT
Start: 2023-03-06 | End: 2023-03-19

## 2023-03-05 RX ORDER — ACETAMINOPHEN 500 MG
500 TABLET ORAL EVERY 4 HOURS PRN
Status: DISCONTINUED | OUTPATIENT
Start: 2023-03-05 | End: 2023-03-19

## 2023-03-05 RX ORDER — GABAPENTIN 300 MG/1
300 CAPSULE ORAL 2 TIMES DAILY
Status: DISCONTINUED | OUTPATIENT
Start: 2023-03-05 | End: 2023-03-19

## 2023-03-05 RX ORDER — DEXTROSE MONOHYDRATE 25 G/50ML
50 INJECTION, SOLUTION INTRAVENOUS
Status: DISCONTINUED | OUTPATIENT
Start: 2023-03-05 | End: 2023-03-19

## 2023-03-05 RX ORDER — NICOTINE POLACRILEX 4 MG
15 LOZENGE BUCCAL
Status: DISCONTINUED | OUTPATIENT
Start: 2023-03-05 | End: 2023-03-19

## 2023-03-05 NOTE — ED QUICK NOTES
RN and MD at bedside. Plan of care discussed with patient, all questions answered at this time. Xray to bedside. Warm blanket provided, call light within reach.

## 2023-03-05 NOTE — ED QUICK NOTES
Orders for admission, patient is aware of plan and ready to go upstairs. Any questions, please call ED RN Anthony Azar. at extension [phone].      Patient Covid vaccination status: Fully vaccinated     COVID Test Ordered in ED: Rapid SARS-CoV-2 by PCR    COVID Suspicion at Admission: Low clinical suspicion for COVID    Running Infusions:  None    Mental Status/LOC at time of transport: AOx4    Other pertinent information: L heal ucler; asepsis done, bandages changed; large hunk of dead tissue take off by ER MD on admission; Zuleyka Jerome give in ER; tough stick, one IV in R wrist; unable to draw blood initially however, it flushes and infuses w/no issues; pt on perwick    CIWA score: N/A   NIH score:  N/A

## 2023-03-05 NOTE — ED QUICK NOTES
Pts  Lucia Singh) in [phone]; pt requesting to see him when arriving on floor or after transfer

## 2023-03-06 ENCOUNTER — TELEPHONE (OUTPATIENT)
Dept: INTERNAL MEDICINE CLINIC | Facility: CLINIC | Age: 78
End: 2023-03-06

## 2023-03-06 ENCOUNTER — APPOINTMENT (OUTPATIENT)
Dept: MRI IMAGING | Facility: HOSPITAL | Age: 78
End: 2023-03-06
Attending: HOSPITALIST
Payer: MEDICARE

## 2023-03-06 LAB
ANION GAP SERPL CALC-SCNC: 5 MMOL/L (ref 0–18)
BASOPHILS # BLD AUTO: 0.05 X10(3) UL (ref 0–0.2)
BASOPHILS NFR BLD AUTO: 0.5 %
BUN BLD-MCNC: 41 MG/DL (ref 7–18)
CALCIUM BLD-MCNC: 9.2 MG/DL (ref 8.5–10.1)
CHLORIDE SERPL-SCNC: 109 MMOL/L (ref 98–112)
CO2 SERPL-SCNC: 26 MMOL/L (ref 21–32)
CREAT BLD-MCNC: 1.45 MG/DL
EOSINOPHIL # BLD AUTO: 0.19 X10(3) UL (ref 0–0.7)
EOSINOPHIL NFR BLD AUTO: 1.8 %
ERYTHROCYTE [DISTWIDTH] IN BLOOD BY AUTOMATED COUNT: 15.1 %
GFR SERPLBLD BASED ON 1.73 SQ M-ARVRAT: 37 ML/MIN/1.73M2 (ref 60–?)
GLUCOSE BLD-MCNC: 111 MG/DL (ref 70–99)
GLUCOSE BLD-MCNC: 120 MG/DL (ref 70–99)
GLUCOSE BLD-MCNC: 123 MG/DL (ref 70–99)
GLUCOSE BLD-MCNC: 148 MG/DL (ref 70–99)
GLUCOSE BLD-MCNC: 153 MG/DL (ref 70–99)
HCT VFR BLD AUTO: 38 %
HGB BLD-MCNC: 12 G/DL
IMM GRANULOCYTES # BLD AUTO: 0.05 X10(3) UL (ref 0–1)
IMM GRANULOCYTES NFR BLD: 0.5 %
LYMPHOCYTES # BLD AUTO: 2.56 X10(3) UL (ref 1–4)
LYMPHOCYTES NFR BLD AUTO: 24.9 %
MCH RBC QN AUTO: 29.7 PG (ref 26–34)
MCHC RBC AUTO-ENTMCNC: 31.6 G/DL (ref 31–37)
MCV RBC AUTO: 94.1 FL
MONOCYTES # BLD AUTO: 0.75 X10(3) UL (ref 0.1–1)
MONOCYTES NFR BLD AUTO: 7.3 %
NEUTROPHILS # BLD AUTO: 6.7 X10 (3) UL (ref 1.5–7.7)
NEUTROPHILS # BLD AUTO: 6.7 X10(3) UL (ref 1.5–7.7)
NEUTROPHILS NFR BLD AUTO: 65 %
OSMOLALITY SERPL CALC.SUM OF ELEC: 301 MOSM/KG (ref 275–295)
PLATELET # BLD AUTO: 273 10(3)UL (ref 150–450)
POTASSIUM SERPL-SCNC: 4 MMOL/L (ref 3.5–5.1)
RBC # BLD AUTO: 4.04 X10(6)UL
SODIUM SERPL-SCNC: 140 MMOL/L (ref 136–145)
WBC # BLD AUTO: 10.3 X10(3) UL (ref 4–11)

## 2023-03-06 PROCEDURE — 73721 MRI JNT OF LWR EXTRE W/O DYE: CPT | Performed by: HOSPITALIST

## 2023-03-06 PROCEDURE — 99222 1ST HOSP IP/OBS MODERATE 55: CPT | Performed by: PODIATRIST

## 2023-03-06 PROCEDURE — 99232 SBSQ HOSP IP/OBS MODERATE 35: CPT | Performed by: HOSPITALIST

## 2023-03-06 NOTE — CM/SW NOTE
03/06/23 1700   CM/SW Referral Data   Referral Source Social Work (self-referral)   Reason for Referral Discharge planning   Informant Son;EMR;Clinical Staff Member   Patient Info   Patient's Current Mental Status at Time of Assessment Alert;Oriented   Patient's [address]   Patient lives with Spouse/Significant other   Patient Status Prior to Admission   Services in place prior to admission [address] Care;DME/Supplies at home;senior care [address] [employer] Provider Info [facility] [place] [address]   Type of DME/Supplies [address] [facility] [place] Provider Private Duty   senior care Care hours per day MWF 9-4, limited daily CG hours   Discharge Needs   Anticipated D/C needs Home health care;Caregiver services;Subacute rehab;Transportation services         Patient is a 77 y/o woman admitted with left heel ulcer. Pt is chronically bedbound. She lives with her , however he has been hospitalized for the past week. Pt's spouse normally care for her at home. She has a caregiver with varying hours, daily. Pt current with [facility] [place] [address] RN/PT and has history of [facility]. Spoke with pt's Jazmine Alvarez who stated that family/friends have been attempting to check in on patient more regularly since pt's  has been in the hospital.  Discussed that pt's wounds (heel, sacral) have worsened and that pt will need 24 hour care while her  is in the hospital or rehab. Pt may benefit from [facility] stay for wound care pending MD recommendations. Pt's kaushik stated that pt's niece, Alessandra Wilde is pt's [de-identified]. He will update her regarding our conversation today. If pt needs CAR[facility], unclear if pt/family would prefer pt and her  to be at the same facility. MELECIO order for [facility] entered. Await MD recommendations for further DC planning. / to remain available for support and/or discharge planning.      Carine Tran, TANYA  Discharge Planner  [phone]

## 2023-03-06 NOTE — PLAN OF CARE
Pt is Aox4, denies pain at this time, dressing to LLE, VSS on RA, denies numbness/tingling, hx of neuropathy, +1 pulses bilaterally, bed bound at baseline, Podiatry and ID to see, call light and belongings within reach.

## 2023-03-06 NOTE — PROGRESS NOTES
NURSING ADMISSION NOTE      Patient admitted via Cart from ER with multiple pressure sores. Oriented to room. Alert and oriented x 4. Safety precautions initiated. Bed in low position. Call light in reach.

## 2023-03-06 NOTE — PHYSICAL THERAPY NOTE
PT order received, chart reviewed. Writer met with pt to discuss prior level of function. Pt has been bedbound since ~Sept.  Pt returned home from [place] [address] in Dec and has not left her bedroom. Pt reports they had traded their werner lift for a \"caceres chair\" and therefore was not able to get OOB since. Pt has appointment set up to get electric chair and replacement werner, however with spouse in hospital has been unable to go. Pt reports ongoing caregivers MWF while spouse at dialysis and daily at night to assist with prepping for bed. Will dc from acute PT at this time as pt presents at baseline level of dependent without improvement during last CAROL stay.

## 2023-03-06 NOTE — DISCHARGE INSTRUCTIONS
Resume services with [facility]   [phone]    1. Make sure the left foot remains elevated somewhat above hip level at all times. 2.  She must always have the bunny boots on  3.   Do not remove the sutures the dressing may be changed twice weekly by applying a nonadherent dressing such as Adaptic or Xeroform covered with gauze sponges ABD pad over the front of the ankle and the back of the heel with Kerlix roll and an Ace wrap to keep everything in place but the Ace wrap should not be on too tightly    Urology  Continue reilly for 3-5 days then plan outpatient voiding trial  VT could be completed at rehab if able, otherwise can f/u in office as well  Do not resume Oxybutynin at d/c

## 2023-03-06 NOTE — PLAN OF CARE
Patient A & O x4. VSS, on RA. Denies any pain at this time. Dressing to left heel is clean, dry and intact. Ulcer to right ankle is open to air and clean, dry and intact. Purewick in place. IVF infusing per order. Safety measures in place. Instructed to use call light.

## 2023-03-06 NOTE — OCCUPATIONAL THERAPY NOTE
OT orders to eval and treat received via functional mobility screening protocol. Chart reviewed and case discussed with PT and nsg team.  PT spoke to pt and determined pt has been bed bound and requiring max to total assist for all self-care since Sept/2022. Patient has a Corean Rain transfer chair at home. Pt reports ongoing caregivers MWF while spouse at dialysis and daily at night to assist with prepping for bed. Pt presents at functional baseline at this time and no skilled OT warranted. Will sign off.

## 2023-03-07 PROBLEM — N18.30 STAGE 3 CHRONIC KIDNEY DISEASE (HCC): Status: ACTIVE | Noted: 2020-11-18

## 2023-03-07 LAB
GLUCOSE BLD-MCNC: 116 MG/DL (ref 70–99)
GLUCOSE BLD-MCNC: 139 MG/DL (ref 70–99)
GLUCOSE BLD-MCNC: 150 MG/DL (ref 70–99)
GLUCOSE BLD-MCNC: 184 MG/DL (ref 70–99)

## 2023-03-07 PROCEDURE — 99223 1ST HOSP IP/OBS HIGH 75: CPT | Performed by: INTERNAL MEDICINE

## 2023-03-07 PROCEDURE — 99232 SBSQ HOSP IP/OBS MODERATE 35: CPT | Performed by: HOSPITALIST

## 2023-03-07 NOTE — CONSULTS
[address]    PATIENT'S NAME: Margaret Sotelo   ATTENDING PHYSICIAN: Franci Dueñas D.O.   CONSULTING PHYSICIAN: Chriss Angulo M.D. PATIENT ACCOUNT#:   [de-identified]    LOCATION:  [facility]  MEDICAL RECORD #:   [id number]       YOB: 1945  ADMISSION DATE:       03/05/2023      CONSULT DATE:  03/07/2023    REPORT OF CONSULTATION    HISTORY OF PRESENT ILLNESS:  This is a 64-year-old white female consulted by Dr. Delroy Heaton for left heel ulcer. The patient has a history of falling in the bathtub at home, sustained a fracture of the left ankle. She had 2 small sores which eventually healed. She had an open reduction, internal fixation by Dr. Eva Arevalo at [address] [place] [address],[address]. Unfortunately, all the hardware got infected and had to be removed. While she is on the medical bus taking her in the hospital, she slipped on a bus and broke a femur on the right leg that also had to be fixed. The patient basically has been bedbound for 6 to 12 months. She could move her leg, but there is no weightbearing at this time. She has had 2 ulcerations occurred right near the time where she had initial surgery healed, but she developed these new sores being treated by her home nurse without any medical doctor intervention with Betadine. It finally sloughed off and she has pus came out and infection possibly going down to the bone. The patient denies chest pain, shortness of breath, or previous MI. She has no CVA, TIA, visual field defect, or aphasia. She has been a noninsulin-dependent diabetic since 1999. PAST MEDICAL HISTORY:  The patient has a history of VRE. The chart notes she has had a history of deep vein thrombosis, history of arrhythmias, dyslipidemia, hypertension, macular degeneration of the eyes. PAST SURGICAL HISTORY:  Cholecystectomy, total abdominal hysterectomy, and the right femur fracture and a left ankle fracture. ALLERGIES:  Reportedly she has no allergies.      SOCIAL HISTORY:  She states she has no ETOH abuse, drug abuse, tobacco abuse. She is retired. She is .  actually unfortunately is in the hospital with her too. She had 2 kids that are grown adult. She used to work as an . FAMILY HISTORY:  Coronary disease. REVIEW OF SYSTEMS:  She currently has no hematuria, dysuria, hemoptysis, cough, sputum production. No weight loss. She did have fever, chills at home. PHYSICAL EXAMINATION:    GENERAL:  She is awake, alert. She is appropriate. She is oriented x3, gives appropriate questions and answers, very logical in her mind. VITAL SIGNS:  Her blood pressure was 111/64. She is afebrile. Pulse about 70. HEENT:  Pupils equal, round. Sclerae clear. Mouth without lesions. NECK:  No cervical bruit. No JVD. LUNGS:  Breath sounds bilaterally. HEART:  Normal.  No murmur. ABDOMEN:  Soft, obese. No tenderness or masses. EXTREMITIES:  Femoral and popliteal pulses are palpable. Pedal pulses diminished bilaterally. She has the ulceration of the left foot, but unfortunately did not take the dressing off. She does move all 4 extremities. She does not have a compartment syndrome. LABORATORY DATA:  She does have some renal insufficiency with a creatinine of 1.55 and a BUN of 41. Blood flow studies ordered but are still pending. 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 The patient's chart was reviewed. She is currently on Unasyn for antibiotics, being followed by Infectious Disease. She is on losartan 25 mg a day, metoprolol succinate 25 mg a day, Protonix, Eliquis 5 mg b.i.d., insulin.   Will get at least a duplex ultrasound lower legs arterial and a CT angiogram.  She may need to have an angiogram and discussed about the possibility for angioplasty, possible stent versus bypass surgery. She is aware that the amount of blood flow down may be inadequate for healing and she can lose her leg. She has agreed for some type of intervention if it is necessary. We will consult also Interventional Cardiology for their evaluation and also for cardiac evaluation. Will repeat duplex ultrasound of the lower extremity venous circulation for assessment of DVT since she has been on Eliquis. The last time she had ultrasound was back in September 2022, with his thrombus in the left deep superficial femoral vein. All questions and options of not doing it was also explained. All questions were answered for the patient. We will await the results. Gave her my card and told the family members to consult me if they wish. Patient currently is staying at [facility].     Dictated By Isiah Patel M.D.  d: 03/07/2023 16:26:35  t: 03/07/2023 17:09:05  Job [id number]  JJW/    cc: Isiah Patel M.D.

## 2023-03-07 NOTE — PLAN OF CARE
Pt Aox4. RA . SCD off. Eliquis. Last BM 3/4. Purewick in place, voiding. Denies pain. Bed bound at baseline, pt requires lots of encouraging to move in bed. Bunny boots on. Wound to R ankle open to air, scabbed no drainage. Wound to left heel, dressing CDI. IV Unasyn ordered. ID, Vascular and wound care to see today. BLE dopplers ordered/pending.

## 2023-03-07 NOTE — PLAN OF CARE
Patient A/O x4, VSS on RA, denies pain. , bunny boots. Voiding freely via purewick, last BM 3/4. MRI Lt foot and BL US dopp ordered. Contact iso continued. IV abx continued. Plan for wound care to see and poss vascular consult. Safety measures in place.

## 2023-03-08 ENCOUNTER — APPOINTMENT (OUTPATIENT)
Dept: ULTRASOUND IMAGING | Facility: HOSPITAL | Age: 78
End: 2023-03-08
Attending: SURGERY
Payer: MEDICARE

## 2023-03-08 ENCOUNTER — APPOINTMENT (OUTPATIENT)
Dept: ULTRASOUND IMAGING | Facility: HOSPITAL | Age: 78
End: 2023-03-08
Attending: PODIATRIST
Payer: MEDICARE

## 2023-03-08 ENCOUNTER — APPOINTMENT (OUTPATIENT)
Dept: CT IMAGING | Facility: HOSPITAL | Age: 78
End: 2023-03-08
Attending: SURGERY
Payer: MEDICARE

## 2023-03-08 PROBLEM — I73.9 PAD (PERIPHERAL ARTERY DISEASE) (HCC): Status: ACTIVE | Noted: 2020-11-18

## 2023-03-08 PROBLEM — I73.9 PAD (PERIPHERAL ARTERY DISEASE): Status: ACTIVE | Noted: 2020-11-18

## 2023-03-08 LAB
CREAT BLD-MCNC: 1.37 MG/DL
GFR SERPLBLD BASED ON 1.73 SQ M-ARVRAT: 39 ML/MIN/1.73M2 (ref 60–?)
GLUCOSE BLD-MCNC: 108 MG/DL (ref 70–99)
GLUCOSE BLD-MCNC: 125 MG/DL (ref 70–99)
GLUCOSE BLD-MCNC: 136 MG/DL (ref 70–99)
GLUCOSE BLD-MCNC: 142 MG/DL (ref 70–99)

## 2023-03-08 PROCEDURE — 93925 LOWER EXTREMITY STUDY: CPT | Performed by: PODIATRIST

## 2023-03-08 PROCEDURE — 99233 SBSQ HOSP IP/OBS HIGH 50: CPT | Performed by: HOSPITALIST

## 2023-03-08 PROCEDURE — 93970 EXTREMITY STUDY: CPT | Performed by: SURGERY

## 2023-03-08 PROCEDURE — 99233 SBSQ HOSP IP/OBS HIGH 50: CPT | Performed by: INTERNAL MEDICINE

## 2023-03-08 PROCEDURE — 75635 CT ANGIO ABDOMINAL ARTERIES: CPT | Performed by: SURGERY

## 2023-03-08 RX ORDER — SODIUM CHLORIDE 9 MG/ML
INJECTION, SOLUTION INTRAVENOUS CONTINUOUS
Status: DISCONTINUED | OUTPATIENT
Start: 2023-03-08 | End: 2023-03-09

## 2023-03-08 RX ORDER — IOHEXOL 350 MG/ML
100 INJECTION, SOLUTION INTRAVENOUS
Status: COMPLETED | OUTPATIENT
Start: 2023-03-08 | End: 2023-03-08

## 2023-03-08 NOTE — PLAN OF CARE
Assumed care of pt at [address]. Pt AOx4, saturating well on RA, VSS. No complaints of pain. Medications given as ordered. BLE doppler study still to be done. Accuchecks. Call light within reach, bed alarm on. Questions answered and needs met.

## 2023-03-08 NOTE — PLAN OF CARE
Pt Aox4,  RA. IVF infusing. SCD off. Bunny boots on. Dressings CDI to Left heel. Bed bound at baseline. Voiding per tiffanie. Plan for BLE dopplers and CT Angio. 1855: Dr Rayna Ontiveros notified of US results + DVT, On anticoagulation, no new orders at this time.

## 2023-03-09 LAB
ANION GAP SERPL CALC-SCNC: 2 MMOL/L (ref 0–18)
BUN BLD-MCNC: 29 MG/DL (ref 7–18)
CALCIUM BLD-MCNC: 8.9 MG/DL (ref 8.5–10.1)
CHLORIDE SERPL-SCNC: 112 MMOL/L (ref 98–112)
CO2 SERPL-SCNC: 26 MMOL/L (ref 21–32)
CREAT BLD-MCNC: 1.35 MG/DL
GFR SERPLBLD BASED ON 1.73 SQ M-ARVRAT: 40 ML/MIN/1.73M2 (ref 60–?)
GLUCOSE BLD-MCNC: 111 MG/DL (ref 70–99)
GLUCOSE BLD-MCNC: 117 MG/DL (ref 70–99)
GLUCOSE BLD-MCNC: 125 MG/DL (ref 70–99)
GLUCOSE BLD-MCNC: 167 MG/DL (ref 70–99)
GLUCOSE BLD-MCNC: 273 MG/DL (ref 70–99)
OSMOLALITY SERPL CALC.SUM OF ELEC: 297 MOSM/KG (ref 275–295)
POTASSIUM SERPL-SCNC: 4.3 MMOL/L (ref 3.5–5.1)
SODIUM SERPL-SCNC: 140 MMOL/L (ref 136–145)

## 2023-03-09 PROCEDURE — 99223 1ST HOSP IP/OBS HIGH 75: CPT | Performed by: INTERNAL MEDICINE

## 2023-03-09 PROCEDURE — 99233 SBSQ HOSP IP/OBS HIGH 50: CPT | Performed by: HOSPITALIST

## 2023-03-09 PROCEDURE — 99233 SBSQ HOSP IP/OBS HIGH 50: CPT | Performed by: INTERNAL MEDICINE

## 2023-03-09 NOTE — PLAN OF CARE
A&Ox4. VSS. On room air. . IS encouraged. Ankle pumps encouraged. Tolerating diabetic diet. Last BM 3/8. Voiding freely. Pain controlled. Dressing to left heel, C/D/I. Bedrest. Plan is TBD. Patient updated on plan of care. Safety precautions in place. Call light within reach.

## 2023-03-09 NOTE — PROGRESS NOTES
Imaging reports read- however, [employer] will not allow image viewing on my computer- will check later- Films reviewed- normal triphasic flow to feet bilaterally. No pathology seen on CTA.  Will sign off- discussed with patioent

## 2023-03-09 NOTE — PLAN OF CARE
Patient A & O x4. VSS, on RA. Denies any pain at this time. Dressing to left heel is clean, dry and intact. BLE bunny boots. Voiding via purewick. Safety measures in place. Instructed to use call light.

## 2023-03-09 NOTE — CM/SW NOTE
Met with pt to discuss DC planning. Pt indicating preference to return to home at DC. Discussed that pt's  is currently hospitalized and anticipated to need to go to [facility] facility. Pt is bedconfined and would not have 24 hour caregiver available at [place]. Anticipate pt may also benefit from [facility] placement for wound care and potentially IV abx pending MD recommendations. Pt would consider [facility] if needed and stated she would likely want to be at the same facility as her . Attempted to reach pt's niece/POA Clevinnie Ruano to discuss DC planning - message left. / to remain available for support and/or discharge planning.      Mary Acevedo, [facility]  Discharge Planner  [phone]

## 2023-03-10 LAB
ANION GAP SERPL CALC-SCNC: 9 MMOL/L (ref 0–18)
APTT PPP: 35 SECONDS (ref 23.3–35.6)
APTT PPP: 85 SECONDS (ref 23.3–35.6)
BUN BLD-MCNC: 29 MG/DL (ref 7–18)
CALCIUM BLD-MCNC: 9 MG/DL (ref 8.5–10.1)
CHLORIDE SERPL-SCNC: 109 MMOL/L (ref 98–112)
CO2 SERPL-SCNC: 22 MMOL/L (ref 21–32)
CREAT BLD-MCNC: 1.47 MG/DL
ERYTHROCYTE [DISTWIDTH] IN BLOOD BY AUTOMATED COUNT: 15 %
GFR SERPLBLD BASED ON 1.73 SQ M-ARVRAT: 36 ML/MIN/1.73M2 (ref 60–?)
GLUCOSE BLD-MCNC: 102 MG/DL (ref 70–99)
GLUCOSE BLD-MCNC: 135 MG/DL (ref 70–99)
GLUCOSE BLD-MCNC: 163 MG/DL (ref 70–99)
GLUCOSE BLD-MCNC: 169 MG/DL (ref 70–99)
GLUCOSE BLD-MCNC: 97 MG/DL (ref 70–99)
HCT VFR BLD AUTO: 34.9 %
HGB BLD-MCNC: 10.9 G/DL
MCH RBC QN AUTO: 29.6 PG (ref 26–34)
MCHC RBC AUTO-ENTMCNC: 31.2 G/DL (ref 31–37)
MCV RBC AUTO: 94.8 FL
OSMOLALITY SERPL CALC.SUM OF ELEC: 300 MOSM/KG (ref 275–295)
PLATELET # BLD AUTO: 245 10(3)UL (ref 150–450)
POTASSIUM SERPL-SCNC: 4.2 MMOL/L (ref 3.5–5.1)
RBC # BLD AUTO: 3.68 X10(6)UL
SODIUM SERPL-SCNC: 140 MMOL/L (ref 136–145)
WBC # BLD AUTO: 7.8 X10(3) UL (ref 4–11)

## 2023-03-10 PROCEDURE — 02HV33Z INSERTION OF INFUSION DEVICE INTO SUPERIOR VENA CAVA, PERCUTANEOUS APPROACH: ICD-10-PCS | Performed by: HOSPITALIST

## 2023-03-10 PROCEDURE — 99233 SBSQ HOSP IP/OBS HIGH 50: CPT | Performed by: HOSPITALIST

## 2023-03-10 PROCEDURE — 99233 SBSQ HOSP IP/OBS HIGH 50: CPT | Performed by: NURSE PRACTITIONER

## 2023-03-10 PROCEDURE — B548ZZA ULTRASONOGRAPHY OF SUPERIOR VENA CAVA, GUIDANCE: ICD-10-PCS | Performed by: HOSPITALIST

## 2023-03-10 RX ORDER — HEPARIN SODIUM AND DEXTROSE 10000; 5 [USP'U]/100ML; G/100ML
INJECTION INTRAVENOUS CONTINUOUS
Status: DISCONTINUED | OUTPATIENT
Start: 2023-03-10 | End: 2023-03-13

## 2023-03-10 RX ORDER — LIDOCAINE HYDROCHLORIDE 10 MG/ML
5 INJECTION, SOLUTION EPIDURAL; INFILTRATION; INTRACAUDAL; PERINEURAL
Status: COMPLETED | OUTPATIENT
Start: 2023-03-10 | End: 2023-03-10

## 2023-03-10 RX ORDER — HEPARIN SODIUM AND DEXTROSE 10000; 5 [USP'U]/100ML; G/100ML
18 INJECTION INTRAVENOUS ONCE
Status: COMPLETED | OUTPATIENT
Start: 2023-03-10 | End: 2023-03-10

## 2023-03-10 NOTE — PLAN OF CARE
A&Ox4. VSS. On room air. . IS encouraged. Tolerating diabetic diet. Last BM 3/8. Voiding freely. Pain with movement. Right ankle wound open to air, no discharge. Dressing to left heel clean and dry. Bedrest. Safety precautions in place. Call light within reach. Patient  plan of care TBD .

## 2023-03-10 NOTE — VASCULAR ACCESS
Consulted to place a difficult IV. BUE evaluated. Cephalic vessels noted both side at 2cm depth. Dr Anjelica Landon to see if he will approve a midline IV. Discussed with primary RN.

## 2023-03-10 NOTE — PLAN OF CARE
Pt A/OX4. VSS. RA . Denies any pain. IV abx. L heel drsg C/D/I; dressing being changed per orders. B/L heel protectors in place. Contact isolation and safety precautions maintained.

## 2023-03-10 NOTE — PROGRESS NOTES
Received call from Dr Luis Dawkins. Needs eliquis held for at least 4 days prior to surgical procedure. Discussed with hematology APN, will bridge with heparin and stop eliquis today.

## 2023-03-10 NOTE — PROGRESS NOTES
PICC line placed to right arm by vascular access team. Started heparin gtt at 16 ml/hr per orders. PTT due at 1800.

## 2023-03-11 LAB
APTT PPP: 106.3 SECONDS (ref 23.3–35.6)
APTT PPP: 178.7 SECONDS (ref 23.3–35.6)
APTT PPP: >240 SECONDS (ref 23.3–35.6)
GLUCOSE BLD-MCNC: 119 MG/DL (ref 70–99)
GLUCOSE BLD-MCNC: 170 MG/DL (ref 70–99)
GLUCOSE BLD-MCNC: 248 MG/DL (ref 70–99)
GLUCOSE BLD-MCNC: 99 MG/DL (ref 70–99)
PLATELET # BLD AUTO: 232 10(3)UL (ref 150–450)
SARS-COV-2 RNA RESP QL NAA+PROBE: NOT DETECTED

## 2023-03-11 PROCEDURE — 99232 SBSQ HOSP IP/OBS MODERATE 35: CPT | Performed by: HOSPITALIST

## 2023-03-11 NOTE — PROGRESS NOTES
. Heparin drip stopped per protocol. Holding drip for 60 minutes, will reduce rate by 25 units/hr to 1350 units/hr when drip restarted.

## 2023-03-11 NOTE — PLAN OF CARE
Patient AOX4, denies pain. Left heel dressing clean, dry, intact. Bilateral heels protectors applied. Refusing SCDs. PICC line dressing clean,dry, intact. Heparin [email or web address] [email or web address] mL/h. Safety measures in place.

## 2023-03-11 NOTE — PROGRESS NOTES
PTT 85. Per protocol no rate change needed. Currently running at 16ml/hr (1600 units/hr).  Next PTT in am 3/11/23

## 2023-03-11 NOTE — PLAN OF CARE
Pt A/OX4. VSS. Denies any c/o pain. L heel dressing c/d/I. B/L heel protectors place. DALJIT PICC line site clean dry and intact. IV abx per orders. Heparin gtt infusing at 16ml/hr, daily PTT. Safety precautions maintained.

## 2023-03-11 NOTE — PROGRESS NOTES
. Per protocol heparin drip stopped for 1 hour. Decrease rate by 250 units/hr. New rate will be 1100 units/hr, to be restarted at 1500.

## 2023-03-12 ENCOUNTER — ANESTHESIA EVENT (OUTPATIENT)
Dept: SURGERY | Facility: HOSPITAL | Age: 78
End: 2023-03-12
Payer: MEDICARE

## 2023-03-12 LAB
APTT PPP: 83.1 SECONDS (ref 23.3–35.6)
GLUCOSE BLD-MCNC: 106 MG/DL (ref 70–99)
GLUCOSE BLD-MCNC: 125 MG/DL (ref 70–99)
GLUCOSE BLD-MCNC: 129 MG/DL (ref 70–99)
GLUCOSE BLD-MCNC: 159 MG/DL (ref 70–99)
PLATELET # BLD AUTO: 242 10(3)UL (ref 150–450)

## 2023-03-12 PROCEDURE — 99233 SBSQ HOSP IP/OBS HIGH 50: CPT | Performed by: HOSPITALIST

## 2023-03-12 NOTE — PROGRESS NOTES
Suggested to patient to be turned q 2 h to relieve skin pressure. Patient declined. Offer patient to get up to chair for breakfast, she refused. The patient considered getting out of the bed later today.

## 2023-03-12 NOTE — PROGRESS NOTES
Offered to get patient out of bed this afternoon, she declined. Waffle cushion applied under her sacral area for pressure relief, changed the Mepilex.  Wound care consult to evaluate

## 2023-03-12 NOTE — PLAN OF CARE
Alert and oriented x4. VSS. On RA. . IS encouraged. Tolerating diet. Denies pain at this time. Voiding via purewick. IV abx as ordered. Kerlix dressing to left heel clean, dry, intact. Bilateral bunny boots applied. Heparin drip infusing @9.5 ml/h. Plan is OR on 3/13 with Dr Sukhdeep Montenegro. Call light within reach.

## 2023-03-12 NOTE — PROGRESS NOTES
Patient scheduled for surgery with dr. Best gonzalez 03/13 @0700. Per hematology Dr. Jamari Jean hold the heparin drip 6 hours prior surgery.

## 2023-03-12 NOTE — PLAN OF CARE
Alert and oriented x4. VSS. On RA. . IS encouraged. Tolerating diet. Denies pain at this time. Voiding via purewick. IV abx as ordered. Kerlix dressing to left heel C/D/I. Bilateral bunny boots applied. 2000 . 3. Per protocol drip decreased by 150 units/hr. New rate is 950 units/hr. Plan is OR on 3/13 with Dr Bert Mcmahan. Call light within reach. 0340: PTT 83.1.  Per protocol rate still at 950 units/hr and PTT redraw tomorrow AM.

## 2023-03-13 ENCOUNTER — APPOINTMENT (OUTPATIENT)
Dept: GENERAL RADIOLOGY | Facility: HOSPITAL | Age: 78
End: 2023-03-13
Attending: PODIATRIST
Payer: MEDICARE

## 2023-03-13 ENCOUNTER — ANESTHESIA (OUTPATIENT)
Dept: SURGERY | Facility: HOSPITAL | Age: 78
End: 2023-03-13
Payer: MEDICARE

## 2023-03-13 LAB
ANION GAP SERPL CALC-SCNC: 7 MMOL/L (ref 0–18)
APTT PPP: 54.6 SECONDS (ref 23.3–35.6)
BUN BLD-MCNC: 28 MG/DL (ref 7–18)
CALCIUM BLD-MCNC: 8.8 MG/DL (ref 8.5–10.1)
CHLORIDE SERPL-SCNC: 111 MMOL/L (ref 98–112)
CO2 SERPL-SCNC: 24 MMOL/L (ref 21–32)
CREAT BLD-MCNC: 1.6 MG/DL
ERYTHROCYTE [DISTWIDTH] IN BLOOD BY AUTOMATED COUNT: 15.2 %
GFR SERPLBLD BASED ON 1.73 SQ M-ARVRAT: 33 ML/MIN/1.73M2 (ref 60–?)
GLUCOSE BLD-MCNC: 104 MG/DL (ref 70–99)
GLUCOSE BLD-MCNC: 107 MG/DL (ref 70–99)
GLUCOSE BLD-MCNC: 124 MG/DL (ref 70–99)
GLUCOSE BLD-MCNC: 126 MG/DL (ref 70–99)
GLUCOSE BLD-MCNC: 132 MG/DL (ref 70–99)
GLUCOSE BLD-MCNC: 162 MG/DL (ref 70–99)
HCT VFR BLD AUTO: 33.6 %
HGB BLD-MCNC: 10.5 G/DL
MCH RBC QN AUTO: 29.8 PG (ref 26–34)
MCHC RBC AUTO-ENTMCNC: 31.3 G/DL (ref 31–37)
MCV RBC AUTO: 95.5 FL
OSMOLALITY SERPL CALC.SUM OF ELEC: 301 MOSM/KG (ref 275–295)
PLATELET # BLD AUTO: 242 10(3)UL (ref 150–450)
PLATELET # BLD AUTO: 253 10(3)UL (ref 150–450)
POTASSIUM SERPL-SCNC: 5 MMOL/L (ref 3.5–5.1)
RBC # BLD AUTO: 3.52 X10(6)UL
SODIUM SERPL-SCNC: 142 MMOL/L (ref 136–145)
WBC # BLD AUTO: 13.1 X10(3) UL (ref 4–11)

## 2023-03-13 PROCEDURE — 0QBM0ZX EXCISION OF LEFT TARSAL, OPEN APPROACH, DIAGNOSTIC: ICD-10-PCS | Performed by: PODIATRIST

## 2023-03-13 PROCEDURE — 0QBM0ZZ EXCISION OF LEFT TARSAL, OPEN APPROACH: ICD-10-PCS | Performed by: PODIATRIST

## 2023-03-13 PROCEDURE — 76000 FLUOROSCOPY <1 HR PHYS/QHP: CPT | Performed by: PODIATRIST

## 2023-03-13 PROCEDURE — 99233 SBSQ HOSP IP/OBS HIGH 50: CPT | Performed by: HOSPITALIST

## 2023-03-13 RX ORDER — SODIUM CHLORIDE 9 MG/ML
INJECTION, SOLUTION INTRAVENOUS CONTINUOUS
Status: DISCONTINUED | OUTPATIENT
Start: 2023-03-13 | End: 2023-03-13 | Stop reason: HOSPADM

## 2023-03-13 RX ORDER — SODIUM CHLORIDE 9 MG/ML
INJECTION, SOLUTION INTRAVENOUS CONTINUOUS PRN
Status: DISCONTINUED | OUTPATIENT
Start: 2023-03-13 | End: 2023-03-13 | Stop reason: SURG

## 2023-03-13 RX ORDER — PHENYLEPHRINE HCL 10 MG/ML
VIAL (ML) INJECTION AS NEEDED
Status: DISCONTINUED | OUTPATIENT
Start: 2023-03-13 | End: 2023-03-13 | Stop reason: SURG

## 2023-03-13 RX ORDER — POLYETHYLENE GLYCOL 3350 17 G/17G
17 POWDER, FOR SOLUTION ORAL DAILY PRN
Status: DISCONTINUED | OUTPATIENT
Start: 2023-03-13 | End: 2023-03-19

## 2023-03-13 RX ORDER — ONDANSETRON 2 MG/ML
4 INJECTION INTRAMUSCULAR; INTRAVENOUS EVERY 6 HOURS PRN
Status: DISCONTINUED | OUTPATIENT
Start: 2023-03-13 | End: 2023-03-13 | Stop reason: HOSPADM

## 2023-03-13 RX ORDER — GLYCOPYRROLATE 0.2 MG/ML
INJECTION, SOLUTION INTRAMUSCULAR; INTRAVENOUS AS NEEDED
Status: DISCONTINUED | OUTPATIENT
Start: 2023-03-13 | End: 2023-03-13 | Stop reason: SURG

## 2023-03-13 RX ORDER — CEFAZOLIN SODIUM 1 G/3ML
INJECTION, POWDER, FOR SOLUTION INTRAMUSCULAR; INTRAVENOUS AS NEEDED
Status: DISCONTINUED | OUTPATIENT
Start: 2023-03-13 | End: 2023-03-13 | Stop reason: SURG

## 2023-03-13 RX ORDER — HYDROMORPHONE HYDROCHLORIDE 1 MG/ML
0.4 INJECTION, SOLUTION INTRAMUSCULAR; INTRAVENOUS; SUBCUTANEOUS EVERY 5 MIN PRN
Status: DISCONTINUED | OUTPATIENT
Start: 2023-03-13 | End: 2023-03-13 | Stop reason: HOSPADM

## 2023-03-13 RX ORDER — HYDROCODONE BITARTRATE AND ACETAMINOPHEN 5; 325 MG/1; MG/1
1 TABLET ORAL ONCE AS NEEDED
Status: DISCONTINUED | OUTPATIENT
Start: 2023-03-13 | End: 2023-03-13 | Stop reason: HOSPADM

## 2023-03-13 RX ORDER — ACETAMINOPHEN 500 MG
1000 TABLET ORAL ONCE AS NEEDED
Status: DISCONTINUED | OUTPATIENT
Start: 2023-03-13 | End: 2023-03-13 | Stop reason: HOSPADM

## 2023-03-13 RX ORDER — NALOXONE HYDROCHLORIDE 0.4 MG/ML
80 INJECTION, SOLUTION INTRAMUSCULAR; INTRAVENOUS; SUBCUTANEOUS AS NEEDED
Status: DISCONTINUED | OUTPATIENT
Start: 2023-03-13 | End: 2023-03-13 | Stop reason: HOSPADM

## 2023-03-13 RX ORDER — INSULIN ASPART 100 [IU]/ML
INJECTION, SOLUTION INTRAVENOUS; SUBCUTANEOUS ONCE
Status: DISCONTINUED | OUTPATIENT
Start: 2023-03-13 | End: 2023-03-13 | Stop reason: HOSPADM

## 2023-03-13 RX ORDER — LIDOCAINE HYDROCHLORIDE 10 MG/ML
INJECTION, SOLUTION EPIDURAL; INFILTRATION; INTRACAUDAL; PERINEURAL AS NEEDED
Status: DISCONTINUED | OUTPATIENT
Start: 2023-03-13 | End: 2023-03-13 | Stop reason: SURG

## 2023-03-13 RX ORDER — HYDROMORPHONE HYDROCHLORIDE 1 MG/ML
0.2 INJECTION, SOLUTION INTRAMUSCULAR; INTRAVENOUS; SUBCUTANEOUS EVERY 5 MIN PRN
Status: DISCONTINUED | OUTPATIENT
Start: 2023-03-13 | End: 2023-03-13 | Stop reason: HOSPADM

## 2023-03-13 RX ORDER — ONDANSETRON 2 MG/ML
INJECTION INTRAMUSCULAR; INTRAVENOUS AS NEEDED
Status: DISCONTINUED | OUTPATIENT
Start: 2023-03-13 | End: 2023-03-13 | Stop reason: SURG

## 2023-03-13 RX ORDER — NEOSTIGMINE METHYLSULFATE 1 MG/ML
INJECTION, SOLUTION INTRAVENOUS AS NEEDED
Status: DISCONTINUED | OUTPATIENT
Start: 2023-03-13 | End: 2023-03-13 | Stop reason: SURG

## 2023-03-13 RX ORDER — ROCURONIUM BROMIDE 10 MG/ML
INJECTION, SOLUTION INTRAVENOUS AS NEEDED
Status: DISCONTINUED | OUTPATIENT
Start: 2023-03-13 | End: 2023-03-13 | Stop reason: SURG

## 2023-03-13 RX ADMIN — GLYCOPYRROLATE 0.8 MG: 0.2 INJECTION, SOLUTION INTRAMUSCULAR; INTRAVENOUS at 08:23:00

## 2023-03-13 RX ADMIN — NEOSTIGMINE METHYLSULFATE 4.5 MG: 1 INJECTION, SOLUTION INTRAVENOUS at 08:23:00

## 2023-03-13 RX ADMIN — ONDANSETRON 4 MG: 2 INJECTION INTRAMUSCULAR; INTRAVENOUS at 08:29:00

## 2023-03-13 RX ADMIN — PHENYLEPHRINE HCL 100 MCG: 10 MG/ML VIAL (ML) INJECTION at 07:33:00

## 2023-03-13 RX ADMIN — PHENYLEPHRINE HCL 100 MCG: 10 MG/ML VIAL (ML) INJECTION at 07:58:00

## 2023-03-13 RX ADMIN — SODIUM CHLORIDE: 9 INJECTION, SOLUTION INTRAVENOUS at 07:09:00

## 2023-03-13 RX ADMIN — ROCURONIUM BROMIDE 50 MG: 10 INJECTION, SOLUTION INTRAVENOUS at 07:15:00

## 2023-03-13 RX ADMIN — SODIUM CHLORIDE: 9 INJECTION, SOLUTION INTRAVENOUS at 08:23:00

## 2023-03-13 RX ADMIN — CEFAZOLIN SODIUM 2 G: 1 INJECTION, POWDER, FOR SOLUTION INTRAMUSCULAR; INTRAVENOUS at 07:30:00

## 2023-03-13 RX ADMIN — LIDOCAINE HYDROCHLORIDE 50 MG: 10 INJECTION, SOLUTION EPIDURAL; INFILTRATION; INTRACAUDAL; PERINEURAL at 07:15:00

## 2023-03-13 NOTE — CM/SW NOTE
Pt discussed during interdisciplinary rounds. PT/OT has signed off, given that pt has been bed bound since September 2022. Pt's spouse serves as primary caregiver, however he was recently hospitalized and now is at a [facility]. Pt does have caregivers MWF and at nighttime. She now is requiring IV abx and underwent calcanectomy with biopsy today 3/13. Podiatry and ID on consult. Cultures pending. [facility] referral sent via Aidin, as pt may benefit while spouse is at [facility] 12. Referral sent to [facility], where spouse is, as well as [facility] where pt has been previously. VM left for Breanna Cordova in admissions at [facility] to discuss ([phone]). CM/SW to remain available for discharge planning.     Zayra Millan, ROBINAN, RN-BC    [id number]

## 2023-03-13 NOTE — PLAN OF CARE
A/o x4. RA/. Waffle cushion. Bunny boots. NPO. Voiding with purewick. LBM 3/10. IV abx. Hep gtt infusing to be turned off at 0100. R ankle wound ATIF. L foot with dsg cdi. Mepilex to sacrum. Blood return noted from PICC line. Plan to go to surgery with . POC updated with pt. All safety measures in place. Call light within reach instructed pt to call for help or assistance.

## 2023-03-13 NOTE — BRIEF OP NOTE
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 Section of calcaneus the pathology for osteomyelitis  2. Pressure ulcer  3.   Portion of bone from the calcaneus where the bone was exposed to microbiology for aerobic and anaerobic culture and sensitivity     Estimated Blood Loss: Blood Output: 40 mL (3/13/2023  8:29 AM)      Dictation Number: Pending in [address]na Martinez DPM  3/13/2023  8:39 AM

## 2023-03-13 NOTE — ANESTHESIA PROCEDURE NOTES
Airway  Date/Time: 3/13/2023 7:17 AM  Urgency: elective      General Information and Staff    Patient location during procedure: OR  Anesthesiologist: Shawna Loyola MD  Resident/CRNA: Shailesh Byrd CRNA  Performed: CRNA   Performed by: Shailesh Byrd CRNA  Authorized by: Shawna Loyola MD      Indications and Patient Condition  Indications for airway management: anesthesia  Sedation level: deep  Preoxygenated: yes  Patient position: sniffing  Mask difficulty assessment: 1 - vent by mask    Final Airway Details  Final airway type: endotracheal airway      Successful airway: ETT  Cuffed: yes   Successful intubation technique: Video laryngoscopy  Endotracheal tube insertion site: oral  Blade: GlideScope  Blade size: #3  ETT size (mm): 7.5    Cormack-Lehane Classification: grade I - full view of glottis  Placement verified by: chest auscultation and capnometry   Measured from: lips  ETT to lips (cm): 21  Number of attempts at approach: 1

## 2023-03-13 NOTE — ANESTHESIA POSTPROCEDURE EVALUATION
[address] Patient Status:  Inpatient   Age/Gender 76year old female MRN [id number]   [facility] SURGERY Attending Vasu Yu MD   [address] PCP Catrina Ly MD       Anesthesia Post-op Note    PARTIAL  CALCANECTOMY BONE BIOPSY LEFT FOOT    Procedure Summary     Date: 03/13/23 Room / Location: [facility] MAIN OR  / [facility] MAIN OR    Anesthesia Start: 1706 Anesthesia Stop: 1168    Procedure: PARTIAL  CALCANECTOMY BONE BIOPSY LEFT FOOT (Left: Foot) Diagnosis: (inpt)    Surgeons: Patricia Stacy DPM Anesthesiologist: Pasha Turner MD    Anesthesia Type: general ASA Status: 3          Anesthesia Type: general    Vitals Value Taken Time   /77 03/13/23 0854   Temp 99 03/13/23 0854   Pulse 75 03/13/23 0854   Resp 18 03/13/23 0854   SpO2 100% 03/13/23 0854       Patient Location: PACU    Anesthesia Type: general    Airway Patency: patent    Postop Pain Control: adequate    Mental Status: mildly sedated but able to meaningfully participate in the post-anesthesia evaluation    Nausea/Vomiting: none    Cardiopulmonary/Hydration status: stable euvolemic    Complications: no apparent anesthesia related complications    Postop vital signs: stable    Dental Exam: Unchanged from Preop    Patient to be discharged from PACU when criteria met.

## 2023-03-13 NOTE — ANESTHESIA PROCEDURE NOTES
Regional Block    Date/Time: 3/13/2023 8:32 AM    Performed by: Adolfo Chowdhury CRNA  Authorized by: Ana Lucas MD      General Information and Staff    Start Time:  3/13/2023 8:32 AM  End Time:  3/13/2023 8:35 AM  Anesthesiologist:  Ana Lucas MD  CRNA:  Adolfo Chowdhury CRNA  Performed by: Anesthesiologist  Patient Location:  ED      Site Identification: real time ultrasound guided, nerve stimulator and image stored and retrievable    Block site/laterality marked before start: site marked  Reason for Block: procedure for pain and at request of ED Physician    Preanesthetic Checklist: 2 patient identifers, IV checked, site marked, risks and benefits discussed, monitors and equipment checked, pre-op evaluation, timeout performed, anesthesia consent, sterile technique used, no prohibitive neurological deficits and no local skin infection at insertion site      Procedure Details    Patient Position:  Prone  Prep: ChloraPrep    Monitoring:  Cardiac monitor, continuous pulse ox, blood pressure cuff and heart rate  Block Type:  Popliteal  Laterality:  Left  Injection Technique:  Single-shot    Needle    Needle Type:  Short-bevel and echogenic  Needle Gauge:  21 G  Needle Length:  100 mm  Reason for Ultrasound Use: appropriate spread of the medication was noted in real time and no ultrasound evidence of intravascular and/or intraneural injection    Nerve Stimulator: 0.8 amps    Muscle Twitch Response Comment: No response    Assessment    Injection Assessment:  Good spread noted, negative aspiration for heme, negative resistance, no pain on injection, incremental injection, local visualized surrounding nerve on ultrasound and low pressure  Heart Rate Change: No    - Patient tolerated block procedure well without evidence of immediate block related complications.      Medications  3/13/2023 8:32 AM      Additional Comments    Medication:  Ropivacaine 0.375% 30mL

## 2023-03-13 NOTE — PLAN OF CARE
Pt A&Ox4, VSS on RA, , IS. Pt denies any pain or discomfort at this time. POD 0 dressing to Lt foot C/D/I. Bunny boots to RLE. Pt tolerating diet; AAT. DTV 1600, LBM 3/10. New mepilex applied to sacrum. Plan to continue IV abx while cultures pending and resume PO anticoagulation today. Plan of care reviewed with patient. Patient demonstrates understanding.

## 2023-03-13 NOTE — PHYSICAL THERAPY NOTE
Duplicate PT order received, chart reviewed. Per previous PT note, \"Pt has been bedbound since ~Sept.  Pt returned home from [place] [address] in Dec and has not left her bedroom. Pt reports they had traded their werner lift for a \"caceres chair\" and therefore was not able to get OOB since. Pt has appointment set up to get electric chair and replacement werner, however with spouse in hospital has been unable to go. Pt reports ongoing caregivers MWF while spouse at dialysis and daily at night to assist with prepping for bed. \" Will dc from acute PT at this time as pt remains at baseline dependent level of functioning.

## 2023-03-14 LAB
GLUCOSE BLD-MCNC: 120 MG/DL (ref 70–99)
GLUCOSE BLD-MCNC: 125 MG/DL (ref 70–99)
GLUCOSE BLD-MCNC: 161 MG/DL (ref 70–99)
GLUCOSE BLD-MCNC: 91 MG/DL (ref 70–99)
GLUCOSE BLD-MCNC: 91 MG/DL (ref 70–99)

## 2023-03-14 PROCEDURE — 99233 SBSQ HOSP IP/OBS HIGH 50: CPT | Performed by: HOSPITALIST

## 2023-03-14 NOTE — PLAN OF CARE
Pt alert and oriented x4. Decreased/numbness to LLE. Denies pain. Ace wrap dressing to left foot, CDI. Podiatry MD to change dressing tomorrow. Tolerating IV abx, ID following. VS remain stable on room air. Unable to void this AM after several attempts. Bladder scan > 400 mL. Straight cath done per protocol. DTV. Tolerating diet, denies nausea. Pt unable to ambulate, NWB to LLE. Uses wheelchair at baseline. Total lift for all transfers. Pt currently in chair, resting comfortably. Ankle pumps encouraged. IS encouraged. Plan to discharge to facility pending ins auth/placement and culture results. Pt updated on plan of care, all questions answered. Belongings within reach, instructed to call for assistance.

## 2023-03-14 NOTE — PLAN OF CARE
A/o x4. RA/. Waffle cushion. Bunny boots. 1800 ada with qid accuchecks. Denies n/v. DTV by midnight. Justin Obrien LBM 3/10. IV abx. R ankle wound ATIF. L foot with dsg cdi. Mepilex to sacrum. Blood return noted from PICC line. POC updated with pt. All safety measures in place. Call light within reach instructed pt to call for help or assistance.

## 2023-03-14 NOTE — CM/SW NOTE
Call received from Anel at [facility] stating that they can accept pt for [facility], however she is concerned about long term care planning. Pt's spouse also at [facility] for [address] and, per Anel, will likely be there for awhile. Discussed possibility of pt transitioning to LTC and she did state this is an option, as well as a respite stay while spouse is there. Email sent to Leslee from [facility] for Mom to update on situation, as she may be useful in helping family long term care plan. Will meet with pt this afternoon to further discuss discharge plans/goals. 1130: Message received on Aidin from Anel stating that they can no longer accept pt under Medicare because her wound care needs are not skillable. Inquired about whether pt's IV abx would allow her to be skilled under Medicare. Await repsonse. Pt tentatively also accepted by [facility] in [place], however they have concerns regarding skilled needs as well.    1500: CM met with pt at bedside to discuss discharge planning. Inquired about her goals and she states that she would prefer to return home with caregiver support. She typically has caregivers morning/night 7 days per week for 1-2 hours to help her get up in the morning and ready for bed. She also has caregivers MWF while her spouse is at HD. Inquired about whether she would be able to increase caregiver coverage and she reports she may be able to, however she will have to consider her finances. Pt states she was told she'll likely have to go to [facility], but she's not certain how it will be helpful because she hasn't ambulated in years. Pt informed that this is what the [facility] facilities are also looking at, however she may require nursing assistance for IV abx and wound care pending podiatry's recommendations. Discussed that pt's spouse may need [facility] longer than she does and what her long term plan would be. Pt states that she is not sure.  She lives at the [place] and has supportive neighbors, but she would never expect them to live with her. Her niece, Heidi Dunham, lives in [place] and her son lives in [facility]. She hopes to return home with her spouse once they both complete rehab. This is the first time they both have required hospital/rehab stays simultaneously. Pt would prefer to go to [facility] [place], but would go to [facility] if they can accept her. Also, discussed the option of privately paying for SNF if she is discharged sooner than her spouse and she states this may be an option. Pt agreeable to speaking with Leslee from [address] [place] [address] for Mom regarding long term care planning, however ultimately she hopes to return home with spouse and caregiver support. Pt declines having CM contact her son or niece to further discuss. Will reach out to both [address] [place] [address] to determine whether pt can be accepted. Message sent to Dr. Tiara Vogel to inquire about expected antibiotic plan.     Fabiola Prescott, ROBINAN, RN-BC    [id number]

## 2023-03-15 LAB
GLUCOSE BLD-MCNC: 118 MG/DL (ref 70–99)
GLUCOSE BLD-MCNC: 141 MG/DL (ref 70–99)
GLUCOSE BLD-MCNC: 152 MG/DL (ref 70–99)
GLUCOSE BLD-MCNC: 155 MG/DL (ref 70–99)

## 2023-03-15 PROCEDURE — 99232 SBSQ HOSP IP/OBS MODERATE 35: CPT | Performed by: HOSPITALIST

## 2023-03-15 RX ORDER — BISACODYL 10 MG
10 SUPPOSITORY, RECTAL RECTAL
Status: DISCONTINUED | OUTPATIENT
Start: 2023-03-15 | End: 2023-03-19

## 2023-03-15 RX ORDER — DOCUSATE SODIUM 100 MG/1
100 CAPSULE, LIQUID FILLED ORAL 2 TIMES DAILY
Status: DISCONTINUED | OUTPATIENT
Start: 2023-03-15 | End: 2023-03-19

## 2023-03-15 NOTE — PLAN OF CARE
Pt A&Ox4, VSS on RA, . Pt denies any pain or discomfort at this time. Dressing changed today by MD; New dressing C/D/I. Bunny boots to BLE. Pt tolerating diet. Pt straight cath this afternoon per protocol. DTV by 2040. LBM 3/10; patient denies feelings of constipation at this time. Discussed medications available to help patient void; miralax given per patient request. Plan to DC to [facility] once medically cleared and cultures are finalized. Plan of care reviewed with patient. Patient demonstrates understanding.

## 2023-03-15 NOTE — CM/SW NOTE
CM notified by [facility] and [facility] that pt can be accepted for IV abx at discharge. Awaiting final cultures and abx orders per ID. Will leave referral open in Aidin until day of discharge. Leslee from [address] [place] [address] for Mom was able to speak with pt's niece, Rachel Stuart, via phone yesterday to discuss long term care planning for the family. She plans to meet with pt today at bedside to further discuss goals. This information was relayed to [facility] via Aidin to allow them added support when time comes for discharge planning to take place for both pt/spouse. Will send final abx orders to facilities once available. Addendum: Leslee from [address] [place] [address] for Mom met with pt at bedside. She requested Leslee meet with her spouse, Iona Slaughter, to further discuss long term care planning. If unable to meet with Iona Slaughter she requested she speak with niece, Rachel Stuart. Leslee left [facility] with plans to visit Iona Slaughter at [facility].     Kulwant Lilly, ROBINAN, RN-BC    [id number]

## 2023-03-15 NOTE — PLAN OF CARE
Patient A&O X4 on RA. VSS, /IS. Refusing SCDs, eliquis. Purewick in place, LBM 3/10. Cardiac diet with accu checks QID. CHUCKIE PICC, on IV Unasyn Q12h. Bilateral bunny boots in place. Surgical incision to left heel covered with ACE, c/d/i- podiatry to change dressing tomorrow. NWB to left foot. W/c bound at baseline. Romeo [address] for transfers. Fall precautions in place and call light within reach. Plan to dc to [facility] when cleared.

## 2023-03-16 LAB
ANION GAP SERPL CALC-SCNC: 5 MMOL/L (ref 0–18)
BUN BLD-MCNC: 29 MG/DL (ref 7–18)
CALCIUM BLD-MCNC: 8.8 MG/DL (ref 8.5–10.1)
CHLORIDE SERPL-SCNC: 108 MMOL/L (ref 98–112)
CO2 SERPL-SCNC: 26 MMOL/L (ref 21–32)
CREAT BLD-MCNC: 1.54 MG/DL
ERYTHROCYTE [DISTWIDTH] IN BLOOD BY AUTOMATED COUNT: 15.5 %
GFR SERPLBLD BASED ON 1.73 SQ M-ARVRAT: 34 ML/MIN/1.73M2 (ref 60–?)
GLUCOSE BLD-MCNC: 135 MG/DL (ref 70–99)
GLUCOSE BLD-MCNC: 162 MG/DL (ref 70–99)
GLUCOSE BLD-MCNC: 166 MG/DL (ref 70–99)
GLUCOSE BLD-MCNC: 169 MG/DL (ref 70–99)
GLUCOSE BLD-MCNC: 171 MG/DL (ref 70–99)
HCT VFR BLD AUTO: 30.2 %
HGB BLD-MCNC: 9.6 G/DL
MCH RBC QN AUTO: 30.4 PG (ref 26–34)
MCHC RBC AUTO-ENTMCNC: 31.8 G/DL (ref 31–37)
MCV RBC AUTO: 95.6 FL
OSMOLALITY SERPL CALC.SUM OF ELEC: 298 MOSM/KG (ref 275–295)
PLATELET # BLD AUTO: 241 10(3)UL (ref 150–450)
POTASSIUM SERPL-SCNC: 4.5 MMOL/L (ref 3.5–5.1)
RBC # BLD AUTO: 3.16 X10(6)UL
SODIUM SERPL-SCNC: 139 MMOL/L (ref 136–145)
WBC # BLD AUTO: 15.4 X10(3) UL (ref 4–11)

## 2023-03-16 PROCEDURE — 99232 SBSQ HOSP IP/OBS MODERATE 35: CPT | Performed by: HOSPITALIST

## 2023-03-16 NOTE — PLAN OF CARE
Patient A&O X4 on RA. VSS, /IS. Refusing SCDs, eliquis. Purewick in place, LBM 3/15. Cardiac diet with accu checks QID. CHUCKIE PICC, on IV Unasyn Q12h. Bilateral bunny boots in place. Surgical incision to left heel covered with ACE, c/d/i. NWB to left foot. W/c bound at baseline. Clarine Lie for transfers. Fall precautions in place and call light within reach. Plan to dc to [facility] when cleared.

## 2023-03-16 NOTE — PLAN OF CARE
Pt A&Ox4, VSS on RA, . Pt denies any pain or discomfort at this time. Dressing to LLE intact; Bunny boots to BLE. Pt tolerating diet. DTV 1430 per protocol. LBM 3/15. New mepilex applied to sacrum. Plan to DC to [facility] once medically cleared and cultures are finalized. Plan of care reviewed with patient. Patient demonstrates understanding. 1637: Bladder scan > 400 mL. Patient straight cath per protocol.  DTV 2240

## 2023-03-17 LAB
BILIRUB UR QL STRIP.AUTO: NEGATIVE
CLARITY UR REFRACT.AUTO: CLEAR
COLOR UR AUTO: YELLOW
GLUCOSE BLD-MCNC: 108 MG/DL (ref 70–99)
GLUCOSE BLD-MCNC: 138 MG/DL (ref 70–99)
GLUCOSE BLD-MCNC: 151 MG/DL (ref 70–99)
GLUCOSE BLD-MCNC: 163 MG/DL (ref 70–99)
GLUCOSE BLD-MCNC: 214 MG/DL (ref 70–99)
GLUCOSE UR STRIP.AUTO-MCNC: NEGATIVE MG/DL
KETONES UR STRIP.AUTO-MCNC: NEGATIVE MG/DL
NITRITE UR QL STRIP.AUTO: NEGATIVE
PH UR STRIP.AUTO: 5 [PH] (ref 5–8)
PROT UR STRIP.AUTO-MCNC: NEGATIVE MG/DL
SP GR UR STRIP.AUTO: 1.01 (ref 1–1.03)
UROBILINOGEN UR STRIP.AUTO-MCNC: <2 MG/DL

## 2023-03-17 PROCEDURE — 99232 SBSQ HOSP IP/OBS MODERATE 35: CPT | Performed by: HOSPITALIST

## 2023-03-17 RX ORDER — OXYBUTYNIN CHLORIDE 5 MG/1
10 TABLET ORAL 2 TIMES DAILY
Status: DISCONTINUED | OUTPATIENT
Start: 2023-03-17 | End: 2023-03-18

## 2023-03-17 NOTE — PLAN OF CARE
Aox4, denying numbness or tingling to LLE, ace wrap dressing in place, pain controlled, straight cathed overnight, on room air , Eliquis, PICC line to RUE, on Unasyn, OR cxs pending, plan to dc [facility].

## 2023-03-17 NOTE — CM/SW NOTE
Clinical updates, including final IV abx orders, sent to [facility] via Aidin. Awaiting urology consult before pt can be medically cleared for discharge. Will notify facilities of likely weekend discharge. Will need BLS for transport.     Suzette Patton, BSN, RN-BC    [id number]

## 2023-03-17 NOTE — PLAN OF CARE
A&Ox4. VSS. On room air. . IS encouraged. Ankle pumps encouraged. Tolerating diabetic diet. Last BM 3/17. DTV 1115. Pain controlled. Dressing to right ankle, C/D/I. Bedrest. Plan is to dc to [facility] when medically clear. Patient updated on plan of care. Safety precautions in place. Call light within reach.

## 2023-03-18 LAB
EST. AVERAGE GLUCOSE BLD GHB EST-MCNC: 140 MG/DL (ref 68–126)
GLUCOSE BLD-MCNC: 105 MG/DL (ref 70–99)
GLUCOSE BLD-MCNC: 110 MG/DL (ref 70–99)
GLUCOSE BLD-MCNC: 131 MG/DL (ref 70–99)
GLUCOSE BLD-MCNC: 134 MG/DL (ref 70–99)
HBA1C MFR BLD: 6.5 % (ref ?–5.7)

## 2023-03-18 PROCEDURE — 99232 SBSQ HOSP IP/OBS MODERATE 35: CPT | Performed by: HOSPITALIST

## 2023-03-18 NOTE — PLAN OF CARE
Pt Aox4, RA, , IS encouraged. On Eliquis. Last BM 3/17. Inés Almaguer in place, voiding small amounts, will continue to monitor- Ditropan Dc today per MD. Total lift- bed bound at baseline. Bunny boots on. PICC to DALJIT, dressing CDI. Surgical dressing in place on L foot- 4x4, ABD, Kerlix and ACE. Mepilex to sacrum for redness, CDI. Plan to Dc to [facility] when insurance auth. Update: Pt only able to void 100. , straight cath using sterile technique for 500, pt tolerated well. DTV by 2300.

## 2023-03-18 NOTE — OPERATIVE REPORT
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 FINDINGS:   The ulceration was at the medial plantar aspect of the heel. Her leg was in a slight varus position and she had equinovarus to the foot secondary to the fact that she had not ambulated and probably over a year. The heel was very prominent on the medial plantar aspect of the foot bone was exposed. SPECIMENS:   3 specimens were sent from the left foot as follows:  1. Section of the calcaneus to pathology for osteomyelitis  2. Pressure ulcer  3. A portion of the bone and tissue where the calcaneus was exposed and palpable to microbiology for aerobic and anaerobic culture and sensitivity. COMPLICATIONS:  None. DRAINS:   None     OPERATIVE TECHNIQUE:      Patient was brought into the operating with vital signs stable. Timeout was taken there were no additions deletions or concerns reported on the the patient was placed under general anesthetic while on her bed.   Padding to protect neurovascular structures and deep vital structures she was placed in prone position the left foot was prepped and draped using usual aseptic technique and hemostasis was achieved as above the pressure ulcers of the medial plantar aspect of the heel and therefore curvilinear incisions encompassing it were made beginning on the dorsal lateral aspect of the Achilles tendon carried over the posterior aspect of the heel bruising similar incision following the same type of pattern on the plantar surface of the product ulcer which was then excised in total.  This exposed a portion of bone where osteomyelitis was suspected and using a rongeur a bone biopsy was taken for culture and sensitivity both aerobic and anaerobic. Periosteal incision was then made from dorsal to plantar on the calcaneus and the periosteal structures were denuded a sagittal saw was used from medial to lateral to take the posterior portion of the calcaneus off and a small section dorsally on an angle and plantarly on an angle so that it would be a rounded edge and all exposed areas of bone were burred smooth. At this time the pneumatic ankle tourniquet was released. Should be noted that there was some arterial bleeding during the course of this procedure and 3-0 Vicryl suture was used to tie it off and venous bleeding was treated with either ligature and/or electrocautery and additional 10,000 units of thrombin was used in order to help control bleeding along with pressure dressing. Once there was adequate resection of bone and hemostasis was achieved the area was flushed with Irrisept. At this particular point in time the wound edges were reapproximated and maintained using 2-0 and 3-0 Prolene in a simple interrupted fashion and a sterile postoperative dressing consisting of Xeroform sterile gauze ABD pads and a Kerlix roll and an Ace wrap was applied.   Patient tolerated the above anesthesia procedure well left the operative vital signs stable and vascular status of her left foot intact to recovery room via george Madden DPM

## 2023-03-18 NOTE — PLAN OF CARE
Pt states pain minimal at this time, declined need for prn med. VSS, afebrile, on RA, tolerating Unasyn IV. A/W dsg to lt foot cdi, leg elevated on pillows. Pt still not voided, bladder scan was 521cc. Straight cath done, obtained 400cc clear yellow urine. Uro to see pt tomorrow. Plan for dc to [facility] when medically cleared.

## 2023-03-19 VITALS
HEART RATE: 64 BPM | BODY MASS INDEX: 29.62 KG/M2 | WEIGHT: 200 LBS | RESPIRATION RATE: 18 BRPM | TEMPERATURE: 98 F | SYSTOLIC BLOOD PRESSURE: 145 MMHG | HEIGHT: 69 IN | OXYGEN SATURATION: 98 % | DIASTOLIC BLOOD PRESSURE: 66 MMHG

## 2023-03-19 LAB
ANION GAP SERPL CALC-SCNC: 3 MMOL/L (ref 0–18)
BASOPHILS # BLD AUTO: 0.04 X10(3) UL (ref 0–0.2)
BASOPHILS NFR BLD AUTO: 0.5 %
BUN BLD-MCNC: 30 MG/DL (ref 7–18)
CALCIUM BLD-MCNC: 9.1 MG/DL (ref 8.5–10.1)
CHLORIDE SERPL-SCNC: 109 MMOL/L (ref 98–112)
CO2 SERPL-SCNC: 28 MMOL/L (ref 21–32)
CREAT BLD-MCNC: 1.36 MG/DL
EOSINOPHIL # BLD AUTO: 0.21 X10(3) UL (ref 0–0.7)
EOSINOPHIL NFR BLD AUTO: 2.4 %
ERYTHROCYTE [DISTWIDTH] IN BLOOD BY AUTOMATED COUNT: 15.2 %
GFR SERPLBLD BASED ON 1.73 SQ M-ARVRAT: 40 ML/MIN/1.73M2 (ref 60–?)
GLUCOSE BLD-MCNC: 133 MG/DL (ref 70–99)
GLUCOSE BLD-MCNC: 189 MG/DL (ref 70–99)
GLUCOSE BLD-MCNC: 86 MG/DL (ref 70–99)
GLUCOSE BLD-MCNC: 94 MG/DL (ref 70–99)
HCT VFR BLD AUTO: 29.8 %
HGB BLD-MCNC: 9.3 G/DL
IMM GRANULOCYTES # BLD AUTO: 0.03 X10(3) UL (ref 0–1)
IMM GRANULOCYTES NFR BLD: 0.3 %
LYMPHOCYTES # BLD AUTO: 2.78 X10(3) UL (ref 1–4)
LYMPHOCYTES NFR BLD AUTO: 31.8 %
MAGNESIUM SERPL-MCNC: 2.1 MG/DL (ref 1.6–2.6)
MCH RBC QN AUTO: 29.4 PG (ref 26–34)
MCHC RBC AUTO-ENTMCNC: 31.2 G/DL (ref 31–37)
MCV RBC AUTO: 94.3 FL
MONOCYTES # BLD AUTO: 0.63 X10(3) UL (ref 0.1–1)
MONOCYTES NFR BLD AUTO: 7.2 %
NEUTROPHILS # BLD AUTO: 5.06 X10 (3) UL (ref 1.5–7.7)
NEUTROPHILS # BLD AUTO: 5.06 X10(3) UL (ref 1.5–7.7)
NEUTROPHILS NFR BLD AUTO: 57.8 %
OSMOLALITY SERPL CALC.SUM OF ELEC: 295 MOSM/KG (ref 275–295)
PLATELET # BLD AUTO: 265 10(3)UL (ref 150–450)
POTASSIUM SERPL-SCNC: 4.1 MMOL/L (ref 3.5–5.1)
RBC # BLD AUTO: 3.16 X10(6)UL
SODIUM SERPL-SCNC: 140 MMOL/L (ref 136–145)
WBC # BLD AUTO: 8.8 X10(3) UL (ref 4–11)

## 2023-03-19 PROCEDURE — 99239 HOSP IP/OBS DSCHRG MGMT >30: CPT | Performed by: HOSPITALIST

## 2023-03-19 NOTE — PROGRESS NOTES
Message sent to Dr Jace Ellington if reilly needs to placed if pt unable to void by 2300, awaiting for response.

## 2023-03-19 NOTE — CM/SW NOTE
Pt is ready for dc today. Pt wants to go to [facility] for [facility] - her  is there as well. [facility] accepted and can take pt today. RN to call report to [phone]. THE [facility] is scheduled to  pt at 5:00pm today. PCS form completed.

## 2023-03-19 NOTE — PLAN OF CARE
Still no void, no urge per pt. Bladder scan was 628ml. Holm placed, pt tolerated well, obtained 600ml clear yellow urine. VSS, afebrile, dsg to lt foot cdi, leg elevated. Assisted with frequent bed repositioning, bunny boots present to both feet. Encouraged use of IS and ankle exercises. Plan is dc to [facility] when medically cleared.

## 2023-03-19 NOTE — PROGRESS NOTES
RN gave report to Navjot at [facility] and faxed the script for Unasyn so their pharmacy can start working on getting it ready for the patient.

## 2023-03-19 NOTE — PLAN OF CARE
Patient is alert and oriented x 4. On room air. IVF SL. Voiding per Holm. Incision covered with kerlex and ace dressing - clean, dry, intact. Patient denies pain, sensation to LLE is intact, leg elevated. Worked with Physical Therapy. IS and ankle pumps encouraged, call light within reach and encouraged to call for assistance. All safety precautions and alarms in place. Assisted with frequent bed repositioning, bunny boots present to both feet. Encouraged use of IS and ankle exercises. Plan is dc to [facility] later today.

## 2023-03-19 NOTE — CM/SW NOTE
03/19/23 1301   Discharge disposition   Expected discharge disposition [address] [facility] Sara Provider [facility]   Discharge transportation [facility]

## 2023-03-20 ENCOUNTER — PATIENT OUTREACH (OUTPATIENT)
Dept: CASE MANAGEMENT | Age: 78
End: 2023-03-20

## 2023-03-22 ENCOUNTER — TELEPHONE (OUTPATIENT)
Dept: INTERNAL MEDICINE CLINIC | Facility: CLINIC | Age: 78
End: 2023-03-22

## 2023-04-04 ENCOUNTER — OFFICE VISIT (OUTPATIENT)
Dept: PODIATRY CLINIC | Facility: CLINIC | Age: 78
End: 2023-04-04

## 2023-04-04 DIAGNOSIS — Z98.890 STATUS POST FOOT SURGERY: Primary | ICD-10-CM

## 2023-04-04 PROCEDURE — 99024 POSTOP FOLLOW-UP VISIT: CPT | Performed by: PODIATRIST

## 2023-04-27 ENCOUNTER — TELEPHONE (OUTPATIENT)
Dept: INTERNAL MEDICINE CLINIC | Facility: CLINIC | Age: 78
End: 2023-04-27

## 2023-04-27 NOTE — TELEPHONE ENCOUNTER
Ty  [facility]  [phone] ext [phone]  Fax: [phone]    Fax that was sent on 4/20/23 for pt's script was was received and faxed back to [facility]. However, the doctors signature was cut out of the fax. Refax script for the werner lift and add the most recent chart note to fax number listed above.

## 2023-06-12 ENCOUNTER — TELEPHONE (OUTPATIENT)
Dept: INTERNAL MEDICINE CLINIC | Facility: CLINIC | Age: 78
End: 2023-06-12

## 2023-06-19 ENCOUNTER — TELEPHONE (OUTPATIENT)
Dept: INTERNAL MEDICINE CLINIC | Facility: CLINIC | Age: 78
End: 2023-06-19

## 2023-06-19 NOTE — TELEPHONE ENCOUNTER
Incoming fax from [address] [facility] # [phone]    Placed in TO in basket for review

## 2023-06-20 ENCOUNTER — TELEPHONE (OUTPATIENT)
Dept: INTERNAL MEDICINE CLINIC | Facility: CLINIC | Age: 78
End: 2023-06-20

## 2023-06-20 DIAGNOSIS — M86.672 CHRONIC OSTEOMYELITIS OF HINDFOOT, LEFT (HCC): Primary | ICD-10-CM

## 2023-06-20 NOTE — TELEPHONE ENCOUNTER
Incoming order from [facility] Order # [id number] placed in your inbasket for signature. Not seen in >1 year, please reach out for an appt.

## 2023-06-21 NOTE — TELEPHONE ENCOUNTER
Future Appointments   Date Time Provider Alejandro Nguyen   7/5/2023  2:00 PM Suri Mcdermott MD [facility] [place]     Lvmtcb. Pt scheduled AWV. Please confirm with pt if she will come into office? See message below.

## 2023-06-29 ENCOUNTER — TELEPHONE (OUTPATIENT)
Dept: INTERNAL MEDICINE CLINIC | Facility: CLINIC | Age: 78
End: 2023-06-29

## 2023-07-03 ENCOUNTER — TELEPHONE (OUTPATIENT)
Dept: INTERNAL MEDICINE CLINIC | Facility: CLINIC | Age: 78
End: 2023-07-03

## 2023-07-04 ENCOUNTER — PATIENT MESSAGE (OUTPATIENT)
Dept: INTERNAL MEDICINE CLINIC | Facility: CLINIC | Age: 78
End: 2023-07-04

## 2023-07-05 ENCOUNTER — TELEPHONE (OUTPATIENT)
Dept: INTERNAL MEDICINE CLINIC | Facility: CLINIC | Age: 78
End: 2023-07-05

## 2023-07-05 DIAGNOSIS — Z79.899 MEDICATION MANAGEMENT: ICD-10-CM

## 2023-07-05 DIAGNOSIS — E11.22 TYPE 2 DIABETES MELLITUS WITH STAGE 3 CHRONIC KIDNEY DISEASE, WITHOUT LONG-TERM CURRENT USE OF INSULIN, UNSPECIFIED WHETHER STAGE 3A OR 3B CKD (HCC): Primary | ICD-10-CM

## 2023-07-05 DIAGNOSIS — N18.30 TYPE 2 DIABETES MELLITUS WITH STAGE 3 CHRONIC KIDNEY DISEASE, WITHOUT LONG-TERM CURRENT USE OF INSULIN, UNSPECIFIED WHETHER STAGE 3A OR 3B CKD (HCC): Primary | ICD-10-CM

## 2023-07-05 NOTE — TELEPHONE ENCOUNTER
Chantelle Aguilar  [phone]    FYI    Supposed to see pt this week for discharge visit. However, pt wants to move the visit for next week.

## 2023-07-05 NOTE — TELEPHONE ENCOUNTER
Labs placed, pt notified. Pt is waiting for john Pina to pick her up, if she can't get them done prior to appt, she'll do them after appt.

## 2023-07-05 NOTE — TELEPHONE ENCOUNTER
Pt has an AWV today, she would like to know if  will want pt to complete labs, she is currently fasting. If labs needed, please place order.

## 2023-07-06 NOTE — TELEPHONE ENCOUNTER
Maritza Cuellar called back stating she needs VO in order to move pts appt to next week with [facility]. [id number] for VO ?     [address]

## 2023-07-10 ENCOUNTER — TELEPHONE (OUTPATIENT)
Dept: INTERNAL MEDICINE CLINIC | Facility: CLINIC | Age: 78
End: 2023-07-10

## 2023-07-11 NOTE — TELEPHONE ENCOUNTER
Eliquis 5 mg  Filled 10-31-22  Qty 180  1 refill  Future Appointments   Date Time Provider [facility]   7/17/2023 10:30 AM Rochell Cushing, MD [facility] [place]   LOV 5-12-22 TO     Losartan 25 mg  Filled 1-29-23  Qty 30  0 refills  Future Appointments   Date Time Provider [facility]   7/17/2023 10:30 AM Rochell Cushing, MD [facility] [place]   LOV 5-12-22 TO     Atorvastatin 20 mg  Filled 5-21-23  Qty 30   0 refills  Future Appointments   Date Time Provider [facility]   7/17/2023 10:30 AM Rochell Cushing, MD [facility] [place]   LOV 5-12-22 TO   Labs 12-21-22 Lipid

## 2023-07-13 RX ORDER — ATORVASTATIN CALCIUM 20 MG/1
20 TABLET, FILM COATED ORAL DAILY
Qty: 30 TABLET | Refills: 0 | Status: SHIPPED | OUTPATIENT
Start: 2023-07-13 | End: 2023-07-14

## 2023-07-13 RX ORDER — LOSARTAN POTASSIUM 25 MG/1
25 TABLET ORAL DAILY
Qty: 30 TABLET | Refills: 0 | Status: SHIPPED | OUTPATIENT
Start: 2023-07-13 | End: 2023-07-14

## 2023-07-14 RX ORDER — LOSARTAN POTASSIUM 25 MG/1
25 TABLET ORAL DAILY
Qty: 30 TABLET | Refills: 0 | Status: SHIPPED | OUTPATIENT
Start: 2023-07-14

## 2023-07-14 RX ORDER — ATORVASTATIN CALCIUM 20 MG/1
20 TABLET, FILM COATED ORAL DAILY
Qty: 30 TABLET | Refills: 0 | Status: SHIPPED | OUTPATIENT
Start: 2023-07-14

## 2023-07-17 ENCOUNTER — LAB ENCOUNTER (OUTPATIENT)
Dept: LAB | Age: 78
End: 2023-07-17
Attending: FAMILY MEDICINE
Payer: MEDICARE

## 2023-07-17 ENCOUNTER — OFFICE VISIT (OUTPATIENT)
Dept: INTERNAL MEDICINE CLINIC | Facility: CLINIC | Age: 78
End: 2023-07-17
Payer: MEDICARE

## 2023-07-17 VITALS
DIASTOLIC BLOOD PRESSURE: 68 MMHG | HEIGHT: 69 IN | SYSTOLIC BLOOD PRESSURE: 130 MMHG | OXYGEN SATURATION: 98 % | BODY MASS INDEX: 30 KG/M2 | HEART RATE: 122 BPM | TEMPERATURE: 98 F

## 2023-07-17 DIAGNOSIS — I82.512 CHRONIC DEEP VEIN THROMBOSIS (DVT) OF FEMORAL VEIN OF LEFT LOWER EXTREMITY (HCC): ICD-10-CM

## 2023-07-17 DIAGNOSIS — I48.0 PAROXYSMAL ATRIAL FIBRILLATION (HCC): ICD-10-CM

## 2023-07-17 DIAGNOSIS — N39.0 RECURRENT UTI: ICD-10-CM

## 2023-07-17 DIAGNOSIS — N31.2 ATONIC BLADDER: ICD-10-CM

## 2023-07-17 DIAGNOSIS — Z79.01 ON APIXABAN THERAPY: ICD-10-CM

## 2023-07-17 DIAGNOSIS — N20.0 BILATERAL KIDNEY STONES: ICD-10-CM

## 2023-07-17 DIAGNOSIS — D64.9 ANEMIA, UNSPECIFIED TYPE: ICD-10-CM

## 2023-07-17 DIAGNOSIS — M48.061 SPINAL STENOSIS OF LUMBAR REGION WITHOUT NEUROGENIC CLAUDICATION: ICD-10-CM

## 2023-07-17 DIAGNOSIS — Z79.899 MEDICATION MANAGEMENT: ICD-10-CM

## 2023-07-17 DIAGNOSIS — E66.01 CLASS 2 SEVERE OBESITY DUE TO EXCESS CALORIES WITH SERIOUS COMORBIDITY AND BODY MASS INDEX (BMI) OF 35.0 TO 35.9 IN ADULT: ICD-10-CM

## 2023-07-17 DIAGNOSIS — N18.30 TYPE 2 DIABETES MELLITUS WITH STAGE 3 CHRONIC KIDNEY DISEASE, WITHOUT LONG-TERM CURRENT USE OF INSULIN, UNSPECIFIED WHETHER STAGE 3A OR 3B CKD (HCC): ICD-10-CM

## 2023-07-17 DIAGNOSIS — E78.00 PURE HYPERCHOLESTEROLEMIA: ICD-10-CM

## 2023-07-17 DIAGNOSIS — I10 ESSENTIAL HYPERTENSION: ICD-10-CM

## 2023-07-17 DIAGNOSIS — Z00.00 ENCOUNTER FOR ANNUAL HEALTH EXAMINATION: ICD-10-CM

## 2023-07-17 DIAGNOSIS — E11.22 TYPE 2 DIABETES MELLITUS WITH STAGE 3 CHRONIC KIDNEY DISEASE, WITHOUT LONG-TERM CURRENT USE OF INSULIN, UNSPECIFIED WHETHER STAGE 3A OR 3B CKD (HCC): ICD-10-CM

## 2023-07-17 DIAGNOSIS — R29.898 BILATERAL LEG WEAKNESS: ICD-10-CM

## 2023-07-17 DIAGNOSIS — I73.9 PAD (PERIPHERAL ARTERY DISEASE) (HCC): ICD-10-CM

## 2023-07-17 DIAGNOSIS — I65.23 BILATERAL CAROTID ARTERY STENOSIS: Primary | ICD-10-CM

## 2023-07-17 PROBLEM — R52 PAIN: Status: RESOLVED | Noted: 2022-09-07 | Resolved: 2023-07-17

## 2023-07-17 PROBLEM — S72.402A CLOSED FRACTURE OF DISTAL END OF LEFT FEMUR, UNSPECIFIED FRACTURE MORPHOLOGY, INITIAL ENCOUNTER (HCC): Status: RESOLVED | Noted: 2022-08-30 | Resolved: 2023-07-17

## 2023-07-17 PROBLEM — R53.1 WEAKNESS: Status: RESOLVED | Noted: 2022-04-01 | Resolved: 2023-07-17

## 2023-07-17 PROBLEM — R73.9 HYPERGLYCEMIA: Status: RESOLVED | Noted: 2022-07-29 | Resolved: 2023-07-17

## 2023-07-17 PROBLEM — S72.402A CLOSED FRACTURE OF LEFT DISTAL FEMUR (HCC): Status: RESOLVED | Noted: 2022-08-30 | Resolved: 2023-07-17

## 2023-07-17 PROBLEM — R31.9 HEMATURIA, UNSPECIFIED TYPE: Status: RESOLVED | Noted: 2022-07-29 | Resolved: 2023-07-17

## 2023-07-17 PROBLEM — N18.9 ACUTE RENAL FAILURE SUPERIMPOSED ON CHRONIC KIDNEY DISEASE, UNSPECIFIED CKD STAGE, UNSPECIFIED ACUTE RENAL FAILURE TYPE: Status: RESOLVED | Noted: 2022-09-20 | Resolved: 2023-07-17

## 2023-07-17 PROBLEM — N28.9 RENAL INSUFFICIENCY: Status: RESOLVED | Noted: 2022-09-12 | Resolved: 2023-07-17

## 2023-07-17 PROBLEM — N17.9 ACUTE RENAL FAILURE (ARF) (HCC): Status: RESOLVED | Noted: 2022-07-29 | Resolved: 2023-07-17

## 2023-07-17 PROBLEM — E87.5 HYPERKALEMIA: Status: RESOLVED | Noted: 2021-12-31 | Resolved: 2023-07-17

## 2023-07-17 PROBLEM — N17.9 ACUTE KIDNEY INJURY: Status: RESOLVED | Noted: 2022-07-29 | Resolved: 2023-07-17

## 2023-07-17 PROBLEM — I48.91 ATRIAL FIBRILLATION (HCC): Status: RESOLVED | Noted: 2022-07-29 | Resolved: 2023-07-17

## 2023-07-17 PROBLEM — N17.9 ACUTE RENAL FAILURE (ARF): Status: RESOLVED | Noted: 2022-07-29 | Resolved: 2023-07-17

## 2023-07-17 PROBLEM — N17.9 ACUTE KIDNEY INJURY (HCC): Status: RESOLVED | Noted: 2022-07-29 | Resolved: 2023-07-17

## 2023-07-17 PROBLEM — R31.9 URINARY TRACT INFECTION WITH HEMATURIA, SITE UNSPECIFIED: Status: RESOLVED | Noted: 2022-08-19 | Resolved: 2023-07-17

## 2023-07-17 PROBLEM — R00.1 BRADYCARDIA: Status: RESOLVED | Noted: 2022-09-20 | Resolved: 2023-07-17

## 2023-07-17 PROBLEM — S72.402S: Status: RESOLVED | Noted: 2022-09-12 | Resolved: 2023-07-17

## 2023-07-17 PROBLEM — L97.422: Status: RESOLVED | Noted: 2023-03-05 | Resolved: 2023-07-17

## 2023-07-17 PROBLEM — D72.829 LEUKOCYTOSIS, UNSPECIFIED TYPE: Status: RESOLVED | Noted: 2022-08-30 | Resolved: 2023-07-17

## 2023-07-17 PROBLEM — R55 SYNCOPE: Status: RESOLVED | Noted: 2022-09-12 | Resolved: 2023-07-17

## 2023-07-17 PROBLEM — M25.552 PAIN OF LEFT HIP: Status: RESOLVED | Noted: 2022-09-07 | Resolved: 2023-07-17

## 2023-07-17 PROBLEM — M47.816 FACET HYPERTROPHY OF LUMBAR REGION: Status: RESOLVED | Noted: 2020-11-18 | Resolved: 2023-07-17

## 2023-07-17 PROBLEM — R40.4 UNRESPONSIVE EPISODE: Status: RESOLVED | Noted: 2022-09-20 | Resolved: 2023-07-17

## 2023-07-17 PROBLEM — N17.9 ACUTE RENAL FAILURE SUPERIMPOSED ON CHRONIC KIDNEY DISEASE, UNSPECIFIED CKD STAGE, UNSPECIFIED ACUTE RENAL FAILURE TYPE: Status: RESOLVED | Noted: 2022-09-20 | Resolved: 2023-07-17

## 2023-07-17 LAB
ALBUMIN SERPL-MCNC: 3.5 G/DL (ref 3.4–5)
ALBUMIN/GLOB SERPL: 0.8 {RATIO} (ref 1–2)
ALP LIVER SERPL-CCNC: 205 U/L
ALT SERPL-CCNC: 15 U/L
ANION GAP SERPL CALC-SCNC: 7 MMOL/L (ref 0–18)
AST SERPL-CCNC: 17 U/L (ref 15–37)
BASOPHILS # BLD AUTO: 0.05 X10(3) UL (ref 0–0.2)
BASOPHILS NFR BLD AUTO: 0.4 %
BILIRUB SERPL-MCNC: 0.4 MG/DL (ref 0.1–2)
BUN BLD-MCNC: 49 MG/DL (ref 7–18)
CALCIUM BLD-MCNC: 9.3 MG/DL (ref 8.5–10.1)
CHLORIDE SERPL-SCNC: 103 MMOL/L (ref 98–112)
CHOLEST SERPL-MCNC: 203 MG/DL (ref ?–200)
CO2 SERPL-SCNC: 25 MMOL/L (ref 21–32)
CREAT BLD-MCNC: 1.81 MG/DL
EOSINOPHIL # BLD AUTO: 0.11 X10(3) UL (ref 0–0.7)
EOSINOPHIL NFR BLD AUTO: 0.8 %
ERYTHROCYTE [DISTWIDTH] IN BLOOD BY AUTOMATED COUNT: 16.3 %
EST. AVERAGE GLUCOSE BLD GHB EST-MCNC: 180 MG/DL (ref 68–126)
FASTING PATIENT LIPID ANSWER: YES
FASTING STATUS PATIENT QL REPORTED: YES
GFR SERPLBLD BASED ON 1.73 SQ M-ARVRAT: 28 ML/MIN/1.73M2 (ref 60–?)
GLOBULIN PLAS-MCNC: 4.3 G/DL (ref 2.8–4.4)
GLUCOSE BLD-MCNC: 159 MG/DL (ref 70–99)
HBA1C MFR BLD: 7.9 % (ref ?–5.7)
HCT VFR BLD AUTO: 43.9 %
HDLC SERPL-MCNC: 35 MG/DL (ref 40–59)
HGB BLD-MCNC: 14 G/DL
IMM GRANULOCYTES # BLD AUTO: 0.07 X10(3) UL (ref 0–1)
IMM GRANULOCYTES NFR BLD: 0.5 %
LDLC SERPL CALC-MCNC: 117 MG/DL (ref ?–100)
LYMPHOCYTES # BLD AUTO: 2.31 X10(3) UL (ref 1–4)
LYMPHOCYTES NFR BLD AUTO: 16.7 %
MCH RBC QN AUTO: 28.3 PG (ref 26–34)
MCHC RBC AUTO-ENTMCNC: 31.9 G/DL (ref 31–37)
MCV RBC AUTO: 88.9 FL
MONOCYTES # BLD AUTO: 0.74 X10(3) UL (ref 0.1–1)
MONOCYTES NFR BLD AUTO: 5.4 %
NEUTROPHILS # BLD AUTO: 10.55 X10 (3) UL (ref 1.5–7.7)
NEUTROPHILS # BLD AUTO: 10.55 X10(3) UL (ref 1.5–7.7)
NEUTROPHILS NFR BLD AUTO: 76.2 %
NONHDLC SERPL-MCNC: 168 MG/DL (ref ?–130)
OSMOLALITY SERPL CALC.SUM OF ELEC: 296 MOSM/KG (ref 275–295)
PLATELET # BLD AUTO: 331 10(3)UL (ref 150–450)
POTASSIUM SERPL-SCNC: 4.3 MMOL/L (ref 3.5–5.1)
PROT SERPL-MCNC: 7.8 G/DL (ref 6.4–8.2)
RBC # BLD AUTO: 4.94 X10(6)UL
SODIUM SERPL-SCNC: 135 MMOL/L (ref 136–145)
TRIGL SERPL-MCNC: 290 MG/DL (ref 30–149)
TSI SER-ACNC: 2.18 MIU/ML (ref 0.36–3.74)
VLDLC SERPL CALC-MCNC: 51 MG/DL (ref 0–30)
WBC # BLD AUTO: 13.8 X10(3) UL (ref 4–11)

## 2023-07-17 PROCEDURE — 80053 COMPREHEN METABOLIC PANEL: CPT

## 2023-07-17 PROCEDURE — 85025 COMPLETE CBC W/AUTO DIFF WBC: CPT

## 2023-07-17 PROCEDURE — 83036 HEMOGLOBIN GLYCOSYLATED A1C: CPT

## 2023-07-17 PROCEDURE — 84443 ASSAY THYROID STIM HORMONE: CPT

## 2023-07-17 PROCEDURE — 80061 LIPID PANEL: CPT

## 2023-07-17 PROCEDURE — 36415 COLL VENOUS BLD VENIPUNCTURE: CPT

## 2023-07-17 RX ORDER — METOPROLOL SUCCINATE 50 MG/1
TABLET, EXTENDED RELEASE ORAL
COMMUNITY
Start: 2023-05-18

## 2023-07-21 ENCOUNTER — TELEPHONE (OUTPATIENT)
Dept: INTERNAL MEDICINE CLINIC | Facility: CLINIC | Age: 78
End: 2023-07-21

## 2023-07-21 ENCOUNTER — LAB REQUISITION (OUTPATIENT)
Dept: LAB | Facility: HOSPITAL | Age: 78
End: 2023-07-21
Payer: MEDICARE

## 2023-07-21 DIAGNOSIS — N39.0 URINARY TRACT INFECTION, SITE NOT SPECIFIED: ICD-10-CM

## 2023-07-21 LAB
BILIRUB UR QL STRIP.AUTO: NEGATIVE
COLOR UR AUTO: YELLOW
CREAT UR-SCNC: 18.3 MG/DL
GLUCOSE UR STRIP.AUTO-MCNC: NEGATIVE MG/DL
KETONES UR STRIP.AUTO-MCNC: NEGATIVE MG/DL
MICROALBUMIN UR-MCNC: 3.91 MG/DL
MICROALBUMIN/CREAT 24H UR-RTO: 213.7 UG/MG (ref ?–30)
NITRITE UR QL STRIP.AUTO: NEGATIVE
PH UR STRIP.AUTO: 9 [PH] (ref 5–8)
PROT UR STRIP.AUTO-MCNC: 100 MG/DL
RBC UR QL AUTO: NEGATIVE
SP GR UR STRIP.AUTO: 1.01 (ref 1–1.03)
UROBILINOGEN UR STRIP.AUTO-MCNC: <2 MG/DL

## 2023-07-21 PROCEDURE — 87186 SC STD MICRODIL/AGAR DIL: CPT | Performed by: FAMILY MEDICINE

## 2023-07-21 PROCEDURE — 87086 URINE CULTURE/COLONY COUNT: CPT | Performed by: FAMILY MEDICINE

## 2023-07-21 PROCEDURE — 87077 CULTURE AEROBIC IDENTIFY: CPT | Performed by: FAMILY MEDICINE

## 2023-07-21 PROCEDURE — 81001 URINALYSIS AUTO W/SCOPE: CPT | Performed by: FAMILY MEDICINE

## 2023-07-21 PROCEDURE — 82570 ASSAY OF URINE CREATININE: CPT | Performed by: FAMILY MEDICINE

## 2023-07-21 PROCEDURE — 82043 UR ALBUMIN QUANTITATIVE: CPT | Performed by: FAMILY MEDICINE

## 2023-07-21 NOTE — TELEPHONE ENCOUNTER
Incoming fax from [address] [facility] # [phone]    Placed in TO in basket for review

## 2023-07-24 RX ORDER — CEPHALEXIN 500 MG/1
500 CAPSULE ORAL 2 TIMES DAILY
Qty: 20 CAPSULE | Refills: 0 | Status: SHIPPED | OUTPATIENT
Start: 2023-07-24

## 2023-07-26 ENCOUNTER — TELEPHONE (OUTPATIENT)
Dept: INTERNAL MEDICINE CLINIC | Facility: CLINIC | Age: 78
End: 2023-07-26

## 2023-07-26 RX ORDER — CEPHALEXIN 500 MG/1
500 CAPSULE ORAL 2 TIMES DAILY
Qty: 20 CAPSULE | Refills: 0 | Status: SHIPPED | OUTPATIENT
Start: 2023-07-26

## 2023-07-26 NOTE — TELEPHONE ENCOUNTER
Spoke to patient to ensure that mcm was read. Relayed results and recommenations. Patient requesting abx to be sent to mail order because she has no way of picking up rx from local pharmacy. Advised patient to [facility] to ensure that they are able to ship antibiotic to her soon. TO is aware.

## 2023-07-26 NOTE — TELEPHONE ENCOUNTER
Gisselle Horowitz MD  7/24/2023  5:38 PM CDT       +UTI    rec Keflex 500mg BID for 10d      ordered

## 2023-07-28 ENCOUNTER — TELEPHONE (OUTPATIENT)
Dept: INTERNAL MEDICINE CLINIC | Facility: CLINIC | Age: 78
End: 2023-07-28

## 2023-07-31 ENCOUNTER — TELEPHONE (OUTPATIENT)
Dept: INTERNAL MEDICINE CLINIC | Facility: CLINIC | Age: 78
End: 2023-07-31

## 2023-08-08 ENCOUNTER — TELEPHONE (OUTPATIENT)
Dept: INTERNAL MEDICINE CLINIC | Facility: CLINIC | Age: 78
End: 2023-08-08

## 2023-08-10 RX ORDER — LOSARTAN POTASSIUM 25 MG/1
25 TABLET ORAL DAILY
Qty: 30 TABLET | Refills: 1 | Status: SHIPPED | OUTPATIENT
Start: 2023-08-10 | End: 2023-08-10

## 2023-08-10 RX ORDER — LOSARTAN POTASSIUM 25 MG/1
25 TABLET ORAL DAILY
Qty: 90 TABLET | Refills: 0 | Status: SHIPPED | OUTPATIENT
Start: 2023-08-10

## 2023-08-10 NOTE — TELEPHONE ENCOUNTER
Requesting    Name from pharmacy: LOSARTAN 25MG TABLETS         Will file in chart as: LOSARTAN 25 MG Oral Tab    Sig: TAKE 1 TABLET(25 MG) BY MOUTH DAILY    Disp: 30 tablet    Refills: 0 (Pharmacy requested: Not specified)    Start: 8/10/2023    Class: Normal    Last ordered: 3 weeks ago by Cherry Holley MD Last refill: 7/13/2023    Rx #: [id number]    Hypertension Medications Protocol Whsfkd66/10/2023 05:59 AM   Protocol Details CMP or BMP in past 12 months    Last serum creatinine< 2.0    Appointment in past 6 or next 3 months           LOV: 7/17/2023  RTC: 6 months   Last Relevant Labs: 7/17/2023  Filled: 7/14/2023 #30 with 0 refills    No future appointments.

## 2023-09-04 RX ORDER — LOSARTAN POTASSIUM 25 MG/1
25 TABLET ORAL DAILY
Qty: 30 TABLET | Refills: 11 | Status: SHIPPED | OUTPATIENT
Start: 2023-09-04

## 2023-09-04 RX ORDER — ATORVASTATIN CALCIUM 20 MG/1
20 TABLET, FILM COATED ORAL DAILY
Qty: 30 TABLET | Refills: 11 | Status: SHIPPED | OUTPATIENT
Start: 2023-09-04

## 2023-09-04 RX ORDER — MIRTAZAPINE 7.5 MG/1
7.5 TABLET, FILM COATED ORAL NIGHTLY
Qty: 90 TABLET | Refills: 3 | Status: SHIPPED | OUTPATIENT
Start: 2023-09-04

## 2023-09-04 RX ORDER — METOPROLOL SUCCINATE 25 MG/1
25 TABLET, EXTENDED RELEASE ORAL DAILY
Qty: 90 TABLET | Refills: 3 | Status: SHIPPED | OUTPATIENT
Start: 2023-09-04

## 2023-09-04 RX ORDER — CEPHALEXIN 500 MG/1
500 CAPSULE ORAL 2 TIMES DAILY
Qty: 20 CAPSULE | Refills: 35 | OUTPATIENT
Start: 2023-09-04

## 2023-09-04 RX ORDER — OMEPRAZOLE 20 MG/1
20 CAPSULE, DELAYED RELEASE ORAL EVERY MORNING
Qty: 90 CAPSULE | Refills: 3 | Status: SHIPPED | OUTPATIENT
Start: 2023-09-04

## 2023-09-06 RX ORDER — GABAPENTIN 300 MG/1
300 CAPSULE ORAL 2 TIMES DAILY
Qty: 180 CAPSULE | Refills: 1 | Status: SHIPPED | OUTPATIENT
Start: 2023-09-06

## 2023-09-12 ENCOUNTER — HOSPITAL ENCOUNTER (INPATIENT)
Facility: HOSPITAL | Age: 78
LOS: 3 days | Discharge: HOME HEALTH | End: 2023-09-16
Attending: EMERGENCY MEDICINE | Admitting: INTERNAL MEDICINE
Payer: MEDICARE

## 2023-09-12 ENCOUNTER — HOSPITAL ENCOUNTER (INPATIENT)
Facility: HOSPITAL | Age: 78
LOS: 3 days | Discharge: HOME HEALTH | DRG: 871 | End: 2023-09-16
Attending: EMERGENCY MEDICINE | Admitting: INTERNAL MEDICINE
Payer: MEDICARE

## 2023-09-12 ENCOUNTER — TELEPHONE (OUTPATIENT)
Dept: INTERNAL MEDICINE CLINIC | Facility: CLINIC | Age: 78
End: 2023-09-12

## 2023-09-12 ENCOUNTER — APPOINTMENT (OUTPATIENT)
Dept: GENERAL RADIOLOGY | Facility: HOSPITAL | Age: 78
End: 2023-09-12
Attending: EMERGENCY MEDICINE
Payer: MEDICARE

## 2023-09-12 ENCOUNTER — APPOINTMENT (OUTPATIENT)
Dept: GENERAL RADIOLOGY | Facility: HOSPITAL | Age: 78
DRG: 871 | End: 2023-09-12
Attending: EMERGENCY MEDICINE
Payer: MEDICARE

## 2023-09-12 DIAGNOSIS — E87.5 HYPERKALEMIA: ICD-10-CM

## 2023-09-12 DIAGNOSIS — U07.1 COVID-19: ICD-10-CM

## 2023-09-12 DIAGNOSIS — A41.9 SEPSIS DUE TO URINARY TRACT INFECTION  (HCC): Primary | ICD-10-CM

## 2023-09-12 DIAGNOSIS — N39.0 SEPSIS DUE TO URINARY TRACT INFECTION  (HCC): Primary | ICD-10-CM

## 2023-09-12 DIAGNOSIS — N17.9 AKI (ACUTE KIDNEY INJURY) (HCC): ICD-10-CM

## 2023-09-12 LAB
ALBUMIN SERPL-MCNC: 3 G/DL (ref 3.4–5)
ALBUMIN/GLOB SERPL: 0.6 {RATIO} (ref 1–2)
ALP LIVER SERPL-CCNC: 226 U/L
ALT SERPL-CCNC: 26 U/L
ANION GAP SERPL CALC-SCNC: 7 MMOL/L (ref 0–18)
AST SERPL-CCNC: 25 U/L (ref 15–37)
BASOPHILS # BLD AUTO: 0.04 X10(3) UL (ref 0–0.2)
BASOPHILS NFR BLD AUTO: 0.2 %
BILIRUB SERPL-MCNC: 0.4 MG/DL (ref 0.1–2)
BILIRUB UR QL STRIP.AUTO: NEGATIVE
BUN BLD-MCNC: 62 MG/DL (ref 7–18)
CALCIUM BLD-MCNC: 9.4 MG/DL (ref 8.5–10.1)
CHLORIDE SERPL-SCNC: 101 MMOL/L (ref 98–112)
CO2 SERPL-SCNC: 24 MMOL/L (ref 21–32)
COLOR UR AUTO: YELLOW
CREAT BLD-MCNC: 2.79 MG/DL
EGFRCR SERPLBLD CKD-EPI 2021: 17 ML/MIN/1.73M2 (ref 60–?)
EOSINOPHIL # BLD AUTO: 0.05 X10(3) UL (ref 0–0.7)
EOSINOPHIL NFR BLD AUTO: 0.3 %
ERYTHROCYTE [DISTWIDTH] IN BLOOD BY AUTOMATED COUNT: 15.7 %
GLOBULIN PLAS-MCNC: 5.4 G/DL (ref 2.8–4.4)
GLUCOSE BLD-MCNC: 263 MG/DL (ref 70–99)
GLUCOSE BLD-MCNC: 273 MG/DL (ref 70–99)
GLUCOSE UR STRIP.AUTO-MCNC: NORMAL MG/DL
HCT VFR BLD AUTO: 40.9 %
HGB BLD-MCNC: 12.7 G/DL
IMM GRANULOCYTES # BLD AUTO: 0.12 X10(3) UL (ref 0–1)
IMM GRANULOCYTES NFR BLD: 0.7 %
KETONES UR STRIP.AUTO-MCNC: NEGATIVE MG/DL
LACTATE SERPL-SCNC: 3 MMOL/L (ref 0.4–2)
LEUKOCYTE ESTERASE UR QL STRIP.AUTO: 500
LIPASE SERPL-CCNC: 35 U/L (ref 13–75)
LYMPHOCYTES # BLD AUTO: 1.87 X10(3) UL (ref 1–4)
LYMPHOCYTES NFR BLD AUTO: 11.2 %
MCH RBC QN AUTO: 27.5 PG (ref 26–34)
MCHC RBC AUTO-ENTMCNC: 31.1 G/DL (ref 31–37)
MCV RBC AUTO: 88.7 FL
MONOCYTES # BLD AUTO: 1.2 X10(3) UL (ref 0.1–1)
MONOCYTES NFR BLD AUTO: 7.2 %
NEUTROPHILS # BLD AUTO: 13.45 X10 (3) UL (ref 1.5–7.7)
NEUTROPHILS # BLD AUTO: 13.45 X10(3) UL (ref 1.5–7.7)
NEUTROPHILS NFR BLD AUTO: 80.4 %
NITRITE UR QL STRIP.AUTO: NEGATIVE
OSMOLALITY SERPL CALC.SUM OF ELEC: 301 MOSM/KG (ref 275–295)
PH UR STRIP.AUTO: 6.5 [PH] (ref 5–8)
PLATELET # BLD AUTO: 407 10(3)UL (ref 150–450)
POTASSIUM SERPL-SCNC: 5.7 MMOL/L (ref 3.5–5.1)
PROT SERPL-MCNC: 8.4 G/DL (ref 6.4–8.2)
PROT UR STRIP.AUTO-MCNC: 70 MG/DL
RBC # BLD AUTO: 4.61 X10(6)UL
SARS-COV-2 RNA RESP QL NAA+PROBE: DETECTED
SODIUM SERPL-SCNC: 132 MMOL/L (ref 136–145)
SP GR UR STRIP.AUTO: 1.01 (ref 1–1.03)
TROPONIN I HIGH SENSITIVITY: 6 NG/L
UROBILINOGEN UR STRIP.AUTO-MCNC: NORMAL MG/DL
WBC # BLD AUTO: 16.7 X10(3) UL (ref 4–11)
WBC #/AREA URNS AUTO: >50 /HPF
WBC CLUMPS UR QL AUTO: PRESENT /HPF

## 2023-09-12 PROCEDURE — 71045 X-RAY EXAM CHEST 1 VIEW: CPT | Performed by: EMERGENCY MEDICINE

## 2023-09-12 RX ORDER — SODIUM POLYSTYRENE SULFONATE 4.1 MEQ/G
15 POWDER, FOR SUSPENSION ORAL; RECTAL ONCE
Status: COMPLETED | OUTPATIENT
Start: 2023-09-12 | End: 2023-09-13

## 2023-09-12 RX ORDER — CALCIUM GLUCONATE 10 MG/ML
1 INJECTION, SOLUTION INTRAVENOUS ONCE
Status: COMPLETED | OUTPATIENT
Start: 2023-09-12 | End: 2023-09-13

## 2023-09-12 RX ORDER — DEXTROSE MONOHYDRATE 25 G/50ML
50 INJECTION, SOLUTION INTRAVENOUS ONCE
Status: COMPLETED | OUTPATIENT
Start: 2023-09-12 | End: 2023-09-12

## 2023-09-12 RX ORDER — MELATONIN
325
COMMUNITY

## 2023-09-12 RX ORDER — ACETAMINOPHEN 500 MG
1000 TABLET ORAL ONCE
Status: COMPLETED | OUTPATIENT
Start: 2023-09-12 | End: 2023-09-12

## 2023-09-13 PROBLEM — E78.5 DYSLIPIDEMIA: Status: ACTIVE | Noted: 2023-09-13

## 2023-09-13 PROBLEM — N17.9 AKI (ACUTE KIDNEY INJURY) (HCC): Status: ACTIVE | Noted: 2023-09-13

## 2023-09-13 PROBLEM — N17.9 AKI (ACUTE KIDNEY INJURY): Status: ACTIVE | Noted: 2023-09-13

## 2023-09-13 PROBLEM — U07.1 COVID-19: Status: ACTIVE | Noted: 2023-09-13

## 2023-09-13 LAB
ALBUMIN SERPL-MCNC: 2.3 G/DL (ref 3.4–5)
ALBUMIN/GLOB SERPL: 0.5 {RATIO} (ref 1–2)
ALP LIVER SERPL-CCNC: 177 U/L
ALT SERPL-CCNC: 27 U/L
ANION GAP SERPL CALC-SCNC: 8 MMOL/L (ref 0–18)
APTT PPP: 42.9 SECONDS (ref 23.3–35.6)
AST SERPL-CCNC: 24 U/L (ref 15–37)
ATRIAL RATE: 87 BPM
BASOPHILS # BLD AUTO: 0.04 X10(3) UL (ref 0–0.2)
BASOPHILS NFR BLD AUTO: 0.3 %
BILIRUB SERPL-MCNC: 0.4 MG/DL (ref 0.1–2)
BUN BLD-MCNC: 56 MG/DL (ref 7–18)
CALCIUM BLD-MCNC: 8.2 MG/DL (ref 8.5–10.1)
CHLORIDE SERPL-SCNC: 112 MMOL/L (ref 98–112)
CO2 SERPL-SCNC: 21 MMOL/L (ref 21–32)
CREAT BLD-MCNC: 2.36 MG/DL
EGFRCR SERPLBLD CKD-EPI 2021: 20 ML/MIN/1.73M2 (ref 60–?)
EOSINOPHIL # BLD AUTO: 0.02 X10(3) UL (ref 0–0.7)
EOSINOPHIL NFR BLD AUTO: 0.1 %
ERYTHROCYTE [DISTWIDTH] IN BLOOD BY AUTOMATED COUNT: 15.8 %
GLOBULIN PLAS-MCNC: 4.4 G/DL (ref 2.8–4.4)
GLUCOSE BLD-MCNC: 159 MG/DL (ref 70–99)
GLUCOSE BLD-MCNC: 173 MG/DL (ref 70–99)
GLUCOSE BLD-MCNC: 180 MG/DL (ref 70–99)
GLUCOSE BLD-MCNC: 212 MG/DL (ref 70–99)
GLUCOSE BLD-MCNC: 222 MG/DL (ref 70–99)
GLUCOSE BLD-MCNC: 289 MG/DL (ref 70–99)
HCT VFR BLD AUTO: 35.3 %
HGB BLD-MCNC: 10.9 G/DL
IMM GRANULOCYTES # BLD AUTO: 0.08 X10(3) UL (ref 0–1)
IMM GRANULOCYTES NFR BLD: 0.6 %
INR BLD: 1.68 (ref 0.85–1.16)
LACTATE SERPL-SCNC: 1.7 MMOL/L (ref 0.4–2)
LACTATE SERPL-SCNC: 1.8 MMOL/L (ref 0.4–2)
LACTATE SERPL-SCNC: 2.4 MMOL/L (ref 0.4–2)
LACTATE SERPL-SCNC: 2.5 MMOL/L (ref 0.4–2)
LYMPHOCYTES # BLD AUTO: 1.16 X10(3) UL (ref 1–4)
LYMPHOCYTES NFR BLD AUTO: 8.7 %
MCH RBC QN AUTO: 28.3 PG (ref 26–34)
MCHC RBC AUTO-ENTMCNC: 30.9 G/DL (ref 31–37)
MCV RBC AUTO: 91.7 FL
MONOCYTES # BLD AUTO: 0.73 X10(3) UL (ref 0.1–1)
MONOCYTES NFR BLD AUTO: 5.4 %
NEUTROPHILS # BLD AUTO: 11.37 X10 (3) UL (ref 1.5–7.7)
NEUTROPHILS # BLD AUTO: 11.37 X10(3) UL (ref 1.5–7.7)
NEUTROPHILS NFR BLD AUTO: 84.9 %
OSMOLALITY SERPL CALC.SUM OF ELEC: 311 MOSM/KG (ref 275–295)
P AXIS: -22 DEGREES
P-R INTERVAL: 144 MS
PLATELET # BLD AUTO: 278 10(3)UL (ref 150–450)
POTASSIUM SERPL-SCNC: 4.7 MMOL/L (ref 3.5–5.1)
PROT SERPL-MCNC: 6.7 G/DL (ref 6.4–8.2)
PROTHROMBIN TIME: 19.7 SECONDS (ref 11.6–14.8)
Q-T INTERVAL: 358 MS
QRS DURATION: 78 MS
QTC CALCULATION (BEZET): 430 MS
R AXIS: 74 DEGREES
RBC # BLD AUTO: 3.85 X10(6)UL
SODIUM SERPL-SCNC: 141 MMOL/L (ref 136–145)
T AXIS: -66 DEGREES
VENTRICULAR RATE: 87 BPM
WBC # BLD AUTO: 13.4 X10(3) UL (ref 4–11)

## 2023-09-13 PROCEDURE — 99223 1ST HOSP IP/OBS HIGH 75: CPT | Performed by: INTERNAL MEDICINE

## 2023-09-13 RX ORDER — NICOTINE POLACRILEX 4 MG
15 LOZENGE BUCCAL
Status: DISCONTINUED | OUTPATIENT
Start: 2023-09-13 | End: 2023-09-16

## 2023-09-13 RX ORDER — MIRTAZAPINE 7.5 MG/1
7.5 TABLET, FILM COATED ORAL NIGHTLY
Status: DISCONTINUED | OUTPATIENT
Start: 2023-09-13 | End: 2023-09-16

## 2023-09-13 RX ORDER — ACETAMINOPHEN 500 MG
500 TABLET ORAL EVERY 4 HOURS PRN
Status: DISCONTINUED | OUTPATIENT
Start: 2023-09-13 | End: 2023-09-16

## 2023-09-13 RX ORDER — METOCLOPRAMIDE HYDROCHLORIDE 5 MG/ML
5 INJECTION INTRAMUSCULAR; INTRAVENOUS EVERY 8 HOURS PRN
Status: DISCONTINUED | OUTPATIENT
Start: 2023-09-13 | End: 2023-09-16

## 2023-09-13 RX ORDER — ONDANSETRON 2 MG/ML
4 INJECTION INTRAMUSCULAR; INTRAVENOUS EVERY 6 HOURS PRN
Status: DISCONTINUED | OUTPATIENT
Start: 2023-09-13 | End: 2023-09-16

## 2023-09-13 RX ORDER — ZINC SULFATE 50(220)MG
220 CAPSULE ORAL DAILY
Status: DISCONTINUED | OUTPATIENT
Start: 2023-09-13 | End: 2023-09-16

## 2023-09-13 RX ORDER — ATORVASTATIN CALCIUM 20 MG/1
20 TABLET, FILM COATED ORAL DAILY
Status: DISCONTINUED | OUTPATIENT
Start: 2023-09-13 | End: 2023-09-16

## 2023-09-13 RX ORDER — DEXTROSE MONOHYDRATE 25 G/50ML
50 INJECTION, SOLUTION INTRAVENOUS
Status: DISCONTINUED | OUTPATIENT
Start: 2023-09-13 | End: 2023-09-16

## 2023-09-13 RX ORDER — NICOTINE POLACRILEX 4 MG
30 LOZENGE BUCCAL
Status: DISCONTINUED | OUTPATIENT
Start: 2023-09-13 | End: 2023-09-16

## 2023-09-13 RX ORDER — PANTOPRAZOLE SODIUM 20 MG/1
20 TABLET, DELAYED RELEASE ORAL
Status: DISCONTINUED | OUTPATIENT
Start: 2023-09-13 | End: 2023-09-16

## 2023-09-13 RX ORDER — SODIUM CHLORIDE 9 MG/ML
INJECTION, SOLUTION INTRAVENOUS CONTINUOUS
Status: DISCONTINUED | OUTPATIENT
Start: 2023-09-13 | End: 2023-09-15

## 2023-09-13 RX ORDER — SODIUM CHLORIDE 9 MG/ML
INJECTION, SOLUTION INTRAVENOUS CONTINUOUS
Status: ACTIVE | OUTPATIENT
Start: 2023-09-13 | End: 2023-09-13

## 2023-09-13 RX ORDER — GABAPENTIN 300 MG/1
300 CAPSULE ORAL 2 TIMES DAILY
Status: DISCONTINUED | OUTPATIENT
Start: 2023-09-13 | End: 2023-09-16

## 2023-09-13 RX ORDER — MELATONIN
325
Status: DISCONTINUED | OUTPATIENT
Start: 2023-09-13 | End: 2023-09-16

## 2023-09-13 RX ORDER — DOCUSATE SODIUM 100 MG/1
100 CAPSULE, LIQUID FILLED ORAL DAILY PRN
Status: DISCONTINUED | OUTPATIENT
Start: 2023-09-13 | End: 2023-09-16

## 2023-09-13 NOTE — PLAN OF CARE
NURSING ADMISSION NOTE      Patient admitted via Cart  Oriented to room. Safety precautions initiated. Bed in low position. Call light in reach. Assumed care of pt around 0145. Pt alert and oriented x4, on RA. Denies any pain or SOB. NSR on tele. Abdomen soft, non tender. POC: IV fluids, IV antibiotics. Pt updated on plan of care. All questions answered, verbalized understanding. Problem: Patient/Family Goals  Goal: Patient/Family Long Term Goal  Description: Patient's Long Term Goal: stay out of the hospital    Interventions:  - take all medications as prescribed  - attend all follow up appointments  - See additional Care Plan goals for specific interventions  Outcome: Progressing  Goal: Patient/Family Short Term Goal  Description: Patient's Short Term Goal: go home    Interventions:   - take all medications as prescribed  - all testing as ordered by providers  - See additional Care Plan goals for specific interventions  Outcome: Progressing     Problem: SAFETY ADULT - FALL  Goal: Free from fall injury  Description: INTERVENTIONS:  - Assess pt frequently for physical needs  - Identify cognitive and physical deficits and behaviors that affect risk of falls.   - [place] fall precautions as indicated by assessment.  - Educate pt/family on patient safety including physical limitations  - Instruct pt to call for assistance with activity based on assessment  - Modify environment to reduce risk of injury  - Provide assistive devices as appropriate  - Consider OT/PT consult to assist with strengthening/mobility  - Encourage toileting schedule  Outcome: Progressing     Problem: RESPIRATORY - ADULT  Goal: Achieves optimal ventilation and oxygenation  Description: INTERVENTIONS:  - Assess for changes in respiratory status  - Assess for changes in mentation and behavior  - Position to facilitate oxygenation and minimize respiratory effort  - Oxygen supplementation based on oxygen saturation or ABGs  - Provide Smoking Cessation handout, if applicable  - Encourage broncho-pulmonary hygiene including cough, deep breathe, Incentive Spirometry  - Assess the need for suctioning and perform as needed  - Assess and instruct to report SOB or any respiratory difficulty  - Respiratory Therapy support as indicated  - Manage/alleviate anxiety  - Monitor for signs/symptoms of CO2 retention  Outcome: Progressing

## 2023-09-13 NOTE — PHYSICAL THERAPY NOTE
Order received for PT eval per functional mobility screen and chart reviewed. Pt is bed bound at baseline and requires total assist for all ADL/mobility. Pt resides with spouse and has a 24hr caregiver. No skilled therapy needs. PT will sign off.

## 2023-09-13 NOTE — PROGRESS NOTES
[facility] Pharmacy Note:  Renal Adjustment for piperacillin/tazobactam (ZOSYN)    Claudette Durham is a [de-identified]year old patient who has been prescribed piperacillin/tazobactam (ZOSYN) 3.375 gm every 8 hrs. The estimated creatinine clearance is 16.8 mL/min (A) (based on SCr of 2.79 mg/dL (H)). The dose has been adjusted to piperacillin/tazobactam (ZOSYN) 4.5 gm every 12 hrs per hospital renal dose adjustment protocol for treatment of UTI. Pharmacy will follow and adjust dose as warranted for additional renal function changes.     Thank you,    Dixie Pablo, PharmD  9/13/2023  4:50 AM

## 2023-09-13 NOTE — OCCUPATIONAL THERAPY NOTE
OT orders to eval and treat received via functional mobility screening protocol. Pt has been bed bound and requiring max to total assist for all self-care since Sept/2022. Pt has 24 hour caregiver at home.  OT will sign off at this time

## 2023-09-13 NOTE — PROGRESS NOTES
09/13/23 0912   Clinical Encounter Type   Visited With Health care provider  (Spoke with bedside nurse to explain -communion candelaria do not provided communion to COVID patients.)   Mandaeism Encounters   Spiritual Requests During Visit / Hospitalization [address] Joelle Farris Patient wants communion      responded to the spiritual consult for Communion request.  Patient is diagnosed with COVID.  explained to the bedside nurse- communion stewards do not enter [facility] rooms therefore, the patient will not be able to receive communion. Spiritual care staff [facility]  will provide communion to the patient Saturday if the patient is available. Spiritual Care support can be requested via an Epic consult. Rev.  [address]

## 2023-09-13 NOTE — PLAN OF CARE
Patient Efrem Law. On room air. NSR on tele. No c/o pain. Remains on IVF and IV Antibiotics. Isolation precautions maintained. Incontinent of bowel and bladder. Call light within reach. Repositioned. Plan of care updated. Problem: Patient/Family Goals  Goal: Patient/Family Long Term Goal  Description: Patient's Long Term Goal: stay out of the hospital    Interventions:  - take all medications as prescribed  - attend all follow up appointments  - See additional Care Plan goals for specific interventions  Outcome: Progressing  Goal: Patient/Family Short Term Goal  Description: Patient's Short Term Goal: go home    Interventions:   - take all medications as prescribed  - all testing as ordered by providers  - See additional Care Plan goals for specific interventions  Outcome: Progressing     Problem: SAFETY ADULT - FALL  Goal: Free from fall injury  Description: INTERVENTIONS:  - Assess pt frequently for physical needs  - Identify cognitive and physical deficits and behaviors that affect risk of falls.   - [place] fall precautions as indicated by assessment.  - Educate pt/family on patient safety including physical limitations  - Instruct pt to call for assistance with activity based on assessment  - Modify environment to reduce risk of injury  - Provide assistive devices as appropriate  - Consider OT/PT consult to assist with strengthening/mobility  - Encourage toileting schedule  Outcome: Progressing     Problem: RESPIRATORY - ADULT  Goal: Achieves optimal ventilation and oxygenation  Description: INTERVENTIONS:  - Assess for changes in respiratory status  - Assess for changes in mentation and behavior  - Position to facilitate oxygenation and minimize respiratory effort  - Oxygen supplementation based on oxygen saturation or ABGs  - Provide Smoking Cessation handout, if applicable  - Encourage broncho-pulmonary hygiene including cough, deep breathe, Incentive Spirometry  - Assess the need for suctioning and perform as needed  - Assess and instruct to report SOB or any respiratory difficulty  - Respiratory Therapy support as indicated  - Manage/alleviate anxiety  - Monitor for signs/symptoms of CO2 retention  Outcome: Progressing

## 2023-09-14 LAB
ANION GAP SERPL CALC-SCNC: 6 MMOL/L (ref 0–18)
BASOPHILS # BLD AUTO: 0.03 X10(3) UL (ref 0–0.2)
BASOPHILS NFR BLD AUTO: 0.3 %
BUN BLD-MCNC: 45 MG/DL (ref 7–18)
CALCIUM BLD-MCNC: 8.4 MG/DL (ref 8.5–10.1)
CHLORIDE SERPL-SCNC: 113 MMOL/L (ref 98–112)
CO2 SERPL-SCNC: 21 MMOL/L (ref 21–32)
CREAT BLD-MCNC: 1.91 MG/DL
EGFRCR SERPLBLD CKD-EPI 2021: 26 ML/MIN/1.73M2 (ref 60–?)
EOSINOPHIL # BLD AUTO: 0.13 X10(3) UL (ref 0–0.7)
EOSINOPHIL NFR BLD AUTO: 1.2 %
ERYTHROCYTE [DISTWIDTH] IN BLOOD BY AUTOMATED COUNT: 15.8 %
GLUCOSE BLD-MCNC: 106 MG/DL (ref 70–99)
GLUCOSE BLD-MCNC: 123 MG/DL (ref 70–99)
GLUCOSE BLD-MCNC: 146 MG/DL (ref 70–99)
GLUCOSE BLD-MCNC: 179 MG/DL (ref 70–99)
GLUCOSE BLD-MCNC: 192 MG/DL (ref 70–99)
GLUCOSE BLD-MCNC: 232 MG/DL (ref 70–99)
HCT VFR BLD AUTO: 31.2 %
HGB BLD-MCNC: 9.9 G/DL
IMM GRANULOCYTES # BLD AUTO: 0.17 X10(3) UL (ref 0–1)
IMM GRANULOCYTES NFR BLD: 1.6 %
LYMPHOCYTES # BLD AUTO: 2.16 X10(3) UL (ref 1–4)
LYMPHOCYTES NFR BLD AUTO: 20.2 %
MCH RBC QN AUTO: 28.4 PG (ref 26–34)
MCHC RBC AUTO-ENTMCNC: 31.7 G/DL (ref 31–37)
MCV RBC AUTO: 89.4 FL
MONOCYTES # BLD AUTO: 0.96 X10(3) UL (ref 0.1–1)
MONOCYTES NFR BLD AUTO: 9 %
NEUTROPHILS # BLD AUTO: 7.22 X10 (3) UL (ref 1.5–7.7)
NEUTROPHILS # BLD AUTO: 7.22 X10(3) UL (ref 1.5–7.7)
NEUTROPHILS NFR BLD AUTO: 67.7 %
OSMOLALITY SERPL CALC.SUM OF ELEC: 302 MOSM/KG (ref 275–295)
PLATELET # BLD AUTO: 261 10(3)UL (ref 150–450)
POTASSIUM SERPL-SCNC: 3.9 MMOL/L (ref 3.5–5.1)
RBC # BLD AUTO: 3.49 X10(6)UL
SODIUM SERPL-SCNC: 140 MMOL/L (ref 136–145)
WBC # BLD AUTO: 10.7 X10(3) UL (ref 4–11)

## 2023-09-14 PROCEDURE — 99232 SBSQ HOSP IP/OBS MODERATE 35: CPT | Performed by: HOSPITALIST

## 2023-09-14 NOTE — PLAN OF CARE
Patient Loye Sea. On room air. NSR on tele. No c/o pain. Remains on IVF and IV antibiotics. Incontinent of bowel and bladder. Marcela Apo in place. Repositioned,plan of care updated. Isolation precautions maintained. Call light within reach  Problem: Patient/Family Goals  Goal: Patient/Family Long Term Goal  Description: Patient's Long Term Goal: stay out of the hospital    Interventions:  - take all medications as prescribed  - attend all follow up appointments  - See additional Care Plan goals for specific interventions  Outcome: Progressing  Goal: Patient/Family Short Term Goal  Description: Patient's Short Term Goal: go home    Interventions:   - take all medications as prescribed  - all testing as ordered by providers  - See additional Care Plan goals for specific interventions  Outcome: Progressing     Problem: SAFETY ADULT - FALL  Goal: Free from fall injury  Description: INTERVENTIONS:  - Assess pt frequently for physical needs  - Identify cognitive and physical deficits and behaviors that affect risk of falls.   - [place] fall precautions as indicated by assessment.  - Educate pt/family on patient safety including physical limitations  - Instruct pt to call for assistance with activity based on assessment  - Modify environment to reduce risk of injury  - Provide assistive devices as appropriate  - Consider OT/PT consult to assist with strengthening/mobility  - Encourage toileting schedule  Outcome: Progressing     Problem: RESPIRATORY - ADULT  Goal: Achieves optimal ventilation and oxygenation  Description: INTERVENTIONS:  - Assess for changes in respiratory status  - Assess for changes in mentation and behavior  - Position to facilitate oxygenation and minimize respiratory effort  - Oxygen supplementation based on oxygen saturation or ABGs  - Provide Smoking Cessation handout, if applicable  - Encourage broncho-pulmonary hygiene including cough, deep breathe, Incentive Spirometry  - Assess the need for suctioning and perform as needed  - Assess and instruct to report SOB or any respiratory difficulty  - Respiratory Therapy support as indicated  - Manage/alleviate anxiety  - Monitor for signs/symptoms of CO2 retention  Outcome: Progressing

## 2023-09-14 NOTE — PLAN OF CARE
Assumed care of patient at [address]. Pt A/Ox 4. O2 sats maintained on room air. NSR on tele. Incontinent, purewick in place. Pt reports no pain. Total assist. Pt updated on plan of care. Care needs met. Bed in lowest position, Call light within reach. Bed alarm on. Isolation precautions maintained. Bunny boots on. Repositioning as needed. POC: IVF, IV antibiotics, BC and urine cultures pending     Problem: Patient/Family Goals  Goal: Patient/Family Long Term Goal  Description: Patient's Long Term Goal: stay out of the hospital    Interventions:  - take all medications as prescribed  - attend all follow up appointments  - See additional Care Plan goals for specific interventions  Outcome: Progressing  Goal: Patient/Family Short Term Goal  Description: Patient's Short Term Goal: go home    Interventions:   - take all medications as prescribed  - all testing as ordered by providers  - See additional Care Plan goals for specific interventions  Outcome: Progressing     Problem: SAFETY ADULT - FALL  Goal: Free from fall injury  Description: INTERVENTIONS:  - Assess pt frequently for physical needs  - Identify cognitive and physical deficits and behaviors that affect risk of falls.   - [place] fall precautions as indicated by assessment.  - Educate pt/family on patient safety including physical limitations  - Instruct pt to call for assistance with activity based on assessment  - Modify environment to reduce risk of injury  - Provide assistive devices as appropriate  - Consider OT/PT consult to assist with strengthening/mobility  - Encourage toileting schedule  Outcome: Progressing     Problem: RESPIRATORY - ADULT  Goal: Achieves optimal ventilation and oxygenation  Description: INTERVENTIONS:  - Assess for changes in respiratory status  - Assess for changes in mentation and behavior  - Position to facilitate oxygenation and minimize respiratory effort  - Oxygen supplementation based on oxygen saturation or ABGs  - Provide Smoking Cessation handout, if applicable  - Encourage broncho-pulmonary hygiene including cough, deep breathe, Incentive Spirometry  - Assess the need for suctioning and perform as needed  - Assess and instruct to report SOB or any respiratory difficulty  - Respiratory Therapy support as indicated  - Manage/alleviate anxiety  - Monitor for signs/symptoms of CO2 retention  Outcome: Progressing

## 2023-09-15 LAB
GLUCOSE BLD-MCNC: 127 MG/DL (ref 70–99)
GLUCOSE BLD-MCNC: 169 MG/DL (ref 70–99)
GLUCOSE BLD-MCNC: 181 MG/DL (ref 70–99)
GLUCOSE BLD-MCNC: 183 MG/DL (ref 70–99)

## 2023-09-15 PROCEDURE — 99232 SBSQ HOSP IP/OBS MODERATE 35: CPT | Performed by: HOSPITALIST

## 2023-09-15 RX ORDER — HYDRALAZINE HYDROCHLORIDE 20 MG/ML
10 INJECTION INTRAMUSCULAR; INTRAVENOUS EVERY 6 HOURS PRN
Status: DISCONTINUED | OUTPATIENT
Start: 2023-09-15 | End: 2023-09-16

## 2023-09-15 NOTE — PLAN OF CARE
Assumed care for patient at 0700. AOx4. Reports feeling fatigued. NSR on cardiac monitor. Adequate saturation on RA. Lung sounds clear, diminished. Denies sob, chest discomfort, n/v.  Denies pain. Fair appetite. Incontinent of bowel and bladder. Shannan care provided. Max assist x2 with turning and repositioning in bed. Heel protector boots in place. Problem: Patient/Family Goals  Goal: Patient/Family Long Term Goal  Description: Patient's Long Term Goal: stay out of the hospital    Interventions:  - take all medications as prescribed  - attend all follow up appointments  - See additional Care Plan goals for specific interventions  Outcome: Progressing  Goal: Patient/Family Short Term Goal  Description: Patient's Short Term Goal: go home    Interventions:   - take all medications as prescribed  - all testing as ordered by providers  - See additional Care Plan goals for specific interventions  Outcome: Progressing     Problem: SAFETY ADULT - FALL  Goal: Free from fall injury  Description: INTERVENTIONS:  - Assess pt frequently for physical needs  - Identify cognitive and physical deficits and behaviors that affect risk of falls.   - [place] fall precautions as indicated by assessment.  - Educate pt/family on patient safety including physical limitations  - Instruct pt to call for assistance with activity based on assessment  - Modify environment to reduce risk of injury  - Provide assistive devices as appropriate  - Consider OT/PT consult to assist with strengthening/mobility  - Encourage toileting schedule  Outcome: Progressing     Problem: RESPIRATORY - ADULT  Goal: Achieves optimal ventilation and oxygenation  Description: INTERVENTIONS:  - Assess for changes in respiratory status  - Assess for changes in mentation and behavior  - Position to facilitate oxygenation and minimize respiratory effort  - Oxygen supplementation based on oxygen saturation or ABGs  - Provide Smoking Cessation handout, if applicable  - Encourage broncho-pulmonary hygiene including cough, deep breathe, Incentive Spirometry  - Assess the need for suctioning and perform as needed  - Assess and instruct to report SOB or any respiratory difficulty  - Respiratory Therapy support as indicated  - Manage/alleviate anxiety  - Monitor for signs/symptoms of CO2 retention  Outcome: Progressing

## 2023-09-16 VITALS
RESPIRATION RATE: 20 BRPM | HEIGHT: 69 IN | BODY MASS INDEX: 32.58 KG/M2 | HEART RATE: 70 BPM | WEIGHT: 220 LBS | DIASTOLIC BLOOD PRESSURE: 61 MMHG | TEMPERATURE: 97 F | OXYGEN SATURATION: 95 % | SYSTOLIC BLOOD PRESSURE: 131 MMHG

## 2023-09-16 LAB
ANION GAP SERPL CALC-SCNC: 6 MMOL/L (ref 0–18)
BASOPHILS # BLD AUTO: 0.04 X10(3) UL (ref 0–0.2)
BASOPHILS NFR BLD AUTO: 0.4 %
BUN BLD-MCNC: 34 MG/DL (ref 7–18)
CALCIUM BLD-MCNC: 8.4 MG/DL (ref 8.5–10.1)
CHLORIDE SERPL-SCNC: 114 MMOL/L (ref 98–112)
CO2 SERPL-SCNC: 21 MMOL/L (ref 21–32)
CREAT BLD-MCNC: 1.56 MG/DL
EGFRCR SERPLBLD CKD-EPI 2021: 33 ML/MIN/1.73M2 (ref 60–?)
EOSINOPHIL # BLD AUTO: 0.11 X10(3) UL (ref 0–0.7)
EOSINOPHIL NFR BLD AUTO: 1 %
ERYTHROCYTE [DISTWIDTH] IN BLOOD BY AUTOMATED COUNT: 15.6 %
GLUCOSE BLD-MCNC: 125 MG/DL (ref 70–99)
GLUCOSE BLD-MCNC: 148 MG/DL (ref 70–99)
GLUCOSE BLD-MCNC: 217 MG/DL (ref 70–99)
GLUCOSE BLD-MCNC: 226 MG/DL (ref 70–99)
HCT VFR BLD AUTO: 33.7 %
HGB BLD-MCNC: 10.6 G/DL
IMM GRANULOCYTES # BLD AUTO: 0.06 X10(3) UL (ref 0–1)
IMM GRANULOCYTES NFR BLD: 0.6 %
LYMPHOCYTES # BLD AUTO: 1.54 X10(3) UL (ref 1–4)
LYMPHOCYTES NFR BLD AUTO: 14.5 %
MAGNESIUM SERPL-MCNC: 1.7 MG/DL (ref 1.6–2.6)
MCH RBC QN AUTO: 28 PG (ref 26–34)
MCHC RBC AUTO-ENTMCNC: 31.5 G/DL (ref 31–37)
MCV RBC AUTO: 88.9 FL
MONOCYTES # BLD AUTO: 0.83 X10(3) UL (ref 0.1–1)
MONOCYTES NFR BLD AUTO: 7.8 %
NEUTROPHILS # BLD AUTO: 8.06 X10 (3) UL (ref 1.5–7.7)
NEUTROPHILS # BLD AUTO: 8.06 X10(3) UL (ref 1.5–7.7)
NEUTROPHILS NFR BLD AUTO: 75.7 %
OSMOLALITY SERPL CALC.SUM OF ELEC: 301 MOSM/KG (ref 275–295)
PLATELET # BLD AUTO: 278 10(3)UL (ref 150–450)
POTASSIUM SERPL-SCNC: 4.1 MMOL/L (ref 3.5–5.1)
RBC # BLD AUTO: 3.79 X10(6)UL
SODIUM SERPL-SCNC: 141 MMOL/L (ref 136–145)
WBC # BLD AUTO: 10.6 X10(3) UL (ref 4–11)

## 2023-09-16 PROCEDURE — 99239 HOSP IP/OBS DSCHRG MGMT >30: CPT | Performed by: HOSPITALIST

## 2023-09-16 RX ORDER — CIPROFLOXACIN 500 MG/1
500 TABLET, FILM COATED ORAL 2 TIMES DAILY
Qty: 10 TABLET | Refills: 0 | Status: SHIPPED | OUTPATIENT
Start: 2023-09-16 | End: 2023-09-16

## 2023-09-16 RX ORDER — CIPROFLOXACIN 500 MG/1
500 TABLET, FILM COATED ORAL 2 TIMES DAILY
Qty: 10 TABLET | Refills: 0 | Status: SHIPPED | OUTPATIENT
Start: 2023-09-16 | End: 2023-09-21

## 2023-09-16 RX ORDER — CIPROFLOXACIN 500 MG/1
500 TABLET, FILM COATED ORAL
Qty: 10 TABLET | Refills: 0 | Status: DISCONTINUED | OUTPATIENT
Start: 2023-09-16 | End: 2023-09-16

## 2023-09-16 RX ORDER — MAGNESIUM OXIDE 400 MG/1
400 TABLET ORAL ONCE
Status: COMPLETED | OUTPATIENT
Start: 2023-09-16 | End: 2023-09-16

## 2023-09-16 NOTE — PROGRESS NOTES
Discharge     Patient cleared for discharge by all services. Discharge education and handout provided   Medications reviewed   Telemetry discontinued. All belongings sent home with patient.    Patient escorted home by ambulance

## 2023-09-16 NOTE — PLAN OF CARE
Assumed care of patient at 1. Patient is alert and orientated x4. Currently room air. Lung sounds diminished. Continuous pulse ox maintained. NSR on tele. Incontinent of bowel and bladder. External catheter in place. No complaints of pain. Total assist. Call light within reach. Fall precautions in place. Plan of care:  IV abx    ~2352: Messaged on call hosp about blood pressure and IV fluids. See new orders    Problem: SAFETY ADULT - FALL  Goal: Free from fall injury  Description: INTERVENTIONS:  - Assess pt frequently for physical needs  - Identify cognitive and physical deficits and behaviors that affect risk of falls.   - [place] fall precautions as indicated by assessment.  - Educate pt/family on patient safety including physical limitations  - Instruct pt to call for assistance with activity based on assessment  - Modify environment to reduce risk of injury  - Provide assistive devices as appropriate  - Consider OT/PT consult to assist with strengthening/mobility  - Encourage toileting schedule  Outcome: Progressing     Problem: RESPIRATORY - ADULT  Goal: Achieves optimal ventilation and oxygenation  Description: INTERVENTIONS:  - Assess for changes in respiratory status  - Assess for changes in mentation and behavior  - Position to facilitate oxygenation and minimize respiratory effort  - Oxygen supplementation based on oxygen saturation or ABGs  - Provide Smoking Cessation handout, if applicable  - Encourage broncho-pulmonary hygiene including cough, deep breathe, Incentive Spirometry  - Assess the need for suctioning and perform as needed  - Assess and instruct to report SOB or any respiratory difficulty  - Respiratory Therapy support as indicated  - Manage/alleviate anxiety  - Monitor for signs/symptoms of CO2 retention  Outcome: Progressing

## 2023-09-16 NOTE — CM/SW NOTE
09/16/23 1300   Discharge disposition   Expected discharge disposition Home-Health   Post Acute Care Provider [facility]   Discharge transportation [facility]     Notified by RN pt is cleared for dc. DC plans to DC back home with [facility] RN. MELECIO entered and liaison aware of DC. Per reports has a 24 hr caregiver available. Transport called and able to transport around 6pm, PCS completed.      NAGA Blackmon  Discharge Planner  [id number]

## 2023-09-18 ENCOUNTER — PATIENT OUTREACH (OUTPATIENT)
Dept: CASE MANAGEMENT | Age: 78
End: 2023-09-18

## 2023-09-18 DIAGNOSIS — A41.9 SEPSIS DUE TO URINARY TRACT INFECTION: Primary | ICD-10-CM

## 2023-09-18 DIAGNOSIS — N39.0 SEPSIS DUE TO URINARY TRACT INFECTION: Primary | ICD-10-CM

## 2023-09-18 PROCEDURE — 1111F DSCHRG MED/CURRENT MED MERGE: CPT

## 2023-09-18 NOTE — PROGRESS NOTES
KOKI attempted to contact the patient for HFU. Man answered the phone and stated that the patient is currently with the [facility] RN. KOKI will try again another time.

## 2023-09-19 ENCOUNTER — TELEPHONE (OUTPATIENT)
Dept: INTERNAL MEDICINE CLINIC | Facility: CLINIC | Age: 78
End: 2023-09-19

## 2023-09-19 NOTE — TELEPHONE ENCOUNTER
Spoke to pt for TCM today. Pt does not have an appointment scheduled at this time. NCM attempted to schedule this appointment however patient declined and stated she will call at another time to schedule after talking to her  about the appointment. TCM/HFU appt recommended by 09/30/2023 as pt is a high risk for readmission. Please advise. BOOK BY DATE (last date for TCM): 09/30/2023    Clinical staff:  Please f/u with pt and try to get them to schedule as pt would greatly benefit from a TCM/HFU appt. Thank you!

## 2023-09-19 NOTE — TELEPHONE ENCOUNTER
Call placed to patient. Patient agreeable to schedule HFU with Dr. Yeimi Morrison. Patient states \"I do need time to arrange transportation as I am in a wheel chair\".   Appointment made     Future Appointments   Date Time Provider Alejandro Wendy   9/27/2023  1:00 PM Angela Muniz MD [facility] [place]

## 2023-09-26 ENCOUNTER — TELEPHONE (OUTPATIENT)
Dept: INTERNAL MEDICINE CLINIC | Facility: CLINIC | Age: 78
End: 2023-09-26

## 2023-09-26 NOTE — TELEPHONE ENCOUNTER
Mukesh Earl with [facility] called stating the patient needs to be re certified for home health for continuing wound care of the right ankle. Please advise.

## 2023-09-27 ENCOUNTER — TELEPHONE (OUTPATIENT)
Dept: INTERNAL MEDICINE CLINIC | Facility: CLINIC | Age: 78
End: 2023-09-27

## 2023-09-27 ENCOUNTER — OFFICE VISIT (OUTPATIENT)
Dept: INTERNAL MEDICINE CLINIC | Facility: CLINIC | Age: 78
End: 2023-09-27
Payer: MEDICARE

## 2023-09-27 VITALS
DIASTOLIC BLOOD PRESSURE: 70 MMHG | OXYGEN SATURATION: 99 % | SYSTOLIC BLOOD PRESSURE: 122 MMHG | HEART RATE: 89 BPM | TEMPERATURE: 98 F

## 2023-09-27 DIAGNOSIS — I10 BENIGN ESSENTIAL HTN: ICD-10-CM

## 2023-09-27 DIAGNOSIS — N39.0 SEPSIS DUE TO URINARY TRACT INFECTION: Primary | ICD-10-CM

## 2023-09-27 DIAGNOSIS — A41.9 SEPSIS DUE TO URINARY TRACT INFECTION: Primary | ICD-10-CM

## 2023-09-27 DIAGNOSIS — N17.9 AKI (ACUTE KIDNEY INJURY) (HCC): ICD-10-CM

## 2023-09-27 DIAGNOSIS — E11.69 TYPE 2 DIABETES MELLITUS WITH OTHER SPECIFIED COMPLICATION, WITHOUT LONG-TERM CURRENT USE OF INSULIN (HCC): ICD-10-CM

## 2023-09-27 PROCEDURE — G0008 ADMIN INFLUENZA VIRUS VAC: HCPCS | Performed by: FAMILY MEDICINE

## 2023-09-27 PROCEDURE — 90662 IIV NO PRSV INCREASED AG IM: CPT | Performed by: FAMILY MEDICINE

## 2023-09-27 PROCEDURE — 1111F DSCHRG MED/CURRENT MED MERGE: CPT | Performed by: FAMILY MEDICINE

## 2023-09-27 PROCEDURE — 99495 TRANSJ CARE MGMT MOD F2F 14D: CPT | Performed by: FAMILY MEDICINE

## 2023-09-27 RX ORDER — CEFDINIR 300 MG/1
1 CAPSULE ORAL 2 TIMES DAILY
COMMUNITY

## 2023-09-27 RX ORDER — BENAZEPRIL HYDROCHLORIDE AND HYDROCHLOROTHIAZIDE 20; 12.5 MG/1; MG/1
TABLET ORAL
COMMUNITY

## 2023-09-27 RX ORDER — CYCLOBENZAPRINE HCL 10 MG
TABLET ORAL
COMMUNITY

## 2023-09-27 RX ORDER — DOXYCYCLINE HYCLATE 100 MG/1
CAPSULE ORAL
COMMUNITY

## 2023-09-27 RX ORDER — CEPHALEXIN 500 MG/1
CAPSULE ORAL
COMMUNITY
Start: 2023-09-17

## 2023-09-27 RX ORDER — AMLODIPINE BESYLATE 5 MG/1
TABLET ORAL
COMMUNITY

## 2023-09-27 NOTE — TELEPHONE ENCOUNTER
[facility]  [phone]    Delmy Is calling to discuss patients diagnosis, she is requesting a call back.     1.acute kidney injury  2.hyperkalemia

## 2023-09-27 NOTE — TELEPHONE ENCOUNTER
TO: Delorise Homans from [facility] would like clarification on past Dx of :   1)BRITTANY    2)hyperkalemia    Are these past medical issues or current? Please advise.      Labs 7/17--Slight increase in the Cr, suspect d/t the DM, BP meds etc    sandra 6 mo     *Delorise Homans stated OK to leave detailed message on her VM

## 2023-09-28 ENCOUNTER — TELEPHONE (OUTPATIENT)
Dept: INTERNAL MEDICINE CLINIC | Facility: CLINIC | Age: 78
End: 2023-09-28

## 2023-09-29 ENCOUNTER — TELEPHONE (OUTPATIENT)
Dept: INTERNAL MEDICINE CLINIC | Facility: CLINIC | Age: 78
End: 2023-09-29

## 2023-10-05 ENCOUNTER — TELEPHONE (OUTPATIENT)
Dept: INTERNAL MEDICINE CLINIC | Facility: CLINIC | Age: 78
End: 2023-10-05

## 2023-10-05 ENCOUNTER — LAB REQUISITION (OUTPATIENT)
Dept: LAB | Facility: HOSPITAL | Age: 78
End: 2023-10-05
Payer: MEDICARE

## 2023-10-05 DIAGNOSIS — N39.0 URINARY TRACT INFECTION, SITE NOT SPECIFIED: ICD-10-CM

## 2023-10-05 DIAGNOSIS — M86.672 CHRONIC OSTEOMYELITIS INVOLVING LEFT ANKLE AND FOOT (HCC): Primary | ICD-10-CM

## 2023-10-05 LAB
BILIRUB UR QL STRIP.AUTO: NEGATIVE
GLUCOSE UR STRIP.AUTO-MCNC: NORMAL MG/DL
KETONES UR STRIP.AUTO-MCNC: NEGATIVE MG/DL
LEUKOCYTE ESTERASE UR QL STRIP.AUTO: 500
NITRITE UR QL STRIP.AUTO: NEGATIVE
PH UR STRIP.AUTO: 7 [PH] (ref 5–8)
RBC UR QL AUTO: NEGATIVE
SP GR UR STRIP.AUTO: 1.01 (ref 1–1.03)
UROBILINOGEN UR STRIP.AUTO-MCNC: NORMAL MG/DL

## 2023-10-05 PROCEDURE — 87077 CULTURE AEROBIC IDENTIFY: CPT | Performed by: FAMILY MEDICINE

## 2023-10-05 PROCEDURE — 87086 URINE CULTURE/COLONY COUNT: CPT | Performed by: FAMILY MEDICINE

## 2023-10-05 PROCEDURE — 81001 URINALYSIS AUTO W/SCOPE: CPT | Performed by: FAMILY MEDICINE

## 2023-10-05 PROCEDURE — 87186 SC STD MICRODIL/AGAR DIL: CPT | Performed by: FAMILY MEDICINE

## 2023-10-05 NOTE — TELEPHONE ENCOUNTER
Nitin/[facility] calling to inform that specimen was collected but they need orders. Faxed UA with Culture Reflex order to [phone]. Received confirmation.

## 2023-10-06 ENCOUNTER — TELEPHONE (OUTPATIENT)
Dept: INTERNAL MEDICINE CLINIC | Facility: CLINIC | Age: 78
End: 2023-10-06

## 2023-10-06 DIAGNOSIS — N39.0 RECURRENT UTI: Primary | ICD-10-CM

## 2023-10-06 RX ORDER — CIPROFLOXACIN 250 MG/1
250 TABLET, FILM COATED ORAL 2 TIMES DAILY
Qty: 20 TABLET | Refills: 0 | Status: SHIPPED | OUTPATIENT
Start: 2023-10-06 | End: 2023-10-16

## 2023-10-06 NOTE — TELEPHONE ENCOUNTER
----- Message from Shashank Rodriguez MD sent at 10/6/2023  1:44 PM CDT -----  Coming back + UTI   rec cipro again   250mg BID for 10 days   #20    sandra UC in 3 weeks

## 2023-10-06 NOTE — TELEPHONE ENCOUNTER
TO: in ordering the Cipro, a \"duplicate therapy\" alert popped up. Please sign if it can be overrided. Thanks!

## 2023-10-26 ENCOUNTER — TELEPHONE (OUTPATIENT)
Dept: INTERNAL MEDICINE CLINIC | Facility: CLINIC | Age: 78
End: 2023-10-26

## 2023-10-26 NOTE — TELEPHONE ENCOUNTER
Nitin/[facility] calling to inform that he will collect specimen but needs orders. Faxed Urine Culture order to [phone]. Received confirmation.

## 2023-10-27 ENCOUNTER — LAB REQUISITION (OUTPATIENT)
Dept: LAB | Facility: HOSPITAL | Age: 78
End: 2023-10-27

## 2023-10-27 DIAGNOSIS — N39.0 URINARY TRACT INFECTION, SITE NOT SPECIFIED: ICD-10-CM

## 2023-10-27 PROCEDURE — 87186 SC STD MICRODIL/AGAR DIL: CPT | Performed by: FAMILY MEDICINE

## 2023-10-27 PROCEDURE — 87077 CULTURE AEROBIC IDENTIFY: CPT

## 2023-10-27 PROCEDURE — 87186 SC STD MICRODIL/AGAR DIL: CPT

## 2023-10-27 PROCEDURE — 87086 URINE CULTURE/COLONY COUNT: CPT | Performed by: FAMILY MEDICINE

## 2023-10-27 PROCEDURE — 87086 URINE CULTURE/COLONY COUNT: CPT

## 2023-10-27 PROCEDURE — 87077 CULTURE AEROBIC IDENTIFY: CPT | Performed by: FAMILY MEDICINE

## 2023-10-30 ENCOUNTER — TELEPHONE (OUTPATIENT)
Dept: INTERNAL MEDICINE CLINIC | Facility: CLINIC | Age: 78
End: 2023-10-30

## 2023-10-30 DIAGNOSIS — N39.0 FREQUENT UTI: Primary | ICD-10-CM

## 2023-10-30 RX ORDER — SULFAMETHOXAZOLE AND TRIMETHOPRIM 800; 160 MG/1; MG/1
1 TABLET ORAL 2 TIMES DAILY
Qty: 20 TABLET | Refills: 0 | Status: SHIPPED | OUTPATIENT
Start: 2023-10-30

## 2023-10-30 NOTE — TELEPHONE ENCOUNTER
Spoke to patient via phone regarding results/recommendations from provider. Patient voiced understanding. Provided information for Urology      Not taking doxycycline, cephalexin or cefdinir per patient.  Discontinued from med list.

## 2023-10-30 NOTE — TELEPHONE ENCOUNTER
----- Message from Nolvia Lawrence MD sent at 10/30/2023 11:35 AM CDT -----  +UTI again     rec bactrim DS  1 PO BID  #20    rec see urology d/t freq UTI   Dr Mo Pretty

## 2023-11-06 RX ORDER — ATORVASTATIN CALCIUM 20 MG/1
20 TABLET, FILM COATED ORAL DAILY
Qty: 90 TABLET | Refills: 0 | Status: SHIPPED | OUTPATIENT
Start: 2023-11-06

## 2023-11-06 NOTE — TELEPHONE ENCOUNTER
Requesting   atorvastatin 20 MG Oral Tab          Sig: Take 1 tablet (20 mg total) by mouth daily.     Disp: 30 tablet    Refills: 11    Start: 11/4/2023    Class: Normal    Non-formulary    Last ordered: 2 months ago (9/4/2023) by Derek Roper MD    Cholesterol Medication Protocol Pijzef0811/04/2023 04:46 PM   Protocol Details ALT < 80    ALT resulted within past year    Lipid panel within past 12 months    Appointment within past 12 or next 3 months        LOV: 9/27/2023  RTC: none noted   Last Relevant Labs: 7/17/2023  Filled: 9/24/2023 #30 with 11 refills    Future Appointments   Date Time Provider [facility]   3/27/2024  8:00 AM Ted Jordan MD [facility] [place]

## 2023-11-28 ENCOUNTER — TELEPHONE (OUTPATIENT)
Dept: INTERNAL MEDICINE CLINIC | Facility: CLINIC | Age: 78
End: 2023-11-28

## 2023-11-28 NOTE — TELEPHONE ENCOUNTER
Nitin/[facility] [phone]    Patient to be discharged from [facility] 11/29/23  Wound healed, no further clinical need  Faxed orders to follow, return call not necessary

## 2023-12-01 ENCOUNTER — TELEPHONE (OUTPATIENT)
Dept: INTERNAL MEDICINE CLINIC | Facility: CLINIC | Age: 78
End: 2023-12-01

## 2023-12-01 NOTE — TELEPHONE ENCOUNTER
Incoming fax from [facility]    Discharge/Transfer Report     Placed in TO in basket for review

## 2023-12-06 ENCOUNTER — TELEPHONE (OUTPATIENT)
Dept: INTERNAL MEDICINE CLINIC | Facility: CLINIC | Age: 78
End: 2023-12-06

## 2023-12-06 DIAGNOSIS — N39.0 RECURRENT UTI: Primary | ICD-10-CM

## 2023-12-06 NOTE — TELEPHONE ENCOUNTER
Pt stated she got an appt with uro NP Andrea Terrell on 12/12. Pt stated she actually has no way of doing a UA/UC. She can't have someone  sterile cup and drop it off at lab. Her caretaker does not drive or have car.  is in wheelchair. Spoke with TO. He stated will not order abx without a UA/UC. To call us if s/s worsen or proceed to ER. Relayed recs to pt. She v/u of recs and will call us if s/s worsen or proceed to ER.

## 2023-12-06 NOTE — TELEPHONE ENCOUNTER
Spoke with pt. Pt stated Dr. Everett Montenegro does not have any appts until February. Advised her she could call back and ask if any of his partners have openings sooner. Or to be put on a wait list.     Pt also stated the past 2 days she has a slight burning sensation when she urinates. Not everytime she urinates, but she's concerned it will worsen. Pt would like abx sent in. Pt denies abdominal pain, no fever/chills, denies blood in urine. Pt is bed bound and on a Pure wick system, so she can't really sense if there is urgency in urination. Pt stated in the past [facility] would take a urine collection, but [facility] nurse no longer visits. Please advise:   1) Send in abx for pt? Order labs and have family member collect and drop off at lab? 2) any further recs for urologist outside of Dr. Mercy Garces office?

## 2023-12-06 NOTE — TELEPHONE ENCOUNTER
Patient calling to let Dr. Inessa Anderson know that she is unable to get in to see Dr. Charise Cowden until 02/2024. She would like to know if there is another doctor he can recommend. She would also like to request a refill on antibiotic for uti, she states she is feeling discomfort.

## 2023-12-12 ENCOUNTER — OFFICE VISIT (OUTPATIENT)
Dept: SURGERY | Facility: CLINIC | Age: 78
End: 2023-12-12
Payer: MEDICARE

## 2023-12-12 DIAGNOSIS — E11.65 UNCONTROLLED TYPE 2 DIABETES MELLITUS WITH HYPERGLYCEMIA (HCC): ICD-10-CM

## 2023-12-12 DIAGNOSIS — R82.90 URINE FINDING: ICD-10-CM

## 2023-12-12 DIAGNOSIS — N20.0 NEPHROLITHIASIS: ICD-10-CM

## 2023-12-12 DIAGNOSIS — N39.0 RECURRENT UTI: Primary | ICD-10-CM

## 2023-12-12 LAB
APPEARANCE: CLEAR
BILIRUBIN: NEGATIVE
GLUCOSE (URINE DIPSTICK): NEGATIVE MG/DL
KETONES (URINE DIPSTICK): NEGATIVE MG/DL
MULTISTIX LOT#: ABNORMAL NUMERIC
NITRITE, URINE: NEGATIVE
OCCULT BLOOD: NEGATIVE
PROTEIN (URINE DIPSTICK): >=300 MG/DL
SPECIFIC GRAVITY: 1.01 (ref 1–1.03)
UROBILINOGEN,SEMI-QN: 0.2 MG/DL (ref 0–1.9)

## 2023-12-12 PROCEDURE — 99214 OFFICE O/P EST MOD 30 MIN: CPT | Performed by: PHYSICIAN ASSISTANT

## 2023-12-12 PROCEDURE — 81003 URINALYSIS AUTO W/O SCOPE: CPT | Performed by: PHYSICIAN ASSISTANT

## 2023-12-12 NOTE — PROGRESS NOTES
[facility], [address], [address]    Urology Note    History of Present Illness:   Patient is a [de-identified]year old female with hx of hypertension, HLD, diabetes mellitus type 2, atrial fibrillation, macular degeneration, s/p hysterectomy, who presents today for evaluation of recurrent UTI. Previously seen for nephrolithiasis. Patient here from PCP office due to multiple positive urine cultures. She denies any symptoms of UTI. Has chronic urinary incontinence and has purewick that is used during the day. Her caretaker, who accompanies her today brought sample and noted to have significant debris. UA trace leuks only, negative nitrites. Was admitted in September for UTI. Blood cultures negative. Urine culture multiple E Coli strains with multiple resistances. Drinks 8-12 oz of water daily. No constipation. No abdominal or flank pain, no n/v/f/c. Last A1c 7.9% 7/17/23    Seen last in August 2022 for retained ureteral stent (placed in June 2022 by Dr. Tracey Strickland for obstructing ureteral stone/UTI.) Underwent cystoscopy, ureteroscopic extraction of right ureteral stent on 8/23/22 with Dr. Josr Garcia. Has not been seen since. Had CT w/wo in March 2023 for vascular evaluation d/t left heel gangrene. Previously when she was seen she had indwelling reilly catheter, unclear if for urinary retention or used to decompress system solely due to prior infection.        HISTORY:  Past Medical History:   Diagnosis Date    Arrhythmia     Deep vein thrombosis (HCC)     HIGH BLOOD PRESSURE     HIGH CHOLESTEROL     History of cardiac cath 03/2001    post procedure data    Macular degeneration of right eye     Measles without mention of complication     Type II or unspecified type diabetes mellitus without mention of complication, not stated as uncontrolled     Varicella without mention of complication     Visual impairment       Past Surgical History:   Procedure Laterality Date    CHOLECYSTECTOMY 2005    COLONOSCOPY  12/19/2013    Procedure: COLONOSCOPY;  Surgeon: Vidhi Busby MD;  Location: [facility] ENDOSCOPY    COLONOSCOPY  12/19/13     Alfred  sandra 10    COLONOSCOPY & POLYPECTOMY  11/24/2003    +diminutive polyp; repeat 10 years [address] [facility] FRACTURE SURGERY Left 08/31/2022    FEMUR IM NAIL ANTEGRADE LEFT    HYSTERECTOMY  1989    removal of left ovary    NEEDLE BIOPSY RIGHT  11/2019    benign us guided x 2    OTHER SURGICAL HISTORY  1972    salpingo-oophorectomy right side    OTHER SURGICAL HISTORY  01/01/2020    Cysto, Left RPG,stent insertion      Family History   Problem Relation Age of Onset    Other (Lung Cancer) Mother       Social History:   Social History     Socioeconomic History    Marital status:    Tobacco Use    Smoking status: Never    Smokeless tobacco: Never   Vaping Use    Vaping Use: Never used   Substance and Sexual Activity    Alcohol use: Not Currently    Drug use: Not Currently   Other Topics Concern    Caffeine Concern No    Exercise No    Seat Belt Yes     Social Determinants of Health     Financial Resource Strain: Low Risk  (9/19/2023)    Financial Resource Strain     Difficulty of Paying Living Expenses: Not hard at all   Transportation Needs: No Transportation Needs (9/19/2023)    Transportation Needs     Lack of Transportation: No        Allergies  No Known Allergies    Review of Systems:   A 10-point review of systems was completed and is negative other than as noted above. Physical Exam:   There were no vitals taken for this visit.     GENERAL APPEARANCE: well developed, well nourished, in no acute distress  NEUROLOGIC: no localizing neurologic signs, alert and oriented x 3, converses appropriately  HEAD: atraumatic, normocephalic  EYES: sclera non-icteric  ORAL CAVITY: mucosa moist  NECK/THYROID: no obvious masses or goiter  LUNGS: non-labored breathing  EXTREMITIES: in boots  SKIN: no obvious rashes    Results:     Laboratory Data:  Lab Results   Component Value Date    WBC 10.6 09/16/2023    HGB 10.6 (L) 09/16/2023    .0 09/16/2023     Lab Results   Component Value Date     09/16/2023    K 4.1 09/16/2023     (H) 09/16/2023    CO2 21.0 09/16/2023    BUN 34 (H) 09/16/2023     (H) 09/16/2023    GFRAA 23 (L) 07/30/2022    AST 24 09/13/2023    ALT 27 09/13/2023    TP 6.7 09/13/2023    ALB 2.3 (L) 09/13/2023    PHOS 3.3 12/31/2021    CA 8.4 (L) 09/16/2023    MG 1.7 09/16/2023       Urinalysis Results (last three years):  Recent Labs     12/30/21  2051 04/01/22  0022 06/03/22  0000 07/30/22  1323 08/15/22  1300 08/19/22  1540 09/12/22  1744 09/21/22  0220 11/15/22  1230 03/17/23  1546 07/21/23  1400 09/12/23  2132 10/05/23  1400 12/12/23  1315   COLORUR Straw Yellow  --  Yellow Red* Red* Nidia* Yellow Yellow Yellow Yellow Yellow Light-Yellow  --    CLARITY Clear Hazy*  --  Clear Cloudy* Hazy* Cloudy* Hazy* Cloudy* Clear Cloudy* Turbid* Turbid*  --    SPECGRAVITY 1.013 1.014  --  1.020 1.020 1.004 1.009 1.011 1.011 1.014 1.011 1.013 1.013 1.010   PHURINE 7.0 6.0  --  7.0 5.0 6.0 6.0 6.0 7.0 5.0 9.0* 6.5 7.0  --    PROUR 30* 100*  --  >=300* >=300* Negative 100* Negative 30* Negative 100* 70* Trace*  --    GLUUR Negative Negative  --  Negative Negative Negative Negative Negative Negative Negative Negative Normal Normal  --    KETUR Negative Trace*  --  Negative 15* Negative Negative Negative Negative Negative Negative Negative Negative  --    BILUR Negative Negative  --  Negative  --  Negative Negative Negative Negative Negative Negative Negative Negative  --    BLOODURINE Negative Large* 0.2 Large* Large* Moderate* Small* Negative Negative Moderate* Negative Trace* Negative  --    NITRITE Negative Negative  --  Positive* Negative Negative Negative Negative Negative Negative Negative Negative Negative Negative   UROBILINOGEN <2.0 <2.0  --  1.0* 1.0* <2.0 <2.0 <2.0 <2.0 <2.0 <2.0 Normal Normal  --    LEUUR Negative Small*  --  Large* Large* Moderate* Large* Moderate* Trace* Moderate* Moderate* 500* 500*  --    WBCUR 6-10* >50* 0-5 >50* >50* 11-20* >50* 21-50* 6-10* 11-20* None Seen >50* 21-50*  --    RBCUR 0-2 >10*  --  >10* >10* >10* >10* 6-10* 0-2 0-2 None Seen 3-5* 3-5*  --    BACUR None Seen None Seen many 3+* 1+* None Seen 3+* 1+* Rare* Rare* None Seen 3+* 3+*  --        Urine Culture Results (last three years):  Lab Results   Component Value Date    URINECUL >100,000 CFU/ML Escherichia coli  ESBL Pos (A) 10/27/2023    URINECUL  10/05/2023     10-50,000 cfu/ml Multiple species present-probable contamination. URINECUL 50,000-99,000 CFU/ML Escherichia coli  ESBL Pos (A) 10/05/2023    URINECUL >100,000 CFU/ML Escherichia coli (A) 09/12/2023    URINECUL >100,000 CFU/ML Escherichia coli (A) 09/12/2023    URINECUL >100,000 CFU/ML Proteus mirabilis (A) 07/21/2023    URINECUL <10,000 CFU/ML Gram negative booker 07/21/2023    URINECUL 10,000 - 50,000 CFU/ML Candida tropicalis (A) 03/17/2023    URINECUL >100,000 CFU/ML Escherichia coli (A) 11/15/2022    URINECUL >100,000 CFU/ML Escherichia coli (A) 11/15/2022    URINECUL  09/21/2022     >100,000 cfu/ml Multiple species present- probable contamination. URINECUL >100,000 CFU/ML Enterococcus species VRE (A) 09/12/2022    URINECUL <10,000 CFU/ML Mixed gram negative booker 09/12/2022    URINECUL No Growth at 18-24 hrs. 08/19/2022    URINECUL  08/15/2022     10-50,000 cfu/ml Multiple species present-probable contamination. Imaging  No results found. Impression:     Patient is a [de-identified]year old female with hx of hypertension, HLD, diabetes mellitus type 2, atrial fibrillation, macular degeneration, s/p hysterectomy, who presents today for evaluation of recurrent UTI. Previously seen for nephrolithiasis. Reviewed with patient and caretaker potential risk factors for recurrent UTI. We reviewed cystitis tx and prevention strategies at today's visit and I provided educational materials for this.  Discussed dietery and behavioral issues. Encouraged the patient to drink at least 40-60 oz water per day or enough to keep urine clear. Given her history of nephrolithiasis, very possible that stone burden contributing to recurrent bacteriuria. Last CT scan images from March 2023 personally reviewed, large bilateral stone burden, no obstructing stone noted. Recommendations:  CT stone protocol. Must increase water intake and close monitoring of her sugars. Pending results will make further recommendations. Thank you very much for this consult. Please call if there are any questions or concerns.      Lenny Beckwith PA-C  Urology  [address]    Date: 12/12/2023

## 2024-03-04 RX ORDER — GABAPENTIN 300 MG/1
300 CAPSULE ORAL 2 TIMES DAILY
Qty: 180 CAPSULE | Refills: 0 | Status: SHIPPED | OUTPATIENT
Start: 2024-03-04

## 2024-03-04 RX ORDER — ATORVASTATIN CALCIUM 20 MG/1
20 TABLET, FILM COATED ORAL DAILY
Qty: 90 TABLET | Refills: 0 | Status: SHIPPED | OUTPATIENT
Start: 2024-03-04

## 2024-03-04 NOTE — TELEPHONE ENCOUNTER
Requesting    Name from pharmacy: ATORVASTATIN TABS 20MG         Will file in chart as: ATORVASTATIN 20 MG Oral Tab    Sig: TAKE 1 TABLET DAILY    Disp: 90 tablet    Refills: 3    Start: 3/3/2024    Class: Normal    Non-formulary    Last ordered: 3 months ago (11/6/2023) by Florencio Bowers MD    Last refill: 11/17/2023    Rx #: [id number]    Cholesterol Medication Protocol Syictp3103/03/2024 10:39 AM   Protocol Details ALT < 80    ALT resulted within past year    Lipid panel within past 12 months    In person appointment or virtual visit in the past 12 mos or appointment in next 3 mos       Name from pharmacy: GABAPENTIN CAPS 300MG         Will file in chart as: GABAPENTIN 300 MG Oral Cap    Sig: TAKE 1 CAPSULE IN THE MORNING AND 1 CAPSULE BEFORE BEDTIME    Disp: 180 capsule    Refills: 3    Start: 3/3/2024    Class: Normal    Non-formulary    Last ordered: 6 months ago (9/6/2023) by Florencio Bowers MD    Last refill: 11/12/2023    Rx #: [id number]    Neurology Medications Ztgwhk7103/03/2024 10:39 AM   Protocol Details In person appointment or virtual visit in the past 6 mos or appointment in next 3 mos           LOV: 9/27/2023  RTC: none noted  Last Relevant Labs: 7/17/2023  Filled:   Atorvastatin-11/6/2023 #90 with 0 refills  Gabapentin-9/6/2023 #180 with 1 refills  Future Appointments   Date Time Provider Department Center   3/7/2024  4:00 PM PF CT 1 PF CT [place]   3/27/2024  8:00 AM Florencio Bowers MD [facility] [place]

## 2024-03-07 ENCOUNTER — HOSPITAL ENCOUNTER (OUTPATIENT)
Dept: CT IMAGING | Age: 79
Discharge: HOME | End: 2024-03-07
Attending: PHYSICIAN ASSISTANT
Payer: MEDICARE

## 2024-03-07 DIAGNOSIS — N39.0 RECURRENT UTI: ICD-10-CM

## 2024-03-07 DIAGNOSIS — N20.0 NEPHROLITHIASIS: ICD-10-CM

## 2024-03-07 PROCEDURE — 74176 CT ABD & PELVIS W/O CONTRAST: CPT | Performed by: PHYSICIAN ASSISTANT

## 2024-03-18 RX ORDER — CYCLOBENZAPRINE HCL 10 MG
TABLET ORAL
Refills: 0 | OUTPATIENT
Start: 2024-03-18

## 2024-03-18 RX ORDER — OMEPRAZOLE 20 MG/1
20 CAPSULE, DELAYED RELEASE ORAL EVERY MORNING
Qty: 90 CAPSULE | Refills: 0 | Status: SHIPPED | OUTPATIENT
Start: 2024-03-18

## 2024-03-18 NOTE — TELEPHONE ENCOUNTER
Requesting   omeprazole 20 MG Oral Capsule Delayed Release          Sig: Take 1 capsule (20 mg total) by mouth every morning.    Disp: 90 capsule    Refills: 3    Start: 3/18/2024    Class: Normal    Last ordered: 6 months ago (9/4/2023) by Florencio Bowers MD    Patient comment: Dr Bowers, Yesterday I may have ordered the wrong prescription. This is the prescription that I intended to order.    Gastrointestional Medication Protocol [id number]/18/2024 08:11 AM   Protocol Details In person appointment or virtual visit in the past 12 mos or appointment in next 3 mos        LOV: 9/27/2023  RTC: none noted   Last Relevant Labs: n/a   Filled: 3/3/2023 #90 with 0 refills    Future Appointments   Date Time Provider Department Center   3/27/2024  8:00 AM Florencio Bowers MD [facility] [place]

## 2024-03-21 ENCOUNTER — TELEPHONE (OUTPATIENT)
Dept: INTERNAL MEDICINE CLINIC | Facility: CLINIC | Age: 79
End: 2024-03-21

## 2024-03-21 NOTE — TELEPHONE ENCOUNTER
Spoke with pt's spouse Ceasar. Explained recs from TO. Spouse does not think it is urgent. Pt is not having altered mental status. Advised him he could also call urology if they have concerns for possible start of uti. But should be seen in ER if they suspect uti.     He is more concerned with her knee injury from a few weeks ago. Informed him again that we can't give recs without seeing pt. So can either set up appt with TO or take pt to an IC where they could assess knee and possibly do imaging. Spouse preferred to see TO and set up appt for next week.     Future Appointments   Date Time Provider Department Center   3/28/2024  4:00 PM Florencio Bowers MD [facility] [place]   4/18/2024  1:00 PM Florencio Bowers MD [facility] [place]

## 2024-03-21 NOTE — TELEPHONE ENCOUNTER
Received following message from patients  on [facility] voice mail     Hi lott calling on behalf of his wife Payton lott [phone]. Payton is lethargic and in-home health care person told Hi she needs in home medical services    WILFRIDO--Please contact patient to get further information

## 2024-03-26 ENCOUNTER — TELEPHONE (OUTPATIENT)
Dept: SURGERY | Facility: CLINIC | Age: 79
End: 2024-03-26

## 2024-03-26 NOTE — TELEPHONE ENCOUNTER
Pt called stating pt received results of the cat scan on [employer].   Pt is unable to come to the office for an appointment.  Please call pt to discuss the results.

## 2024-03-26 NOTE — TELEPHONE ENCOUNTER
MATHEUS Martell: For your review.   Pt states she is unable to come into the office for a visit.

## 2024-03-28 ENCOUNTER — LAB ENCOUNTER (OUTPATIENT)
Dept: LAB | Age: 79
End: 2024-03-28
Attending: FAMILY MEDICINE
Payer: MEDICARE

## 2024-03-28 ENCOUNTER — OFFICE VISIT (OUTPATIENT)
Dept: INTERNAL MEDICINE CLINIC | Facility: CLINIC | Age: 79
End: 2024-03-28
Payer: MEDICARE

## 2024-03-28 VITALS
TEMPERATURE: 99 F | HEART RATE: 94 BPM | OXYGEN SATURATION: 95 % | DIASTOLIC BLOOD PRESSURE: 70 MMHG | SYSTOLIC BLOOD PRESSURE: 110 MMHG

## 2024-03-28 DIAGNOSIS — E11.69 TYPE 2 DIABETES MELLITUS WITH OTHER SPECIFIED COMPLICATION, WITHOUT LONG-TERM CURRENT USE OF INSULIN (HCC): ICD-10-CM

## 2024-03-28 DIAGNOSIS — R19.5 LOOSE BOWEL MOVEMENT: ICD-10-CM

## 2024-03-28 DIAGNOSIS — M25.562 ACUTE PAIN OF LEFT KNEE: Primary | ICD-10-CM

## 2024-03-28 PROBLEM — N39.0 SEPSIS DUE TO URINARY TRACT INFECTION (HCC): Status: RESOLVED | Noted: 2023-09-12 | Resolved: 2024-01-01

## 2024-03-28 PROBLEM — N39.0 SEPSIS DUE TO URINARY TRACT INFECTION (HCC): Status: RESOLVED | Noted: 2023-09-12 | Resolved: 2024-03-28

## 2024-03-28 PROBLEM — A41.9 SEPSIS DUE TO URINARY TRACT INFECTION (HCC): Status: RESOLVED | Noted: 2023-09-12 | Resolved: 2024-01-01

## 2024-03-28 PROBLEM — A41.9 SEPSIS DUE TO URINARY TRACT INFECTION (HCC): Status: RESOLVED | Noted: 2023-09-12 | Resolved: 2024-03-28

## 2024-03-28 LAB
ALBUMIN SERPL-MCNC: 4.1 G/DL (ref 3.2–4.8)
ALBUMIN/GLOB SERPL: 1.2 {RATIO} (ref 1–2)
ALP LIVER SERPL-CCNC: 397 U/L
ALT SERPL-CCNC: 27 U/L
ANION GAP SERPL CALC-SCNC: 9 MMOL/L (ref 0–18)
AST SERPL-CCNC: 18 U/L (ref ?–34)
BASOPHILS # BLD AUTO: 0.06 X10(3) UL (ref 0–0.2)
BASOPHILS NFR BLD AUTO: 0.3 %
BILIRUB SERPL-MCNC: 0.5 MG/DL (ref 0.2–1.1)
BUN BLD-MCNC: 24 MG/DL (ref 9–23)
BUN/CREAT SERPL: 21.2 (ref 10–20)
CALCIUM BLD-MCNC: 9.7 MG/DL (ref 8.7–10.4)
CHLORIDE SERPL-SCNC: 107 MMOL/L (ref 98–112)
CHOLEST SERPL-MCNC: 135 MG/DL (ref ?–200)
CO2 SERPL-SCNC: 25 MMOL/L (ref 21–32)
CREAT BLD-MCNC: 1.13 MG/DL
CREAT UR-SCNC: 62.3 MG/DL
DEPRECATED RDW RBC AUTO: 55.3 FL (ref 35.1–46.3)
EGFRCR SERPLBLD CKD-EPI 2021: 49 ML/MIN/1.73M2 (ref 60–?)
EOSINOPHIL # BLD AUTO: 0.02 X10(3) UL (ref 0–0.7)
EOSINOPHIL NFR BLD AUTO: 0.1 %
ERYTHROCYTE [DISTWIDTH] IN BLOOD BY AUTOMATED COUNT: 16.2 % (ref 11–15)
EST. AVERAGE GLUCOSE BLD GHB EST-MCNC: 166 MG/DL (ref 68–126)
FASTING PATIENT LIPID ANSWER: YES
FASTING STATUS PATIENT QL REPORTED: YES
GLOBULIN PLAS-MCNC: 3.5 G/DL (ref 2.8–4.4)
GLUCOSE BLD-MCNC: 160 MG/DL (ref 70–99)
HBA1C MFR BLD: 7.4 % (ref ?–5.7)
HCT VFR BLD AUTO: 40.4 %
HDLC SERPL-MCNC: 32 MG/DL (ref 40–59)
HGB BLD-MCNC: 12.1 G/DL
IMM GRANULOCYTES # BLD AUTO: 0.12 X10(3) UL (ref 0–1)
IMM GRANULOCYTES NFR BLD: 0.6 %
LDLC SERPL CALC-MCNC: 73 MG/DL (ref ?–100)
LYMPHOCYTES # BLD AUTO: 2.06 X10(3) UL (ref 1–4)
LYMPHOCYTES NFR BLD AUTO: 9.9 %
MCH RBC QN AUTO: 28.1 PG (ref 26–34)
MCHC RBC AUTO-ENTMCNC: 30 G/DL (ref 31–37)
MCV RBC AUTO: 94 FL
MICROALBUMIN UR-MCNC: 10.3 MG/DL
MICROALBUMIN/CREAT 24H UR-RTO: 165.3 UG/MG (ref ?–30)
MONOCYTES # BLD AUTO: 1.06 X10(3) UL (ref 0.1–1)
MONOCYTES NFR BLD AUTO: 5.1 %
NEUTROPHILS # BLD AUTO: 17.52 X10 (3) UL (ref 1.5–7.7)
NEUTROPHILS # BLD AUTO: 17.52 X10(3) UL (ref 1.5–7.7)
NEUTROPHILS NFR BLD AUTO: 84 %
NONHDLC SERPL-MCNC: 103 MG/DL (ref ?–130)
OSMOLALITY SERPL CALC.SUM OF ELEC: 299 MOSM/KG (ref 275–295)
PLATELET # BLD AUTO: 614 10(3)UL (ref 150–450)
POTASSIUM SERPL-SCNC: 4.6 MMOL/L (ref 3.5–5.1)
PROT SERPL-MCNC: 7.6 G/DL (ref 5.7–8.2)
RBC # BLD AUTO: 4.3 X10(6)UL
SODIUM SERPL-SCNC: 141 MMOL/L (ref 136–145)
TRIGL SERPL-MCNC: 175 MG/DL (ref 30–149)
VLDLC SERPL CALC-MCNC: 27 MG/DL (ref 0–30)
WBC # BLD AUTO: 20.8 X10(3) UL (ref 4–11)

## 2024-03-28 PROCEDURE — 80061 LIPID PANEL: CPT

## 2024-03-28 PROCEDURE — 83036 HEMOGLOBIN GLYCOSYLATED A1C: CPT

## 2024-03-28 PROCEDURE — 99214 OFFICE O/P EST MOD 30 MIN: CPT | Performed by: FAMILY MEDICINE

## 2024-03-28 PROCEDURE — 85025 COMPLETE CBC W/AUTO DIFF WBC: CPT

## 2024-03-28 PROCEDURE — 36415 COLL VENOUS BLD VENIPUNCTURE: CPT

## 2024-03-28 PROCEDURE — 82043 UR ALBUMIN QUANTITATIVE: CPT

## 2024-03-28 PROCEDURE — 80053 COMPREHEN METABOLIC PANEL: CPT

## 2024-03-28 PROCEDURE — 82570 ASSAY OF URINE CREATININE: CPT

## 2024-03-28 RX ORDER — TRAMADOL HYDROCHLORIDE 50 MG/1
50 TABLET ORAL EVERY 8 HOURS PRN
Qty: 50 TABLET | Refills: 0 | Status: SHIPPED | OUTPATIENT
Start: 2024-03-28

## 2024-03-28 NOTE — PROGRESS NOTES
Payton Henriquez is a 78 year old female.  HPI:   Few weeks ago was going to get a CT scan   She was in a WC and left leg got caught and got bent down and felt pain in the outer aspect of the knee   not sure if swelled    since then has been quite sore   She is on tylenol for it  2 PO daily    helps alittle     is overall better she feels but  thinks she may need more med for pain      is not ambulating      Also has had intermittent loose stools   not diarrhea     No abd pain    No NV   on kaopectate      Also is fasting for labs today if able          Current Outpatient Medications   Medication Sig Dispense Refill    omeprazole 20 MG Oral Capsule Delayed Release Take 1 capsule (20 mg total) by mouth every morning. 90 capsule 0    atorvastatin 20 MG Oral Tab Take 1 tablet (20 mg total) by mouth daily. 90 tablet 0    gabapentin 300 MG Oral Cap Take 1 capsule (300 mg total) by mouth in the morning and 1 capsule (300 mg total) before bedtime. 180 capsule 0    apixaban (ELIQUIS) 5 MG Oral Tab Take 1 tablet (5 mg total) by mouth 2 (two) times daily. 180 tablet 3    amLODIPine 5 MG Oral Tab       Benazepril-hydroCHLOROthiazide 20-12.5 MG Oral Tab       cyclobenzaprine 10 MG Oral Tab       ferrous sulfate 325 (65 FE) MG Oral Tab EC Take 1 tablet (325 mg total) by mouth daily with breakfast.      Zinc Gluconate 50 MG Oral Cap Take 50 mg by mouth daily.      mirtazapine 7.5 MG Oral Tab Take 1 tablet (7.5 mg total) by mouth nightly. 90 tablet 3    losartan 25 MG Oral Tab Take 1 tablet (25 mg total) by mouth daily. 30 tablet 11    metoprolol succinate ER 25 MG Oral Tablet 24 Hr Take 1 tablet (25 mg total) by mouth daily. 90 tablet 3    docusate sodium 100 MG Oral Cap Take 1 capsule (100 mg total) by mouth daily as needed for constipation.      polyethylene glycol, PEG 3350, 17 g Oral Powd Pack Take 17 g by mouth daily as needed.      acetaminophen 500 MG Oral Tab Take 2 tablets (1,000 mg total) by mouth every 6 (six)  hours as needed for Pain.      ondansetron 4 MG Oral Tablet Dispersible Take 1 tablet (4 mg total) by mouth as needed for Nausea.      Multiple Vitamins-Minerals (PRESERVISION AREDS 2) Oral Cap Take 1 tablet by mouth 2 (two) times a day.      Acidophilus/Pectin (PROBIOTIC) Oral Cap Take 1 capsule by mouth daily.      sulfamethoxazole-trimethoprim DS (BACTRIM DS) 800-160 MG Oral Tab per tablet Take 1 tablet by mouth 2 (two) times daily. (Patient not taking: Reported on 12/12/2023) 20 tablet 0      Past Medical History:   Diagnosis Date    Arrhythmia     Deep vein thrombosis (HCC)     HIGH BLOOD PRESSURE     HIGH CHOLESTEROL     History of cardiac cath 03/2001    post procedure data    Macular degeneration of right eye     Measles without mention of complication     Type II or unspecified type diabetes mellitus without mention of complication, not stated as uncontrolled     Varicella without mention of complication     Visual impairment       Social History:  Social History     Socioeconomic History    Marital status:    Tobacco Use    Smoking status: Never    Smokeless tobacco: Never   Vaping Use    Vaping Use: Never used   Substance and Sexual Activity    Alcohol use: Not Currently    Drug use: Not Currently   Other Topics Concern    Caffeine Concern No    Exercise No    Seat Belt Yes     Social Determinants of Health     Financial Resource Strain: Low Risk  (9/19/2023)    Financial Resource Strain     Difficulty of Paying Living Expenses: Not hard at all   Transportation Needs: No Transportation Needs (9/19/2023)    Transportation Needs     Lack of Transportation: No        REVIEW OF SYSTEMS:   GENERAL HEALTH: feels well otherwise       EXAM:   /70   Pulse 94   Temp 98.6 °F (37 °C) (Temporal)   SpO2 95%   GENERAL: well developed, well nourished,in no apparent distress   In a stretcher       LEs:  the left knee is swollen slightly on the inferomedial aspect and seems to have an area that looks like a  bruise that is resolving      is slightly tender to palpation of the area       ASSESSMENT AND PLAN:   1. Acute pain of left knee  Suspect ligament strain w resultant swelling and bruising    does ssem to be getting better     On eliquis     will treat w tramadol    2. Loose bowel movement  Try metamucil daily to see if helps       3. Type 2 diabetes mellitus with other specified complication, without long-term current use of insulin (HCC)  Labs ordered that are due    - Microalb/Creat Ratio, Random Urine; Future  - Comp Metabolic Panel (14); Future  - CBC With Differential With Platelet; Future  - Lipid Panel; Future  - Hemoglobin A1C; Future     The patient indicates understanding of these issues and agrees to the plan.  .

## 2024-04-02 ENCOUNTER — VIRTUAL PHONE E/M (OUTPATIENT)
Dept: SURGERY | Facility: CLINIC | Age: 79
End: 2024-04-02

## 2024-04-02 DIAGNOSIS — N20.0 RECURRENT KIDNEY STONES: Primary | ICD-10-CM

## 2024-04-02 DIAGNOSIS — N39.0 RECURRENT UTI: ICD-10-CM

## 2024-04-02 PROCEDURE — 99443 PHONE E/M BY PHYS 21-30 MIN: CPT | Performed by: UROLOGY

## 2024-04-02 NOTE — PROGRESS NOTES
HPI:     Payton Henriquez is a 78 year old female with a PMH of HTN, HL, DM, GERD, afib (on eliquis), DVT.  New to me, saw Jayshree in the past.  Following for:  1. Recurrent UTIs  - no issues up until 0740-5081, now with recurrent UTIs  - multiple UCx with ESBL S to cipro, juan, macrobid, bactrim  2. Recurrent kidney stones  - one procedure (s/p right URS 8/23/22 - [facility]), no other stones    PCP - Elissa  Prior Urologist - Jayshree 12/12/23, [facility] 8/23/22    Presents for checkup, review CT scan, and discuss next steps.    Pt feels well. Lives at home with . Has live in help and confined to a WC/bedridden. She uses a purewick but is able to hold bladder. She has had more recurrent UTIs with drug-resistant organisms over the past couple years.  She has difficulty getting to appointments as she needs a ride but she does have an echo scheduled for Tuesday of next week at [facility].     Has no difficulty urinating. No UTI symptoms for a least a month.  No abdominal or flank pain.    Unable to void today. Will place reilly today for UA/UCx and for her to collect 24 h urine.    Gross hematuria: none  Tobacco hx: none  Fam h/o  malignancy: none    Drinks ~ 30 oz water, 12 oz coffee, 12 juice, 12 soda with medium yellow urine. I encouraged the pt drink at least 40-60 oz water per day or enough to keep urine clear to pale yellow for stone prevention.    CT SP 3/7/24: large b/l renal stones. Has ~ 24 mm LLP conglomerate, 5 LUP, 6 distal left ureteral stone without hydro. 37 mm RUP conglomerate    Discussed options for the large bilateral renal stone burden.  Options would include observation, staged URS, PCNL.  We discussed the risks and benefits to each option.  We discussed the risk of sepsis and/or obstruction if the large renal stones are left untreated.  We discussed the left side should probably be treated first that she has an obstructing stone on that side.    After discussing options in detail she would  like to proceed with staged left ureteroscopy, laser lithotripsy, stone removal with stent placement.  We would plan for hopefully just 2 procedures spaced 2 to 3 weeks apart.  We discussed that given the large stone burden it would be highly unlikely to be completely removed in just 1 procedure.  We would also plan to leave a ureteral stent Dangler after the second procedure and affixed this to a Reilly catheter so that the stent and Reilly can be removed a few days later.    We discussed that while the right sided stone burden is nonobstructing she does have a larger stone burden on that side.  We may wish to consider PCNL at some point for that side.  I would opt to treat the left side first as she has an obstructing stone on that side.    I would also recommend she check 24 h urine first and she is amenable to this.    Will place reilly today and check UA/UCx. Checking 24 h urine. Plan for left flex URS, laser litho, stone removal with stent placement and likely plan for two procedures for that side.    I spent over 40 minutes in consultation and coordination of this patient's care today including review of pertinent labs, imaging, records with > 50% of time spent counseling.    HISTORY:  Past Medical History:   Diagnosis Date    Arrhythmia     Deep vein thrombosis (HCC)     HIGH BLOOD PRESSURE     HIGH CHOLESTEROL     History of cardiac cath 03/2001    post procedure data    Macular degeneration of right eye     Measles without mention of complication     Type II or unspecified type diabetes mellitus without mention of complication, not stated as uncontrolled     Varicella without mention of complication     Visual impairment       Past Surgical History:   Procedure Laterality Date    CHOLECYSTECTOMY  2005    COLONOSCOPY  12/19/2013    Procedure: COLONOSCOPY;  Surgeon: Igor Simon MD;  Location:  ENDOSCOPY    COLONOSCOPY  12/19/13     [facility]    COLONOSCOPY & POLYPECTOMY  11/24/2003    +diminutive  polyp; repeat 10 years [facility]    FEMUR FRACTURE SURGERY Left 08/31/2022    FEMUR IM NAIL ANTEGRADE LEFT    HYSTERECTOMY  1989    removal of left ovary    NEEDLE BIOPSY RIGHT  11/2019    benign us guided x 2    OTHER SURGICAL HISTORY  1972    salpingo-oophorectomy right side    OTHER SURGICAL HISTORY  01/01/2020    Cysto, Left RPG,stent insertion      Family History   Problem Relation Age of Onset    Other (Lung Cancer) Mother       Social History:   Social History     Socioeconomic History    Marital status:    Tobacco Use    Smoking status: Never    Smokeless tobacco: Never   Vaping Use    Vaping Use: Never used   Substance and Sexual Activity    Alcohol use: Not Currently    Drug use: Not Currently   Other Topics Concern    Caffeine Concern No    Exercise No    Seat Belt Yes     Social Determinants of Health     Financial Resource Strain: Low Risk  (9/19/2023)    Financial Resource Strain     Difficulty of Paying Living Expenses: Not hard at all   Transportation Needs: No Transportation Needs (9/19/2023)    Transportation Needs     Lack of Transportation: No        Medications (Active prior to today's visit):  Current Outpatient Medications   Medication Sig Dispense Refill    traMADol 50 MG Oral Tab Take 1 tablet (50 mg total) by mouth every 8 (eight) hours as needed for Pain. 50 tablet 0    omeprazole 20 MG Oral Capsule Delayed Release Take 1 capsule (20 mg total) by mouth every morning. 90 capsule 0    atorvastatin 20 MG Oral Tab Take 1 tablet (20 mg total) by mouth daily. 90 tablet 0    gabapentin 300 MG Oral Cap Take 1 capsule (300 mg total) by mouth in the morning and 1 capsule (300 mg total) before bedtime. 180 capsule 0    apixaban (ELIQUIS) 5 MG Oral Tab Take 1 tablet (5 mg total) by mouth 2 (two) times daily. 180 tablet 3    sulfamethoxazole-trimethoprim DS (BACTRIM DS) 800-160 MG Oral Tab per tablet Take 1 tablet by mouth 2 (two) times daily. 20 tablet 0    amLODIPine 5 MG Oral Tab        Benazepril-hydroCHLOROthiazide 20-12.5 MG Oral Tab       cyclobenzaprine 10 MG Oral Tab       ferrous sulfate 325 (65 FE) MG Oral Tab EC Take 1 tablet (325 mg total) by mouth daily with breakfast.      Zinc Gluconate 50 MG Oral Cap Take 50 mg by mouth daily.      mirtazapine 7.5 MG Oral Tab Take 1 tablet (7.5 mg total) by mouth nightly. 90 tablet 3    losartan 25 MG Oral Tab Take 1 tablet (25 mg total) by mouth daily. 30 tablet 11    metoprolol succinate ER 25 MG Oral Tablet 24 Hr Take 1 tablet (25 mg total) by mouth daily. 90 tablet 3    docusate sodium 100 MG Oral Cap Take 1 capsule (100 mg total) by mouth daily as needed for constipation.      polyethylene glycol, PEG 3350, 17 g Oral Powd Pack Take 17 g by mouth daily as needed.      acetaminophen 500 MG Oral Tab Take 2 tablets (1,000 mg total) by mouth every 6 (six) hours as needed for Pain.      ondansetron 4 MG Oral Tablet Dispersible Take 1 tablet (4 mg total) by mouth as needed for Nausea.      Multiple Vitamins-Minerals (PRESERVISION AREDS 2) Oral Cap Take 1 tablet by mouth 2 (two) times a day.      Acidophilus/Pectin (PROBIOTIC) Oral Cap Take 1 capsule by mouth daily.         Allergies:  No Known Allergies      ROS:     A comprehensive 10 point review of systems was completed.  Pertinent positives and negatives noted in the the HPI.    PHYSICAL EXAM:     GENERAL APPEARANCE: well, developed, well nourished, in no acute distress  NEUROLOGIC: nonfocal, alert and oriented  HEAD: normocephalic, atraumatic  EYES: sclera non-icteric  EARS: hearing intact  ORAL CAVITY: mucosa moist  NECK/THYROID: no obvious goiter or masses  LUNGS: nonlabored breathing  ABDOMEN: soft, no obvious masses or tenderness  SKIN: no obvious rashes    : as noted above     ASSESSMENT/PLAN:   Diagnoses and all orders for this visit:    Recurrent kidney stones  -     Kidney Stone Urine Test Combination With Saturation Calculations; Future    Recurrent UTI    - as noted above.    Thanks  again for this consult.    Siddhartha Blood MD, FACS  Urologist  [facility]  Office: [phone]

## 2024-04-03 ENCOUNTER — TELEPHONE (OUTPATIENT)
Dept: INTERNAL MEDICINE CLINIC | Facility: CLINIC | Age: 79
End: 2024-04-03

## 2024-04-03 DIAGNOSIS — D72.829 LEUKOCYTOSIS, UNSPECIFIED TYPE: Primary | ICD-10-CM

## 2024-04-03 DIAGNOSIS — R19.7 DIARRHEA, UNSPECIFIED TYPE: Primary | ICD-10-CM

## 2024-04-03 NOTE — H&P
Virtual Telephone Check-In    Payton Henriquez verbally consents to a Virtual Telephone Check-In service today.  Patient understands and accepts financial responsibility for any deductible, co-insurance and/or co-pays associated with this service.    Duration of the service including review of pertinent imaging/labs and coordination of care: 36 minutes    Summary of topics discussed:  See below    HPI:     Payton Henriquez is a 78 year old female with a PMH of HTN, HL, DM, GERD, afib (on eliquis), DVT.  New to me, saw Jayshree in the past.  Following for:  1. Recurrent UTIs  - no issues up until 1202-3493, now with recurrent UTIs  - multiple UCx with ESBL S to cipro, juan, macrobid, bactrim  2. Recurrent kidney stones  - one procedure (s/p right URS 8/23/22 - [facility]), no other stones    PCP Alex Bowers    Presents to establish care, review recent CT scan, and discuss next steps.    Pt feels well. Lives at home with . Has live in help and confined to a WC/bedridden. She uses a purewick but is able to hold bladder. She has had more recurrent UTIs with drug-resistant organisms over the past couple years.  She has difficulty getting to appointments as she needs a ride but she does have an echo scheduled for Tuesday of next week at [facility].     Has no difficulty urinating. No UTI symptoms for a least a month.  No abdominal or flank pain.    Gross hematuria: none  Tobacco hx: none  Fam h/o  malignancy: none    Drinks ~ 30 oz water, 12 oz coffee, 12 juice, 12 soda with medium yellow urine. I encouraged the pt drink at least 40-60 oz water per day or enough to keep urine clear to pale yellow for stone prevention.    CT SP 3/7/24: large b/l renal stones. Has ~ 24 mm LLP conglomerate, 5 LUP, 6 distal left ureteral stone without hydro. 37 mm RUP conglomerate    I reviewed CT images with her over the phone.  She currently has no pain or complaints.  We discussed that given the very large sizes of stones bilaterally she  would likely require multiple procedures for clearance.  We discussed the risk of obstruction and/or sepsis if the stones are not treated.    Discussed checking a 24 h urine for stone prevention could potentially do a staged ureteroscopy on the left side but would likely require 2 procedures given stone size.  The right side has a very substantial stone burden and may require PCNL.  The patient and her  would like to come into the office next week when they will already be in the professional building for another appointment to review images and discuss next steps.  They have to hire a  for visits so they would like to consolidate visits when possible.  I will review everything with him again at NOV.      HISTORY:  Past Medical History:   Diagnosis Date    Arrhythmia     Deep vein thrombosis (HCC)     HIGH BLOOD PRESSURE     HIGH CHOLESTEROL     History of cardiac cath 03/2001    post procedure data    Macular degeneration of right eye     Measles without mention of complication     Type II or unspecified type diabetes mellitus without mention of complication, not stated as uncontrolled     Varicella without mention of complication     Visual impairment       Past Surgical History:   Procedure Laterality Date    CHOLECYSTECTOMY  2005    COLONOSCOPY  12/19/2013    Procedure: COLONOSCOPY;  Surgeon: Igor Simon MD;  Location:  ENDOSCOPY    COLONOSCOPY  12/19/13     [facility]    COLONOSCOPY & POLYPECTOMY  11/24/2003    +diminutive polyp; repeat 10 years [facility]    FEMUR FRACTURE SURGERY Left 08/31/2022    FEMUR IM NAIL ANTEGRADE LEFT    HYSTERECTOMY  1989    removal of left ovary    NEEDLE BIOPSY RIGHT  11/2019    benign us guided x 2    OTHER SURGICAL HISTORY  1972    salpingo-oophorectomy right side    OTHER SURGICAL HISTORY  01/01/2020    Cysto, Left RPG,stent insertion      Family History   Problem Relation Age of Onset    Other (Lung Cancer) Mother       Social History:   Social History      Socioeconomic History    Marital status:    Tobacco Use    Smoking status: Never    Smokeless tobacco: Never   Vaping Use    Vaping Use: Never used   Substance and Sexual Activity    Alcohol use: Not Currently    Drug use: Not Currently   Other Topics Concern    Caffeine Concern No    Exercise No    Seat Belt Yes     Social Determinants of Health     Financial Resource Strain: Low Risk  (9/19/2023)    Financial Resource Strain     Difficulty of Paying Living Expenses: Not hard at all   Transportation Needs: No Transportation Needs (9/19/2023)    Transportation Needs     Lack of Transportation: No        Medications (Active prior to today's visit):  Current Outpatient Medications   Medication Sig Dispense Refill    traMADol 50 MG Oral Tab Take 1 tablet (50 mg total) by mouth every 8 (eight) hours as needed for Pain. 50 tablet 0    omeprazole 20 MG Oral Capsule Delayed Release Take 1 capsule (20 mg total) by mouth every morning. 90 capsule 0    atorvastatin 20 MG Oral Tab Take 1 tablet (20 mg total) by mouth daily. 90 tablet 0    gabapentin 300 MG Oral Cap Take 1 capsule (300 mg total) by mouth in the morning and 1 capsule (300 mg total) before bedtime. 180 capsule 0    apixaban (ELIQUIS) 5 MG Oral Tab Take 1 tablet (5 mg total) by mouth 2 (two) times daily. 180 tablet 3    sulfamethoxazole-trimethoprim DS (BACTRIM DS) 800-160 MG Oral Tab per tablet Take 1 tablet by mouth 2 (two) times daily. (Patient not taking: Reported on 12/12/2023) 20 tablet 0    amLODIPine 5 MG Oral Tab       Benazepril-hydroCHLOROthiazide 20-12.5 MG Oral Tab       cyclobenzaprine 10 MG Oral Tab       ferrous sulfate 325 (65 FE) MG Oral Tab EC Take 1 tablet (325 mg total) by mouth daily with breakfast.      Zinc Gluconate 50 MG Oral Cap Take 50 mg by mouth daily.      mirtazapine 7.5 MG Oral Tab Take 1 tablet (7.5 mg total) by mouth nightly. 90 tablet 3    losartan 25 MG Oral Tab Take 1 tablet (25 mg total) by mouth daily. 30 tablet  11    metoprolol succinate ER 25 MG Oral Tablet 24 Hr Take 1 tablet (25 mg total) by mouth daily. 90 tablet 3    docusate sodium 100 MG Oral Cap Take 1 capsule (100 mg total) by mouth daily as needed for constipation.      polyethylene glycol, PEG 3350, 17 g Oral Powd Pack Take 17 g by mouth daily as needed.      acetaminophen 500 MG Oral Tab Take 2 tablets (1,000 mg total) by mouth every 6 (six) hours as needed for Pain.      ondansetron 4 MG Oral Tablet Dispersible Take 1 tablet (4 mg total) by mouth as needed for Nausea.      Multiple Vitamins-Minerals (PRESERVISION AREDS 2) Oral Cap Take 1 tablet by mouth 2 (two) times a day.      Acidophilus/Pectin (PROBIOTIC) Oral Cap Take 1 capsule by mouth daily.         Allergies:  No Known Allergies      ROS:     A comprehensive 10 point review of systems was completed.  Pertinent positives and negatives noted in the the HPI.    PHYSICAL EXAM:     GENERAL APPEARANCE: well, developed, well nourished, in no acute distress  NEUROLOGIC: nonfocal, alert and oriented  HEAD: normocephalic, atraumatic  EYES: sclera non-icteric  EARS: hearing intact  ORAL CAVITY: mucosa moist  NECK/THYROID: no obvious goiter or masses  LUNGS: nonlabored breathing  ABDOMEN: soft, no obvious masses or tenderness  SKIN: no obvious rashes    : as noted above     ASSESSMENT/PLAN:   Diagnoses and all orders for this visit:    Recurrent kidney stones    Recurrent UTI    - as noted above.    Thanks again for this consult.    Siddhartha Blood MD, FACS  Urologist  [facility]  Office: [phone]

## 2024-04-03 NOTE — TELEPHONE ENCOUNTER
TO:   LOV 3/28 --  2. Loose bowel movement  Try metamucil daily to see if helps     Pt still having loose/liquid stools. Red/raw backside. Please advise on next steps.   Immodium? Desitin/vaseline for sore area?

## 2024-04-03 NOTE — TELEPHONE ENCOUNTER
Hi/pt's  calling to speak to nurse. States Dr. Bowers recommended Metamucil at last appt and it is not working, stools are still liquid and patient's backside is red and raw.

## 2024-04-09 ENCOUNTER — OFFICE VISIT (OUTPATIENT)
Dept: SURGERY | Facility: CLINIC | Age: 79
End: 2024-04-09

## 2024-04-09 ENCOUNTER — TELEPHONE (OUTPATIENT)
Dept: SURGERY | Facility: CLINIC | Age: 79
End: 2024-04-09

## 2024-04-09 ENCOUNTER — TELEPHONE (OUTPATIENT)
Dept: INTERNAL MEDICINE CLINIC | Facility: CLINIC | Age: 79
End: 2024-04-09

## 2024-04-09 ENCOUNTER — PATIENT MESSAGE (OUTPATIENT)
Dept: INTERNAL MEDICINE CLINIC | Facility: CLINIC | Age: 79
End: 2024-04-09

## 2024-04-09 DIAGNOSIS — N39.0 RECURRENT UTI: ICD-10-CM

## 2024-04-09 DIAGNOSIS — N20.0 RECURRENT KIDNEY STONES: Primary | ICD-10-CM

## 2024-04-09 DIAGNOSIS — N20.0 RENAL STONES: ICD-10-CM

## 2024-04-09 DIAGNOSIS — N20.1 LEFT URETERAL STONE: Primary | ICD-10-CM

## 2024-04-09 PROCEDURE — 51702 INSERT TEMP BLADDER CATH: CPT | Performed by: UROLOGY

## 2024-04-09 PROCEDURE — 99215 OFFICE O/P EST HI 40 MIN: CPT | Performed by: UROLOGY

## 2024-04-09 NOTE — TELEPHONE ENCOUNTER
From: Payton Henriquez  To: Florencio Bowers  Sent: 4/9/2024 5:27 PM CDT  Subject: Megan Mcdonough. These are the pictures of the messages that came back with Khushbu.. Thanks again for all your work  Ceasar

## 2024-04-09 NOTE — TELEPHONE ENCOUNTER
Reviewed documents and case with SM.  Based on instructions provided to patient SM advised that we inform spouse to empty reilly bag in morning at 6am and discard urine in toilet.  All urine after this point should be emptied and placed in brown/orange jug.  Collection would stop at 6am on 4/11/24.  Explained a nurse from our office will contact them tomorrow to see if Dr. Blood's office was in touch with clarification on catheter care/removal.  Spouse verbalized understanding.

## 2024-04-09 NOTE — TELEPHONE ENCOUNTER
Shoshana from primary care inquired regarding catheter that was placed today if it will need removal within 24 hours? Because if so patient is calling Pcp to know who will remove in 24 hours?  If you can please call patient to clarify for patient spouse Hi [phone]

## 2024-04-09 NOTE — TELEPHONE ENCOUNTER
Please schedule this patient for surgery x 2 to be scheduled ~ 2-3 weeks apart. If patient and  want to have a phone visit with me prior please let me know. OK to add on end of next week or the following week for phone visit if they would like.    HANNAH Metcalf    Urology Surgery Request # 1  Surgeon: Caitie  Location (if known): [facility]  Procedure: cysto, left flex URS, laser litho, stone removal, stent placement   Anesthesia: General   Time Frame: pt preference  Time required: 80 minutes  Diagnosis: left ureteral and renal stones    Antibiotics: per hospital protocol unless checked below   _x_ Levaquin 500 mg IV   ___ Gemcitabine 2 g/100 mL NS bladder instillation to be given in OR      Urology Surgery Request # 2  Surgeon: Caitie  Location (if known): [facility]  Procedure: cysto, left flex URS, laser litho, stone removal, stent exchange  Anesthesia: General   Time Frame: ~ 2-3 weeks following procedure # 1  Time required: 60 minutes  Diagnosis: left ureteral and renal stones    Antibiotics: per hospital protocol unless checked below   _x_ Levaquin 500 mg IV   ___ Gemcitabine 2 g/100 mL NS bladder instillation to be given in OR    Estimated Post Op/Follow Up Appt: SE[facility]

## 2024-04-10 ENCOUNTER — TELEPHONE (OUTPATIENT)
Dept: SURGERY | Facility: CLINIC | Age: 79
End: 2024-04-10

## 2024-04-10 NOTE — TELEPHONE ENCOUNTER
Spoke with the patient.  Scheduling the (1st) surgery for 6/13/24.  Scheduling (2nd) surgery 6/27/24 Reviewed the pre-op instructions and will send via My Chart or mail to patient once confirmed.  Patient can contact me at [phone]

## 2024-04-10 NOTE — PROGRESS NOTES
RN received an order from Dr Blood to place 16-18 Fr catheter for patient. Collect urine sample today, 4/9 and send it to culture. Patient on stetcher. Staff x 4 assist. On Preval[employer] Heels d/t wounds. Reports she has Home Health. Per Dr Blood to keep Holm catheter until patient completes the 24 hour collection. RN provided instruction to patient and caretaker. New Litholink 24 hour urine jug and instruction given.  RN can remove Holm cath after urine collection is completed. Patient an caretaker verbalized understanding.

## 2024-04-10 NOTE — TELEPHONE ENCOUNTER
Patient  has a question for the nurse would like to know what will they need to do with the sample tomorrow? Please advise

## 2024-04-10 NOTE — TELEPHONE ENCOUNTER
Called pt's spouse to discuss below.  Spouse states they do NOT have home health.  Advised to come to office for reilly removal.   Spouse states it cost him $900 to come to last appointment.  Would like to remove at home.  Instructions gone over with spouse and aid on how to remove reilly catheter after 24 hour urine collection.

## 2024-04-10 NOTE — TELEPHONE ENCOUNTER
Per spouse they do not have home health to  the 24 hour urine collection and remove catheter and asking what they should do. Please advise

## 2024-04-11 ENCOUNTER — LAB ENCOUNTER (OUTPATIENT)
Dept: LAB | Age: 79
End: 2024-04-11
Attending: UROLOGY
Payer: MEDICARE

## 2024-04-11 DIAGNOSIS — N20.0 RECURRENT KIDNEY STONES: ICD-10-CM

## 2024-04-11 PROCEDURE — 83986 ASSAY PH BODY FLUID NOS: CPT

## 2024-04-11 PROCEDURE — 82340 ASSAY OF CALCIUM IN URINE: CPT

## 2024-04-11 PROCEDURE — 84392 ASSAY OF URINE SULFATE: CPT

## 2024-04-11 PROCEDURE — 83945 ASSAY OF OXALATE: CPT

## 2024-04-11 PROCEDURE — 84300 ASSAY OF URINE SODIUM: CPT

## 2024-04-11 PROCEDURE — 82436 ASSAY OF URINE CHLORIDE: CPT

## 2024-04-11 PROCEDURE — 82507 ASSAY OF CITRATE: CPT

## 2024-04-11 PROCEDURE — 84133 ASSAY OF URINE POTASSIUM: CPT

## 2024-04-11 PROCEDURE — 84560 ASSAY OF URINE/URIC ACID: CPT

## 2024-04-11 PROCEDURE — 83735 ASSAY OF MAGNESIUM: CPT

## 2024-04-11 PROCEDURE — 84105 ASSAY OF URINE PHOSPHORUS: CPT

## 2024-04-11 NOTE — TELEPHONE ENCOUNTER
Called pt's  to discuss below.  Spouse had questions on where to drop off 24 hour urine.   Advised he could go to any lab.   Verbalized understanding.

## 2024-04-11 NOTE — TELEPHONE ENCOUNTER
Can see in UROLOGY notes pt will have reilly removed at home from home teach.   Closing encounter  
JOURDAN     Spoke with patient's spouse via phone (Hi-on HIPAA release).  Hi shared \"a catheter was placed today at urology appointment and my wife and caregiver were told a nurse would be coming to our house to remove after 24 hours and will also be collecting urine\".  Patient asking how they can set up home health nurse.  Asked if Dr. Blood's office made arrangements/order for home health or provided instructions for plans for removal.  Spouse states \"caregiver was told she will be having surgery to remove kidney stone in 2-3 weeks- but nothing about home health taking care of catheter.\" Hi confirmed he received \"some paper work from visit but uncertain about home health and catheter care\"  Asked patient to take photos of paperwork and send as attachment to [facility] to our office.  Confirms he will. Explained we will call urology office (Dr. Blood) and ask for clarification.     Call placed to Dr. Blood's office.  Got answering service.  Spoke with Ruby, explained above and need for office to contact patient and help clarify catheter care and plans for removal.  Ruby confirmed she will pass message to on-call provider.  Phone number for patient's spouse provided.    Returned call to patient's spouse (Hi) informed someone from urology office will be calling to help clarify catheter care and removal.  Provided Vicotor with urologist phone number in case no call- can contact in morning.  Hi verbalized understanding.        
Patient calling back to speak to Yusra.  
Pt spouse would like a call back from nurse, Pt would like to ask some question about trying to get a home health nurse   
Spoke w/Ceasar. He stated he has not heard back from Uro office yet. He did confirm with their caretaker this morning, urine was emptied at 6 am and they are collecting it going forward. But he is still unclear on who and when catheter is being removed.     Pt has not had HH for months, so it would take too long to re-establish HH just for catheter removal. Can see in Epic, message to Dr. Blood's office. Advised spouse to give a call again to 's office on next steps for catheter removal. He v/u.   
Spoke with pt's spouse. He just wanted to let us know he did call Urology and ask to speak w/nurse about cath removal. He said they would have someone call him back; but he stated no one from URO has called back yet. Can see message was sent to uro in EPIC.  
ADMIT

## 2024-04-12 ENCOUNTER — TELEPHONE (OUTPATIENT)
Dept: SURGERY | Facility: CLINIC | Age: 79
End: 2024-04-12

## 2024-04-12 NOTE — TELEPHONE ENCOUNTER
This encounter is now closed.     RN called patient. Informed that we received a call from the lab that the 24 hr urine was not collected properly. The preservative was not added.     Per patient, caregiver already removed the catheter. RN offered appt to place Holm again and to repeat 24 hr collection. Instructions/directions explained again. Refused repeat of urine collection, refused reinsertion of catheter. Patient declined d/t transportation cost.     MD made aware.

## 2024-04-22 ENCOUNTER — PATIENT MESSAGE (OUTPATIENT)
Dept: INTERNAL MEDICINE CLINIC | Facility: CLINIC | Age: 79
End: 2024-04-22

## 2024-04-23 NOTE — TELEPHONE ENCOUNTER
From: Payton Henriquez  To: Florencio Bowers  Sent: 4/22/2024 10:34 PM CDT  Subject: Ambulance service [facility] and DrMike visits    Dr. BOWERS  I am sending this paperwork for Elmira. She’s totally bedbound as you know and in order to make hospital visits or doctor visits she will be here soon and ambulance. I am sending you an application that Medicare suggested to be filled out and sent to [facility]. The application And a letter From Carlotta nilowell Which you probably already know. Fortunately, Carlotta next appointments for kidney stones isn’t until June this year. And any future doctors appointments?.  We’ve already paid for two, one for about $700 dollars and the other a little over $900 dollars.   Of course, you are very much appreciated, Ceasar Henriquez [phone]   Carlotta Birthday is 09/01/1945

## 2024-04-24 ENCOUNTER — TELEPHONE (OUTPATIENT)
Dept: SURGERY | Facility: CLINIC | Age: 79
End: 2024-04-24

## 2024-04-24 RX ORDER — CIPROFLOXACIN 500 MG/1
500 TABLET, FILM COATED ORAL 2 TIMES DAILY
Qty: 28 TABLET | Refills: 0 | Status: SHIPPED | OUTPATIENT
Start: 2024-04-24 | End: 2024-05-08

## 2024-04-24 NOTE — TELEPHONE ENCOUNTER
Spoke with patient and states she is not having any UTI symptoms.  Informed to start abx 5 days prior. Patient asked that we [facility]. [facility].        ----- Message from Jayshree Muñoz PA-C sent at 4/23/2024  3:18 PM CDT -----  Please let patient know if she is asymptomatic, should plan to start 14 days of Cipro 5 days prior to surgery. This prescription has been sent.

## 2024-04-29 NOTE — TELEPHONE ENCOUNTER
Message sent to patient--$25 form fee charged    Awaiting response on if patient would like to  forms or have them mailed     Copy of form placed at the  and copy in accordion folder in Pod 3--also sent to scan

## 2024-05-09 ENCOUNTER — PATIENT MESSAGE (OUTPATIENT)
Dept: INTERNAL MEDICINE CLINIC | Facility: CLINIC | Age: 79
End: 2024-05-09

## 2024-05-10 NOTE — TELEPHONE ENCOUNTER
Unable to read form that was sent through [employer] message so [facility] was contacted and asked to send form via fax for it to be completed    Awaiting fax

## 2024-05-10 NOTE — TELEPHONE ENCOUNTER
From: Payton Henriquez  To: Florencio Bowers  Sent: 5/9/2024 10:30 PM CDT  Subject: Another Form request    There is one more form for Transportation for Elmira. It goes to [facility]. Enclosed, there’s a copy of the form if you can be filled out and Faxed back that would be very much appreciated.   Ceasar Ku  Oh yes, please let me know when this is been completed.

## 2024-05-10 NOTE — TELEPHONE ENCOUNTER
Form received was previously filled out by Dr.Ozgen Coombs message sent to see if the can have [facility] fax over exactly what is needed for patient to get ambulance services.

## 2024-05-13 ENCOUNTER — TELEPHONE (OUTPATIENT)
Dept: INTERNAL MEDICINE CLINIC | Facility: CLINIC | Age: 79
End: 2024-05-13

## 2024-05-13 NOTE — TELEPHONE ENCOUNTER
Form completed     Faxed with confirmation to [facility]    Patient informed via [employer]

## 2024-05-13 NOTE — TELEPHONE ENCOUNTER
Received paperwork stating that patient will need to hold Eliquis 3-4 days prior to having Cystoscopy on 6/13 and 6/27    Per , He states that patient needs to see and/or speak to her Cardiologist to see if she can stop her Eliquis     MyChart message sent to patient to speak with Cardiology

## 2024-05-16 NOTE — PAT NURSING NOTE
Pt is Bedridden- needs ambulance transportation , and said she will not be able to come for EKG pre-op . I ordered this stat on admit, and I told pt if there was any problem /w results of EKG that day of surgery, this could delay or cancel her surgery. She understood.

## 2024-05-16 NOTE — PAT NURSING NOTE
I sent a fax to Dr Bowers regarding surgeon advise for pt to stop Eliquis 3-4 days pre-op pending Dr Rehman approval. And aske that he follow up on this matter w the patient.

## 2024-05-19 RX ORDER — ATORVASTATIN CALCIUM 20 MG/1
20 TABLET, FILM COATED ORAL DAILY
Qty: 90 TABLET | Refills: 0 | Status: SHIPPED | OUTPATIENT
Start: 2024-05-19

## 2024-05-19 RX ORDER — ATORVASTATIN CALCIUM 20 MG/1
20 TABLET, FILM COATED ORAL DAILY
Qty: 90 TABLET | Refills: 0 | OUTPATIENT
Start: 2024-05-19

## 2024-05-21 RX ORDER — CIPROFLOXACIN 500 MG/1
TABLET, FILM COATED ORAL
Qty: 28 TABLET | Refills: 25 | Status: SHIPPED | OUTPATIENT
Start: 2024-05-21

## 2024-06-08 RX ORDER — GABAPENTIN 300 MG/1
CAPSULE ORAL
Qty: 180 CAPSULE | Refills: 3 | Status: SHIPPED | OUTPATIENT
Start: 2024-06-08

## 2024-06-08 NOTE — TELEPHONE ENCOUNTER
Requesting   Name from pharmacy: GABAPENTIN CAPS 300MG          Will file in chart as: GABAPENTIN 300 MG Oral Cap    Sig: TAKE 1 CAPSULE IN THE MORNING AND 1 CAPSULE BEFORE BEDTIME    Disp: 180 capsule    Refills: 3    Start: 6/8/2024    Class: Normal    Non-formulary    Last ordered: 3 months ago (3/4/2024) by Florencio Bowers MD    Last refill: 3/4/2024    Rx #: [id number]    Neurology Medications [id number]/08/2024 11:09 AM   Protocol Details In person appointment or virtual visit in the past 6 mos or appointment in next 3 mos        LOV: 3/28/2024  Last Relevant Labs: n/a   Filled: 3/4/2024 #180 with 0 refills    No future appointments.

## 2024-06-12 ENCOUNTER — TELEPHONE (OUTPATIENT)
Dept: INTERNAL MEDICINE CLINIC | Facility: CLINIC | Age: 79
End: 2024-06-12

## 2024-06-12 ENCOUNTER — ANESTHESIA EVENT (OUTPATIENT)
Dept: SURGERY | Facility: HOSPITAL | Age: 79
End: 2024-06-12
Payer: MEDICARE

## 2024-06-12 NOTE — ANESTHESIA PREPROCEDURE EVALUATION
PRE-OP EVALUATION    Patient Name: Payton Henriquez    Admit Diagnosis: Left ureteral stone [N20.1]  Renal stones [N20.0]    Pre-op Diagnosis: Left ureteral stone [N20.1]  Renal stones [N20.0]    CYSTOSCOPY, LEFT FLEXIBLE  URETEROSCOPY, LASER LITHOTRIPSY, STONE REMOVAL, STENT PLACEMENT    Anesthesia Procedure: CYSTOSCOPY, LEFT FLEXIBLE  URETEROSCOPY, LASER LITHOTRIPSY, STONE REMOVAL, STENT PLACEMENT (Left: Urethra)    Surgeons and Role:     * Sidhdartha Blood MD - Primary    Pre-op vitals reviewed.  Temp: 98.3 °F (36.8 °C)  Pulse: 73  Resp: 16  BP: 148/71  SpO2: 99 %  Body mass index is 29.53 kg/m².    Current medications reviewed.  Hospital Medications:   levoFLOXacin in dextrose 5% (Levaquin) 500 mg/100mL IVPB premix 500 mg  500 mg Intravenous Once    acetaminophen (Tylenol Extra Strength) tab 1,000 mg  1,000 mg Oral Once    lactated ringers infusion   Intravenous Continuous       Outpatient Medications:     Medications Prior to Admission   Medication Sig Dispense Refill Last Dose    GABAPENTIN 300 MG Oral Cap TAKE 1 CAPSULE IN THE MORNING AND 1 CAPSULE BEFORE BEDTIME 180 capsule 3 6/13/2024 at 0600    atorvastatin 20 MG Oral Tab Take 1 tablet (20 mg total) by mouth daily. (Patient taking differently: Take 1 tablet (20 mg total) by mouth nightly.) 90 tablet 0 6/12/2024 at 1800    traMADol 50 MG Oral Tab Take 1 tablet (50 mg total) by mouth every 8 (eight) hours as needed for Pain. 50 tablet 0     omeprazole 20 MG Oral Capsule Delayed Release Take 1 capsule (20 mg total) by mouth every morning. 90 capsule 0 6/13/2024 at 0600    apixaban (ELIQUIS) 5 MG Oral Tab Take 1 tablet (5 mg total) by mouth 2 (two) times daily. 180 tablet 3 6/9/2024    Zinc Gluconate 50 MG Oral Cap Take 50 mg by mouth daily.       mirtazapine 7.5 MG Oral Tab Take 1 tablet (7.5 mg total) by mouth nightly. 90 tablet 3 6/12/2024 at 1800    losartan 25 MG Oral Tab Take 1 tablet (25 mg total) by mouth daily. 30 tablet 11 6/12/2024 at 0800     metoprolol succinate ER 25 MG Oral Tablet 24 Hr Take 1 tablet (25 mg total) by mouth daily. 90 tablet 3 6/13/2024 at 0600    docusate sodium 100 MG Oral Cap Take 1 capsule (100 mg total) by mouth daily as needed for constipation.   6/12/2024 at 0800    polyethylene glycol, PEG 3350, 17 g Oral Powd Pack Take 17 g by mouth daily as needed.       acetaminophen 500 MG Oral Tab Take 2 tablets (1,000 mg total) by mouth every 6 (six) hours as needed for Pain.   6/13/2024 at 0600    ondansetron 4 MG Oral Tablet Dispersible Take 1 tablet (4 mg total) by mouth as needed for Nausea.       Multiple Vitamins-Minerals (PRESERVISION AREDS 2) Oral Cap Take 1 tablet by mouth 2 (two) times a day.   6/12/2024 at 2100    Acidophilus/Pectin (PROBIOTIC) Oral Cap Take 1 capsule by mouth daily.       CIPROFLOXACIN 500 MG Oral Tab TAKE 1 TABLET TWICE A DAY FOR 14 DAYS, START 5 DAYS PRIOR TO SURGERY 28 tablet 25     sulfamethoxazole-trimethoprim DS (BACTRIM DS) 800-160 MG Oral Tab per tablet Take 1 tablet by mouth 2 (two) times daily. 20 tablet 0     amLODIPine 5 MG Oral Tab        Benazepril-hydroCHLOROthiazide 20-12.5 MG Oral Tab        cyclobenzaprine 10 MG Oral Tab        ferrous sulfate 325 (65 FE) MG Oral Tab EC Take 1 tablet (325 mg total) by mouth daily with breakfast.          Allergies: Patient has no known allergies.      Anesthesia Evaluation    Patient summary reviewed.    Anesthetic Complications  (-) history of anesthetic complications         GI/Hepatic/Renal  Comment: Left ureteral calculi for cystoscopy and stone manipulation/extraction                               Cardiovascular  Comment: PVD  LVEF 40-45%      ECG reviewed.          (+) obesity  (+) hypertension   (+) hyperlipidemia                                  Endo/Other      (+) diabetes  type 2, not using insulin                         Pulmonary    Negative pulmonary ROS.                       Neuro/Psych  Comment: Lumbar spinal stenosis               (+)  neuromuscular disease                     Past Surgical History:   Procedure Laterality Date    Cholecystectomy  2005    Colonoscopy  12/19/2013    Procedure: COLONOSCOPY;  Surgeon: Igor Simon MD;  Location:  ENDOSCOPY    Colonoscopy  12/19/13     Alfred  sandra 10    Colonoscopy & polypectomy  11/24/2003    +diminutive polyp; repeat 10 years Alfred    Femur fracture surgery Left 08/31/2022    FEMUR IM NAIL ANTEGRADE LEFT    Hysterectomy  1989    removal of left ovary    Needle biopsy right  11/2019    benign us guided x 2    Other surgical history  1972    salpingo-oophorectomy right side    Other surgical history  01/01/2020    Cysto, Left RPG,stent insertion     Social History     Socioeconomic History    Marital status:    Tobacco Use    Smoking status: Never    Smokeless tobacco: Never   Vaping Use    Vaping status: Never Used   Substance and Sexual Activity    Alcohol use: Not Currently    Drug use: Not Currently   Other Topics Concern    Caffeine Concern No    Exercise No    Seat Belt Yes     History   Drug Use Unknown     Available pre-op labs reviewed.  Lab Results   Component Value Date    WBC 20.8 (H) 03/28/2024    RBC 4.30 03/28/2024    HGB 12.1 03/28/2024    HCT 40.4 03/28/2024    MCV 94.0 03/28/2024    MCH 28.1 03/28/2024    MCHC 30.0 (L) 03/28/2024    RDW 16.2 (H) 03/28/2024    .0 (H) 03/28/2024     Lab Results   Component Value Date     03/28/2024    K 4.6 03/28/2024     03/28/2024    CO2 25.0 03/28/2024    BUN 24 (H) 03/28/2024    CREATSERUM 1.13 (H) 03/28/2024     (H) 03/28/2024    CA 9.7 03/28/2024            Airway      Mallampati: III  Mouth opening: >3 FB  TM distance: > 6 cm  Neck ROM: full Cardiovascular    Cardiovascular exam normal.  Rhythm: regular  Rate: normal  (-) murmur   Dental    Dentition appears grossly intact         Pulmonary    Pulmonary exam normal.  Breath sounds clear to auscultation bilaterally.               Other findings               ASA: 3   Plan: general  NPO status verified and patient meets guidelines.        Comment: GA with OETT/LMA explained to patient. Risks/benefits explained including but not limited to sore throat, hoarse voice, nausea, dental trauma, eye injury,pulmonary complication and other complications as discussed in anesthesia consent form. Patient agrees to proceed. Anesthesia consent signed.     Plan/risks discussed with: patient                Present on Admission:  **None**

## 2024-06-12 NOTE — TELEPHONE ENCOUNTER
Sofia  # [phone]   [facility]     Called on behalf of patient asking for paperwork that backs up that patient can receive Free Ambulatory services per her procedure tomorrow.     Facility is asking for PCS form from provider pertaining for procedure tomorrow (cystoscopy), medical history and physicals (within a years date) that reflect her needs of procedure.      These forms will assist patient with free ambulatory services and her insurance to accept.     Needs to be FAXED: [phone]     Please advise.

## 2024-06-13 ENCOUNTER — HOSPITAL ENCOUNTER (OUTPATIENT)
Facility: HOSPITAL | Age: 79
Setting detail: HOSPITAL OUTPATIENT SURGERY
Discharge: HOME | End: 2024-06-13
Attending: UROLOGY | Admitting: UROLOGY
Payer: MEDICARE

## 2024-06-13 ENCOUNTER — APPOINTMENT (OUTPATIENT)
Dept: GENERAL RADIOLOGY | Facility: HOSPITAL | Age: 79
End: 2024-06-13
Attending: UROLOGY
Payer: MEDICARE

## 2024-06-13 ENCOUNTER — TELEPHONE (OUTPATIENT)
Dept: SURGERY | Facility: CLINIC | Age: 79
End: 2024-06-13

## 2024-06-13 ENCOUNTER — ANESTHESIA (OUTPATIENT)
Dept: SURGERY | Facility: HOSPITAL | Age: 79
End: 2024-06-13
Payer: MEDICARE

## 2024-06-13 VITALS
DIASTOLIC BLOOD PRESSURE: 66 MMHG | TEMPERATURE: 98 F | OXYGEN SATURATION: 96 % | RESPIRATION RATE: 16 BRPM | SYSTOLIC BLOOD PRESSURE: 111 MMHG | BODY MASS INDEX: 29.62 KG/M2 | HEART RATE: 58 BPM | WEIGHT: 200 LBS | HEIGHT: 69 IN

## 2024-06-13 DIAGNOSIS — N20.0 RENAL STONES: ICD-10-CM

## 2024-06-13 DIAGNOSIS — N20.1 LEFT URETERAL STONE: ICD-10-CM

## 2024-06-13 LAB
GLUCOSE BLD-MCNC: 193 MG/DL (ref 70–99)
GLUCOSE BLD-MCNC: 199 MG/DL (ref 70–99)

## 2024-06-13 PROCEDURE — 0TC18ZZ EXTIRPATION OF MATTER FROM LEFT KIDNEY, VIA NATURAL OR ARTIFICIAL OPENING ENDOSCOPIC: ICD-10-PCS | Performed by: UROLOGY

## 2024-06-13 PROCEDURE — 88300 SURGICAL PATH GROSS: CPT | Performed by: UROLOGY

## 2024-06-13 PROCEDURE — 93010 ELECTROCARDIOGRAM REPORT: CPT | Performed by: INTERNAL MEDICINE

## 2024-06-13 PROCEDURE — 82962 GLUCOSE BLOOD TEST: CPT

## 2024-06-13 PROCEDURE — 82365 CALCULUS SPECTROSCOPY: CPT | Performed by: UROLOGY

## 2024-06-13 PROCEDURE — 0TC78ZZ EXTIRPATION OF MATTER FROM LEFT URETER, VIA NATURAL OR ARTIFICIAL OPENING ENDOSCOPIC: ICD-10-PCS | Performed by: UROLOGY

## 2024-06-13 PROCEDURE — 0T778DZ DILATION OF LEFT URETER WITH INTRALUMINAL DEVICE, VIA NATURAL OR ARTIFICIAL OPENING ENDOSCOPIC: ICD-10-PCS | Performed by: UROLOGY

## 2024-06-13 PROCEDURE — 93005 ELECTROCARDIOGRAM TRACING: CPT

## 2024-06-13 DEVICE — URETERAL STENT
Type: IMPLANTABLE DEVICE | Site: URETER | Status: FUNCTIONAL
Brand: ASCERTA™

## 2024-06-13 RX ORDER — EPHEDRINE SULFATE 50 MG/ML
INJECTION INTRAVENOUS AS NEEDED
Status: DISCONTINUED | OUTPATIENT
Start: 2024-06-13 | End: 2024-06-13 | Stop reason: SURG

## 2024-06-13 RX ORDER — DEXTROSE MONOHYDRATE 25 G/50ML
50 INJECTION, SOLUTION INTRAVENOUS
Status: DISCONTINUED | OUTPATIENT
Start: 2024-06-13 | End: 2024-06-13

## 2024-06-13 RX ORDER — INSULIN ASPART 100 [IU]/ML
INJECTION, SOLUTION INTRAVENOUS; SUBCUTANEOUS ONCE
Status: DISCONTINUED | OUTPATIENT
Start: 2024-06-13 | End: 2024-06-13

## 2024-06-13 RX ORDER — NICOTINE POLACRILEX 4 MG
30 LOZENGE BUCCAL
Status: DISCONTINUED | OUTPATIENT
Start: 2024-06-13 | End: 2024-06-13

## 2024-06-13 RX ORDER — SODIUM CHLORIDE, SODIUM LACTATE, POTASSIUM CHLORIDE, CALCIUM CHLORIDE 600; 310; 30; 20 MG/100ML; MG/100ML; MG/100ML; MG/100ML
INJECTION, SOLUTION INTRAVENOUS CONTINUOUS
Status: DISCONTINUED | OUTPATIENT
Start: 2024-06-13 | End: 2024-06-13

## 2024-06-13 RX ORDER — ACETAMINOPHEN 500 MG
1000 TABLET ORAL ONCE AS NEEDED
Status: DISCONTINUED | OUTPATIENT
Start: 2024-06-13 | End: 2024-06-13

## 2024-06-13 RX ORDER — ROCURONIUM BROMIDE 10 MG/ML
INJECTION, SOLUTION INTRAVENOUS AS NEEDED
Status: DISCONTINUED | OUTPATIENT
Start: 2024-06-13 | End: 2024-06-13 | Stop reason: SURG

## 2024-06-13 RX ORDER — HYDROMORPHONE HYDROCHLORIDE 1 MG/ML
0.4 INJECTION, SOLUTION INTRAMUSCULAR; INTRAVENOUS; SUBCUTANEOUS EVERY 5 MIN PRN
Status: DISCONTINUED | OUTPATIENT
Start: 2024-06-13 | End: 2024-06-13

## 2024-06-13 RX ORDER — HYDROMORPHONE HYDROCHLORIDE 1 MG/ML
0.2 INJECTION, SOLUTION INTRAMUSCULAR; INTRAVENOUS; SUBCUTANEOUS EVERY 5 MIN PRN
Status: DISCONTINUED | OUTPATIENT
Start: 2024-06-13 | End: 2024-06-13

## 2024-06-13 RX ORDER — GLYCOPYRROLATE 0.2 MG/ML
INJECTION, SOLUTION INTRAMUSCULAR; INTRAVENOUS AS NEEDED
Status: DISCONTINUED | OUTPATIENT
Start: 2024-06-13 | End: 2024-06-13 | Stop reason: SURG

## 2024-06-13 RX ORDER — CIPROFLOXACIN 500 MG/1
500 TABLET, FILM COATED ORAL 2 TIMES DAILY
Qty: 10 TABLET | Refills: 0 | Status: SHIPPED | OUTPATIENT
Start: 2024-06-13 | End: 2024-06-18

## 2024-06-13 RX ORDER — LIDOCAINE HYDROCHLORIDE 10 MG/ML
INJECTION, SOLUTION EPIDURAL; INFILTRATION; INTRACAUDAL; PERINEURAL AS NEEDED
Status: DISCONTINUED | OUTPATIENT
Start: 2024-06-13 | End: 2024-06-13 | Stop reason: SURG

## 2024-06-13 RX ORDER — HYDROCODONE BITARTRATE AND ACETAMINOPHEN 5; 325 MG/1; MG/1
1 TABLET ORAL ONCE AS NEEDED
Status: DISCONTINUED | OUTPATIENT
Start: 2024-06-13 | End: 2024-06-13

## 2024-06-13 RX ORDER — NEOSTIGMINE METHYLSULFATE 1 MG/ML
INJECTION, SOLUTION INTRAVENOUS AS NEEDED
Status: DISCONTINUED | OUTPATIENT
Start: 2024-06-13 | End: 2024-06-13 | Stop reason: SURG

## 2024-06-13 RX ORDER — HYDROCODONE BITARTRATE AND ACETAMINOPHEN 5; 325 MG/1; MG/1
1 TABLET ORAL EVERY 6 HOURS PRN
Qty: 30 TABLET | Refills: 0 | Status: SHIPPED | OUTPATIENT
Start: 2024-06-13

## 2024-06-13 RX ORDER — LEVOFLOXACIN 5 MG/ML
500 INJECTION, SOLUTION INTRAVENOUS ONCE
Status: COMPLETED | OUTPATIENT
Start: 2024-06-13 | End: 2024-06-13

## 2024-06-13 RX ORDER — ONDANSETRON 2 MG/ML
INJECTION INTRAMUSCULAR; INTRAVENOUS AS NEEDED
Status: DISCONTINUED | OUTPATIENT
Start: 2024-06-13 | End: 2024-06-13 | Stop reason: SURG

## 2024-06-13 RX ORDER — NICOTINE POLACRILEX 4 MG
15 LOZENGE BUCCAL
Status: DISCONTINUED | OUTPATIENT
Start: 2024-06-13 | End: 2024-06-13

## 2024-06-13 RX ORDER — NALOXONE HYDROCHLORIDE 0.4 MG/ML
0.08 INJECTION, SOLUTION INTRAMUSCULAR; INTRAVENOUS; SUBCUTANEOUS AS NEEDED
Status: DISCONTINUED | OUTPATIENT
Start: 2024-06-13 | End: 2024-06-13

## 2024-06-13 RX ORDER — ONDANSETRON 2 MG/ML
4 INJECTION INTRAMUSCULAR; INTRAVENOUS EVERY 6 HOURS PRN
Status: DISCONTINUED | OUTPATIENT
Start: 2024-06-13 | End: 2024-06-13

## 2024-06-13 RX ORDER — HYDROCODONE BITARTRATE AND ACETAMINOPHEN 5; 325 MG/1; MG/1
2 TABLET ORAL ONCE AS NEEDED
Status: DISCONTINUED | OUTPATIENT
Start: 2024-06-13 | End: 2024-06-13

## 2024-06-13 RX ORDER — METOCLOPRAMIDE HYDROCHLORIDE 5 MG/ML
10 INJECTION INTRAMUSCULAR; INTRAVENOUS EVERY 8 HOURS PRN
Status: DISCONTINUED | OUTPATIENT
Start: 2024-06-13 | End: 2024-06-13

## 2024-06-13 RX ORDER — DOCUSATE SODIUM 100 MG/1
100 CAPSULE, LIQUID FILLED ORAL 2 TIMES DAILY
Qty: 28 CAPSULE | Refills: 0 | Status: SHIPPED | OUTPATIENT
Start: 2024-06-13 | End: 2024-06-27

## 2024-06-13 RX ORDER — ACETAMINOPHEN 500 MG
1000 TABLET ORAL ONCE
Status: DISCONTINUED | OUTPATIENT
Start: 2024-06-13 | End: 2024-06-13 | Stop reason: HOSPADM

## 2024-06-13 RX ORDER — HYDROMORPHONE HYDROCHLORIDE 1 MG/ML
0.6 INJECTION, SOLUTION INTRAMUSCULAR; INTRAVENOUS; SUBCUTANEOUS EVERY 5 MIN PRN
Status: DISCONTINUED | OUTPATIENT
Start: 2024-06-13 | End: 2024-06-13

## 2024-06-13 RX ORDER — DEXAMETHASONE SODIUM PHOSPHATE 4 MG/ML
VIAL (ML) INJECTION AS NEEDED
Status: DISCONTINUED | OUTPATIENT
Start: 2024-06-13 | End: 2024-06-13 | Stop reason: SURG

## 2024-06-13 RX ADMIN — NEOSTIGMINE METHYLSULFATE 4 MG: 1 INJECTION, SOLUTION INTRAVENOUS at 12:34:00

## 2024-06-13 RX ADMIN — GLYCOPYRROLATE 0.8 MG: 0.2 INJECTION, SOLUTION INTRAMUSCULAR; INTRAVENOUS at 12:34:00

## 2024-06-13 RX ADMIN — LEVOFLOXACIN 500 MG: 5 INJECTION, SOLUTION INTRAVENOUS at 11:33:00

## 2024-06-13 RX ADMIN — DEXAMETHASONE SODIUM PHOSPHATE 4 MG: 4 MG/ML VIAL (ML) INJECTION at 11:30:00

## 2024-06-13 RX ADMIN — ROCURONIUM BROMIDE 40 MG: 10 INJECTION, SOLUTION INTRAVENOUS at 11:23:00

## 2024-06-13 RX ADMIN — ONDANSETRON 4 MG: 2 INJECTION INTRAMUSCULAR; INTRAVENOUS at 11:30:00

## 2024-06-13 RX ADMIN — SODIUM CHLORIDE, SODIUM LACTATE, POTASSIUM CHLORIDE, CALCIUM CHLORIDE: 600; 310; 30; 20 INJECTION, SOLUTION INTRAVENOUS at 12:49:00

## 2024-06-13 RX ADMIN — LIDOCAINE HYDROCHLORIDE 50 MG: 10 INJECTION, SOLUTION EPIDURAL; INFILTRATION; INTRACAUDAL; PERINEURAL at 11:23:00

## 2024-06-13 RX ADMIN — EPHEDRINE SULFATE 10 MG: 50 INJECTION INTRAVENOUS at 11:31:00

## 2024-06-13 NOTE — DISCHARGE INSTRUCTIONS
You had a procedure called a CYSTOSCOPY today    - Keep reilly bag below the level of the bladder at all times to prevent UTI. OK to shower.    - You may remove the reilly by 6 AM anytime next week after you complete your 24 hour urine test. Your caregiver, Rajat, will  the test from our office. There is 10 mL in the reilly balloon. Drink plenty of fluids and call office if any difficulty voiding.    Below is the visual instruction for Reilly Catheter removal           You should cut the balloon port as noted in the pictures above. After you cut this, the balloon keeping the catheter in place will drain and sterile water will leak from the site you just cut. After the balloon has completely drained you should be able to gently slide the catheter out. If you have any questions or concerns, please call us at [phone].    If you have any questions or concerns, please call us at [phone].    - You may have a post-operative appointment that is already scheduled for you. If the appointment date or time does not work with your schedule please call our office to reschedule ([phone]). Alternatively our office may be reaching out to you to schedule the appointment.     - You may experience pain after the procedure for 1-2 days.  If pain becomes intolerable please contact our office or go to the nearest Emergency Room/Immediate Care. You make take over-the counter ibuprofen for mild pain (provided you do not have a medical condition such as stomach ulcers or kidney disease which prohibits you from taking). You may take this in addition to tylenol or narcotic pain medication. Hot packs may help for discomfort as well.    - If you are medically allowed to take ibuprofen then you may take 200-400 mg every 8 hours as needed for pain with plenty of fluids. You may take this in addition to tylenol or other pain medication which may be prescribed.     - You may experience burning with urination and frequency of  urination over the next few days.     - You may see blood in your urine that should clear up within a few days. If you have a stent in place then you may see blood in the urine until the stent is eventually removed.     - Try to abstain from alcohol, coffee, tea, artificial sweeteners, and spicy food for the next 48 hours as these can irritate the bladder.     - If you develop a fever, chills, difficulty urinating or abdominal pain in the next 24 hours, call the office.     - Drink 1.5 to 2 liters of fluid today, water is preferable. If you are on a fluid restriction due to other medical reasons then you need to adhere to your fluid restriction recommendations.

## 2024-06-13 NOTE — ANESTHESIA PROCEDURE NOTES
Airway  Date/Time: 6/13/2024 11:26 AM  Urgency: elective      General Information and Staff    Patient location during procedure: OR  Anesthesiologist: Chiki Ortiz MD  Performed: anesthesiologist   Performed by: Chiki Ortiz MD  Authorized by: Chiki Ortiz MD      Indications and Patient Condition  Indications for airway management: anesthesia  Sedation level: deep  Preoxygenated: yes  Patient position: sniffing  Mask difficulty assessment: 1 - vent by mask    Final Airway Details  Final airway type: endotracheal airway      Successful airway: ETT  Cuffed: yes   Successful intubation technique: Video laryngoscopy  Endotracheal tube insertion site: oral  Blade: GlideScope  Blade size: #3  ETT size (mm): 7.0    Cormack-Lehane Classification: grade IIA - partial view of glottis  Placement verified by: capnometry   Measured from: lips  ETT to lips (cm): 21  Number of attempts at approach: 1

## 2024-06-13 NOTE — H&P
HPI:     Payton Henriquez is a 78 year old female with a PMH of HTN, HL, DM, GERD, afib (on eliquis), DVT.  New to me, saw Jayshree in the past.  Following for:  1. Recurrent MDR UTIs  - no issues up until 9165-2896, now with recurrent UTIs  - multiple UCx with ESBL S to cipro, juan, macrobid, bactrim  2. Recurrent kidney stones  - one procedure (s/p right URS 8/23/22 - Frank), no other stones    FRANCESCO Bowers    Presents for staged left URS (number 1 or 2) for very large b/l renal stone burden.    Pt feels OK. Lives at home with . Has live-in help and confined to a WC/bedridden. She uses a purewick but is able to hold bladder. She has had more recurrent UTIs with drug-resistant organisms over the past couple years.  She has difficulty getting to appointments as she needs an ambulance ride.    Has no difficulty urinating.   No abdominal or flank pain.    Gross hematuria: none  Tobacco hx: none  Fam h/o  malignancy: none    Drinks ~ 30 oz water, 12 oz coffee, 12 juice, 12 soda with medium yellow urine. I encouraged the pt drink at least 40-60 oz water per day or enough to keep urine clear to pale yellow for stone prevention.    CT SP 3/7/24: large b/l renal stones. Has ~ 24 mm LLP conglomerate, 5 LUP, 6 distal left ureteral stone without hydro. 37 mm RUP conglomerate    I reviewed CT images with her over the phone and again.  She currently has no pain or complaints.  We discussed that given the very large sizes of stones bilaterally she would likely require multiple procedures for clearance.  We discussed the risk of obstruction and/or sepsis if the stones are not treated.    I again discussed checking a 24 h urine for stone prevention could potentially do a staged ureteroscopy on the left side but would likely require 2 procedures given stone size.  The right side has a very substantial stone burden and may require PCNL.  The patient would like to proceed to OR for left flex URS, laser litho, stone  removal, stent placement. We discussed the risks and benefits to the procedure including, but not limited to, bleeding, infection, possible damage to surrounding structures. The patient understands and would like to proceed.      HISTORY:  Past Medical History:    Arrhythmia    A FIB    Back problem    SPINAL STENOSIS    Bedridden    Due to a Fall in 2020 broken left fankle w surgery, s/p infection bone left foot, rehab, antibx x 7 weeks, after Fractured her  right femur   between 2020 -2024    Calculus of kidney    right and left    Deep vein thrombosis (HCC)    suspected left leg    Encounter for urinary catheter    uses external catheter system \" purewick\" / due to being bedridden    HIGH BLOOD PRESSURE    HIGH CHOLESTEROL    History of cardiac cath    post procedure data    Macular degeneration of right eye    Measles without mention of complication    Neuropathy    feet    Type II or unspecified type diabetes mellitus without mention of complication, not stated as uncontrolled    diet control    UTI (urinary tract infection)    X2 UTI'S brought to Dr Short without mention of complication    Visual impairment      Past Surgical History:   Procedure Laterality Date    Cholecystectomy  2005    Colonoscopy  12/19/2013    Procedure: COLONOSCOPY;  Surgeon: Igor Simon MD;  Location:  ENDOSCOPY    Colonoscopy  12/19/13     Alfred flores 10    Colonoscopy & polypectomy  11/24/2003    +diminutive polyp; repeat 10 years Alfred    Femur fracture surgery Left 08/31/2022    FEMUR IM NAIL ANTEGRADE LEFT    Hysterectomy  1989    removal of left ovary    Needle biopsy right  11/2019    benign us guided x 2    Other surgical history  1972    salpingo-oophorectomy right side    Other surgical history  01/01/2020    Cysto, Left RPG,stent insertion      Family History   Problem Relation Age of Onset    Other (Lung Cancer) Mother       Social History:   Social History     Socioeconomic History    Marital status:     Tobacco Use    Smoking status: Never    Smokeless tobacco: Never   Vaping Use    Vaping status: Never Used   Substance and Sexual Activity    Alcohol use: Not Currently    Drug use: Not Currently   Other Topics Concern    Caffeine Concern No    Exercise No    Seat Belt Yes     Social Determinants of Health     Financial Resource Strain: Low Risk  (9/19/2023)    Financial Resource Strain     Difficulty of Paying Living Expenses: Not hard at all   Transportation Needs: No Transportation Needs (9/19/2023)    Transportation Needs     Lack of Transportation: No    Received from [facility]        Medications (Active prior to today's visit):  No current outpatient medications on file.       Allergies:  No Known Allergies      ROS:     A comprehensive 10 point review of systems was completed.  Pertinent positives and negatives noted in the the HPI.    PHYSICAL EXAM:     GENERAL APPEARANCE: well, developed, well nourished, in no acute distress  NEUROLOGIC: nonfocal, alert and oriented  HEAD: normocephalic, atraumatic  EYES: sclera non-icteric  EARS: hearing intact  ORAL CAVITY: mucosa moist  NECK/THYROID: no obvious goiter or masses  LUNGS: nonlabored breathing  ABDOMEN: soft, no obvious masses or tenderness  SKIN: no obvious rashes    : as noted above     ASSESSMENT/PLAN:   There are no diagnoses linked to this encounter.  - as noted above.    Thanks again for this consult.    Siddhartha Blood MD, FACS  Urologist  [facility]  Office: [phone]

## 2024-06-13 NOTE — TELEPHONE ENCOUNTER
Spoke with Sofia at [facility].   She stated ppw PCS (physician certificate statement) before was filled out for general f/up appts, not today's specific procedure.   She stated PCP has to fill out, even tho Dr. Blood is doing surgery.   She stated she will fax a new form. Just needs to state pt is bed bound, can't sit in wheel chair; date of procedure and details of specific procedure.   - also to inclued H& P valid with in a year.     Gave her our fax #, will fax back once completed.

## 2024-06-13 NOTE — TELEPHONE ENCOUNTER
Received Fax, TO filled it out and we printed out H& P from 7/17/23.     Faxed to [facility] and received fax confirmation.

## 2024-06-13 NOTE — OPERATIVE REPORT
Urology Operative Note    Attending Surgeon: Siddhartha Blood MD    Patient Name: Payton Henriquez    Date of Surgery: 6/13/2024    Preoperative Diagnosis: severe bilateral kidney stone burden with recurrent UTIs    Postoperative Diagnosis: same    Procedure Performed: Cystoscopy, left flexible ureteroscopy with Holmium laser lithotripsy and basket extraction of stone fragments, left ureteral stent insertion  - Modifier 22 added for severe stone burden adding ~ 45 minutes procedure time    Indication for procedure:  Patient is a 78 year old female who presented with severe bilateral kidney stone burden. The patient was counseled on options and elected to undergo the aforementioned procedure. We discussed the risks and benefits to surgery. We discussed risks including, but not limited to, bleeding, infection, possible damage to surrounding structures, possible need for repeat procedure(s). The patient understood these risks and wished to proceed with surgery. We discussed we would likely need to do at least two procedures on the left side due to severe stone burden.  Description of the procedure:  The patient was taken to the operating room and prepped and draped in lithotomy position after undergoing general anesthesia. The cystoscope was inserted and the bladder was inspected and drained. The left ureter was cannulated with a Glidewire and the wire was passed up into the renal pelvis which I confirmed using fluoroscopy. I then inserted a flexible sheath and a second wire as a safety wire. I then reinserted the flexible sheath. We then proceeded with flexible ureteroscopy using the [employer] ureteroscope.  I was easily able to identify the stone burden. She had at least 3-4 cm of total stone burden on the left side. I spent at least 60-75 minutes of time during this portion of the case but due to severe stone burden was unable to completely remove the stone burden.  I then removed the flexible scope and sheath. I  inserted a 6 x 28 cm JJ ureteral stent over the safety wire.   I confirmed there was good curl of stent in the kidney using fluoroscopy as well as good curl of stent in the bladder using direct cystoscopic examination.   The bladder was drained and the scope was removed. Holm catheter was placed.  The stone fragments were sent for analysis.   The patient was awoken having tolerated the procedure well.    Specimens: Stone fragments    Complications: No known complications.    Condition on Discharge from the operating room was stable    Plan: The patient will follow up in 2 weeks for next procedure - second look left URS.    Siddhartha Blood MD    Date: 6/13/2024 Time: 1:00 PM

## 2024-06-13 NOTE — ANESTHESIA POSTPROCEDURE EVALUATION
[facility]    Patyon Henriquez Patient Status:  Hospital Outpatient Surgery   Age/Gender 78 year old female MRN [id number]   Location [facility] SURGERY Attending Siddhartha Blood MD   Hosp Day # 0 PCP Florencio Bowers MD       Anesthesia Post-op Note    CYSTOSCOPY, LEFT FLEXIBLE  URETEROSCOPY, LASER LITHOTRIPSY, STONE REMOVAL, STENT PLACEMENT    Procedure Summary       Date: 06/13/24 Room / Location:  MAIN OR 07 /  MAIN OR    Anesthesia Start: 1115 Anesthesia Stop: 1249    Procedure: CYSTOSCOPY, LEFT FLEXIBLE  URETEROSCOPY, LASER LITHOTRIPSY, STONE REMOVAL, STENT PLACEMENT (Left: Urethra) Diagnosis:       Left ureteral stone      Renal stones      (Left ureteral stone [N20.1]Renal stones [N20.0])    Surgeons: Siddhartha Blood MD Anesthesiologist: Chiki Ortiz MD    Anesthesia Type: general ASA Status: 3            Anesthesia Type: general    Vitals Value Taken Time   /73 06/13/24 1249   Temp 97.5 06/13/24 1249   Pulse 86 06/13/24 1249   Resp 18 06/13/24 1249   SpO2 97 06/13/24 1249       Patient Location: PACU    Anesthesia Type: general    Airway Patency: patent and extubated    Postop Pain Control: adequate    Mental Status: mildly sedated but able to meaningfully participate in the post-anesthesia evaluation    Nausea/Vomiting: none    Cardiopulmonary/Hydration status: stable euvolemic    Complications: no apparent anesthesia related complications    Postop vital signs: stable    Dental Exam: Unchanged from Preop    Patient to be discharged from PACU when criteria met.

## 2024-06-13 NOTE — TELEPHONE ENCOUNTER
Per Dr Blood, patient's caregiver Rajat was given verbal and written instructions and supplies for collecting urine for 24 hour urine test. Expressed that it is important that the paperwork is completed and returned with the container. Verbalized understanding.

## 2024-06-14 LAB
ATRIAL RATE: 72 BPM
P AXIS: 73 DEGREES
P-R INTERVAL: 152 MS
Q-T INTERVAL: 410 MS
QRS DURATION: 88 MS
QTC CALCULATION (BEZET): 448 MS
R AXIS: -26 DEGREES
T AXIS: 182 DEGREES
VENTRICULAR RATE: 72 BPM

## 2024-06-15 ENCOUNTER — LAB ENCOUNTER (OUTPATIENT)
Dept: LAB | Age: 79
End: 2024-06-15
Attending: UROLOGY
Payer: MEDICARE

## 2024-06-15 DIAGNOSIS — N20.0 RENAL STONES: ICD-10-CM

## 2024-06-15 PROCEDURE — 84133 ASSAY OF URINE POTASSIUM: CPT

## 2024-06-15 PROCEDURE — 84105 ASSAY OF URINE PHOSPHORUS: CPT

## 2024-06-15 PROCEDURE — 83945 ASSAY OF OXALATE: CPT

## 2024-06-15 PROCEDURE — 84560 ASSAY OF URINE/URIC ACID: CPT

## 2024-06-15 PROCEDURE — 82340 ASSAY OF CALCIUM IN URINE: CPT

## 2024-06-15 PROCEDURE — 82140 ASSAY OF AMMONIA: CPT

## 2024-06-15 PROCEDURE — 82507 ASSAY OF CITRATE: CPT

## 2024-06-15 PROCEDURE — 83986 ASSAY PH BODY FLUID NOS: CPT

## 2024-06-15 PROCEDURE — 84392 ASSAY OF URINE SULFATE: CPT

## 2024-06-15 PROCEDURE — 83735 ASSAY OF MAGNESIUM: CPT

## 2024-06-15 PROCEDURE — 84300 ASSAY OF URINE SODIUM: CPT

## 2024-06-15 PROCEDURE — 82615 TEST FOR URINE CYSTINES: CPT

## 2024-06-15 PROCEDURE — 82436 ASSAY OF URINE CHLORIDE: CPT

## 2024-06-15 PROCEDURE — 82570 ASSAY OF URINE CREATININE: CPT

## 2024-06-15 PROCEDURE — 84540 ASSAY OF URINE/UREA-N: CPT

## 2024-06-19 ENCOUNTER — ANESTHESIA EVENT (OUTPATIENT)
Dept: SURGERY | Facility: HOSPITAL | Age: 79
End: 2024-06-19
Payer: MEDICARE

## 2024-06-22 LAB
CAOX DIHYDRATE: 30 %
CAOX MONOHYDRATE: 70 %
WEIGHT-STONE: 543 MG

## 2024-06-24 ENCOUNTER — VIRTUAL PHONE E/M (OUTPATIENT)
Dept: SURGERY | Facility: CLINIC | Age: 79
End: 2024-06-24

## 2024-06-24 DIAGNOSIS — N39.0 RECURRENT UTI: ICD-10-CM

## 2024-06-24 DIAGNOSIS — R82.991 HYPOCITRATURIA: ICD-10-CM

## 2024-06-24 DIAGNOSIS — N20.0 RECURRENT KIDNEY STONES: Primary | ICD-10-CM

## 2024-06-24 PROCEDURE — 99443 PHONE E/M BY PHYS 21-30 MIN: CPT | Performed by: UROLOGY

## 2024-06-25 ENCOUNTER — TELEPHONE (OUTPATIENT)
Dept: SURGERY | Facility: CLINIC | Age: 79
End: 2024-06-25

## 2024-06-25 RX ORDER — POTASSIUM CITRATE 15 MEQ/1
1 TABLET, EXTENDED RELEASE ORAL 2 TIMES DAILY
Qty: 180 TABLET | Refills: 5 | Status: SHIPPED | OUTPATIENT
Start: 2024-06-25 | End: 2024-07-01

## 2024-06-25 NOTE — TELEPHONE ENCOUNTER
Patient  calling to state ambulatory transportation is unable to get patient to the hospital before 7am. Patient is scheduled for surgery on 06.27.24. Please advise.

## 2024-06-25 NOTE — H&P
Virtual Telephone Check-In    Payton Henriquez verbally consents to a Virtual Telephone Check-In service today.  Patient understands and accepts financial responsibility for any deductible, co-insurance and/or co-pays associated with this service.    Duration of the service including review of pertinent imaging/labs and coordination of care: 35 minutes    Summary of topics discussed:  See below    HPI:     Payton Henriquez is a 78 year old female with a PMH of HTN, HL, DM, GERD, afib (on eliquis), DVT.    Following for:  1. Recurrent UTIs  - no issues up until 0403-4050, now with recurrent UTIs  - multiple UCx with ESBL S to cipro, juan, macrobid, bactrim  2. Recurrent large b/l kidney stones  - s/p staged #1/2 left URS 7/13/24  - s/p right URS 8/23/22 - Frank), no other stones  3. Hypocitraturia and low UOP  - 24 h urine 6/15/2: very low UOP, citrate (1150, 298), low pH (5.94)    PCP - Elissa  Prior Urologist - Jayshree 12/12/23, Frank 8/23/22    Presents for checkup, review 24 h urine, and discuss next steps.    Pt feels well. Lives at home with . Has live in help and confined to a WC/bedridden. She uses a purewick but is able to hold bladder. She has had more recurrent UTIs with drug-resistant organisms over the past couple years.  She has difficulty getting to appointments as she needs a ride     Has no difficulty urinating. No UTI symptoms for a least a month.  No abdominal or flank pain.    Unable to void LOV.     Gross hematuria: none  Tobacco hx: none  Fam h/o  malignancy: none    Drinks ~ 30 oz water, 12 oz coffee, 12 juice, 12 soda with medium yellow urine. I encouraged the pt drink at least 40-60 oz water per day or enough to keep urine clear to pale yellow for stone prevention.    CT SP 3/7/24: large b/l renal stones. Has ~ 24 mm LLP conglomerate, 5 LUP, 6 distal left ureteral stone without hydro. 37 mm RUP conglomerate    We again discussed options for the large bilateral renal stone  burden.  Options would include observation, staged URS, PCNL.  We discussed the risks and benefits to each option.  We discussed the risk of sepsis and/or obstruction if the large renal stones are left untreated.  She is amenable to #2/2 left URS and will f/u with me  few weeks later to discuss treatment on the right side.  We would also plan to leave a ureteral stent Dangler after the second procedure and affixed this to a Holm catheter so that the stent and Holm can be removed a few days later.    Have discussed that while the right sided stone burden is nonobstructing she does have a larger stone burden on that side.  We may wish to consider PCNL at some point for that side. I will review this in more detail after completing left URS.     We reviewed 24 h urine results. Given very low UOP and citrate will have her double water intake and start 15 urocit BID. Reviewed SEs.    She will try to double water intake. Starting 15 urocit BID. OR for 2nd look left URS as noted above.    HISTORY:  Past Medical History:    Arrhythmia    A FIB    Back problem    SPINAL STENOSIS    Bedridden    Due to a Fall in 2020 broken left fankle w surgery, s/p infection bone left foot, rehab, antibx x 7 weeks, after Fractured her  right femur   between 2020 -2024    Calculus of kidney    right and left    Deep vein thrombosis (HCC)    suspected left leg    Encounter for urinary catheter    uses external catheter system \" purewick\" / due to being bedridden    HIGH BLOOD PRESSURE    HIGH CHOLESTEROL    History of cardiac cath    post procedure data    Macular degeneration of right eye    Measles without mention of complication    Neuropathy    feet    Type II or unspecified type diabetes mellitus without mention of complication, not stated as uncontrolled    diet control    UTI (urinary tract infection)    X2 UTI'S brought to Dr Short without mention of complication    Visual impairment      Past Surgical History:   Procedure  Laterality Date    Cholecystectomy  2005    Colonoscopy  12/19/2013    Procedure: COLONOSCOPY;  Surgeon: Igor Simon MD;  Location:  ENDOSCOPY    Colonoscopy  12/19/13     Alfred  sandra 10    Colonoscopy & polypectomy  11/24/2003    +diminutive polyp; repeat 10 years Alfred    Femur fracture surgery Left 08/31/2022    FEMUR IM NAIL ANTEGRADE LEFT    Hysterectomy  1989    removal of left ovary    Needle biopsy right  11/2019    benign us guided x 2    Other surgical history  1972    salpingo-oophorectomy right side    Other surgical history  01/01/2020    Cysto, Left RPG,stent insertion      Family History   Problem Relation Age of Onset    Other (Lung Cancer) Mother       Social History:   Social History     Socioeconomic History    Marital status:    Tobacco Use    Smoking status: Never    Smokeless tobacco: Never   Vaping Use    Vaping status: Never Used   Substance and Sexual Activity    Alcohol use: Not Currently    Drug use: Not Currently   Other Topics Concern    Caffeine Concern No    Exercise No    Seat Belt Yes     Social Determinants of Health     Financial Resource Strain: Low Risk  (9/19/2023)    Financial Resource Strain     Difficulty of Paying Living Expenses: Not hard at all   Transportation Needs: No Transportation Needs (9/19/2023)    Transportation Needs     Lack of Transportation: No    Received from [facility]        Medications (Active prior to today's visit):  Current Outpatient Medications   Medication Sig Dispense Refill    Potassium Citrate ER (UROCIT-K 15) 15 MEQ (1620 MG) Oral Tab CR Take 1 tablet by mouth in the morning and 1 tablet before bedtime. 180 tablet 5    HYDROcodone-acetaminophen (NORCO) 5-325 MG Oral Tab Take 1 tablet by mouth every 6 (six) hours as needed for Pain. 30 tablet 0    docusate sodium 100 MG Oral Cap Take 1 capsule (100 mg total) by mouth 2 (two) times daily for 14 days. Stop taking if loose stools/diarrhea. 28 capsule 0    GABAPENTIN  300 MG Oral Cap TAKE 1 CAPSULE IN THE MORNING AND 1 CAPSULE BEFORE BEDTIME 180 capsule 3    atorvastatin 20 MG Oral Tab Take 1 tablet (20 mg total) by mouth daily. (Patient taking differently: Take 1 tablet (20 mg total) by mouth nightly.) 90 tablet 0    traMADol 50 MG Oral Tab Take 1 tablet (50 mg total) by mouth every 8 (eight) hours as needed for Pain. 50 tablet 0    omeprazole 20 MG Oral Capsule Delayed Release Take 1 capsule (20 mg total) by mouth every morning. 90 capsule 0    apixaban (ELIQUIS) 5 MG Oral Tab Take 1 tablet (5 mg total) by mouth 2 (two) times daily. 180 tablet 3    amLODIPine 5 MG Oral Tab       Benazepril-hydroCHLOROthiazide 20-12.5 MG Oral Tab       cyclobenzaprine 10 MG Oral Tab       ferrous sulfate 325 (65 FE) MG Oral Tab EC Take 1 tablet (325 mg total) by mouth daily with breakfast.      Zinc Gluconate 50 MG Oral Cap Take 50 mg by mouth daily.      mirtazapine 7.5 MG Oral Tab Take 1 tablet (7.5 mg total) by mouth nightly. 90 tablet 3    losartan 25 MG Oral Tab Take 1 tablet (25 mg total) by mouth daily. 30 tablet 11    metoprolol succinate ER 25 MG Oral Tablet 24 Hr Take 1 tablet (25 mg total) by mouth daily. 90 tablet 3    docusate sodium 100 MG Oral Cap Take 1 capsule (100 mg total) by mouth daily as needed for constipation.      polyethylene glycol, PEG 3350, 17 g Oral Powd Pack Take 17 g by mouth daily as needed.      acetaminophen 500 MG Oral Tab Take 2 tablets (1,000 mg total) by mouth every 6 (six) hours as needed for Pain.      ondansetron 4 MG Oral Tablet Dispersible Take 1 tablet (4 mg total) by mouth as needed for Nausea.      Multiple Vitamins-Minerals (PRESERVISION AREDS 2) Oral Cap Take 1 tablet by mouth 2 (two) times a day.      Acidophilus/Pectin (PROBIOTIC) Oral Cap Take 1 capsule by mouth daily.         Allergies:  No Known Allergies      ROS:     A comprehensive 10 point review of systems was completed.  Pertinent positives and negatives noted in the the  HPI.    PHYSICAL EXAM:     GENERAL APPEARANCE: well, developed, well nourished, in no acute distress  NEUROLOGIC: nonfocal, alert and oriented  HEAD: normocephalic, atraumatic  EYES: sclera non-icteric  EARS: hearing intact  ORAL CAVITY: mucosa moist  NECK/THYROID: no obvious goiter or masses  LUNGS: nonlabored breathing  ABDOMEN: soft, no obvious masses or tenderness  SKIN: no obvious rashes    : as noted above     ASSESSMENT/PLAN:   Diagnoses and all orders for this visit:    Recurrent kidney stones    Recurrent UTI    Hypocitraturia  -     Potassium Citrate ER (UROCIT-K 15) 15 MEQ (1620 MG) Oral Tab CR; Take 1 tablet by mouth in the morning and 1 tablet before bedtime.      - as noted above.    Thanks again for this consult.    Siddhartha Blood MD, FACS  Urologist  [facility]  Office: [phone]

## 2024-06-25 NOTE — TELEPHONE ENCOUNTER
Per Elizabeth (OR) will adjust surgery schedule for a later start time for patient and will call patient's  with new time.

## 2024-06-25 NOTE — TELEPHONE ENCOUNTER
Pt's  called.  Pt is scheduled for surgery on 6-27-24.  Unable to arrange transportation until 6:30 am.  Please call

## 2024-06-26 ENCOUNTER — TELEPHONE (OUTPATIENT)
Dept: SURGERY | Facility: CLINIC | Age: 79
End: 2024-06-26

## 2024-06-26 LAB
AMMONIA, URINE: 6 MMOL/24 HR
CHLORIDE URINE: 88 MMOL/24 HR
CITRIC ACID(CITRATE): 298 MG/24 HR
CREATININE, URINE: 606 MG/24 HR
CREATININE/KG BODY WEIGHT, URINE: 11.4 MG/24 HR/KG
CREATININE/KG BODY WEIGHT, URINE: 11.4 MG/24 HR/KG
LC CALCIUM OXALATE SATURATION, URINE: 10.3
LC CALCIUM PHOSPHATE SATURATION, URINE: 0.98
LC CALCIUM, URINE: 121 MG/24 HR
LC CALCIUM/CREATININE RATIO, URINE: 199 MG/G CREAT
LC CALCIUM/KG BODY WEIGHT, URINE: 2.3 MG/24 HR/KG
MAGNESIUM, URINE: 88 MG/24 HR
OXALATES, URINE: 37 MG/24 HR
PH, 24 HR URINE: 5.94
PHOSPHORUS, URINE: 652 MG/24 HR
POTASSIUM, URINE: 53 MMOL/24 HR
PROTEIN CATABOLIC RATE, URINE: 0.9 G/KG/24 HR
PROTEIN CATABOLIC RATE, URINE: 0.9 G/KG/24 HR
SODIUM, URINE: 87 MMOL/24 HR
SULFATE, URINE: 6 MEQ/24 HR
UREA NITROGEN, URINE: 6.13 G/24 HR
UREA NITROGEN, URINE: 6.13 G/24 HR
URIC ACID SATURATION, URINE: 0.76
URIC ACID SATURATION, URINE: 0.76
URIC ACID, URINE: 385 MG/24 HR
URINE VOLUME (PRESERVATIVE): 1150 ML/24 HR

## 2024-06-26 NOTE — TELEPHONE ENCOUNTER
Patient's  calling to request a PCS form be faxed to [employer] at [phone]. Patient will no be able to have transportation for scheduled surgery unless it is sent today. Please advise.

## 2024-06-26 NOTE — TELEPHONE ENCOUNTER
Pt's  called.  Surgery scheduled tomorrow 6-27-24.  Caller requesting a pcs form faxed to [employer] at [phone].  Please call

## 2024-06-27 ENCOUNTER — ANESTHESIA (OUTPATIENT)
Dept: SURGERY | Facility: HOSPITAL | Age: 79
End: 2024-06-27
Payer: MEDICARE

## 2024-06-27 ENCOUNTER — TELEPHONE (OUTPATIENT)
Dept: SURGERY | Facility: CLINIC | Age: 79
End: 2024-06-27

## 2024-06-27 ENCOUNTER — APPOINTMENT (OUTPATIENT)
Dept: GENERAL RADIOLOGY | Facility: HOSPITAL | Age: 79
End: 2024-06-27
Attending: UROLOGY
Payer: MEDICARE

## 2024-06-27 ENCOUNTER — HOSPITAL ENCOUNTER (OUTPATIENT)
Facility: HOSPITAL | Age: 79
Setting detail: HOSPITAL OUTPATIENT SURGERY
Discharge: HOME | End: 2024-06-27
Attending: UROLOGY | Admitting: UROLOGY
Payer: MEDICARE

## 2024-06-27 VITALS
WEIGHT: 200.63 LBS | SYSTOLIC BLOOD PRESSURE: 118 MMHG | HEIGHT: 69 IN | OXYGEN SATURATION: 100 % | HEART RATE: 66 BPM | TEMPERATURE: 97 F | DIASTOLIC BLOOD PRESSURE: 63 MMHG | BODY MASS INDEX: 29.71 KG/M2 | RESPIRATION RATE: 18 BRPM

## 2024-06-27 DIAGNOSIS — N20.1 LEFT URETERAL STONE: ICD-10-CM

## 2024-06-27 DIAGNOSIS — N20.0 RENAL STONES: ICD-10-CM

## 2024-06-27 LAB
GLUCOSE BLD-MCNC: 158 MG/DL (ref 70–99)
GLUCOSE BLD-MCNC: 173 MG/DL (ref 70–99)

## 2024-06-27 PROCEDURE — 0T778DZ DILATION OF LEFT URETER WITH INTRALUMINAL DEVICE, VIA NATURAL OR ARTIFICIAL OPENING ENDOSCOPIC: ICD-10-PCS | Performed by: UROLOGY

## 2024-06-27 PROCEDURE — 0TC78ZZ EXTIRPATION OF MATTER FROM LEFT URETER, VIA NATURAL OR ARTIFICIAL OPENING ENDOSCOPIC: ICD-10-PCS | Performed by: UROLOGY

## 2024-06-27 PROCEDURE — 52356 CYSTO/URETERO W/LITHOTRIPSY: CPT | Performed by: UROLOGY

## 2024-06-27 DEVICE — URETERAL STENT
Type: IMPLANTABLE DEVICE | Site: URETER | Status: FUNCTIONAL
Brand: ASCERTA™

## 2024-06-27 RX ORDER — CIPROFLOXACIN 500 MG/1
500 TABLET, FILM COATED ORAL 2 TIMES DAILY
Qty: 8 TABLET | Refills: 0 | Status: SHIPPED | OUTPATIENT
Start: 2024-06-27 | End: 2024-07-01

## 2024-06-27 RX ORDER — IBUPROFEN 600 MG/1
600 TABLET ORAL ONCE AS NEEDED
Status: DISCONTINUED | OUTPATIENT
Start: 2024-06-27 | End: 2024-06-27

## 2024-06-27 RX ORDER — SODIUM CHLORIDE, SODIUM LACTATE, POTASSIUM CHLORIDE, CALCIUM CHLORIDE 600; 310; 30; 20 MG/100ML; MG/100ML; MG/100ML; MG/100ML
INJECTION, SOLUTION INTRAVENOUS CONTINUOUS
Status: DISCONTINUED | OUTPATIENT
Start: 2024-06-27 | End: 2024-06-27

## 2024-06-27 RX ORDER — ONDANSETRON 2 MG/ML
INJECTION INTRAMUSCULAR; INTRAVENOUS AS NEEDED
Status: DISCONTINUED | OUTPATIENT
Start: 2024-06-27 | End: 2024-06-27 | Stop reason: SURG

## 2024-06-27 RX ORDER — HYDROMORPHONE HYDROCHLORIDE 1 MG/ML
0.4 INJECTION, SOLUTION INTRAMUSCULAR; INTRAVENOUS; SUBCUTANEOUS EVERY 5 MIN PRN
Status: DISCONTINUED | OUTPATIENT
Start: 2024-06-27 | End: 2024-06-27

## 2024-06-27 RX ORDER — HYDROMORPHONE HYDROCHLORIDE 1 MG/ML
0.6 INJECTION, SOLUTION INTRAMUSCULAR; INTRAVENOUS; SUBCUTANEOUS EVERY 5 MIN PRN
Status: DISCONTINUED | OUTPATIENT
Start: 2024-06-27 | End: 2024-06-27

## 2024-06-27 RX ORDER — ONDANSETRON 2 MG/ML
4 INJECTION INTRAMUSCULAR; INTRAVENOUS EVERY 6 HOURS PRN
Status: DISCONTINUED | OUTPATIENT
Start: 2024-06-27 | End: 2024-06-27

## 2024-06-27 RX ORDER — NICOTINE POLACRILEX 4 MG
15 LOZENGE BUCCAL
Status: DISCONTINUED | OUTPATIENT
Start: 2024-06-27 | End: 2024-06-27

## 2024-06-27 RX ORDER — DEXAMETHASONE SODIUM PHOSPHATE 4 MG/ML
VIAL (ML) INJECTION AS NEEDED
Status: DISCONTINUED | OUTPATIENT
Start: 2024-06-27 | End: 2024-06-27 | Stop reason: SURG

## 2024-06-27 RX ORDER — DOCUSATE SODIUM 100 MG/1
100 CAPSULE, LIQUID FILLED ORAL 2 TIMES DAILY
Qty: 28 CAPSULE | Refills: 0 | Status: SHIPPED | OUTPATIENT
Start: 2024-06-27 | End: 2024-07-11

## 2024-06-27 RX ORDER — ACETAMINOPHEN 500 MG
1000 TABLET ORAL ONCE
Status: DISCONTINUED | OUTPATIENT
Start: 2024-06-27 | End: 2024-06-27 | Stop reason: HOSPADM

## 2024-06-27 RX ORDER — METOCLOPRAMIDE HYDROCHLORIDE 5 MG/ML
10 INJECTION INTRAMUSCULAR; INTRAVENOUS EVERY 8 HOURS PRN
Status: DISCONTINUED | OUTPATIENT
Start: 2024-06-27 | End: 2024-06-27

## 2024-06-27 RX ORDER — ACETAMINOPHEN 500 MG
1000 TABLET ORAL ONCE AS NEEDED
Status: DISCONTINUED | OUTPATIENT
Start: 2024-06-27 | End: 2024-06-27

## 2024-06-27 RX ORDER — ROCURONIUM BROMIDE 10 MG/ML
INJECTION, SOLUTION INTRAVENOUS AS NEEDED
Status: DISCONTINUED | OUTPATIENT
Start: 2024-06-27 | End: 2024-06-27 | Stop reason: SURG

## 2024-06-27 RX ORDER — LIDOCAINE HYDROCHLORIDE 10 MG/ML
INJECTION, SOLUTION EPIDURAL; INFILTRATION; INTRACAUDAL; PERINEURAL AS NEEDED
Status: DISCONTINUED | OUTPATIENT
Start: 2024-06-27 | End: 2024-06-27 | Stop reason: SURG

## 2024-06-27 RX ORDER — HYDROCODONE BITARTRATE AND ACETAMINOPHEN 5; 325 MG/1; MG/1
1 TABLET ORAL EVERY 6 HOURS PRN
Qty: 30 TABLET | Refills: 0 | Status: SHIPPED | OUTPATIENT
Start: 2024-06-27

## 2024-06-27 RX ORDER — LABETALOL HYDROCHLORIDE 5 MG/ML
5 INJECTION, SOLUTION INTRAVENOUS EVERY 5 MIN PRN
Status: DISCONTINUED | OUTPATIENT
Start: 2024-06-27 | End: 2024-06-27

## 2024-06-27 RX ORDER — EPHEDRINE SULFATE 50 MG/ML
INJECTION INTRAVENOUS AS NEEDED
Status: DISCONTINUED | OUTPATIENT
Start: 2024-06-27 | End: 2024-06-27 | Stop reason: SURG

## 2024-06-27 RX ORDER — NALOXONE HYDROCHLORIDE 0.4 MG/ML
0.08 INJECTION, SOLUTION INTRAMUSCULAR; INTRAVENOUS; SUBCUTANEOUS AS NEEDED
Status: DISCONTINUED | OUTPATIENT
Start: 2024-06-27 | End: 2024-06-27

## 2024-06-27 RX ORDER — LEVOFLOXACIN 5 MG/ML
500 INJECTION, SOLUTION INTRAVENOUS ONCE
Status: COMPLETED | OUTPATIENT
Start: 2024-06-27 | End: 2024-06-27

## 2024-06-27 RX ORDER — DEXTROSE MONOHYDRATE 25 G/50ML
50 INJECTION, SOLUTION INTRAVENOUS
Status: DISCONTINUED | OUTPATIENT
Start: 2024-06-27 | End: 2024-06-27

## 2024-06-27 RX ORDER — HYDROCODONE BITARTRATE AND ACETAMINOPHEN 5; 325 MG/1; MG/1
1 TABLET ORAL ONCE AS NEEDED
Status: DISCONTINUED | OUTPATIENT
Start: 2024-06-27 | End: 2024-06-27

## 2024-06-27 RX ORDER — HYDROCODONE BITARTRATE AND ACETAMINOPHEN 5; 325 MG/1; MG/1
2 TABLET ORAL ONCE AS NEEDED
Status: DISCONTINUED | OUTPATIENT
Start: 2024-06-27 | End: 2024-06-27

## 2024-06-27 RX ORDER — NICOTINE POLACRILEX 4 MG
30 LOZENGE BUCCAL
Status: DISCONTINUED | OUTPATIENT
Start: 2024-06-27 | End: 2024-06-27

## 2024-06-27 RX ORDER — HYDROMORPHONE HYDROCHLORIDE 1 MG/ML
0.2 INJECTION, SOLUTION INTRAMUSCULAR; INTRAVENOUS; SUBCUTANEOUS EVERY 5 MIN PRN
Status: DISCONTINUED | OUTPATIENT
Start: 2024-06-27 | End: 2024-06-27

## 2024-06-27 RX ORDER — PHENYLEPHRINE HCL 10 MG/ML
VIAL (ML) INJECTION AS NEEDED
Status: DISCONTINUED | OUTPATIENT
Start: 2024-06-27 | End: 2024-06-27 | Stop reason: SURG

## 2024-06-27 RX ADMIN — EPHEDRINE SULFATE 5 MG: 50 INJECTION INTRAVENOUS at 13:29:00

## 2024-06-27 RX ADMIN — SODIUM CHLORIDE, SODIUM LACTATE, POTASSIUM CHLORIDE, CALCIUM CHLORIDE: 600; 310; 30; 20 INJECTION, SOLUTION INTRAVENOUS at 14:13:00

## 2024-06-27 RX ADMIN — ROCURONIUM BROMIDE 30 MG: 10 INJECTION, SOLUTION INTRAVENOUS at 13:07:00

## 2024-06-27 RX ADMIN — ONDANSETRON 4 MG: 2 INJECTION INTRAMUSCULAR; INTRAVENOUS at 14:05:00

## 2024-06-27 RX ADMIN — LEVOFLOXACIN 500 MG: 5 INJECTION, SOLUTION INTRAVENOUS at 13:16:00

## 2024-06-27 RX ADMIN — DEXAMETHASONE SODIUM PHOSPHATE 4 MG: 4 MG/ML VIAL (ML) INJECTION at 13:26:00

## 2024-06-27 RX ADMIN — SODIUM CHLORIDE, SODIUM LACTATE, POTASSIUM CHLORIDE, CALCIUM CHLORIDE: 600; 310; 30; 20 INJECTION, SOLUTION INTRAVENOUS at 12:53:00

## 2024-06-27 RX ADMIN — PHENYLEPHRINE HCL 100 MCG: 10 MG/ML VIAL (ML) INJECTION at 13:11:00

## 2024-06-27 RX ADMIN — LIDOCAINE HYDROCHLORIDE 50 MG: 10 INJECTION, SOLUTION EPIDURAL; INFILTRATION; INTRACAUDAL; PERINEURAL at 13:05:00

## 2024-06-27 NOTE — TELEPHONE ENCOUNTER
Please call this patient or their family and schedule the patient for a virtual visit with me in ~ 3-4 weeks for check-up and discuss stone management on right side.    Thanks,    MP[facility]      Below if for Documentation Purposes Only:  24 h urine 6/15/24: very low UOP, citrate (1150, 298), low-NL pH (5.9)    Likely recommend she double water intake and start 15 urocit BID but will discuss at NOV

## 2024-06-27 NOTE — DISCHARGE INSTRUCTIONS
You had a procedure called a CYSTOSCOPY today    - Keep reilly bag below the level of the bladder at all times to prevent UTI. OK to shower.    - You may remove the reilly by 6 AM next Tuesday. The stent is tied to the reilly catheter. There is 15 mL in the reilly balloon. Drink plenty of fluids and call office if any difficulty voiding.    - You may resume regular activity tomorrow.    - You may have a post-operative appointment that is already scheduled for you. If the appointment date or time does not work with your schedule please call our office to reschedule ([phone]). Alternatively our office may be reaching out to you to schedule the appointment.     - You may experience pain after the procedure for 1-2 days.  If pain becomes intolerable please contact our office or go to the nearest Emergency Room/Immediate Care. You make take over-the counter ibuprofen for mild pain (provided you do not have a medical condition such as stomach ulcers or kidney disease which prohibits you from taking). You may take this in addition to tylenol or narcotic pain medication. Hot packs may help for discomfort as well.    - If you are medically allowed to take ibuprofen then you may take 200-400 mg every 8 hours as needed for pain with plenty of fluids. You may take this in addition to tylenol or other pain medication which may be prescribed.     - You may experience burning with urination and frequency of urination over the next few days.     - You may see blood in your urine that should clear up within a few days. If you have a stent in place then you may see blood in the urine until the stent is eventually removed.     - Try to abstain from alcohol, coffee, tea, artificial sweeteners, and spicy food for the next 48 hours as these can irritate the bladder.     - If you develop a fever, chills, difficulty urinating or abdominal pain in the next 24 hours, call the office.     - Drink 1.5 to 2 liters of fluid today, water is  preferable. If you are on a fluid restriction due to other medical reasons then you need to adhere to your fluid restriction recommendations.

## 2024-06-27 NOTE — H&P
Pre-op Diagnosis: No diagnosis found.      The above referenced H&P was reviewed by Siddhartha Blood MD on 6/27/2024, the patient was examined and no significant changes have occurred in the patient's condition since the H&P was performed.  I discussed with the patient and/or legal representative the potential benefits, risks and side effects of this procedure; the likelihood of the patient achieving goals; and potential problems that might occur during recuperation.  I discussed reasonable alternatives to the procedure, including risks, benefits and side effects related to the alternatives and risks related to not receiving this procedure.  We will proceed with procedure as planned.         Siddhartha Blood MD  Physician  Specialty: UROLOGY     H&P     Signed     Encounter Date: 6/24/2024     Signed       Expand All Collapse All    Virtual Telephone Check-In     Payton Henriquez verbally consents to a Virtual Telephone Check-In service today.  Patient understands and accepts financial responsibility for any deductible, co-insurance and/or co-pays associated with this service.     Duration of the service including review of pertinent imaging/labs and coordination of care: 35 minutes     Summary of topics discussed:  See below     HPI:      Payton Henriquez is a 78 year old female with a PMH of HTN, HL, DM, GERD, afib (on eliquis), DVT.     Following for:  1. Recurrent UTIs  - no issues up until 4305-0817, now with recurrent UTIs  - multiple UCx with ESBL S to cipro, juan, macrobid, bactrim  2. Recurrent large b/l kidney stones  - s/p staged #1/2 left URS 7/13/24  - s/p right URS 8/23/22 - Frank), no other stones  3. Hypocitraturia and low UOP  - 24 h urine 6/15/2: very low UOP, citrate (1150, 298), low pH (5.94)     PCP - Elissa  Prior Urologist - Jayshree 12/12/23, Frank 8/23/22     Presents for checkup, review 24 h urine, and discuss next steps.     Pt feels well. Lives at home with . Has live in help and  confined to a WC/bedridden. She uses a purewick but is able to hold bladder. She has had more recurrent UTIs with drug-resistant organisms over the past couple years.  She has difficulty getting to appointments as she needs a ride      Has no difficulty urinating. No UTI symptoms for a least a month.  No abdominal or flank pain.     Unable to void LOV.      Gross hematuria: none  Tobacco hx: none  Fam h/o  malignancy: none     Drinks ~ 30 oz water, 12 oz coffee, 12 juice, 12 soda with medium yellow urine. I encouraged the pt drink at least 40-60 oz water per day or enough to keep urine clear to pale yellow for stone prevention.     CT SP 3/7/24: large b/l renal stones. Has ~ 24 mm LLP conglomerate, 5 LUP, 6 distal left ureteral stone without hydro. 37 mm RUP conglomerate     We again discussed options for the large bilateral renal stone burden.  Options would include observation, staged URS, PCNL.  We discussed the risks and benefits to each option.  We discussed the risk of sepsis and/or obstruction if the large renal stones are left untreated.  She is amenable to #2/2 left URS and will f/u with me  few weeks later to discuss treatment on the right side.  We would also plan to leave a ureteral stent Dangler after the second procedure and affixed this to a Holm catheter so that the stent and Holm can be removed a few days later.     Have discussed that while the right sided stone burden is nonobstructing she does have a larger stone burden on that side.  We may wish to consider PCNL at some point for that side. I will review this in more detail after completing left URS.      We reviewed 24 h urine results. Given very low UOP and citrate will have her double water intake and start 15 urocit BID. Reviewed SEs.     She will try to double water intake. Starting 15 urocit BID. OR for 2nd look left URS as noted above.     HISTORY:  Past Medical History       Past Medical History:    Arrhythmia     A FIB    Back  problem     SPINAL STENOSIS    Bedridden     Due to a Fall in 2020 broken left fankle w surgery, s/p infection bone left foot, rehab, antibx x 7 weeks, after Fractured her  right femur   between 2020 -2024    Calculus of kidney     right and left    Deep vein thrombosis (HCC)     suspected left leg    Encounter for urinary catheter     uses external catheter system \" purewick\" / due to being bedridden    HIGH BLOOD PRESSURE    HIGH CHOLESTEROL    History of cardiac cath     post procedure data    Macular degeneration of right eye    Measles without mention of complication    Neuropathy     feet    Type II or unspecified type diabetes mellitus without mention of complication, not stated as uncontrolled     diet control    UTI (urinary tract infection)     X2 UTI'S brought to Dr Short without mention of complication    Visual impairment         Past Surgical History         Past Surgical History:   Procedure Laterality Date    Cholecystectomy   2005    Colonoscopy   12/19/2013     Procedure: COLONOSCOPY;  Surgeon: Igor Simon MD;  Location:  ENDOSCOPY    Colonoscopy   12/19/13      Alfred  sandra 10    Colonoscopy & polypectomy   11/24/2003     +diminutive polyp; repeat 10 years Alfred    Femur fracture surgery Left 08/31/2022     FEMUR IM NAIL ANTEGRADE LEFT    Hysterectomy   1989     removal of left ovary    Needle biopsy right   11/2019     benign us guided x 2    Other surgical history   1972     salpingo-oophorectomy right side    Other surgical history   01/01/2020     Cysto, Left RPG,stent insertion         Family History         Family History   Problem Relation Age of Onset    Other (Lung Cancer) Mother           Social History:   Short Social Hx on File   Social History           Socioeconomic History    Marital status:    Tobacco Use    Smoking status: Never    Smokeless tobacco: Never   Vaping Use    Vaping status: Never Used   Substance and Sexual Activity    Alcohol use: Not Currently     Drug use: Not Currently   Other Topics Concern    Caffeine Concern No    Exercise No    Seat Belt Yes      Social Determinants of Health           Financial Resource Strain: Low Risk  (9/19/2023)     Financial Resource Strain      Difficulty of Paying Living Expenses: Not hard at all   Transportation Needs: No Transportation Needs (9/19/2023)     Transportation Needs      Lack of Transportation: No     [facility]            Medications (Active prior to today's visit):  Current Medications          Current Outpatient Medications   Medication Sig Dispense Refill    Potassium Citrate ER (UROCIT-K 15) 15 MEQ (1620 MG) Oral Tab CR Take 1 tablet by mouth in the morning and 1 tablet before bedtime. 180 tablet 5    HYDROcodone-acetaminophen (NORCO) 5-325 MG Oral Tab Take 1 tablet by mouth every 6 (six) hours as needed for Pain. 30 tablet 0    docusate sodium 100 MG Oral Cap Take 1 capsule (100 mg total) by mouth 2 (two) times daily for 14 days. Stop taking if loose stools/diarrhea. 28 capsule 0    GABAPENTIN 300 MG Oral Cap TAKE 1 CAPSULE IN THE MORNING AND 1 CAPSULE BEFORE BEDTIME 180 capsule 3    atorvastatin 20 MG Oral Tab Take 1 tablet (20 mg total) by mouth daily. (Patient taking differently: Take 1 tablet (20 mg total) by mouth nightly.) 90 tablet 0    traMADol 50 MG Oral Tab Take 1 tablet (50 mg total) by mouth every 8 (eight) hours as needed for Pain. 50 tablet 0    omeprazole 20 MG Oral Capsule Delayed Release Take 1 capsule (20 mg total) by mouth every morning. 90 capsule 0    apixaban (ELIQUIS) 5 MG Oral Tab Take 1 tablet (5 mg total) by mouth 2 (two) times daily. 180 tablet 3    amLODIPine 5 MG Oral Tab          Benazepril-hydroCHLOROthiazide 20-12.5 MG Oral Tab          cyclobenzaprine 10 MG Oral Tab          ferrous sulfate 325 (65 FE) MG Oral Tab EC Take 1 tablet (325 mg total) by mouth daily with breakfast.        Zinc Gluconate 50 MG Oral Cap Take 50 mg by mouth daily.         mirtazapine 7.5 MG Oral Tab Take 1 tablet (7.5 mg total) by mouth nightly. 90 tablet 3    losartan 25 MG Oral Tab Take 1 tablet (25 mg total) by mouth daily. 30 tablet 11    metoprolol succinate ER 25 MG Oral Tablet 24 Hr Take 1 tablet (25 mg total) by mouth daily. 90 tablet 3    docusate sodium 100 MG Oral Cap Take 1 capsule (100 mg total) by mouth daily as needed for constipation.        polyethylene glycol, PEG 3350, 17 g Oral Powd Pack Take 17 g by mouth daily as needed.        acetaminophen 500 MG Oral Tab Take 2 tablets (1,000 mg total) by mouth every 6 (six) hours as needed for Pain.        ondansetron 4 MG Oral Tablet Dispersible Take 1 tablet (4 mg total) by mouth as needed for Nausea.        Multiple Vitamins-Minerals (PRESERVISION AREDS 2) Oral Cap Take 1 tablet by mouth 2 (two) times a day.        Acidophilus/Pectin (PROBIOTIC) Oral Cap Take 1 capsule by mouth daily.                Allergies:  Allergies   No Known Allergies           ROS:      A comprehensive 10 point review of systems was completed.  Pertinent positives and negatives noted in the the HPI.     PHYSICAL EXAM:      GENERAL APPEARANCE: well, developed, well nourished, in no acute distress  NEUROLOGIC: nonfocal, alert and oriented  HEAD: normocephalic, atraumatic  EYES: sclera non-icteric  EARS: hearing intact  ORAL CAVITY: mucosa moist  NECK/THYROID: no obvious goiter or masses  LUNGS: nonlabored breathing  ABDOMEN: soft, no obvious masses or tenderness  SKIN: no obvious rashes     : as noted above     ASSESSMENT/PLAN:   Diagnoses and all orders for this visit:     Recurrent kidney stones     Recurrent UTI     Hypocitraturia  -     Potassium Citrate ER (UROCIT-K 15) 15 MEQ (1620 MG) Oral Tab CR; Take 1 tablet by mouth in the morning and 1 tablet before bedtime.        - as noted above.     Thanks again for this consult.     Siddhartha Blood MD, FACS  Urologist  [facility]  Office: [phone]                  Electronically  signed by Siddhartha Blood MD at 6/25/2024 10:19 AM         Virtual Phone E/M on 6/24/2024            Detailed Report          Note shared with patient  Additional Documentation    SmartForms:  [facility]B TELEHEALTH VISIT MODIFIERS   Encounter Info: Billing Info,     History,     Allergies,     Detailed Report     Chart Review: Note Routing History    No routing history on file.

## 2024-06-27 NOTE — ANESTHESIA PREPROCEDURE EVALUATION
PRE-OP EVALUATION    Patient Name: Payton Henriquez    Admit Diagnosis: Left ureteral stone [N20.1]  Renal stones [N20.0]    Pre-op Diagnosis: Left ureteral stone [N20.1]  Renal stones [N20.0]    CYSTOSCOPY, LEFT FLEXIBLE URETEROSCOPY, LASER LITHOTRIPSY, STONE REMOVAL, STENT EXCHANGE    Anesthesia Procedure: CYSTOSCOPY, LEFT FLEXIBLE URETEROSCOPY, LASER LITHOTRIPSY, STONE REMOVAL, STENT EXCHANGE (Left)    Surgeons and Role:     * Siddhartha Blood MD - Primary    Pre-op vitals reviewed.  Temp: 98.2 °F (36.8 °C)  Pulse: 71  Resp: 20  BP: 132/57  SpO2: 100 %  Body mass index is 29.63 kg/m².    Current medications reviewed.  Hospital Medications:  • acetaminophen (Tylenol Extra Strength) tab 1,000 mg  1,000 mg Oral Once   • lactated ringers infusion   Intravenous Continuous   • levoFLOXacin in dextrose 5% (Levaquin) 500 mg/100mL IVPB premix 500 mg  500 mg Intravenous Once   • [COMPLETED] levoFLOXacin in dextrose 5% (Levaquin) 500 mg/100mL IVPB premix 500 mg  500 mg Intravenous Once       Outpatient Medications:     Medications Prior to Admission   Medication Sig Dispense Refill Last Dose   • Potassium Citrate ER (UROCIT-K 15) 15 MEQ (1620 MG) Oral Tab CR Take 1 tablet by mouth in the morning and 1 tablet before bedtime. 180 tablet 5 6/26/2024   • ciprofloxacin 500 MG Oral Tab Take 1 tablet (500 mg total) by mouth 2 (two) times daily for 5 days. 10 tablet 0 6/26/2024 at 2100   • docusate sodium 100 MG Oral Cap Take 1 capsule (100 mg total) by mouth 2 (two) times daily for 14 days. Stop taking if loose stools/diarrhea. 28 capsule 0 6/26/2024   • GABAPENTIN 300 MG Oral Cap TAKE 1 CAPSULE IN THE MORNING AND 1 CAPSULE BEFORE BEDTIME 180 capsule 3 6/26/2024   • atorvastatin 20 MG Oral Tab Take 1 tablet (20 mg total) by mouth daily. (Patient taking differently: Take 1 tablet (20 mg total) by mouth nightly.) 90 tablet 0 6/26/2024 at 2100   • omeprazole 20 MG Oral Capsule Delayed Release Take 1 capsule (20 mg total) by mouth  every morning. 90 capsule 0 6/26/2024   • amLODIPine 5 MG Oral Tab    6/26/2024 at 2100   • Benazepril-hydroCHLOROthiazide 20-12.5 MG Oral Tab    6/26/2024 at 0900   • cyclobenzaprine 10 MG Oral Tab    6/26/2024   • Zinc Gluconate 50 MG Oral Cap Take 50 mg by mouth daily.   6/26/2024   • mirtazapine 7.5 MG Oral Tab Take 1 tablet (7.5 mg total) by mouth nightly. 90 tablet 3 6/26/2024   • losartan 25 MG Oral Tab Take 1 tablet (25 mg total) by mouth daily. 30 tablet 11 6/26/2024 at 0900   • metoprolol succinate ER 25 MG Oral Tablet 24 Hr Take 1 tablet (25 mg total) by mouth daily. 90 tablet 3 6/27/2024 at 0   • polyethylene glycol, PEG 3350, 17 g Oral Powd Pack Take 17 g by mouth daily as needed.   6/26/2024   • acetaminophen 500 MG Oral Tab Take 2 tablets (1,000 mg total) by mouth every 6 (six) hours as needed for Pain.   6/27/2024 at 0630   • ondansetron 4 MG Oral Tablet Dispersible Take 1 tablet (4 mg total) by mouth as needed for Nausea.   Past Month   • Acidophilus/Pectin (PROBIOTIC) Oral Cap Take 1 capsule by mouth daily.   Past Week   • HYDROcodone-acetaminophen (NORCO) 5-325 MG Oral Tab Take 1 tablet by mouth every 6 (six) hours as needed for Pain. 30 tablet 0 6/24/2024   • traMADol 50 MG Oral Tab Take 1 tablet (50 mg total) by mouth every 8 (eight) hours as needed for Pain. 50 tablet 0 Unknown   • apixaban (ELIQUIS) 5 MG Oral Tab Take 1 tablet (5 mg total) by mouth 2 (two) times daily. 180 tablet 3 6/22/2024 at 2000   • ferrous sulfate 325 (65 FE) MG Oral Tab EC Take 1 tablet (325 mg total) by mouth daily with breakfast.      • docusate sodium 100 MG Oral Cap Take 1 capsule (100 mg total) by mouth daily as needed for constipation.      • Multiple Vitamins-Minerals (PRESERVISION AREDS 2) Oral Cap Take 1 tablet by mouth 2 (two) times a day.          Allergies: Patient has no known allergies.      Anesthesia Evaluation    Patient summary reviewed.    Anesthetic Complications  (-) history of anesthetic  complications         GI/Hepatic/Renal  Comment: Renal stones                               Cardiovascular  Comment: Echo (2022):  Conclusions:    1. Left ventricle: The cavity size was normal. Wall thickness was normal.     Systolic function was mildly reduced. The estimated ejection fraction was     45-50% with mild global hypokinesis  2. Mitral valve: Mildly calcified annulus. There was mild regurgitation.  3. Left atrium: The left atrial volume was mildly to moderately increased.  4. Pulmonary arteries: Systolic pressure was moderately increased, in the     range of 45mm Hg to 50mm Hg.    Impressions:  This study is compared with previous dated 7/29/2022:  Pulmonary artery pressures are now moderatley increased.    ECG reviewed.  Exercise tolerance: good         (+) obesity  (+) hypertension and well controlled  (+) hyperlipidemia               (+) dysrhythmias and atrial fibrillation  (+) CHF                Endo/Other      (+) diabetes and poorly controlled, type 2, not using insulin                         Pulmonary    Negative pulmonary ROS.                       Neuro/Psych  Comment: Neuropathy  Bedridden               (+) neuromuscular disease             PAD  H/o DVT      Past Surgical History:   Procedure Laterality Date   • Cholecystectomy  2005   • Colonoscopy  12/19/2013    Procedure: COLONOSCOPY;  Surgeon: Igor Simon MD;  Location:  ENDOSCOPY   • Colonoscopy  12/19/13     Alfred flores 10   • Colonoscopy & polypectomy  11/24/2003    +diminutive polyp; repeat 10 years Alfred   • Femur fracture surgery Left 08/31/2022    FEMUR IM NAIL ANTEGRADE LEFT   • Hysterectomy  1989    removal of left ovary   • Needle biopsy right  11/2019    benign us guided x 2   • Other surgical history  1972    salpingo-oophorectomy right side   • Other surgical history  01/01/2020    Cysto, Left RPG,stent insertion     Social History     Socioeconomic History   • Marital status:    Tobacco Use   • Smoking status:  Never   • Smokeless tobacco: Never   Vaping Use   • Vaping status: Never Used   Substance and Sexual Activity   • Alcohol use: Not Currently   • Drug use: Not Currently   Other Topics Concern   • Caffeine Concern No   • Exercise No   • Seat Belt Yes     History   Drug Use Unknown     Available pre-op labs reviewed.               Airway      Mallampati: III  Mouth opening: 3 FB  TM distance: 4 - 6 cm  Neck ROM: full Cardiovascular    Cardiovascular exam normal.  Rhythm: regular  Rate: normal  (-) murmur   Dental    Dentition appears grossly intact         Pulmonary    Pulmonary exam normal.  Breath sounds clear to auscultation bilaterally.               Other findings        ASA: 3   Plan: general  NPO status verified and patient meets guidelines.  Patient has taken beta blockers in last 24 hours. (Last dose 6/27/24 at 0530.)  Post-procedure pain management plan discussed with surgeon and patient.      Plan/risks discussed with: patient            Present on Admission:  **None**

## 2024-06-27 NOTE — ANESTHESIA PROCEDURE NOTES
Airway  Date/Time: 6/27/2024 1:08 PM  Urgency: elective      General Information and Staff    Patient location during procedure: OR  Anesthesiologist: Robert Bearden MD  Resident/CRNA: Samara Joyce CRNA  Performed: CRNA   Performed by: Samara Joyce CRNA  Authorized by: Robert Bearden MD      Indications and Patient Condition  Indications for airway management: anesthesia  Sedation level: deep  Preoxygenated: yes  Patient position: sniffing  Mask difficulty assessment: 1 - vent by mask    Final Airway Details  Final airway type: endotracheal airway      Successful airway: ETT  Cuffed: yes   Successful intubation technique: direct laryngoscopy  Endotracheal tube insertion site: oral  Blade: GlideScope  Blade size: #3  ETT size (mm): 7.0    Cormack-Lehane Classification: grade I - full view of glottis  Placement verified by: capnometry   Measured from: lips  ETT to lips (cm): 21  Number of attempts at approach: 1

## 2024-06-27 NOTE — INTERVAL H&P NOTE
Pre-op Diagnosis: Left ureteral stone [N20.1]  Renal stones [N20.0]    The above referenced H&P was reviewed by Siddhartha Blood MD on 6/27/2024, the patient was examined and no significant changes have occurred in the patient's condition since the H&P was performed.  I discussed with the patient and/or legal representative the potential benefits, risks and side effects of this procedure; the likelihood of the patient achieving goals; and potential problems that might occur during recuperation.  I discussed reasonable alternatives to the procedure, including risks, benefits and side effects related to the alternatives and risks related to not receiving this procedure.  We will proceed with procedure as planned.

## 2024-06-27 NOTE — ANESTHESIA POSTPROCEDURE EVALUATION
[facility]    Payton Henriquez Patient Status:  Hospital Outpatient Surgery   Age/Gender 78 year old female MRN [id number]   Location [facility] POST ANESTHESIA CARE UNIT Attending Siddhartha Blood MD   Hosp Day # 0 PCP Florencio Bowers MD       Anesthesia Post-op Note    CYSTOSCOPY, LEFT FLEXIBLE URETEROSCOPY, LASER LITHOTRIPSY, STONE REMOVAL, STENT EXCHANGE    Procedure Summary       Date: 06/27/24 Room / Location:  MAIN OR 07 / [facility] MAIN OR    Anesthesia Start: 1253 Anesthesia Stop: 1427    Procedure: CYSTOSCOPY, LEFT FLEXIBLE URETEROSCOPY, LASER LITHOTRIPSY, STONE REMOVAL, STENT EXCHANGE (Left: Ureter) Diagnosis:       Left ureteral stone      Renal stones      (Left ureteral stone [N20.1]Renal stones [N20.0])    Surgeons: Siddhartha Blood MD Anesthesiologist: Robert Bearden MD    Anesthesia Type: general ASA Status: 3            Anesthesia Type: general    Vitals Value Taken Time   /68 06/27/24 1428   Temp 96.9 06/27/24 1428   Pulse 70 06/27/24 1428   Resp 10 06/27/24 1428   SpO2 99% 06/27/24 1428       Patient Location: PACU    Anesthesia Type: general    Airway Patency: patent and extubated    Postop Pain Control: adequate    Mental Status: preanesthetic baseline    Nausea/Vomiting: none    Cardiopulmonary/Hydration status: stable euvolemic    Complications: no apparent anesthesia related complications    Postop vital signs: stable    Comments: Report to Faith PACU RN.    Dental Exam: Unchanged from Preop    Patient to be discharged from PACU when criteria met.

## 2024-06-27 NOTE — OPERATIVE REPORT
Urology Operative Note    Attending Surgeon: Siddhartha Blood MD    Patient Name: Payton Henriquez    Date of Surgery: 6/27/2024    Preoperative Diagnosis: left sided kidney and ureteral stones    Postoperative Diagnosis: same    Procedure Performed: Cystoscopy, left flexible ureteroscopy with Holmium laser lithotripsy and basket extraction of stone fragments, left ureteral stent exchange    Indication for procedure:  Patient is a 78 year old female who presented with recurrent UTIs and large bilateral kidney stones. She underwent left URS a few weeks ago and presents for second look. The patient was counseled on options and elected to undergo the aforementioned procedure. We discussed the risks and benefits to surgery. We discussed risks including, but not limited to, bleeding, infection, possible damage to surrounding structures, possible need for repeat procedure(s). The patient understood these risks and wished to proceed with surgery.    Description of the procedure:  The patient was taken to the operating room and prepped and draped in lithotomy position after undergoing general anesthesia. The cystoscope was inserted and the bladder was inspected and drained.  The left ureteral stent was grasped and this was pulled to the meatus. This was cannulated with a wire and the wire was passed up into the renal pelvis which I confirmed using fluoroscopy. The old stent was removed. I then inserted a flexible sheath and a second wire as a safety wire. I then reinserted the flexible sheath. We then proceeded with flexible ureteroscopy.  I was easily able to identify the stone burden with the largest stone measuring ~ 10 mm. We were able to fragment the stone easily using the Holmium laser and the stone fragments were removed.  I carefully inspected all the renal calices and the ureter in its entirety. No significant residual stone burden was remaining. I then removed the flexible scope and sheath. I inserted a 6 x 28 cm JJ  ureteral stent over the safety wire.   I confirmed there was good curl of stent in the kidney using fluoroscopy as well as good curl of stent in the bladder using direct cystoscopic examination.   The bladder was drained and the scope was removed.  A reilly catheter was placed at the end of the case and the stent dangler was affixed to the catheter using tape.  The stone fragments were sent for analysis.   The patient was awoken having tolerated the procedure well.    Specimens: Stone fragments    Complications: No known complications.    Condition on Discharge from the operating room was stable    Plan: Will plan for video visit in a few weeks to discuss management of the large right renal stones.    Siddhartha Blood MD    Date: 6/27/2024 Time: 2:40 PM

## 2024-07-01 ENCOUNTER — TELEPHONE (OUTPATIENT)
Dept: SURGERY | Facility: CLINIC | Age: 79
End: 2024-07-01

## 2024-07-01 DIAGNOSIS — R82.991 HYPOCITRATURIA: ICD-10-CM

## 2024-07-01 RX ORDER — POTASSIUM CITRATE 15 MEQ/1
1 TABLET, EXTENDED RELEASE ORAL 2 TIMES DAILY
Qty: 180 TABLET | Refills: 5 | Status: SHIPPED | OUTPATIENT
Start: 2024-07-01

## 2024-07-01 NOTE — TELEPHONE ENCOUNTER
Pt called stating [facility] has not received the rx. Potassium citrate.  Please send.  Call pt

## 2024-07-10 LAB
CAOX DIHYDRATE: 30 %
CAOX MONOHYDRATE: 70 %
WEIGHT-STONE: 207 MG

## 2024-07-15 ENCOUNTER — PATIENT OUTREACH (OUTPATIENT)
Dept: INTERNAL MEDICINE CLINIC | Facility: CLINIC | Age: 79
End: 2024-07-15

## 2024-07-16 NOTE — PROGRESS NOTES
Virtual Video Check-In    Payton Henriquez verbally consents to a Virtual Video Check-In service today.  This is a telemedicine visit with live, interactive video and audio.   Patient understands and accepts financial responsibility for any deductible, co-insurance and/or co-pays associated with this service.    Duration of the service including review of pertinent imaging/labs and coordination of care: 35 minutes    Summary of topics discussed:  See below    HPI:     Payton Henriquez is a 78 year old female with a PMH of HTN, HL, DM, GERD, afib (on eliquis), DVT.    Following for:  1. Recurrent UTIs  - no issues up until 4989-8083, now with recurrent UTIs  - multiple UCx with ESBL S to cipro, juan, macrobid, bactrim  2. Recurrent large CaOx b/l kidney stones  - s/p staged #1/2 left URS 6/13/24, #2/2 6/27/24  - s/p right URS 8/23/22 - Frank), no other stones  3. Hypocitraturia and low UOP  - 15 urocit BID 7/1/24  - 24 h urine 6/15/24: very low UOP, citrate (1150, 298), low pH (5.94)    PCP - Elissa Sutton Urologist - Jayshree 12/12/23, Frank 8/23/22    Presents for checkup, review CT, and discuss next steps. She is WC/bedridden and has difficulty getting to appointments as she needs a ride.    Pt feels well. Lives at home with . Has live in help.  Her reilly cath was removed with the ureteral stent. She feels well. Has no difficulty urinating. No UTI symptoms for >2 mo.  No abdominal or flank pain.  She uses a purewick but is able to hold bladder. She has had more recurrent UTIs with drug-resistant organisms over the past couple years.      She is taking 15 urocit BID.    Unable to void LOV.     Gross hematuria: none  Tobacco hx: none  Fam h/o  malignancy: none    Drinks ~ 30 oz water, 12 oz coffee, 12 juice, 12 soda with medium yellow urine. I encouraged the pt drink at least 40-60 oz water per day or enough to keep urine clear to pale yellow for stone prevention.    CT SP 3/7/24: large b/l renal  stones. Has ~ 24 mm LLP conglomerate, 5 LUP, 6 distal left ureteral stone without hydro. 37 mm RUP conglomerate    We again discussed options for the large bilateral renal stone burden.  Options would include observation, staged URS, PCNL.  We discussed the risks and benefits to each option.    She would like to proceed to OR for R PCNL. We discussed the risks and benefits to the procedure including, but not limited to, bleeding, infection, possible damage to surrounding structures. The patient understands and would like to proceed.  Discussed we can leave reilly catheter for ~ 1 week after procedure if she would like and I  would also likely repeat 24 h urine in ~ 3-6 to ensure urinary parameters have improved but we can discuss repeat 24 h urine at a later date.    Plan for OR for R PCNL pending medical clearance.  She will double water intake and =continue 15 urocit BID.      HISTORY:  Past Medical History:    Arrhythmia    A FIB    Back problem    SPINAL STENOSIS    Bedridden    Due to a Fall in 2020 broken left fankle w surgery, s/p infection bone left foot, rehab, antibx x 7 weeks, after Fractured her  right femur   between 2020 -2024    Calculus of kidney    right and left    Deep vein thrombosis (HCC)    suspected left leg    Encounter for urinary catheter    uses external catheter system \" purewick\" / due to being bedridden    HIGH BLOOD PRESSURE    HIGH CHOLESTEROL    History of cardiac cath    post procedure data    Macular degeneration of right eye    Measles without mention of complication    Neuropathy    feet    Type II or unspecified type diabetes mellitus without mention of complication, not stated as uncontrolled    diet control    UTI (urinary tract infection)    X2 UTI'S brought to Dr Short without mention of complication    Visual impairment      Past Surgical History:   Procedure Laterality Date    Cholecystectomy  2005    Colonoscopy  12/19/2013    Procedure: COLONOSCOPY;  Surgeon:  Igor Simon MD;  Location:  ENDOSCOPY    Colonoscopy  12/19/13     Alfred  sandra 10    Colonoscopy & polypectomy  11/24/2003    +diminutive polyp; repeat 10 years Alfred    Femur fracture surgery Left 08/31/2022    FEMUR IM NAIL ANTEGRADE LEFT    Hysterectomy  1989    removal of left ovary    Needle biopsy right  11/2019    benign us guided x 2    Other surgical history  1972    salpingo-oophorectomy right side    Other surgical history  01/01/2020    Cysto, Left RPG,stent insertion      Family History   Problem Relation Age of Onset    Other (Lung Cancer) Mother       Social History:   Social History     Socioeconomic History    Marital status:    Tobacco Use    Smoking status: Never    Smokeless tobacco: Never   Vaping Use    Vaping status: Never Used   Substance and Sexual Activity    Alcohol use: Not Currently    Drug use: Not Currently   Other Topics Concern    Caffeine Concern No    Exercise No    Seat Belt Yes     Social Determinants of Health     Financial Resource Strain: Low Risk  (9/19/2023)    Financial Resource Strain     Difficulty of Paying Living Expenses: Not hard at all   Transportation Needs: No Transportation Needs (9/19/2023)    Transportation Needs     Lack of Transportation: No    Received from [facility]        Medications (Active prior to today's visit):  Current Outpatient Medications   Medication Sig Dispense Refill    Potassium Citrate ER (UROCIT-K 15) 15 MEQ (1620 MG) Oral Tab CR Take 1 tablet by mouth in the morning and 1 tablet before bedtime. 180 tablet 5    HYDROcodone-acetaminophen (NORCO) 5-325 MG Oral Tab Take 1 tablet by mouth every 6 (six) hours as needed for Pain. 30 tablet 0    HYDROcodone-acetaminophen (NORCO) 5-325 MG Oral Tab Take 1 tablet by mouth every 6 (six) hours as needed for Pain. 30 tablet 0    GABAPENTIN 300 MG Oral Cap TAKE 1 CAPSULE IN THE MORNING AND 1 CAPSULE BEFORE BEDTIME 180 capsule 3    atorvastatin 20 MG Oral Tab Take 1 tablet  (20 mg total) by mouth daily. (Patient taking differently: Take 1 tablet (20 mg total) by mouth nightly.) 90 tablet 0    traMADol 50 MG Oral Tab Take 1 tablet (50 mg total) by mouth every 8 (eight) hours as needed for Pain. 50 tablet 0    omeprazole 20 MG Oral Capsule Delayed Release Take 1 capsule (20 mg total) by mouth every morning. 90 capsule 0    apixaban (ELIQUIS) 5 MG Oral Tab Take 1 tablet (5 mg total) by mouth 2 (two) times daily. 180 tablet 3    amLODIPine 5 MG Oral Tab       Benazepril-hydroCHLOROthiazide 20-12.5 MG Oral Tab       cyclobenzaprine 10 MG Oral Tab       ferrous sulfate 325 (65 FE) MG Oral Tab EC Take 1 tablet (325 mg total) by mouth daily with breakfast.      Zinc Gluconate 50 MG Oral Cap Take 50 mg by mouth daily.      mirtazapine 7.5 MG Oral Tab Take 1 tablet (7.5 mg total) by mouth nightly. 90 tablet 3    losartan 25 MG Oral Tab Take 1 tablet (25 mg total) by mouth daily. 30 tablet 11    metoprolol succinate ER 25 MG Oral Tablet 24 Hr Take 1 tablet (25 mg total) by mouth daily. 90 tablet 3    docusate sodium 100 MG Oral Cap Take 1 capsule (100 mg total) by mouth daily as needed for constipation.      polyethylene glycol, PEG 3350, 17 g Oral Powd Pack Take 17 g by mouth daily as needed.      acetaminophen 500 MG Oral Tab Take 2 tablets (1,000 mg total) by mouth every 6 (six) hours as needed for Pain.      ondansetron 4 MG Oral Tablet Dispersible Take 1 tablet (4 mg total) by mouth as needed for Nausea.      Multiple Vitamins-Minerals (PRESERVISION AREDS 2) Oral Cap Take 1 tablet by mouth 2 (two) times a day.      Acidophilus/Pectin (PROBIOTIC) Oral Cap Take 1 capsule by mouth daily.         Allergies:  No Known Allergies      ROS:     A comprehensive 10 point review of systems was completed.  Pertinent positives and negatives noted in the the HPI.    PHYSICAL EXAM:     GENERAL APPEARANCE: well, developed, well nourished, in no acute distress  NEUROLOGIC: nonfocal, alert and oriented  HEAD:  normocephalic, atraumatic  EYES: sclera non-icteric  EARS: hearing intact  ORAL CAVITY: mucosa moist  NECK/THYROID: no obvious goiter or masses  LUNGS: nonlabored breathing  ABDOMEN: soft, no obvious masses or tenderness  SKIN: no obvious rashes    : as noted above     ASSESSMENT/PLAN:   Diagnoses and all orders for this visit:    Hypocitraturia    Recurrent kidney stones    Recurrent UTI    - as noted above.    Thanks again for this consult.    Siddhartha Blood MD, FACS  Urologist  [facility]  Office: [phone]

## 2024-07-18 NOTE — TELEPHONE ENCOUNTER
Medicare Discharge summary faxed to [facility] with confirmation     Fax #- [phone]      Placed in teal accordion folder in TO/KR/SM pod and also copy sent to scan
Partially impaired: cannot see medication labels or newsprint, but can see obstacles in path, and the surrounding layout; can count fingers at arm's length

## 2024-07-23 ENCOUNTER — TELEPHONE (OUTPATIENT)
Dept: SURGERY | Facility: CLINIC | Age: 79
End: 2024-07-23

## 2024-07-23 ENCOUNTER — TELEMEDICINE (OUTPATIENT)
Dept: SURGERY | Facility: CLINIC | Age: 79
End: 2024-07-23
Payer: MEDICARE

## 2024-07-23 DIAGNOSIS — N39.0 RECURRENT UTI: ICD-10-CM

## 2024-07-23 DIAGNOSIS — N20.0 STAGHORN CALCULUS: ICD-10-CM

## 2024-07-23 DIAGNOSIS — R82.991 HYPOCITRATURIA: Primary | ICD-10-CM

## 2024-07-23 DIAGNOSIS — N20.0 RECURRENT KIDNEY STONES: ICD-10-CM

## 2024-07-23 DIAGNOSIS — N39.0 RECURRENT UTI: Primary | ICD-10-CM

## 2024-07-23 PROCEDURE — 99214 OFFICE O/P EST MOD 30 MIN: CPT | Performed by: UROLOGY

## 2024-07-23 NOTE — TELEPHONE ENCOUNTER
Please schedule this patient for surgery. Please remind her to drop off UA/UCx ~ 2 weeks prior    HANNAH Metcalf    Urology Surgery Request  Surgeon: Caitie  Location (if known): [facility]  Procedure: right PCNL with IR access prior   Anesthesia: General   Time Frame: pt preference  Time required: 2.5 hours  Diagnosis: right staghorn calculus    Antibiotics: per hospital protocol unless checked below   ___ Levaquin 500 mg IV   ___ Gemcitabine 2 g/100 mL NS bladder instillation to be given in OR    Estimated Post Op/Follow Up Appt: ~ 4 weeks with Jayshree for post-op

## 2024-07-25 NOTE — TELEPHONE ENCOUNTER
Spoke with the patient.  Scheduling the surgery for 9/19/24.  Reviewed the pre-op instructions and will send via My Chart or mail to patient once confirmed.  Patient can contact me at [phone]

## 2024-08-05 ENCOUNTER — TELEPHONE (OUTPATIENT)
Dept: INTERNAL MEDICINE CLINIC | Facility: CLINIC | Age: 79
End: 2024-08-05

## 2024-08-05 NOTE — TELEPHONE ENCOUNTER
Incoming fax from Dr.Hoeh Delatorre Hold for upcoming Surgery 9/19/2024    Placed in  in basket for review

## 2024-08-23 RX ORDER — ATORVASTATIN CALCIUM 20 MG/1
20 TABLET, FILM COATED ORAL DAILY
Qty: 90 TABLET | Refills: 0 | Status: SHIPPED | OUTPATIENT
Start: 2024-08-23

## 2024-08-23 RX ORDER — MIRTAZAPINE 7.5 MG/1
7.5 TABLET, FILM COATED ORAL NIGHTLY
Qty: 90 TABLET | Refills: 3 | Status: SHIPPED | OUTPATIENT
Start: 2024-08-23

## 2024-08-23 RX ORDER — LOSARTAN POTASSIUM 25 MG/1
25 TABLET ORAL DAILY
Qty: 30 TABLET | Refills: 11 | Status: SHIPPED | OUTPATIENT
Start: 2024-08-23

## 2024-08-23 RX ORDER — GABAPENTIN 300 MG/1
300 CAPSULE ORAL 2 TIMES DAILY
Qty: 180 CAPSULE | Refills: 0 | Status: SHIPPED | OUTPATIENT
Start: 2024-08-23

## 2024-08-23 NOTE — TELEPHONE ENCOUNTER
Requesting    mirtazapine 7.5 MG Oral Tab         Sig: Take 1 tablet (7.5 mg total) by mouth nightly.    Disp: 90 tablet    Refills: 3    Start: 8/22/2024    Class: Normal    Non-formulary    Last ordered: 11 months ago (9/4/2023) by Florencio Bowers MD        losartan 25 MG Oral Tab         Sig: Take 1 tablet (25 mg total) by mouth daily.    Disp: 30 tablet    Refills: 11    Start: 8/22/2024    Class: Normal    Non-formulary    Last ordered: 11 months ago (9/4/2023) by Florencio Bowers MD    Hypertension Medications Protocol [id number]/22/2024 03:56 PM   Protocol Details EGFRCR or GFRNAA > 50    CMP or BMP in past 12 months    Last BP reading less than 140/90    In person appointment or virtual visit in the past 12 mos or appointment in next 3 mos       atorvastatin 20 MG Oral Tab         Sig: Take 1 tablet (20 mg total) by mouth daily.    Disp: 90 tablet    Refills: 0    Start: 8/22/2024    Class: Normal    Non-formulary    Last ordered: 3 months ago (5/19/2024) by VERNON Cruz    Cholesterol Medication Protocol [id number]/22/2024 03:56 PM   Protocol Details ALT < 80    ALT resulted within past year    Lipid panel within past 12 months    In person appointment or virtual visit in the past 12 mos or appointment in next 3 mos       gabapentin 300 MG Oral Cap         Sig: N/A    Disp: 180 capsule    Refills: 3    Start: 8/22/2024    Class: Normal    Non-formulary    Last ordered: 2 months ago (6/8/2024) by Florencio Bowers MD    Neurology Medications [id number]/22/2024 03:56 PM   Protocol Details In person appointment or virtual visit in the past 6 mos or appointment in next 3 mos        LOV: 3/28/2024  RTC: none noted   Last Relevant Labs: 3/28/2024      Future Appointments   Date Time Provider Department Center   9/4/2024  1:30 PM Florencio Bowers MD [facility] [place]   9/19/2024  8:30 AM [facility]

## 2024-09-04 ENCOUNTER — OFFICE VISIT (OUTPATIENT)
Dept: INTERNAL MEDICINE CLINIC | Facility: CLINIC | Age: 79
End: 2024-09-04
Payer: MEDICARE

## 2024-09-04 ENCOUNTER — LAB ENCOUNTER (OUTPATIENT)
Dept: LAB | Age: 79
End: 2024-09-04
Attending: FAMILY MEDICINE
Payer: MEDICARE

## 2024-09-04 VITALS
OXYGEN SATURATION: 98 % | DIASTOLIC BLOOD PRESSURE: 78 MMHG | HEART RATE: 70 BPM | TEMPERATURE: 98 F | SYSTOLIC BLOOD PRESSURE: 100 MMHG

## 2024-09-04 DIAGNOSIS — I82.512 CHRONIC DEEP VEIN THROMBOSIS (DVT) OF FEMORAL VEIN OF LEFT LOWER EXTREMITY (HCC): ICD-10-CM

## 2024-09-04 DIAGNOSIS — I10 BENIGN ESSENTIAL HTN: ICD-10-CM

## 2024-09-04 DIAGNOSIS — E87.5 HYPERKALEMIA: ICD-10-CM

## 2024-09-04 DIAGNOSIS — E11.51 TYPE 2 DIABETES MELLITUS WITH DIABETIC PERIPHERAL ANGIOPATHY WITHOUT GANGRENE, WITHOUT LONG-TERM CURRENT USE OF INSULIN (HCC): ICD-10-CM

## 2024-09-04 DIAGNOSIS — N31.2 ATONIC BLADDER: ICD-10-CM

## 2024-09-04 DIAGNOSIS — Z00.00 ENCOUNTER FOR ANNUAL HEALTH EXAMINATION: ICD-10-CM

## 2024-09-04 DIAGNOSIS — I48.0 PAROXYSMAL ATRIAL FIBRILLATION (HCC): ICD-10-CM

## 2024-09-04 DIAGNOSIS — B35.9 TINEA: ICD-10-CM

## 2024-09-04 DIAGNOSIS — I65.23 BILATERAL CAROTID ARTERY STENOSIS: ICD-10-CM

## 2024-09-04 DIAGNOSIS — N39.0 RECURRENT UTI: ICD-10-CM

## 2024-09-04 DIAGNOSIS — M48.061 SPINAL STENOSIS OF LUMBAR REGION WITHOUT NEUROGENIC CLAUDICATION: ICD-10-CM

## 2024-09-04 DIAGNOSIS — E78.00 PURE HYPERCHOLESTEROLEMIA: ICD-10-CM

## 2024-09-04 DIAGNOSIS — R29.898 BILATERAL LEG WEAKNESS: ICD-10-CM

## 2024-09-04 DIAGNOSIS — Z79.01 ON APIXABAN THERAPY: ICD-10-CM

## 2024-09-04 DIAGNOSIS — I73.9 PAD (PERIPHERAL ARTERY DISEASE) (HCC): ICD-10-CM

## 2024-09-04 DIAGNOSIS — E11.51 TYPE 2 DIABETES MELLITUS WITH DIABETIC PERIPHERAL ANGIOPATHY WITHOUT GANGRENE, WITHOUT LONG-TERM CURRENT USE OF INSULIN (HCC): Primary | ICD-10-CM

## 2024-09-04 DIAGNOSIS — N20.0 BILATERAL KIDNEY STONES: ICD-10-CM

## 2024-09-04 DIAGNOSIS — E66.01 CLASS 2 SEVERE OBESITY DUE TO EXCESS CALORIES WITH SERIOUS COMORBIDITY AND BODY MASS INDEX (BMI) OF 35.0 TO 35.9 IN ADULT (HCC): ICD-10-CM

## 2024-09-04 PROBLEM — U07.1 COVID-19: Status: RESOLVED | Noted: 2023-09-13 | Resolved: 2024-09-04

## 2024-09-04 PROBLEM — E78.5 DYSLIPIDEMIA: Status: RESOLVED | Noted: 2023-09-13 | Resolved: 2024-09-04

## 2024-09-04 PROBLEM — E78.5 DYSLIPIDEMIA: Status: RESOLVED | Noted: 2023-09-13 | Resolved: 2024-01-01

## 2024-09-04 PROBLEM — U07.1 COVID-19: Status: RESOLVED | Noted: 2023-09-13 | Resolved: 2024-01-01

## 2024-09-04 LAB
ALBUMIN SERPL-MCNC: 4.1 G/DL (ref 3.2–4.8)
ALBUMIN/GLOB SERPL: 1.3 {RATIO} (ref 1–2)
ALP LIVER SERPL-CCNC: 181 U/L
ALT SERPL-CCNC: 17 U/L
ANION GAP SERPL CALC-SCNC: 18 MMOL/L (ref 0–18)
AST SERPL-CCNC: 19 U/L (ref ?–34)
BASOPHILS # BLD AUTO: 0.06 X10(3) UL (ref 0–0.2)
BASOPHILS NFR BLD AUTO: 0.3 %
BILIRUB SERPL-MCNC: 0.2 MG/DL (ref 0.2–1.1)
BUN BLD-MCNC: 23 MG/DL (ref 9–23)
CALCIUM BLD-MCNC: 9.5 MG/DL (ref 8.7–10.4)
CHLORIDE SERPL-SCNC: 102 MMOL/L (ref 98–112)
CHOLEST SERPL-MCNC: 96 MG/DL (ref ?–200)
CO2 SERPL-SCNC: 18 MMOL/L (ref 21–32)
CREAT BLD-MCNC: 1.44 MG/DL
EGFRCR SERPLBLD CKD-EPI 2021: 37 ML/MIN/1.73M2 (ref 60–?)
EOSINOPHIL # BLD AUTO: 0.06 X10(3) UL (ref 0–0.7)
EOSINOPHIL NFR BLD AUTO: 0.3 %
ERYTHROCYTE [DISTWIDTH] IN BLOOD BY AUTOMATED COUNT: 18.1 %
EST. AVERAGE GLUCOSE BLD GHB EST-MCNC: 180 MG/DL (ref 68–126)
FASTING PATIENT LIPID ANSWER: YES
FASTING STATUS PATIENT QL REPORTED: YES
GLOBULIN PLAS-MCNC: 3.1 G/DL (ref 2–3.5)
GLUCOSE BLD-MCNC: 164 MG/DL (ref 70–99)
HBA1C MFR BLD: 7.9 % (ref ?–5.7)
HCT VFR BLD AUTO: 41.5 %
HDLC SERPL-MCNC: 29 MG/DL (ref 40–59)
HGB BLD-MCNC: 12.8 G/DL
IMM GRANULOCYTES # BLD AUTO: 0.16 X10(3) UL (ref 0–1)
IMM GRANULOCYTES NFR BLD: 0.9 %
LDLC SERPL CALC-MCNC: 43 MG/DL (ref ?–100)
LYMPHOCYTES # BLD AUTO: 1.94 X10(3) UL (ref 1–4)
LYMPHOCYTES NFR BLD AUTO: 11.2 %
MCH RBC QN AUTO: 27.5 PG (ref 26–34)
MCHC RBC AUTO-ENTMCNC: 30.8 G/DL (ref 31–37)
MCV RBC AUTO: 89.1 FL
MONOCYTES # BLD AUTO: 0.77 X10(3) UL (ref 0.1–1)
MONOCYTES NFR BLD AUTO: 4.5 %
NEUTROPHILS # BLD AUTO: 14.29 X10 (3) UL (ref 1.5–7.7)
NEUTROPHILS # BLD AUTO: 14.29 X10(3) UL (ref 1.5–7.7)
NEUTROPHILS NFR BLD AUTO: 82.8 %
NONHDLC SERPL-MCNC: 67 MG/DL (ref ?–130)
OSMOLALITY SERPL CALC.SUM OF ELEC: 293 MOSM/KG (ref 275–295)
PLATELET # BLD AUTO: 433 10(3)UL (ref 150–450)
POTASSIUM SERPL-SCNC: 4.6 MMOL/L (ref 3.5–5.1)
PROT SERPL-MCNC: 7.2 G/DL (ref 5.7–8.2)
RBC # BLD AUTO: 4.66 X10(6)UL
SODIUM SERPL-SCNC: 138 MMOL/L (ref 136–145)
TRIGL SERPL-MCNC: 133 MG/DL (ref 30–149)
TSI SER-ACNC: 1.65 MIU/ML (ref 0.55–4.78)
VLDLC SERPL CALC-MCNC: 19 MG/DL (ref 0–30)
WBC # BLD AUTO: 17.3 X10(3) UL (ref 4–11)

## 2024-09-04 PROCEDURE — 80053 COMPREHEN METABOLIC PANEL: CPT

## 2024-09-04 PROCEDURE — G0439 PPPS, SUBSEQ VISIT: HCPCS | Performed by: FAMILY MEDICINE

## 2024-09-04 PROCEDURE — 85025 COMPLETE CBC W/AUTO DIFF WBC: CPT

## 2024-09-04 PROCEDURE — 36415 COLL VENOUS BLD VENIPUNCTURE: CPT

## 2024-09-04 PROCEDURE — 83036 HEMOGLOBIN GLYCOSYLATED A1C: CPT

## 2024-09-04 PROCEDURE — 80061 LIPID PANEL: CPT

## 2024-09-04 PROCEDURE — 84443 ASSAY THYROID STIM HORMONE: CPT

## 2024-09-04 RX ORDER — KETOCONAZOLE 20 MG/G
CREAM TOPICAL
Qty: 60 G | Refills: 1 | Status: SHIPPED | OUTPATIENT
Start: 2024-09-04

## 2024-09-04 NOTE — PROGRESS NOTES
Subjective:   Payton Henriquez is a 79 year old female who presents for a Subsequent Annual Wellness visit (Pt already had Initial Annual Wellness) and scheduled follow up of multiple significant but stable problems.       History/Other:   Fall Risk Assessment:   She has been screened for Falls and is High Risk. Fall Prevention information provided to patient in After Visit Summary.    Do you feel unsteady when standing or walking?: Yes  Do you worry about falling?: No  Have you fallen in the past year?: Yes     Cognitive Assessment:   She had a completely normal cognitive assessment - see flowsheet entries     Functional Ability/Status:   Payton Henriquez has some abnormal functions as listed below:  She has Dressing and/or Bathing issues based on screening of functional status.  Difficulty dressing or bathing?: Yes  Bathing or Showering: Cannot do without help  Dressing: Cannot do without help  She has Toileting difficulties based on screening of functional status.She has Driving difficulties based on screening of functional status. She has Meal Preparation difficulties based on screening of functional status.She has difficulties Shopping for Groceries based on screening of functional status. She has difficulties Taking Meds as Rx'd based on screening of functional status. She has Vision problems based on screening of functional status. She has Walking problems based on screening of functional status. She has problems with Daily Activities based on screening of functional status.       Depression Screening (PHQ):  PHQ-2 SCORE: 0  , done 9/4/2024             Advanced Directives:   She does have a Living Will but we do NOT have it on file in Epic.    She has a Power of  for Health Care on file in [facility].  Discussed Advance Care Planning with patient (and family/surrogate if present). Standard forms made available to patient in After Visit Summary.      Patient Active Problem List   Diagnosis    Carotid  artery disease (HCC)    Type 2 diabetes mellitus with diabetic peripheral angiopathy without gangrene (HCC)    Pure hypercholesterolemia    Benign essential HTN    Spinal stenosis of lumbar region    Class 2 severe obesity with serious comorbidity and body mass index (BMI) of 35.0 to 35.9 in adult (HCC)    PAD (peripheral artery disease) (HCC)    Hyperkalemia    Paroxysmal atrial fibrillation (HCC)    Bilateral kidney stones    Recurrent UTI    On apixaban therapy    Atonic bladder    Bilateral leg weakness    Chronic deep vein thrombosis (DVT) of femoral vein of left lower extremity (HCC)    BRITTANY (acute kidney injury) ([facility])     Allergies:  She has No Known Allergies.    Current Medications:  Outpatient Medications Marked as Taking for the 9/4/24 encounter (Office Visit) with Florencio Bowers MD   Medication Sig    ketoconazole 2 % External Cream Apply to affected area daily for 2 weeks    HYDROcodone-acetaminophen (NORCO) 5-325 MG Oral Tab Take 1 tablet by mouth every 6 (six) hours as needed for Pain.       Medical History:  She  has a past medical history of Arrhythmia, Back problem, Bedridden, Calculus of kidney, Deep vein thrombosis (HCC), Encounter for urinary catheter, HIGH BLOOD PRESSURE, HIGH CHOLESTEROL, History of cardiac cath (03/2001), Macular degeneration of right eye, Measles without mention of complication, Neuropathy, Type II or unspecified type diabetes mellitus without mention of complication, not stated as uncontrolled, UTI (urinary tract infection), Varicella without mention of complication, and Visual impairment.  Surgical History:  She  has a past surgical history that includes colonoscopy & polypectomy (11/24/2003); colonoscopy (12/19/2013); colonoscopy (12/19/13 ); other surgical history (1972); other surgical history (01/01/2020); needle biopsy right (11/2019); cholecystectomy (2005); hysterectomy (1989); and femur fracture surgery (Left, 08/31/2022).   Family History:  Her family history  includes Lung Cancer in her mother.  Social History:  She  reports that she has never smoked. She has never used smokeless tobacco. She reports that she does not currently use alcohol. She reports that she does not currently use drugs.    Tobacco:  She has never smoked tobacco.    CAGE Alcohol Screen:   CAGE screening score of 0 on 9/3/2024, showing low risk of alcohol abuse.      Patient Care Team:  Florencio Bowers MD as PCP - General (Family Practice)  Milad Boucher MD as Consulting Physician (NEUROLOGY)  Herbert Gurrola PT as Physical Therapist (Physical Therapy)  Igor Velasquez MD as Consulting Physician (NEUROSURGERY)  Marilu Wesley PA-C as Consulting Physician (Physician Assistant)    Review of Systems  GENERAL: feels well otherwise  SKIN: has a rash in the upper back     does lay a lot     sweats in the back   Going on few weeks     EYES: denies blurred vision   no eye check in some time     HEENT: denies nasal congestion, sinus pain or ST  LUNGS: denies shortness of breath   no cough     CARDIOVASCULAR: denies chest pain   Did see cardiology for preop clearance     told OK      GI: denies abdominal pain,   no NVD  :  seeing urology    to have procedure later in the month    to have stone in the R side removed    Dr Blood      to have general anesthesia    MUSCULOSKELETAL: knee bothers her at times    No strength in the LEs    Ue's are fine    NEURO: denies headaches  PSYCHE: denies depression or anxiety  HEMATOLOGIC: hx of anemia  ENDOCRINE: denies thyroid history  ALL/ASTHMA: denies asthma    Objective:   Physical Exam  General Appearance:  Alert, cooperative, no distress, appears stated age   In stretcher      Head:  Normocephalic, without obvious abnormality, atraumatic                    Neck: Supple, symmetrical, trachea midline, no adenopathy;  thyroid: not enlarged, symmetric, no tenderness/mass/nodules; no JVD        Lungs:   Clear to auscultation bilaterally, respirations unlabored    Heart:  Irregular      Abdomen:   Soft, non-tender, bowel sounds active all four quadrants,  no masses, no organomegaly   Pelvic: Deferred   Extremities: no edema   Pulses: 2+ and symmetric   Skin:  Tinea on the back noted     Lymph nodes: Cervical, supraclavicular nodes normal            /78   Pulse 70   Temp 98 °F (36.7 °C) (Temporal)   SpO2 98%  Estimated body mass index is 29.63 kg/m² as calculated from the following:    Height as of 5/16/24: 5' 9\" (1.753 m).    Weight as of 6/27/24: 200 lb 9.9 oz (91 kg).    Medicare Hearing Assessment:   Hearing Screening    Screening Method: Other               Assessment & Plan:   Payton Henriquez is a 79 year old female who presents for a Medicare Assessment.   1. Type 2 diabetes mellitus with diabetic peripheral angiopathy without gangrene, without long-term current use of insulin ([facility])  Check A1c    Proceed then     No acute circulatory issues   - Hemoglobin A1C; Future    2. Paroxysmal atrial fibrillation ([facility])  Rate controlled     On eliquis        3. PAD (peripheral artery disease) ([facility])  No acute issues        4. Class 2 severe obesity due to excess calories with serious comorbidity and body mass index (BMI) of 35.0 to 35.9 in adult ([facility])  Unable to exercise        5. Chronic deep vein thrombosis (DVT) of femoral vein of left lower extremity ([facility])  On eliquis        6. Bilateral carotid artery stenosis  No acute issues        7. Pure hypercholesterolemia  Check lipids      - Lipid Panel; Future  - Assay, Thyroid Stim Hormone; Future    8. Benign essential HTN  BP OK   CPM       - CBC With Differential With Platelet; Future  - Comp Metabolic Panel (14); Future  - Lipid Panel; Future  - Assay, Thyroid Stim Hormone; Future    9. Spinal stenosis of lumbar region without neurogenic claudication  Noted on imaging    not a surgical candidate        10. Bilateral leg weakness  D/t above        11. Recurrent UTI  No issues at present      No longer w catheter        to have procedure in 2 weeks         12. Hyperkalemia  Check labs        13. Bilateral kidney stones  To have extraction        14. Atonic bladder  Seeing Dr Blood        15. On apixaban therapy  D/t A Fib        16. Tinea  Nizoral cream ordered     Keep as dry as possible       1. Type 2 diabetes mellitus with diabetic peripheral angiopathy without gangrene, without long-term current use of insulin ([facility]) (Primary)  -     Hemoglobin A1C; Future; Expected date: 09/04/2024  2. Paroxysmal atrial fibrillation ([facility])  3. PAD (peripheral artery disease) ([facility])  Overview:  On arterial US 1/21/20 - normal waveforms no overall significant stenosis   4. Class 2 severe obesity due to excess calories with serious comorbidity and body mass index (BMI) of 35.0 to 35.9 in adult ([facility])  5. Chronic deep vein thrombosis (DVT) of femoral vein of left lower extremity ([facility])  6. Bilateral carotid artery stenosis  7. Pure hypercholesterolemia  -     Lipid Panel; Future; Expected date: 09/04/2024  -     Assay, Thyroid Stim Hormone; Future; Expected date: 09/04/2024  8. Benign essential HTN  -     CBC With Differential With Platelet; Future; Expected date: 09/04/2024  -     Comp Metabolic Panel (14); Future; Expected date: 09/04/2024  -     Lipid Panel; Future; Expected date: 09/04/2024  -     Assay, Thyroid Stim Hormone; Future; Expected date: 09/04/2024  9. Spinal stenosis of lumbar region without neurogenic claudication  Overview:  MRI 7/21/20  10. Bilateral leg weakness  11. Recurrent UTI  12. Hyperkalemia  13. Bilateral kidney stones  14. Atonic bladder  15. On apixaban therapy  16. Tinea  17. Encounter for annual health examination  Other orders  -     Ketoconazole; Apply to affected area daily for 2 weeks  Dispense: 60 g; Refill: 1    The patient indicates understanding of these issues and agrees to the plan.  Reinforced healthy diet, lifestyle, and exercise.      No follow-ups on file.     Florencio Bowers MD, 9/4/2024     Supplementary  Documentation:   General Health:  Type of Diet: Diabetic;Low Salt  How would you describe your daily physical activity?: None  How would you describe your current health state?: Fair  How do you maintain positive mental well-being?: Puzzles;Games  On a scale of 0 to 10, with 0 being no pain and 10 being severe pain, what is your pain level?: 5 - (Moderate)  In the past six months, have you experienced urine leakage?: 0-No  At any time do you feel concerned for the safety/well-being of yourself and/or your children, in your home or elsewhere?: No  Have you had any immunizations at another office such as Influenza, Hepatitis B, Tetanus, or Pneumococcal?: No    Health Maintenance   Topic Date Due    Diabetes Care Foot Exam  10/04/2019    Diabetes Care Dilated Eye Exam  06/01/2022    Annual Depression Screening  01/01/2024    Annual Physical  07/17/2024    COVID-19 Vaccine (6 - 2023-24 season) 09/01/2024    Diabetes Care A1C  09/28/2024    Influenza Vaccine (1) 10/01/2024    Diabetes Care: GFR  03/28/2025    Diabetes Care: Microalb/Creat Ratio  03/28/2025    DEXA Scan  Completed    Fall Risk Screening (Annual)  Completed    Pneumococcal Vaccine: 65+ Years  Completed    Zoster Vaccines  Completed

## 2024-09-05 RX ORDER — METOPROLOL SUCCINATE 25 MG/1
25 TABLET, EXTENDED RELEASE ORAL DAILY
Qty: 90 TABLET | Refills: 3 | Status: SHIPPED | OUTPATIENT
Start: 2024-09-05

## 2024-09-05 NOTE — TELEPHONE ENCOUNTER
Requesting    Name from pharmacy: OMEPRAZOLE DR CAPS 20MG         Will file in chart as: OMEPRAZOLE 20 MG Oral Capsule Delayed Release    Sig: TAKE 1 CAPSULE EVERY MORNING    Disp: 90 capsule    Refills: 3    Start: 9/5/2024    Class: Normal    Last ordered: 5 months ago (3/18/2024) by Floerncio Bowers MD    Last refill: 5/10/2024    Rx #: [id number]    Gastrointestional Medication Protocol [id number]/05/2024 01:07 PM   Protocol Details In person appointment or virtual visit in the past 12 mos or appointment in next 3 mos       Name from pharmacy: METOPROLOL SUCCINATE ER TABS 25MG         Will file in chart as: METOPROLOL SUCCINATE ER 25 MG Oral Tablet 24 Hr    Sig: TAKE 1 TABLET DAILY    Disp: 90 tablet    Refills: 3    Start: 9/5/2024    Class: Normal    Last ordered: 1 year ago (9/4/2023) by Florencio Bowers MD    Last refill: 3/10/2024    Rx #: [id number]    Hypertension Medications Protocol [id number]/05/2024 01:07 PM   Protocol Details EGFRCR or GFRNAA > 50    CMP or BMP in past 12 months    Last BP reading less than 140/90    In person appointment or virtual visit in the past 12 mos or appointment in next 3 mos        LOV: 9/4/2024  RTC: 6 months   Last Relevant Labs: 9/4/2024  Filled:   Omeprazole-3/18/2024 #90 with 0 refills  Metoprolol-9/4/2023 #90 with 3 refills  Future Appointments   Date Time Provider Department Center   9/19/2024  8:30 AM [facility]

## 2024-09-09 ENCOUNTER — TELEPHONE (OUTPATIENT)
Dept: INTERNAL MEDICINE CLINIC | Facility: CLINIC | Age: 79
End: 2024-09-09

## 2024-09-09 DIAGNOSIS — Z00.00 LABORATORY EXAMINATION ORDERED AS PART OF A ROUTINE GENERAL MEDICAL EXAMINATION: Primary | ICD-10-CM

## 2024-09-12 ENCOUNTER — ANESTHESIA EVENT (OUTPATIENT)
Dept: SURGERY | Facility: HOSPITAL | Age: 79
End: 2024-09-12
Payer: MEDICARE

## 2024-09-13 ENCOUNTER — TELEPHONE (OUTPATIENT)
Dept: INTERNAL MEDICINE CLINIC | Facility: CLINIC | Age: 79
End: 2024-09-13

## 2024-09-17 ENCOUNTER — TELEPHONE (OUTPATIENT)
Dept: SURGERY | Facility: CLINIC | Age: 79
End: 2024-09-17

## 2024-09-17 RX ORDER — SULFAMETHOXAZOLE/TRIMETHOPRIM 800-160 MG
1 TABLET ORAL 2 TIMES DAILY
Qty: 20 TABLET | Refills: 0 | Status: ON HOLD | OUTPATIENT
Start: 2024-09-17 | End: 2024-09-19

## 2024-09-17 NOTE — TELEPHONE ENCOUNTER
Patient has questions for the nurse about her surgery. Patient also wants to know if she will be staying over night?

## 2024-09-17 NOTE — PAT NURSING NOTE
1312 LM with Jessica at Dr. Blood's office-UA and urine c&s to be completed DOS, patient non ambulatory, uses ambulance services to & from procedures.    1542 Jessica state Dr. Blood informed of the above, will start patient on antibiotic. Patient to have ua & urine c&s DOS

## 2024-09-18 NOTE — TELEPHONE ENCOUNTER
Noted that Nurse spoke with the patient yesterday.  Received a call from OR today stating the patient wanted to know if she was staying over night.  Reached out to the patient and advised that per normal protocol she would spend the night.  She stated that she preferred not to if possible as she has a 24 hours caregiver and her  is in a wheelchair.  [facility]

## 2024-09-19 ENCOUNTER — APPOINTMENT (OUTPATIENT)
Dept: CT IMAGING | Facility: HOSPITAL | Age: 79
End: 2024-09-19
Attending: UROLOGY
Payer: MEDICARE

## 2024-09-19 ENCOUNTER — HOSPITAL ENCOUNTER (OUTPATIENT)
Facility: HOSPITAL | Age: 79
Discharge: HOME HEALTH | End: 2024-09-21
Attending: UROLOGY | Admitting: UROLOGY
Payer: MEDICARE

## 2024-09-19 ENCOUNTER — ANESTHESIA (OUTPATIENT)
Dept: SURGERY | Facility: HOSPITAL | Age: 79
End: 2024-09-19
Payer: MEDICARE

## 2024-09-19 ENCOUNTER — HOSPITAL ENCOUNTER (OUTPATIENT)
Dept: INTERVENTIONAL RADIOLOGY/VASCULAR | Facility: HOSPITAL | Age: 79
Discharge: HOME | End: 2024-09-19
Attending: UROLOGY | Admitting: UROLOGY
Payer: MEDICARE

## 2024-09-19 ENCOUNTER — APPOINTMENT (OUTPATIENT)
Dept: GENERAL RADIOLOGY | Facility: HOSPITAL | Age: 79
End: 2024-09-19
Attending: UROLOGY
Payer: MEDICARE

## 2024-09-19 DIAGNOSIS — N20.0 STAGHORN CALCULUS: ICD-10-CM

## 2024-09-19 DIAGNOSIS — N20.0 RENAL STONES: ICD-10-CM

## 2024-09-19 PROBLEM — E11.9 DIABETES MELLITUS (HCC): Status: ACTIVE | Noted: 2024-09-19

## 2024-09-19 PROBLEM — E11.9 DIABETES MELLITUS (HCC): Status: ACTIVE | Noted: 2024-01-01

## 2024-09-19 LAB
BASOPHILS # BLD AUTO: 0.06 X10(3) UL (ref 0–0.2)
BASOPHILS NFR BLD AUTO: 0.5 %
EOSINOPHIL # BLD AUTO: 0.09 X10(3) UL (ref 0–0.7)
EOSINOPHIL NFR BLD AUTO: 0.7 %
ERYTHROCYTE [DISTWIDTH] IN BLOOD BY AUTOMATED COUNT: 18.3 %
GLUCOSE BLD-MCNC: 149 MG/DL (ref 70–99)
GLUCOSE BLD-MCNC: 153 MG/DL (ref 70–99)
GLUCOSE BLD-MCNC: 204 MG/DL (ref 70–99)
GLUCOSE BLD-MCNC: 253 MG/DL (ref 70–99)
HCT VFR BLD AUTO: 40.3 %
HGB BLD-MCNC: 12.8 G/DL
IMM GRANULOCYTES # BLD AUTO: 0.14 X10(3) UL (ref 0–1)
IMM GRANULOCYTES NFR BLD: 1.1 %
INR BLD: 1.27 (ref 0.8–1.2)
LYMPHOCYTES # BLD AUTO: 2.23 X10(3) UL (ref 1–4)
LYMPHOCYTES NFR BLD AUTO: 17.2 %
MCH RBC QN AUTO: 27.4 PG (ref 26–34)
MCHC RBC AUTO-ENTMCNC: 31.8 G/DL (ref 31–37)
MCV RBC AUTO: 86.3 FL
MONOCYTES # BLD AUTO: 0.93 X10(3) UL (ref 0.1–1)
MONOCYTES NFR BLD AUTO: 7.2 %
NEUTROPHILS # BLD AUTO: 9.49 X10 (3) UL (ref 1.5–7.7)
NEUTROPHILS # BLD AUTO: 9.49 X10(3) UL (ref 1.5–7.7)
NEUTROPHILS NFR BLD AUTO: 73.3 %
PLATELET # BLD AUTO: 433 10(3)UL (ref 150–450)
PROTHROMBIN TIME: 16 SECONDS (ref 11.6–14.8)
RBC # BLD AUTO: 4.67 X10(6)UL
WBC # BLD AUTO: 12.9 X10(3) UL (ref 4–11)

## 2024-09-19 PROCEDURE — 50081 PERQ NL/PL LITHOTRP CPLX>2CM: CPT | Performed by: UROLOGY

## 2024-09-19 PROCEDURE — 50436 DILAT XST TRC NDURLGC PX: CPT | Performed by: UROLOGY

## 2024-09-19 PROCEDURE — 99152 MOD SED SAME PHYS/QHP 5/>YRS: CPT | Performed by: RADIOLOGY

## 2024-09-19 PROCEDURE — BT11YZZ FLUOROSCOPY OF RIGHT KIDNEY USING OTHER CONTRAST: ICD-10-PCS | Performed by: UROLOGY

## 2024-09-19 PROCEDURE — 74176 CT ABD & PELVIS W/O CONTRAST: CPT | Performed by: UROLOGY

## 2024-09-19 PROCEDURE — 99215 OFFICE O/P EST HI 40 MIN: CPT | Performed by: INTERNAL MEDICINE

## 2024-09-19 PROCEDURE — 0TC03ZZ EXTIRPATION OF MATTER FROM RIGHT KIDNEY, PERCUTANEOUS APPROACH: ICD-10-PCS | Performed by: UROLOGY

## 2024-09-19 PROCEDURE — 50433 PLMT NEPHROURETERAL CATHETER: CPT | Performed by: RADIOLOGY

## 2024-09-19 RX ORDER — ONDANSETRON 2 MG/ML
INJECTION INTRAMUSCULAR; INTRAVENOUS
Status: COMPLETED
Start: 2024-09-19 | End: 2024-09-19

## 2024-09-19 RX ORDER — NALOXONE HYDROCHLORIDE 0.4 MG/ML
0.08 INJECTION, SOLUTION INTRAMUSCULAR; INTRAVENOUS; SUBCUTANEOUS AS NEEDED
Status: DISCONTINUED | OUTPATIENT
Start: 2024-09-19 | End: 2024-09-19 | Stop reason: HOSPADM

## 2024-09-19 RX ORDER — METOCLOPRAMIDE HYDROCHLORIDE 5 MG/ML
10 INJECTION INTRAMUSCULAR; INTRAVENOUS EVERY 8 HOURS PRN
Status: DISCONTINUED | OUTPATIENT
Start: 2024-09-19 | End: 2024-09-19 | Stop reason: HOSPADM

## 2024-09-19 RX ORDER — LEVOFLOXACIN 5 MG/ML
INJECTION, SOLUTION INTRAVENOUS
Status: COMPLETED
Start: 2024-09-19 | End: 2024-09-19

## 2024-09-19 RX ORDER — DEXTROSE MONOHYDRATE, SODIUM CHLORIDE, AND POTASSIUM CHLORIDE 50; 1.49; 4.5 G/1000ML; G/1000ML; G/1000ML
INJECTION, SOLUTION INTRAVENOUS CONTINUOUS
Status: DISCONTINUED | OUTPATIENT
Start: 2024-09-19 | End: 2024-09-21

## 2024-09-19 RX ORDER — ACETAMINOPHEN 500 MG
1000 TABLET ORAL ONCE
Status: DISCONTINUED | OUTPATIENT
Start: 2024-09-19 | End: 2024-09-19

## 2024-09-19 RX ORDER — HYDROMORPHONE HYDROCHLORIDE 1 MG/ML
0.2 INJECTION, SOLUTION INTRAMUSCULAR; INTRAVENOUS; SUBCUTANEOUS EVERY 5 MIN PRN
Status: DISCONTINUED | OUTPATIENT
Start: 2024-09-19 | End: 2024-09-19 | Stop reason: HOSPADM

## 2024-09-19 RX ORDER — HYDROMORPHONE HYDROCHLORIDE 1 MG/ML
0.4 INJECTION, SOLUTION INTRAMUSCULAR; INTRAVENOUS; SUBCUTANEOUS EVERY 2 HOUR PRN
Status: DISCONTINUED | OUTPATIENT
Start: 2024-09-19 | End: 2024-09-21

## 2024-09-19 RX ORDER — SODIUM CHLORIDE 9 MG/ML
INJECTION, SOLUTION INTRAVENOUS CONTINUOUS
Status: DISCONTINUED | OUTPATIENT
Start: 2024-09-19 | End: 2024-09-19 | Stop reason: HOSPADM

## 2024-09-19 RX ORDER — SENNOSIDES 8.6 MG
17.2 TABLET ORAL NIGHTLY PRN
Status: DISCONTINUED | OUTPATIENT
Start: 2024-09-19 | End: 2024-09-21

## 2024-09-19 RX ORDER — DOCUSATE SODIUM 100 MG/1
100 CAPSULE, LIQUID FILLED ORAL 2 TIMES DAILY
Qty: 28 CAPSULE | Refills: 0 | Status: SHIPPED | OUTPATIENT
Start: 2024-09-19 | End: 2024-10-03

## 2024-09-19 RX ORDER — IODIXANOL 320 MG/ML
50 INJECTION, SOLUTION INTRAVASCULAR
Status: COMPLETED | OUTPATIENT
Start: 2024-09-19 | End: 2024-09-19

## 2024-09-19 RX ORDER — NICOTINE POLACRILEX 4 MG
30 LOZENGE BUCCAL
Status: DISCONTINUED | OUTPATIENT
Start: 2024-09-19 | End: 2024-09-21

## 2024-09-19 RX ORDER — BUPIVACAINE HYDROCHLORIDE 5 MG/ML
INJECTION, SOLUTION EPIDURAL; INTRACAUDAL AS NEEDED
Status: DISCONTINUED | OUTPATIENT
Start: 2024-09-19 | End: 2024-09-19 | Stop reason: HOSPADM

## 2024-09-19 RX ORDER — ATORVASTATIN CALCIUM 20 MG/1
20 TABLET, FILM COATED ORAL DAILY
Status: DISCONTINUED | OUTPATIENT
Start: 2024-09-20 | End: 2024-09-21

## 2024-09-19 RX ORDER — HYDROCODONE BITARTRATE AND ACETAMINOPHEN 5; 325 MG/1; MG/1
1 TABLET ORAL EVERY 6 HOURS PRN
Qty: 30 TABLET | Refills: 0 | Status: SHIPPED | OUTPATIENT
Start: 2024-09-19

## 2024-09-19 RX ORDER — PANTOPRAZOLE SODIUM 20 MG/1
20 TABLET, DELAYED RELEASE ORAL
Status: DISCONTINUED | OUTPATIENT
Start: 2024-09-20 | End: 2024-09-21

## 2024-09-19 RX ORDER — ONDANSETRON 4 MG/1
4 TABLET, FILM COATED ORAL EVERY 6 HOURS PRN
Status: DISCONTINUED | OUTPATIENT
Start: 2024-09-19 | End: 2024-09-21

## 2024-09-19 RX ORDER — LOSARTAN POTASSIUM 25 MG/1
25 TABLET ORAL DAILY
Status: DISCONTINUED | OUTPATIENT
Start: 2024-09-19 | End: 2024-09-20

## 2024-09-19 RX ORDER — HYDROMORPHONE HYDROCHLORIDE 1 MG/ML
0.8 INJECTION, SOLUTION INTRAMUSCULAR; INTRAVENOUS; SUBCUTANEOUS EVERY 2 HOUR PRN
Status: DISCONTINUED | OUTPATIENT
Start: 2024-09-19 | End: 2024-09-21

## 2024-09-19 RX ORDER — SULFAMETHOXAZOLE/TRIMETHOPRIM 800-160 MG
1 TABLET ORAL 2 TIMES DAILY
Qty: 14 TABLET | Refills: 0 | Status: SHIPPED | OUTPATIENT
Start: 2024-09-19 | End: 2024-09-21

## 2024-09-19 RX ORDER — CYCLOBENZAPRINE HCL 10 MG
10 TABLET ORAL 3 TIMES DAILY PRN
Status: DISCONTINUED | OUTPATIENT
Start: 2024-09-19 | End: 2024-09-21

## 2024-09-19 RX ORDER — MIRTAZAPINE 7.5 MG/1
7.5 TABLET, FILM COATED ORAL NIGHTLY
Status: DISCONTINUED | OUTPATIENT
Start: 2024-09-19 | End: 2024-09-21

## 2024-09-19 RX ORDER — FAMOTIDINE 20 MG/1
20 TABLET, FILM COATED ORAL 2 TIMES DAILY PRN
Status: DISCONTINUED | OUTPATIENT
Start: 2024-09-19 | End: 2024-09-21

## 2024-09-19 RX ORDER — GABAPENTIN 300 MG/1
300 CAPSULE ORAL 2 TIMES DAILY
Status: DISCONTINUED | OUTPATIENT
Start: 2024-09-19 | End: 2024-09-21

## 2024-09-19 RX ORDER — PIPERACILLIN SODIUM, TAZOBACTAM SODIUM 3; .375 G/15ML; G/15ML
INJECTION, POWDER, LYOPHILIZED, FOR SOLUTION INTRAVENOUS
Status: DISCONTINUED
Start: 2024-09-19 | End: 2024-09-19 | Stop reason: WASHOUT

## 2024-09-19 RX ORDER — ONDANSETRON 2 MG/ML
INJECTION INTRAMUSCULAR; INTRAVENOUS AS NEEDED
Status: DISCONTINUED | OUTPATIENT
Start: 2024-09-19 | End: 2024-09-19 | Stop reason: SURG

## 2024-09-19 RX ORDER — LIDOCAINE HYDROCHLORIDE 10 MG/ML
INJECTION, SOLUTION INFILTRATION; PERINEURAL
Status: COMPLETED
Start: 2024-09-19 | End: 2024-09-19

## 2024-09-19 RX ORDER — POLYETHYLENE GLYCOL 3350 17 G/17G
17 POWDER, FOR SOLUTION ORAL DAILY PRN
Status: DISCONTINUED | OUTPATIENT
Start: 2024-09-19 | End: 2024-09-21

## 2024-09-19 RX ORDER — ONDANSETRON 4 MG/1
4 TABLET, ORALLY DISINTEGRATING ORAL AS NEEDED
Status: DISCONTINUED | OUTPATIENT
Start: 2024-09-19 | End: 2024-09-19

## 2024-09-19 RX ORDER — SODIUM CHLORIDE, SODIUM LACTATE, POTASSIUM CHLORIDE, CALCIUM CHLORIDE 600; 310; 30; 20 MG/100ML; MG/100ML; MG/100ML; MG/100ML
INJECTION, SOLUTION INTRAVENOUS CONTINUOUS
Status: DISCONTINUED | OUTPATIENT
Start: 2024-09-19 | End: 2024-09-21

## 2024-09-19 RX ORDER — HYDROMORPHONE HYDROCHLORIDE 1 MG/ML
0.6 INJECTION, SOLUTION INTRAMUSCULAR; INTRAVENOUS; SUBCUTANEOUS EVERY 5 MIN PRN
Status: DISCONTINUED | OUTPATIENT
Start: 2024-09-19 | End: 2024-09-19 | Stop reason: HOSPADM

## 2024-09-19 RX ORDER — SODIUM CHLORIDE, SODIUM LACTATE, POTASSIUM CHLORIDE, CALCIUM CHLORIDE 600; 310; 30; 20 MG/100ML; MG/100ML; MG/100ML; MG/100ML
INJECTION, SOLUTION INTRAVENOUS CONTINUOUS
Status: DISCONTINUED | OUTPATIENT
Start: 2024-09-19 | End: 2024-09-19 | Stop reason: HOSPADM

## 2024-09-19 RX ORDER — DIPHENHYDRAMINE HYDROCHLORIDE 50 MG/ML
25 INJECTION INTRAMUSCULAR; INTRAVENOUS NIGHTLY PRN
Status: DISCONTINUED | OUTPATIENT
Start: 2024-09-19 | End: 2024-09-21

## 2024-09-19 RX ORDER — ONDANSETRON 2 MG/ML
4 INJECTION INTRAMUSCULAR; INTRAVENOUS EVERY 6 HOURS PRN
Status: DISCONTINUED | OUTPATIENT
Start: 2024-09-19 | End: 2024-09-19 | Stop reason: HOSPADM

## 2024-09-19 RX ORDER — LIDOCAINE HYDROCHLORIDE 10 MG/ML
INJECTION, SOLUTION EPIDURAL; INFILTRATION; INTRACAUDAL; PERINEURAL AS NEEDED
Status: DISCONTINUED | OUTPATIENT
Start: 2024-09-19 | End: 2024-09-19 | Stop reason: SURG

## 2024-09-19 RX ORDER — DEXTROSE MONOHYDRATE 25 G/50ML
50 INJECTION, SOLUTION INTRAVENOUS
Status: DISCONTINUED | OUTPATIENT
Start: 2024-09-19 | End: 2024-09-19 | Stop reason: HOSPADM

## 2024-09-19 RX ORDER — BISACODYL 10 MG
10 SUPPOSITORY, RECTAL RECTAL
Status: DISCONTINUED | OUTPATIENT
Start: 2024-09-19 | End: 2024-09-21

## 2024-09-19 RX ORDER — HYDROMORPHONE HYDROCHLORIDE 1 MG/ML
0.4 INJECTION, SOLUTION INTRAMUSCULAR; INTRAVENOUS; SUBCUTANEOUS EVERY 5 MIN PRN
Status: DISCONTINUED | OUTPATIENT
Start: 2024-09-19 | End: 2024-09-19 | Stop reason: HOSPADM

## 2024-09-19 RX ORDER — ONDANSETRON 2 MG/ML
4 INJECTION INTRAMUSCULAR; INTRAVENOUS EVERY 6 HOURS PRN
Status: DISCONTINUED | OUTPATIENT
Start: 2024-09-19 | End: 2024-09-21

## 2024-09-19 RX ORDER — OXYCODONE HYDROCHLORIDE 10 MG/1
10 TABLET ORAL EVERY 4 HOURS PRN
Status: DISCONTINUED | OUTPATIENT
Start: 2024-09-19 | End: 2024-09-21

## 2024-09-19 RX ORDER — METOPROLOL SUCCINATE 25 MG/1
25 TABLET, EXTENDED RELEASE ORAL
Status: DISCONTINUED | OUTPATIENT
Start: 2024-09-20 | End: 2024-09-21

## 2024-09-19 RX ORDER — DEXTROSE MONOHYDRATE 25 G/50ML
50 INJECTION, SOLUTION INTRAVENOUS
Status: DISCONTINUED | OUTPATIENT
Start: 2024-09-19 | End: 2024-09-21

## 2024-09-19 RX ORDER — NICOTINE POLACRILEX 4 MG
15 LOZENGE BUCCAL
Status: DISCONTINUED | OUTPATIENT
Start: 2024-09-19 | End: 2024-09-21

## 2024-09-19 RX ORDER — ROCURONIUM BROMIDE 10 MG/ML
INJECTION, SOLUTION INTRAVENOUS AS NEEDED
Status: DISCONTINUED | OUTPATIENT
Start: 2024-09-19 | End: 2024-09-19 | Stop reason: SURG

## 2024-09-19 RX ORDER — PHENYLEPHRINE HCL 10 MG/ML
VIAL (ML) INJECTION AS NEEDED
Status: DISCONTINUED | OUTPATIENT
Start: 2024-09-19 | End: 2024-09-19 | Stop reason: SURG

## 2024-09-19 RX ORDER — NICOTINE POLACRILEX 4 MG
30 LOZENGE BUCCAL
Status: DISCONTINUED | OUTPATIENT
Start: 2024-09-19 | End: 2024-09-19 | Stop reason: HOSPADM

## 2024-09-19 RX ORDER — MIDAZOLAM HYDROCHLORIDE 1 MG/ML
INJECTION INTRAMUSCULAR; INTRAVENOUS
Status: COMPLETED
Start: 2024-09-19 | End: 2024-09-19

## 2024-09-19 RX ORDER — OXYCODONE HYDROCHLORIDE 5 MG/1
5 TABLET ORAL EVERY 4 HOURS PRN
Status: DISCONTINUED | OUTPATIENT
Start: 2024-09-19 | End: 2024-09-21

## 2024-09-19 RX ORDER — PROCHLORPERAZINE EDISYLATE 5 MG/ML
10 INJECTION INTRAMUSCULAR; INTRAVENOUS EVERY 6 HOURS PRN
Status: DISCONTINUED | OUTPATIENT
Start: 2024-09-19 | End: 2024-09-21

## 2024-09-19 RX ORDER — NICOTINE POLACRILEX 4 MG
15 LOZENGE BUCCAL
Status: DISCONTINUED | OUTPATIENT
Start: 2024-09-19 | End: 2024-09-19 | Stop reason: HOSPADM

## 2024-09-19 RX ORDER — ACETAMINOPHEN 500 MG
1000 TABLET ORAL EVERY 8 HOURS SCHEDULED
Status: DISCONTINUED | OUTPATIENT
Start: 2024-09-19 | End: 2024-09-21

## 2024-09-19 RX ADMIN — PHENYLEPHRINE HCL 100 MCG: 10 MG/ML VIAL (ML) INJECTION at 12:13:00

## 2024-09-19 RX ADMIN — SODIUM CHLORIDE, SODIUM LACTATE, POTASSIUM CHLORIDE, CALCIUM CHLORIDE: 600; 310; 30; 20 INJECTION, SOLUTION INTRAVENOUS at 10:53:00

## 2024-09-19 RX ADMIN — PHENYLEPHRINE HCL 50 MCG: 10 MG/ML VIAL (ML) INJECTION at 11:02:00

## 2024-09-19 RX ADMIN — IODIXANOL 10 ML: 320 INJECTION, SOLUTION INTRAVASCULAR at 09:25:00

## 2024-09-19 RX ADMIN — PHENYLEPHRINE HCL 50 MCG: 10 MG/ML VIAL (ML) INJECTION at 10:57:00

## 2024-09-19 RX ADMIN — ONDANSETRON 4 MG: 2 INJECTION INTRAMUSCULAR; INTRAVENOUS at 12:08:00

## 2024-09-19 RX ADMIN — LIDOCAINE HYDROCHLORIDE 50 MG: 10 INJECTION, SOLUTION EPIDURAL; INFILTRATION; INTRACAUDAL; PERINEURAL at 10:50:00

## 2024-09-19 RX ADMIN — ROCURONIUM BROMIDE 30 MG: 10 INJECTION, SOLUTION INTRAVENOUS at 10:50:00

## 2024-09-19 RX ADMIN — PHENYLEPHRINE HCL 75 MCG: 10 MG/ML VIAL (ML) INJECTION at 11:39:00

## 2024-09-19 NOTE — ANESTHESIA POSTPROCEDURE EVALUATION
[facility] Patient Status:  Hospital Outpatient Surgery   Age/Gender 79 year old female MRN [id number]   Location [facility] PERIOPERATIVE SERVICE Attending Siddhartha Blood MD   Hosp Day # 0 PCP Florencio Bowers MD       Anesthesia Post-op Note    RIGHT NEPHROLITHOTOMY PERCUTANEOUS WITH IR ACCESS    Procedure Summary       Date: 09/19/24 Room / Location:  MAIN OR 14 /  MAIN OR    Anesthesia Start: 1045 Anesthesia Stop: 1233    Procedure: RIGHT NEPHROLITHOTOMY PERCUTANEOUS WITH IR ACCESS (Right: Flank) Diagnosis:       Staghorn calculus      (Staghorn calculus [N20.0])    Surgeons: Siddhartha Blood MD Anesthesiologist: Eric Torres MD    Anesthesia Type: general ASA Status: 3            Anesthesia Type: general    Vitals Value Taken Time   /61 09/19/24 1231   Temp 97.0 09/19/24 1233   Pulse 58 09/19/24 1232   Resp 23 09/19/24 1232   SpO2 95 09/19/24 1233   Vitals shown include unfiled device data.    Patient Location: PACU    Anesthesia Type: general    Airway Patency: patent    Postop Pain Control: adequate    Mental Status: mildly sedated but able to meaningfully participate in the post-anesthesia evaluation    Nausea/Vomiting: none    Cardiopulmonary/Hydration status: stable euvolemic    Complications: no apparent anesthesia related complications    Postop vital signs: stable    Dental Exam: Unchanged from Preop    Patient to be discharged from PACU when criteria met.

## 2024-09-19 NOTE — ANESTHESIA PROCEDURE NOTES
Airway  Date/Time: 9/19/2024 10:54 AM  Urgency: elective      General Information and Staff    Patient location during procedure: OR  Anesthesiologist: Eric Torres MD  Performed: anesthesiologist   Performed by: Eric Torres MD  Authorized by: Eric Torres MD      Indications and Patient Condition  Indications for airway management: anesthesia  Sedation level: deep  Preoxygenated: yes  Patient position: sniffing  Mask difficulty assessment: 2 - vent by mask + OA or adjuvant +/- NMBA    Final Airway Details  Final airway type: endotracheal airway      Successful airway: ETT  Cuffed: yes   Successful intubation technique: Video laryngoscopy  Facilitating devices/methods: intubating stylet  Endotracheal tube insertion site: oral  Blade type: One Scope.  Blade size: #3  ETT size (mm): 7.0    Cormack-Lehane Classification: grade IIA - partial view of glottis  Placement verified by: capnometry   Measured from: lips  Number of attempts at approach: 1

## 2024-09-19 NOTE — PROGRESS NOTES
NURSING ADMISSION NOTE      Patient admitted via  bed.  Oriented to room.  Safety precautions initiated.  Bed in low position.  Call light in reach.    Holm draining pink tinged urine. Right nephrostomy tube draining bloody drainage. Skin assessment performed with Jordan ANG. Patient has flaking to BLE, redness to entire back (per caregiver at bedside, being treated with topical medication), redness under right breast, and small open wounds to left coccyx and right buttock (see flowsheets). Wound care consulted.

## 2024-09-19 NOTE — H&P
HPI:     Payton Henriquez is a 79 year old female with a PMH of HTN, HL, DM, GERD, afib (on eliquis), DVT.    Following for:  1. Recurrent UTIs  - no issues up until 1499-4956, now with recurrent UTIs  - multiple UCx with ESBL S to cipro, juan, macrobid, bactrim  2. Recurrent large CaOx b/l kidney stones  - s/p staged #1/2 left URS 6/13/24, #2/2 6/27/24  - s/p right URS 8/23/22 - [place]), no other stones  3. Hypocitraturia and low UOP  - 15 urocit BID 7/1/24  - 24 h urine 6/15/24: very low UOP, citrate (1150, 298), low pH (5.94)    PCP - Elissa  Prior Urologist - Jayshree 12/12/23, Frank 8/23/22    Presents for right PCNL.  She is WC/bedridden and has difficulty getting to appointments as she needs a ride.    Pt feels well. Lives at home with . Has live in help.  Her reilly cath was removed with the ureteral stent. She feels well. Has no difficulty urinating. No UTI symptoms for >2 mo.  No abdominal or flank pain.  She uses a purewick but is able to hold bladder. She has had more recurrent UTIs with drug-resistant organisms over the past couple years.      She is taking 15 urocit BID.    Unable to void LOV.     Gross hematuria: none  Tobacco hx: none  Fam h/o  malignancy: none    Drinks ~ 30 oz water, 12 oz coffee, 12 juice, 12 soda with medium yellow urine. I encouraged the pt drink at least 40-60 oz water per day or enough to keep urine clear to pale yellow for stone prevention.    CT SP 3/7/24: large b/l renal stones. Has ~ 24 mm LLP conglomerate, 5 LUP, 6 distal left ureteral stone without hydro. 37 mm RUP conglomerate    We again discussed options for the large bilateral renal stone burden.  Options would include observation, staged URS, PCNL.  We discussed the risks and benefits to each option.    She would like to proceed to OR for R PCNL. We discussed the risks and benefits to the procedure including, but not limited to, bleeding, infection, possible damage to surrounding structures. The patient  understands and would like to proceed.  Discussed we can leave reilly catheter for ~ 1 week after procedure if she would like and I  would also likely repeat 24 h urine in ~ 3-6 to ensure urinary parameters have improved but we can discuss repeat 24 h urine at a later date.      Prior note:  Plan for OR for R PCNL pending medical clearance.  She will double water intake and =continue 15 urocit BID.      HISTORY:  Past Medical History:    Arrhythmia    A FIB    Back problem    SPINAL STENOSIS    Bedridden    Due to a Fall in 2020 broken left fankle w surgery, s/p infection bone left foot, rehab, antibx x 7 weeks, after Fractured her  right femur   between 2020 -2024    Calculus of kidney    right and left    Deep vein thrombosis (HCC)    suspected left leg    Encounter for urinary catheter    uses external catheter system \" purewick\" / due to being bedridden    HIGH BLOOD PRESSURE    HIGH CHOLESTEROL    History of cardiac cath    post procedure data    Macular degeneration of right eye    Measles without mention of complication    Neuropathy    feet    Type II or unspecified type diabetes mellitus without mention of complication, not stated as uncontrolled    diet control    UTI (urinary tract infection)    X2 UTI'S brought to Dr Short without mention of complication    Visual impairment      Past Surgical History:   Procedure Laterality Date    Cholecystectomy  2005    Colonoscopy  12/19/2013    Procedure: COLONOSCOPY;  Surgeon: Igor Simon MD;  Location:  ENDOSCOPY    Colonoscopy  12/19/13     Alfred  sandra 10    Colonoscopy & polypectomy  11/24/2003    +diminutive polyp; repeat 10 years Alfred    Femur fracture surgery Left 08/31/2022    FEMUR IM NAIL ANTEGRADE LEFT    Hysterectomy  1989    removal of left ovary    Needle biopsy right  11/2019    benign us guided x 2    Other surgical history  1972    salpingo-oophorectomy right side    Other surgical history  01/01/2020    Cysto, Left RPG,stent  insertion      Family History   Problem Relation Age of Onset    Other (Lung Cancer) Mother       Social History:   Social History     Socioeconomic History    Marital status:    Tobacco Use    Smoking status: Never    Smokeless tobacco: Never   Vaping Use    Vaping status: Never Used   Substance and Sexual Activity    Alcohol use: Not Currently    Drug use: Not Currently   Other Topics Concern    Caffeine Concern No    Exercise No    Seat Belt Yes     Social Determinants of Health     Financial Resource Strain: Low Risk  (9/19/2023)    Financial Resource Strain     Difficulty of Paying Living Expenses: Not hard at all   Transportation Needs: No Transportation Needs (9/19/2023)    Transportation Needs     Lack of Transportation: No    Received from [facility]        Medications (Active prior to today's visit):  No current outpatient medications on file.       Allergies:  No Known Allergies      ROS:     A comprehensive 10 point review of systems was completed.  Pertinent positives and negatives noted in the the HPI.    PHYSICAL EXAM:     GENERAL APPEARANCE: well, developed, well nourished, in no acute distress  NEUROLOGIC: nonfocal, alert and oriented  HEAD: normocephalic, atraumatic  EYES: sclera non-icteric  EARS: hearing intact  ORAL CAVITY: mucosa moist  NECK/THYROID: no obvious goiter or masses  LUNGS: nonlabored breathing  ABDOMEN: soft, no obvious masses or tenderness  SKIN: no obvious rashes    : as noted above     ASSESSMENT/PLAN:   There are no diagnoses linked to this encounter.  - as noted above.    Thanks again for this consult.    Siddhartha Blood MD, FACS  Urologist  [facility]  Office: [phone]

## 2024-09-19 NOTE — PROGRESS NOTES
[facility]  Inpatient Wound Care Contact Note    Payton MANN Alokalexandraak Patient Status:  Outpatient in a Bed    1945 MRN [id number]   Location [facility] 3N-A Attending Siddhartha Blood MD   Hosp Day # 0 PCP Florencio Bowers MD     Attempted to see patient for wound care assessment.  Patient is currently unavailable secondary to being shaky and nauseous after surgery. Patient asks to be seen by wound care tomorrow.     We will continue to follow this patient while in-house and assist with wound care discharge planning.    Please call [phone] if any questions.      Thank you,    Bakari Mcclure, LATRELL   [facility]  [address] [address] [phone]

## 2024-09-19 NOTE — OPERATIVE REPORT
Urology Operative Note    Attending Surgeon: Siddhartha Blood MD    Patient Name: Payton Henriquez    Date of Surgery: 9/19/2024    Preoperative Diagnosis: right sided staghorn calculus with ureteral stone    Postoperative Diagnosis: same    Procedure Performed: right percutaneous nephrolithotripsy, dilation of existing percutaneous nephrostomy access, right antegrade ureteroscopy, right antegrade nephrostogram with insertion of nephrostomy catheter    Indication for procedure:  Patient is a 79 year old female who presented with a > 2 cm right sided staghorn calculus as well as right ureteral stones. The patient was counseled on options and elected to undergo the aforementioned procedure. We discussed the risks and benefits to surgery. We discussed risks including, but not limited to, bleeding, infection, possible damage to surrounding structures, possible need for repeat procedure(s). The patient understood these risks and wished to proceed with surgery.    Description of the procedure:  Patient was taken the operating room and prepped and draped in prone position after undergoing general anesthesia. A reilly catheter was placed prior to positioning. The interventional radiologists obtained nephroureteral access prior to the procedure and the nephroureteral catheter was prepped in addition to the patient's flank. I inserted a Super Stiff wire through the nephroureteral catheter and passed this into the bladder, which I confirmed using fluoroscopy. I then inserted a flexible sheath and a secondSuper Stiff wire as a safety wire.  I then inserted the nephro max balloon dilator over 1 of the wires. I then dilated the tract up to 12 mm of mercury. I confirmed I was in good position using fluoroscopy. I then was able to easily insert the nephrostomy sheath over the balloon after the tract had been dilated. The balloon dilator was then removed. I then proceeded with nephroscopy. I was able to easily identify the stone  burden. The largest stone was ~ 25 mm in size. The stone burden was fragmented using the Lithoclast device and the pieces were extracted without difficulty.  I then performed antegrade flexible ureteroscopy. To do this I inserted the flexible sheath over the safety wire down to the upper ureter. I then inserted the flexible ureteral scope. The entire ureter was inspected down to the level of the bladder. All residual stone fragments were removed. There were no residual stone fragments remaining in the ureter. The flexible ureteral scope was then removed.  I once again performed nephroscopy using the nephroscope. I carefully inspected all of the calices. No residual stone fragments were remaining.  The scope was then removed. I then inserted a 20F malecot nephroureteral re-entry catheter and ensured malecot was positioned in the renal pelvis. I then performed antegrade nephrostogram, which showed the nephrostomy was in good position and showed no extravasation or other abnormalities.  The sheath and wires were then removed with the nephrostomy tube left in place. The catheter was sutured to the skin using an 0 silk. The Reilly catheter and nephrostomy tube were left to free drain. This was covered with sterile gauze as well. No significant bleeding was noted at the end of the case.  The patient was then placed back in supine position.  The stone fragments were sent for analysis.   The patient was awoken having tolerated the procedure well.    Specimen:   ID Type Source Tests Collected by Time Destination   1 : right kidney stones Calculus Kidney stone (calculus) CALCULI, URINARY, SURGICAL PATHOLOGY TISSUE Siddhartha Blood MD 9/19/2024 12:24 PM        Complications: No known complications    Condition on Discharge from the operating room was stable    Plan: The patient will be admitted overnight. Tentatively remove nephrostomy and reilly catheter tomorrow.    Siddhartha Blood MD  Date: 9/19/2024  Time: 12:30 PM

## 2024-09-19 NOTE — CONSULTS
[facility]  CONSULT     Payton Henriquez Patient Status:  Observation    1945 MRN [id number]   Location [facility] 3NW-A Attending Siddhartha Blood MD   Hosp Day # 0 PCP Florencio Bowers MD     Reason for consult: med management    Requested by: Dr. Blood    Subjective:   History of Present Illness:     Payton Henriquez is a 79 year old female with PMhx of Afib, spinal stenosis, HTN, DL, DM, renal stones presents for R perc nephrolithotripsy, ureteroscopy, nephrostomy tube placement. Pt had procedure this morning and tolerated well. Pt states pain is controlled. No F/C. No CP or SOB. Pt feels nauseated and has thrown up a few times. No diarrhea or constipation. Pain currently controlled.     History/Other:    Past Medical History:  Past Medical History:    Arrhythmia    A FIB    Back problem    SPINAL STENOSIS    Bedridden    Due to a Fall in  broken left fankle w surgery, s/p infection bone left foot, rehab, antibx x 7 weeks, after Fractured her  right femur   between  -    Calculus of kidney    right and left    Deep vein thrombosis (HCC)    suspected left leg    Encounter for urinary catheter    uses external catheter system \" purewick\" / due to being bedridden    HIGH BLOOD PRESSURE    HIGH CHOLESTEROL    History of cardiac cath    post procedure data    Macular degeneration of right eye    Measles without mention of complication    Neuropathy    feet    Type II or unspecified type diabetes mellitus without mention of complication, not stated as uncontrolled    diet control    UTI (urinary tract infection)    X2 UTI'S brought to Dr Short without mention of complication    Visual impairment     Past Surgical History:   Past Surgical History:   Procedure Laterality Date    Cholecystectomy      Colonoscopy  2013    Procedure: COLONOSCOPY;  Surgeon: Igor Simon MD;  Location:  ENDOSCOPY    Colonoscopy  13     Alfred flores 10    Colonoscopy & polypectomy   11/24/2003    +diminutive polyp; repeat 10 years Alfred    Femur fracture surgery Left 08/31/2022    FEMUR IM NAIL ANTEGRADE LEFT    Hysterectomy  1989    removal of left ovary    Needle biopsy right  11/2019    benign us guided x 2    Other surgical history  1972    salpingo-oophorectomy right side    Other surgical history  01/01/2020    Cysto, Left RPG,stent insertion      Family History:   Family History   Problem Relation Age of Onset    Other (Lung Cancer) Mother      Social History:    reports that she has never smoked. She has never used smokeless tobacco. She reports that she does not currently use alcohol. She reports that she does not use drugs.     Allergies: No Known Allergies    Medications:    Current Facility-Administered Medications on File Prior to Encounter   Medication Dose Route Frequency Provider Last Rate Last Admin    [COMPLETED] levoFLOXacin in dextrose 5% (Levaquin) 500 mg/100mL IVPB premix 500 mg  500 mg Intravenous Once Siddhartha Blood MD   500 mg at 06/27/24 1316     Current Outpatient Medications on File Prior to Encounter   Medication Sig Dispense Refill    Potassium Citrate ER (UROCIT-K 15) 15 MEQ (1620 MG) Oral Tab CR Take 1 tablet by mouth in the morning and 1 tablet before bedtime. 180 tablet 5    apixaban (ELIQUIS) 5 MG Oral Tab Take 1 tablet (5 mg total) by mouth 2 (two) times daily. 180 tablet 3    cyclobenzaprine 10 MG Oral Tab       ferrous sulfate 325 (65 FE) MG Oral Tab EC Take 1 tablet (325 mg total) by mouth daily with breakfast.      Zinc Gluconate 50 MG Oral Cap Take 50 mg by mouth daily.      polyethylene glycol, PEG 3350, 17 g Oral Powd Pack Take 17 g by mouth daily as needed.      acetaminophen 500 MG Oral Tab Take 2 tablets (1,000 mg total) by mouth every 6 (six) hours as needed for Pain.      Multiple Vitamins-Minerals (PRESERVISION AREDS 2) Oral Cap Take 1 tablet by mouth 2 (two) times a day.      Acidophilus/Pectin (PROBIOTIC) Oral Cap Take 1 capsule by mouth  daily.      docusate sodium 100 MG Oral Cap Take 1 capsule (100 mg total) by mouth daily as needed for constipation.      ondansetron 4 MG Oral Tablet Dispersible Take 1 tablet (4 mg total) by mouth as needed for Nausea.         Review of Systems:   A comprehensive review of systems was completed.    Pertinent positives and negatives noted in the HPI.    Objective:   Physical Exam:    /69 (BP Location: Right arm)   Pulse 69   Temp 96.6 °F (35.9 °C) (Axillary)   Resp 14   Ht 175.3 cm (5' 9\")   Wt 188 lb 15 oz (85.7 kg)   SpO2 98%   BMI 27.90 kg/m²   General: No acute distress, Alert  Respiratory: No rhonchi, no wheezes  Cardiovascular: S1, S2. Regular rate and rhythm  Abdomen: Soft, NT/ND, +BS  Neuro: No new focal deficits  Extremities: No edema      Results:    Labs:      Labs Last 24 Hours:  Recent Labs   Lab 09/19/24  0722   WBC 12.9*   HGB 12.8   MCV 86.3   .0   INR 1.27*       No results for input(s): \"GLU\", \"BUN\", \"CREATSERUM\", \"GFRAA\", \"GFRNAA\", \"CA\", \"ALB\", \"NA\", \"K\", \"CL\", \"CO2\", \"ALKPHO\", \"AST\", \"ALT\", \"BILT\", \"TP\" in the last 168 hours.    Recent Labs   Lab 09/19/24  0722   PTP 16.0*   INR 1.27*       No results for input(s): \"TROP\", \"CK\" in the last 168 hours.      Imaging: Imaging data reviewed in Epic.    Assessment & Plan:      #R sided staghorn calculus with ureteral stone  S/p nephrostomy and reilly placement 9/9  Plan to monitor overnight and hopefully remove nephrostomy and reilly in the morning  Per urology    #Ess HTN  Resume home meds    #Dyslipidemia  Statin    #DM2  SSI    #Afib  Rate controlled  Resumed AC when ok with Urology        Plan of care discussed with patient, Rn    Eren Lujan MD  9/19/2024    The 21st Century Cures Act makes medical notes like these available to patients in the interest of transparency. Please be advised this is a medical document. Medical documents are intended to carry relevant information, facts as evident, and the clinical opinion of the  practitioner. The medical note is intended as peer to peer communication and may appear blunt or direct. It is written in medical language and may contain abbreviations or verbiage that are unfamiliar.

## 2024-09-19 NOTE — ANESTHESIA PREPROCEDURE EVALUATION
PRE-OP EVALUATION    Patient Name: Payton Henriquez    Admit Diagnosis: Staghorn calculus [N20.0]    Pre-op Diagnosis: Staghorn calculus [N20.0]    RIGHT NEPHROLITHOTOMY PERCUTANEOUS WITH IR ACCESS    Anesthesia Procedure: RIGHT NEPHROLITHOTOMY PERCUTANEOUS WITH IR ACCESS (Right)    Surgeons and Role:     * Siddhartha Blood MD - Primary    Pre-op vitals reviewed.        Body mass index is 29.53 kg/m².    Current medications reviewed.  Hospital Medications:  No current facility-administered medications on file as of .       Outpatient Medications:     No medications prior to admission.       Allergies: Patient has no known allergies.      Anesthesia Evaluation        Anesthetic Complications           GI/Hepatic/Renal                                 Cardiovascular  Comment: TTE 9/2022:  1. Left ventricle: The cavity size was normal. Wall thickness was normal.      Systolic function was mildly reduced. The estimated ejection fraction was      45-50% with mild global hypokinesis   2. Mitral valve: Mildly calcified annulus. There was mild regurgitation.   3. Left atrium: The left atrial volume was mildly to moderately increased.   4. Pulmonary arteries: Systolic pressure was moderately increased, in the      range of 45mm Hg to 50mm Hg.                                     (+) dysrhythmias and atrial fibrillation  (+) CHF                Endo/Other      (+) diabetes                            Pulmonary                           Neuro/Psych                                      Past Surgical History:   Procedure Laterality Date    Cholecystectomy  2005    Colonoscopy  12/19/2013    Procedure: COLONOSCOPY;  Surgeon: Igor Simon MD;  Location:  ENDOSCOPY    Colonoscopy  12/19/13     Alfred flores 10    Colonoscopy & polypectomy  11/24/2003    +diminutive polyp; repeat 10 years Alfred    Femur fracture surgery Left 08/31/2022    FEMUR IM NAIL ANTEGRADE LEFT    Hysterectomy  1989    removal of left ovary    Needle biopsy  right  11/2019    benign us guided x 2    Other surgical history  1972    salpingo-oophorectomy right side    Other surgical history  01/01/2020    Cysto, Left RPG,stent insertion     Social History     Socioeconomic History    Marital status:    Tobacco Use    Smoking status: Never    Smokeless tobacco: Never   Vaping Use    Vaping status: Never Used   Substance and Sexual Activity    Alcohol use: Not Currently    Drug use: Not Currently   Other Topics Concern    Caffeine Concern No    Exercise No    Seat Belt Yes     History   Drug Use Unknown     Available pre-op labs reviewed.  Lab Results   Component Value Date    WBC 17.3 (H) 09/04/2024    RBC 4.66 09/04/2024    HGB 12.8 09/04/2024    HCT 41.5 09/04/2024    MCV 89.1 09/04/2024    MCH 27.5 09/04/2024    MCHC 30.8 (L) 09/04/2024    RDW 18.1 09/04/2024    .0 09/04/2024     Lab Results   Component Value Date     09/04/2024    K 4.6 09/04/2024     09/04/2024    CO2 18.0 (L) 09/04/2024    BUN 23 09/04/2024    CREATSERUM 1.44 (H) 09/04/2024     (H) 09/04/2024    CA 9.5 09/04/2024            Airway      Mallampati: II  Mouth opening: 3 FB  TM distance: 4 - 6 cm  Neck ROM: limited Cardiovascular      Rhythm: regular  Rate: normal     Dental    Dentition appears grossly intact         Pulmonary      Breath sounds clear to auscultation bilaterally.               Other findings              ASA: 3   Plan: general  NPO status verified and patient meets guidelines.  Patient has not taken beta blockers in last 24 hours.  Post-procedure pain management plan discussed with surgeon and patient.      Plan/risks discussed with: patient (Risks of general anesthesia discussed with patient, including nausea/vomiting, sore throat, dental injury, aspiration, allergic reaction, and cardiopulmonary compromise. All of their questions were answered and they are in agreement with the plan.)                Present on Admission:  **None**

## 2024-09-20 LAB
ANION GAP SERPL CALC-SCNC: 7 MMOL/L (ref 0–18)
ANION GAP SERPL CALC-SCNC: 9 MMOL/L (ref 0–18)
BUN BLD-MCNC: 19 MG/DL (ref 9–23)
BUN BLD-MCNC: 23 MG/DL (ref 9–23)
CALCIUM BLD-MCNC: 8.9 MG/DL (ref 8.7–10.4)
CALCIUM BLD-MCNC: 9.1 MG/DL (ref 8.7–10.4)
CHLORIDE SERPL-SCNC: 102 MMOL/L (ref 98–112)
CHLORIDE SERPL-SCNC: 103 MMOL/L (ref 98–112)
CO2 SERPL-SCNC: 22 MMOL/L (ref 21–32)
CO2 SERPL-SCNC: 22 MMOL/L (ref 21–32)
CREAT BLD-MCNC: 1.75 MG/DL
CREAT BLD-MCNC: 1.76 MG/DL
EGFRCR SERPLBLD CKD-EPI 2021: 29 ML/MIN/1.73M2 (ref 60–?)
EGFRCR SERPLBLD CKD-EPI 2021: 29 ML/MIN/1.73M2 (ref 60–?)
ERYTHROCYTE [DISTWIDTH] IN BLOOD BY AUTOMATED COUNT: 18.1 %
GLUCOSE BLD-MCNC: 127 MG/DL (ref 70–99)
GLUCOSE BLD-MCNC: 133 MG/DL (ref 70–99)
GLUCOSE BLD-MCNC: 134 MG/DL (ref 70–99)
GLUCOSE BLD-MCNC: 161 MG/DL (ref 70–99)
GLUCOSE BLD-MCNC: 204 MG/DL (ref 70–99)
GLUCOSE BLD-MCNC: 252 MG/DL (ref 70–99)
HCT VFR BLD AUTO: 34 %
HGB BLD-MCNC: 10.8 G/DL
MCH RBC QN AUTO: 27.5 PG (ref 26–34)
MCHC RBC AUTO-ENTMCNC: 31.8 G/DL (ref 31–37)
MCV RBC AUTO: 86.5 FL
OSMOLALITY SERPL CALC.SUM OF ELEC: 278 MOSM/KG (ref 275–295)
OSMOLALITY SERPL CALC.SUM OF ELEC: 286 MOSM/KG (ref 275–295)
PLATELET # BLD AUTO: 406 10(3)UL (ref 150–450)
POTASSIUM SERPL-SCNC: 5.2 MMOL/L (ref 3.5–5.1)
POTASSIUM SERPL-SCNC: 5.6 MMOL/L (ref 3.5–5.1)
POTASSIUM SERPL-SCNC: 6.3 MMOL/L (ref 3.5–5.1)
POTASSIUM SERPL-SCNC: 6.3 MMOL/L (ref 3.5–5.1)
RBC # BLD AUTO: 3.93 X10(6)UL
SODIUM SERPL-SCNC: 132 MMOL/L (ref 136–145)
SODIUM SERPL-SCNC: 133 MMOL/L (ref 136–145)
WBC # BLD AUTO: 21.9 X10(3) UL (ref 4–11)

## 2024-09-20 PROCEDURE — 99214 OFFICE O/P EST MOD 30 MIN: CPT | Performed by: HOSPITALIST

## 2024-09-20 RX ORDER — DEXTROSE MONOHYDRATE 25 G/50ML
INJECTION, SOLUTION INTRAVENOUS ONCE
Status: COMPLETED | OUTPATIENT
Start: 2024-09-20 | End: 2024-09-20

## 2024-09-20 RX ORDER — DEXTROSE MONOHYDRATE 25 G/50ML
25 INJECTION, SOLUTION INTRAVENOUS
Status: ACTIVE | OUTPATIENT
Start: 2024-09-20 | End: 2024-09-20

## 2024-09-20 NOTE — PROGRESS NOTES
[facility]   part of [facility]     Hospitalist Progress Note     Payton Henriquez Patient Status:  Outpatient in a Bed    1945 MRN [id number]   [facility] 3NW-A Attending Siddhartha Blood MD   Hosp Day # 0 PCP Florencio Bowers MD     Chief Complaint: Medical management    Subjective:     Patient seen and examined. Denies abdominal pain/N/V/D.     Objective:    Review of Systems:   A comprehensive review of systems was completed; pertinent positive and negatives stated in subjective.    Vital signs:  Temp:  [97.5 °F (36.4 °C)-98.9 °F (37.2 °C)] 97.5 °F (36.4 °C)  Pulse:  [77-88] 78  Resp:  [15-18] 16  BP: (113-152)/(55-81) 113/55  SpO2:  [95 %-100 %] 100 %    Physical Exam:    General: No acute distress  Respiratory: No wheezes, no rhonchi  Cardiovascular: S1, S2, regular rate and rhythm  Abdomen: Soft, Non-tender, non-distended, positive bowel sounds  Neuro: No new focal deficits.   Extremities: No edema    Diagnostic Data:    Labs:  Recent Labs   Lab 24  0722 24  0612   WBC 12.9* 21.9*   HGB 12.8 10.8*   MCV 86.3 86.5   .0 406.0   INR 1.27*  --        Recent Labs   Lab 24  0612 24  1444   * 134*   BUN 23 19   CREATSERUM 1.76* 1.75*   CA 8.9 9.1   * 132*   K 5.6* 6.3*  6.3*    103   CO2 22.0 22.0       Estimated Creatinine Clearance: 26.3 mL/min (A) (based on SCr of 1.75 mg/dL (H)).    No results for input(s): \"TROP\", \"TROPHS\", \"CK\" in the last 168 hours.    Recent Labs   Lab 24  0722   PTP 16.0*   INR 1.27*                  Microbiology    No results found for this visit on 24.      Imaging: Reviewed in Epic.    Medications:    insulin regular human  10 Units Intravenous Once    gabapentin  300 mg Oral BID    losartan  25 mg Oral Daily    metoprolol succinate ER  25 mg Oral Daily Beta Blocker    mirtazapine  7.5 mg Oral Nightly    pantoprazole  20 mg Oral QAM AC    acetaminophen  1,000 mg Oral Q8H ANABELL    atorvastatin  20 mg  Oral Daily    insulin aspart  1-10 Units Subcutaneous TID AC and HS       Assessment & Plan:      #R sided staghorn calculus with ureteral stone  S/p nephrostomy and reilly placement 9/19 - nephrostomy tube removed 9/20  Management per urology    #Hyperkalemia  Worsened despite Patiromer   Insulin/dextrose/bicarb/calcium gluconate ordered  Hold ARB for now  Repeat potassium level at 1900  Tele     #Ess HTN  Hold ARB as above     #Dyslipidemia  Statin     #DM2  SSI     #Afib  Rate controlled  Resumed AC when ok with Urology    #Disposition  -Hold DC today due to hyperkalemia     Errol Art,     Supplementary Documentation:     Quality:  DVT Mechanical Prophylaxis:   SCDs, Early ambuation  DVT Pharmacologic Prophylaxis   Medication   None                Code Status: Full Code  Reilly: Reilly catheter in place  Reilly Duration (in days): 2  Central line:    LONG: 9/20/2024    Discharge is dependent on: clinical progress  At this point Ms. Henriquez is expected to be discharge to: home    The 21st Century Cures Act makes medical notes like these available to patients in the interest of transparency. Please be advised this is a medical document. Medical documents are intended to carry relevant information, facts as evident, and the clinical opinion of the practitioner. The medical note is intended as peer to peer communication and may appear blunt or direct. It is written in medical language and may contain abbreviations or verbiage that are unfamiliar.

## 2024-09-20 NOTE — CONSULTS
[facility]  Report of Inpatient Wound Care Consultation    Payton Henriquez Patient Status:  Outpatient in a Bed    1945 MRN [id number]   Location [facility] 3NW-A Attending Siddhartha Blood MD   Hosp Day # 0 PCP Florencio Bowers MD     Reason for Consultation:  Back, buttocks     History of Present Illness:  Payton Henriquez is a a(n) 79 year old female. Patient presents with an incision site to right upper back and excoriated skin to buttocks due to moisture associated skin damage. Patient with multiple comorbidities.        History:  Past Medical History:    Arrhythmia    A FIB    Back problem    SPINAL STENOSIS    Bedridden    Due to a Fall in  broken left fankle w surgery, s/p infection bone left foot, rehab, antibx x 7 weeks, after Fractured her  right femur   between  -    Calculus of kidney    right and left    Deep vein thrombosis (HCC)    suspected left leg    Encounter for urinary catheter    uses external catheter system \" purewick\" / due to being bedridden    HIGH BLOOD PRESSURE    HIGH CHOLESTEROL    History of cardiac cath    post procedure data    Macular degeneration of right eye    Measles without mention of complication    Neuropathy    feet    Type II or unspecified type diabetes mellitus without mention of complication, not stated as uncontrolled    diet control    UTI (urinary tract infection)    X2 UTI'S brought to Dr Short without mention of complication    Visual impairment     Past Surgical History:   Procedure Laterality Date    Cholecystectomy      Colonoscopy  2013    Procedure: COLONOSCOPY;  Surgeon: Igor Simon MD;  Location:  ENDOSCOPY    Colonoscopy  13     Alfred  sandra 10    Colonoscopy & polypectomy  2003    +diminutive polyp; repeat 10 years Alfred    Femur fracture surgery Left 2022    FEMUR IM NAIL ANTEGRADE LEFT    Hysterectomy      removal of left ovary    Needle biopsy right  2019    benign us guided x 2     Other surgical history  1972    salpingo-oophorectomy right side    Other surgical history  01/01/2020    Cysto, Left RPG,stent insertion      reports that she has never smoked. She has never used smokeless tobacco. She reports that she does not currently use alcohol. She reports that she does not use drugs.      Allergies:  @ALLERGY    Laboratory Data:    Recent Labs   Lab 09/19/24  0722 09/19/24  1231 09/19/24  1655 09/19/24  2105 09/20/24  0545 09/20/24  0612   WBC 12.9*  --   --   --   --  21.9*   HGB 12.8  --   --   --   --  10.8*   HCT 40.3  --   --   --   --  34.0*   .0  --   --   --   --  406.0   CREATSERUM  --   --   --   --   --  1.76*   BUN  --   --   --   --   --  23   GLU  --   --   --   --   --  204*   CA  --   --   --   --   --  8.9   INR 1.27*  --   --   --   --   --    PGLU  --    < > 204* 253* 252*  --     < > = values in this interval not displayed.         ASSESSMENT:  Wound 09/19/24 Back Right (Active)   Date First Assessed/Time First Assessed: 09/19/24 1504   Present on Original Admission: Yes  Location: Back  Wound Location Orientation: Right  Wound Description (Comments): open wound      Assessments 9/20/2024  9:41 AM   Wound Image     Drainage Amount None   Wound Length (cm) 0.1 cm   Wound Width (cm) 1.5 cm   Wound Surface Area (cm^2) 0.15 cm^2   Wound Depth (cm) 0 cm   Wound Volume (cm^3) 0 cm^3   Shannan-wound Assessment Edema   Wound Odor None       Wound 09/19/24 Buttocks Mid (Active)   Date First Assessed/Time First Assessed: 09/19/24 1506   Present on Original Admission: Yes  Primary Wound Type: Pressure Injury  Location: Buttocks  Wound Location Orientation: Mid  Wound Description (Comments): open wound to right buttock      Assessments 9/20/2024 10:29 AM   Wound Image     Drainage Amount None   Wound Odor None   Shape Moisture Associated skin Damage.Excoriated skin        Wound Cleaning and Dressings:  1.Right upper back  Wound cleansing:  Cleanse with saline, pat dry then  apply the dressing  Wound product: Cover with dry gauze and a tape or bordered gauze  Dressing change frequency:  Change dressing daily and/or PRN    2.Buttocks   Wound cleansing: Wash with mild soap and water  Wound product: Apply zinc oxide skin protectant daily and as needed      Miscellaneous/Additional Orders:  Offloading: Turn Q 2 hours and as needed, off load heels to prevent further skin breakdown, waffle cushion    Care Summary:  Discussed Plan of Care at bedside with patient and her niece. Patient and her niece verbally acknowledge understanding of all instructions and all questions were answered.    Additional Notes:   Possible discharge today.Patient has a live in care giver per niece.    Thank you for this consultation and for allowing me to participate in the care of your patient.       Time Spent 30 Minutes.    Thank you,  Chey Joe RN BSN [facility]  Wound Care Clinician  [facility]  9/20/2024  10:28 AM

## 2024-09-20 NOTE — HOME CARE LIAISON
[facility]. Spoke w/ patient who is agreeable with [facility]. Contact information placed on AVS.

## 2024-09-20 NOTE — PROGRESS NOTES
Patient is saline locked, on room air, on telemetry running NSR, bed bound at baseline-refusing to turn most of the time. Nephrostomy tube removed by -dressing changed due to leaking, tolerating a regular diet but has a poor appetite. Potassium elevated at 5.6-Veltassa given. Potassium rechecked and increased to 6.3- Regular insuline, Calcium Gluconate, D50, and Sodium Bicarbonate given-will recheck at 1900. Possible discharge home tomorrow pending potassium levels. Patient will need [facility] ambulance company to be notified so they can transport her back home. Patient and family updated on plan of care.

## 2024-09-20 NOTE — CM/SW NOTE
09/20/24 1200   CM/SW Referral Data   Referral Source Nurse;Social Work (self-referral)   Reason for Referral Discharge planning     SW notified by RN of potential DC today. Will need to call [facility] to schedule DC transport.     Chart reviewed for DC planning. Pt admitted for planned procedure with Dr. Blood. Pt resides at home with spouse, has caregiver assistance. Pt is bedbound at baseline, has hx with [facility]. SW sent return referral for HHRN needs for continued oversight at DC.     SW to call [facility] to schedule DC transport once DC confirmed.      [facility]:  Ph: [phone]  Fax: [phone]    NAGA Lafleur

## 2024-09-20 NOTE — DISCHARGE SUMMARY
[facility]   part of [facility]    Discharge Summary    Payton Henriquez Patient Status:  Outpatient in a Bed    1945 MRN [id number]   Location [facility] 3NW-A Attending Siddhartha Blood MD   Hosp Day # 0 PCP Florencio Bowers MD     Date of Admission: 2024     Date of Discharge: 24    Disposition: Home or Self Care     Admitting Diagnosis: Staghorn calculus [N20.0]    Discharge Diagnosis: Staghorn calculus [N20.0]    Reason for Admission:  You came to the hospital for a percutaneous nephrolithotomy (PCNL) in the operating room.    Physical Exam:    CONSTITUTIONAL: Well developed, well nourished, in no acute distress  NEUROLOGIC: Alert and oriented  HEAD: Normocephalic, atraumatic  EYES: Sclera non-icteric  ENT: Hearing intact, moist mucous membranes  NECK: No obvious goiter or masses  RESPIRATORY: Normal respiratory effort  SKIN: No evident rashes  ABDOMEN: Soft, non-tender, non-distended  GENITOURINARY: Reilly draining pink urine     History of Present Illness:   Payton Henriquez is a 79 year old female with history kidney stone, here for PCNL.    Hospital Course:  After access was obtain by intervention radiology and informed consent was obtained for right percutaneous nephrolithotomy, the patient was brought to the OR where aforementioned procedure was completed without any intraoperative complication. Patient was transferred from PACU to the floor without event. Patient progressed as expected on the floor. CT scan obtained that showed successful treatment of stone burden. Nephrostomy tube and reilly catheter were removed sequentially at appropriate timed interval. Patient is voiding freely without any difficulty. Patient has tolerated advancement of diet with appropriate return of bowel function. Pain is well controlled on oral pain medications and patient is ambulatory. Restrictions and post op instructions were reviewed. Patient will follow up as scheduled.    Consultations:  None    Procedures: Percutaneous nephrolithotomy (PCNL)     Complications: None    Discharge Condition: Good         Discharge Medications:      Discharge Medications        CHANGE how you take these medications        Instructions Prescription details   docusate sodium 100 MG Caps  Commonly known as: Colace  What changed: Another medication with the same name was removed. Continue taking this medication, and follow the directions you see here.      Take 1 capsule (100 mg total) by mouth 2 (two) times daily for 14 days. Stop taking if loose stools/diarrhea.   Stop taking on: October 3, 2024  Quantity: 28 capsule  Refills: 0            CONTINUE taking these medications        Instructions Prescription details   acetaminophen 500 MG Tabs  Commonly known as: Tylenol Extra Strength      Take 2 tablets (1,000 mg total) by mouth every 6 (six) hours as needed for Pain.   Refills: 0     acidophilus-pectin Caps  Commonly known as: Probiotic      Take 1 capsule by mouth daily.   Refills: 0     atorvastatin 20 MG Tabs  Commonly known as: Lipitor      Take 1 tablet (20 mg total) by mouth daily.   Quantity: 90 tablet  Refills: 0     cyclobenzaprine 10 MG Tabs  Commonly known as: Flexeril       Refills: 0     ferrous sulfate 325 (65 FE) MG Tbec      Take 1 tablet (325 mg total) by mouth daily with breakfast.   Refills: 0     gabapentin 300 MG Caps  Commonly known as: Neurontin      Take 1 capsule (300 mg total) by mouth in the morning and 1 capsule (300 mg total) before bedtime.   Quantity: 180 capsule  Refills: 0     HYDROcodone-acetaminophen 5-325 MG Tabs  Commonly known as: Norco      Take 1 tablet by mouth every 6 (six) hours as needed for Pain.   Quantity: 30 tablet  Refills: 0     ketoconazole 2 % Crea  Commonly known as: Nizoral      Apply to affected area daily for 2 weeks   Quantity: 60 g  Refills: 1     losartan 25 MG Tabs  Commonly known as: Cozaar      Take 1 tablet (25 mg total) by mouth daily.   Quantity: 30  tablet  Refills: 11     metoprolol succinate ER 25 MG Tb24  Commonly known as: Toprol XL      TAKE 1 TABLET DAILY   Quantity: 90 tablet  Refills: 3     mirtazapine 7.5 MG Tabs  Commonly known as: Remeron      Take 1 tablet (7.5 mg total) by mouth nightly.   Quantity: 90 tablet  Refills: 3     omeprazole 20 MG Cpdr  Commonly known as: PriLOSEC      TAKE 1 CAPSULE EVERY MORNING   Quantity: 90 capsule  Refills: 3     ondansetron 4 MG Tbdp  Commonly known as: Zofran-ODT      Take 1 tablet (4 mg total) by mouth as needed for Nausea.   Refills: 0     polyethylene glycol (PEG 3350) 17 g Pack  Commonly known as: Miralax      Take 17 g by mouth daily as needed.   Refills: 0     Potassium Citrate ER 15 MEQ (1620 MG) Tbcr  Commonly known as: Urocit-K 15      Take 1 tablet by mouth in the morning and 1 tablet before bedtime.   Quantity: 180 tablet  Refills: 5     PreserVision AREDS 2 Caps      Take 1 tablet by mouth 2 (two) times a day.   Refills: 0     sulfamethoxazole-trimethoprim -160 MG Tabs per tablet  Commonly known as: Bactrim DS      Take 1 tablet by mouth 2 (two) times daily for 7 days.   Stop taking on: September 26, 2024  Quantity: 14 tablet  Refills: 0     Zinc Gluconate 50 MG Caps      Take 50 mg by mouth daily.   Refills: 0            STOP taking these medications      apixaban 5 MG Tabs  Commonly known as: Eliquis        ciprofloxacin 500 MG Tabs  Commonly known as: Cipro                  Where to Get Your Medications        These medications were sent to [id number] - [place] - [address] AT [facility] [address], [phone], [phone]  [address], [place] [address]      Hours: 24-hours Phone: [phone]   docusate sodium 100 MG Caps  HYDROcodone-acetaminophen 5-325 MG Tabs  sulfamethoxazole-trimethoprim -160 MG Tabs per tablet         Follow up Visits:          Follow up Labs: None    Discharge Instructions:     Hospital stay: Patients typically stay  overnight in the hospital and go home the next day.     Diet:  - After anesthesia, begin with clear liquids. You may take what you like to eat or drink. Depending on how you feel the following day, you may resume your normal diet. The appetite may be diminished the first several days at home. Avoid heavy meals.   - Drink 6 to 8 glasses of water/fluids a day unless your healthcare provider tells you to limit your fluids.  - Expect bloody urine and possibly clots for up to 4-6 weeks following the procedure.    Wound Care:  - The small incision on your flank will drain some blood and urine for the next few days. This is completely normal. You may need to change your dressing every 1-2 hours for the first couple days. The drainage will decrease over the first few days at home.  - When you are not ambulating I recommend you put slight pressure on your flank by leaning back in your chair or in bed. This mild pressure should help the flank drainage to stop faster.    Activity:  - Be sure to walk at least six times per day. This helps prevents blood clots in the legs, which can travel to the lung and become life-threatening. You may take walks outside. You may go up and down stairs.  - You may tire easily with minimal activity. Your incision will be tender and you will have some degree of pain for 3-4 weeks.  - You should avoid strenuous activity for ~ 6 weeks. This includes activities such as golf, tennis, cutting the grass, stretching exercises, lifting weights and so forth. You should avoid carrying anything over fifteen pounds for this six week period.  - Don’t drive until you are off your pain medicine and pain-free. This may take 2 to 4 weeks. Avoid long car rides for 3-4 weeks (you have a slightly higher risk of developing blood clots immediately following surgery).    Showering: You should shower once daily starting the day after discharge. Allow the water to run over the incision then pat dry. Avoid submerging in  water (no baths, jacuzzi, pools, or soaking) for four weeks. You do not need to place anything over the incision once drainage stops but may place a gauze or bandaid if there is oozing or spotting.     Medication:   - If you take blood thinners (such as aspirin or plavix) please DO NOT take them when you go home. You may resume these medications in 4 weeks.  - You can obtain good pain relief by taking two acetaminophen (Tylenol) every four hours while awake for the first several days. You will also get a prescription for pain pills. This narcotic pain medication may also contain acetaminophen. Do not exceed 3000 mg acetaminophen per day.  - You may receive a prescription for a stool softener to avoid straining after surgery. Take plenty of fiber and water or over the counter stool softener to avoid constipation. It may take a few days to have your first bowel movement after surgery. You may use two tabs of over the counter Senekot or Senegen twice daily as needed for constipation.     Postoperative appointment: You will need a postoperative visit within a few weeks after your discharge. Call the office to make an appointment if you do not already have one.     Call the office or come to the emergency room for fever over 101°F, difficulty breathing, chest pain, palpitations, significant nausea or vomiting, leg swelling or pain.

## 2024-09-20 NOTE — DISCHARGE INSTRUCTIONS
Percutaneous Nephrolithotripsy (PCNL) Discharge Instructions    Hospital stay: Patients typically stay overnight in the hospital and go home the next day.     Diet:  - After anesthesia, begin with clear liquids. You may take what you like to eat or drink. Depending on how you feel the following day, you may resume your normal diet. The appetite may be diminished the first several days at home. Avoid heavy meals.   - Drink 6 to 8 glasses of water/fluids a day unless your healthcare provider tells you to limit your fluids.  - Expect bloody urine and possibly clots for up to 4-6 weeks following the procedure.    Wound Care:  - The small incision on your flank will drain some blood and urine for the next few days. This is completely normal. You may need to change your dressing every 1-2 hours for the first couple days. The drainage will decrease over the first few days at home.  - When you are not ambulating I recommend you put slight pressure on your flank by leaning back in your chair or in bed. This mild pressure should help the flank drainage to stop faster.    Activity:  - Be sure to walk at least six times per day. This helps prevents blood clots in the legs, which can travel to the lung and become life-threatening. You may take walks outside. You may go up and down stairs.  - You may tire easily with minimal activity. Your incision will be tender and you will have some degree of pain for 3-4 weeks.  - You should avoid strenuous activity for ~ 6 weeks. This includes activities such as golf, tennis, cutting the grass, stretching exercises, lifting weights and so forth. You should avoid carrying anything over fifteen pounds for this six week period.  - Don’t drive until you are off your pain medicine and pain-free. This may take 2 to 4 weeks. Avoid long car rides for 3-4 weeks (you have a slightly higher risk of developing blood clots immediately following surgery).    Showering: You should shower once daily  starting the day after discharge. Allow the water to run over the incision then pat dry. Avoid submerging in water (no baths, jacuzzi, pools, or soaking) for four weeks. You do not need to place anything over the incision once drainage stops but may place a gauze or bandaid if there is oozing or spotting.     Medication:   - If you take blood thinners (such as aspirin or plavix) please DO NOT take them when you go home. You may resume these medications in 4 weeks.    - You can obtain good pain relief by taking two acetaminophen (Tylenol) every four hours while awake for the first several days. You will also get a prescription for pain pills. This narcotic pain medication may also contain acetaminophen. Do not exceed 3000 mg acetaminophen per day.    - You may receive a prescription for a stool softener to avoid straining after surgery. Take plenty of fiber and water or over the counter stool softener to avoid constipation. It may take a few days to have your first bowel movement after surgery. You may use two tabs of over the counter Senekot or Senegen twice daily as needed for constipation.     Postoperative appointment: You will need a postoperative visit within a few weeks after your discharge. Call the office to make an appointment if you do not already have one.     Call the office or come to the emergency room for fever over 101°F, difficulty breathing, chest pain, palpitations, significant nausea or vomiting, leg swelling or pain.    REILLY CARE:   - reilly may be removed by home health nurse in 1 weeks and switched to patient's purewick system at home    IMAGING:   -Need to get abdominal x-ray in 4 months and follow-up with physician assistant via telephone visit to discuss results. Please call central scheduling at [phone] to schedule the XRAY.  Please call our office at [phone] to schedule appointment with PA.  Make sure it is done as a telephone visit.     LABS:   -Need to get a 24-hour urine  test done in 1 month.  This is the urine test that you pee into for 24 hours.  This needs to be dropped off at the lab once completed. Supplies can be picked up from any [facility] lab by home health nurse or family.            Sometimes managing your health at home requires assistance.  The [facility] team has recognized your preference to use [facility].  They can be reached by phone at [phone].  The fax number for your reference is [phone].  A representative from the home health agency will contact you or your family to schedule your first visit.               URINARY CATHETER / TREJO CARE PATIENT - FAMILY INSTRUCTIONS    Equipment:  Perfume free soap, warm water, washcloth, clean waterproof underpad or towel    Procedure:  Wash hands with soap and water  Keeping catheter secure, wash skin around catheter, removing all matter  Rinse area around catheter, thoroughly with clean water  Dry with clean towel    Frequency:  Wash area from front to back around catheter twice a day or more often if becomes wet or soiled  Also wash rectal area twice a day and after each bowel movement  Check skin around the catheter for signs of irritation, such as redness, tenderness, or swelling    Notify doctor for any problems, such as:    A leaking catheter  Pain or feeling of fullness in the lower abdomen after checking the drainage tubing for no kinks or loops  Small amount of urine in the drainage bag after checking to see that the drainage tubing has not kinds or loops  Blood in urine  Blood clots or increased bleeding  If instructed to strain urine, report stones and save stone(s) in a container and bring to doctor on next office visit    Never pull on your catheter or try to remove it        Always keep drainage bag below the level of the hip whether sitting or standing.  Always use the large drainage bag when lying down.    Empty large drainage bag every 8 hours or more often this  bag is used for night time use or when lying down    The drainage bag can be used for up to a month                It has been a pleasure taking care of you!  Best wishes for a speedy recovery!   Chelsea ANG

## 2024-09-20 NOTE — PROGRESS NOTES
0914: Paged  to inform him of patient being cleared by Urology for discharge but has potassium level of 5.6. Orders received.    1534: Paged  to inform of potassium recheck being 6.3. Orders received.

## 2024-09-21 VITALS
HEIGHT: 69 IN | OXYGEN SATURATION: 94 % | SYSTOLIC BLOOD PRESSURE: 103 MMHG | DIASTOLIC BLOOD PRESSURE: 52 MMHG | RESPIRATION RATE: 16 BRPM | BODY MASS INDEX: 27.98 KG/M2 | WEIGHT: 188.94 LBS | HEART RATE: 91 BPM | TEMPERATURE: 98 F

## 2024-09-21 LAB
ANION GAP SERPL CALC-SCNC: 4 MMOL/L (ref 0–18)
ATRIAL RATE: 74 BPM
BASOPHILS # BLD AUTO: 0.05 X10(3) UL (ref 0–0.2)
BASOPHILS NFR BLD AUTO: 0.3 %
BUN BLD-MCNC: 21 MG/DL (ref 9–23)
CALCIUM BLD-MCNC: 9.4 MG/DL (ref 8.7–10.4)
CHLORIDE SERPL-SCNC: 105 MMOL/L (ref 98–112)
CO2 SERPL-SCNC: 25 MMOL/L (ref 21–32)
CREAT BLD-MCNC: 1.79 MG/DL
EGFRCR SERPLBLD CKD-EPI 2021: 28 ML/MIN/1.73M2 (ref 60–?)
EOSINOPHIL # BLD AUTO: 0.08 X10(3) UL (ref 0–0.7)
EOSINOPHIL NFR BLD AUTO: 0.6 %
ERYTHROCYTE [DISTWIDTH] IN BLOOD BY AUTOMATED COUNT: 18.6 %
GLUCOSE BLD-MCNC: 105 MG/DL (ref 70–99)
GLUCOSE BLD-MCNC: 109 MG/DL (ref 70–99)
GLUCOSE BLD-MCNC: 137 MG/DL (ref 70–99)
HCT VFR BLD AUTO: 33.1 %
HGB BLD-MCNC: 10.4 G/DL
IMM GRANULOCYTES # BLD AUTO: 0.1 X10(3) UL (ref 0–1)
IMM GRANULOCYTES NFR BLD: 0.7 %
LYMPHOCYTES # BLD AUTO: 2.57 X10(3) UL (ref 1–4)
LYMPHOCYTES NFR BLD AUTO: 17.9 %
MCH RBC QN AUTO: 27.8 PG (ref 26–34)
MCHC RBC AUTO-ENTMCNC: 31.4 G/DL (ref 31–37)
MCV RBC AUTO: 88.5 FL
MONOCYTES # BLD AUTO: 1.54 X10(3) UL (ref 0.1–1)
MONOCYTES NFR BLD AUTO: 10.7 %
NEUTROPHILS # BLD AUTO: 10.05 X10 (3) UL (ref 1.5–7.7)
NEUTROPHILS # BLD AUTO: 10.05 X10(3) UL (ref 1.5–7.7)
NEUTROPHILS NFR BLD AUTO: 69.8 %
OSMOLALITY SERPL CALC.SUM OF ELEC: 282 MOSM/KG (ref 275–295)
P AXIS: 74 DEGREES
P-R INTERVAL: 150 MS
PLATELET # BLD AUTO: 302 10(3)UL (ref 150–450)
POTASSIUM SERPL-SCNC: 4.8 MMOL/L (ref 3.5–5.1)
POTASSIUM SERPL-SCNC: 5.2 MMOL/L (ref 3.5–5.1)
Q-T INTERVAL: 408 MS
QRS DURATION: 86 MS
QTC CALCULATION (BEZET): 452 MS
R AXIS: -17 DEGREES
RBC # BLD AUTO: 3.74 X10(6)UL
SODIUM SERPL-SCNC: 134 MMOL/L (ref 136–145)
T AXIS: 109 DEGREES
VENTRICULAR RATE: 74 BPM
WBC # BLD AUTO: 14.4 X10(3) UL (ref 4–11)

## 2024-09-21 PROCEDURE — 99214 OFFICE O/P EST MOD 30 MIN: CPT | Performed by: HOSPITALIST

## 2024-09-21 RX ORDER — CIPROFLOXACIN 250 MG/1
250 TABLET, FILM COATED ORAL 2 TIMES DAILY
Qty: 14 TABLET | Refills: 0 | Status: SHIPPED | OUTPATIENT
Start: 2024-09-21 | End: 2024-09-28

## 2024-09-21 NOTE — PROGRESS NOTES
[facility]   part of [facility]     Hospitalist Progress Note     Payton Henriquez Patient Status:  Outpatient in a Bed    1945 MRN [id number]   [facility] 3NW-A Attending Siddhartha Blood MD   Hosp Day # 0 PCP Florencio Bowers MD     Chief Complaint: Medical management    Subjective:     Patient seen and examined. No acute complaints overnight or this morning.     Objective:    Review of Systems:   A comprehensive review of systems was completed; pertinent positive and negatives stated in subjective.    Vital signs:  Temp:  [97.4 °F (36.3 °C)-98.4 °F (36.9 °C)] 97.9 °F (36.6 °C)  Pulse:  [64-91] 80  Resp:  [14-18] 16  BP: ()/(46-66) 103/49  SpO2:  [90 %-98 %] 93 %    Physical Exam:    General: No acute distress  Respiratory: No wheezes, no rhonchi  Cardiovascular: S1, S2, regular rate and rhythm  Abdomen: Soft, Non-tender, non-distended, positive bowel sounds  Neuro: No new focal deficits.   Extremities: No edema    Diagnostic Data:    Labs:  Recent Labs   Lab 24  0722 24  0612 24  0551   WBC 12.9* 21.9* 14.4*   HGB 12.8 10.8* 10.4*   MCV 86.3 86.5 88.5   .0 406.0 302.0   INR 1.27*  --   --        Recent Labs   Lab 24  0612 24  1444 24  1917 24  0551 24  1222   * 134*  --  109*  --    BUN 23   --  21  --    CREATSERUM 1.76* 1.75*  --  1.79*  --    CA 8.9 9.1  --  9.4  --    * 132*  --  134*  --    K 5.6* 6.3*  6.3* 5.2* 5.2* 4.8    103  --  105  --    CO2 22.0 22.0  --  25.0  --        Estimated Creatinine Clearance: 25.8 mL/min (A) (based on SCr of 1.79 mg/dL (H)).    No results for input(s): \"TROP\", \"TROPHS\", \"CK\" in the last 168 hours.    Recent Labs   Lab 24  0722   PTP 16.0*   INR 1.27*                  Microbiology    No results found for this visit on 24.      Imaging: Reviewed in Epic.    Medications:    sodium zirconium cyclosilicate  10 g Oral Q8H    gabapentin  300 mg Oral BID     metoprolol succinate ER  25 mg Oral Daily Beta Blocker    mirtazapine  7.5 mg Oral Nightly    pantoprazole  20 mg Oral QAM AC    acetaminophen  1,000 mg Oral Q8H ANABELL    atorvastatin  20 mg Oral Daily    insulin aspart  1-10 Units Subcutaneous TID AC and HS       Assessment & Plan:      #R sided staghorn calculus with ureteral stone  S/p nephrostomy and reilly placement 9/19 - nephrostomy tube removed 9/20  Management per urology    #Hyperkalemia  Improved today  S/p Insulin/dextrose/bicarb/calcium gluconate ordered  D/w urology, plan to DC urocrit and ARB  Antibiotics at DC changed from bactrim to cipro      #Ess HTN  Hold ARB as above     #Dyslipidemia  Statin     #DM2  SSI     #Afib  Rate controlled  Resumed AC when ok with Urology    #Disposition  -Stable for DC home today     Erorl Art DO    Supplementary Documentation:     Quality:  DVT Mechanical Prophylaxis:   SCDs, Early ambuation  DVT Pharmacologic Prophylaxis   Medication   None                Code Status: Full Code  Reilly: Reilly catheter in place  Reilly Duration (in days): 2  Central line:    LONG: 9/21/2024    Discharge is dependent on: clinical progress  At this point Ms. Henriquez is expected to be discharge to: home    The 21st Century Cures Act makes medical notes like these available to patients in the interest of transparency. Please be advised this is a medical document. Medical documents are intended to carry relevant information, facts as evident, and the clinical opinion of the practitioner. The medical note is intended as peer to peer communication and may appear blunt or direct. It is written in medical language and may contain abbreviations or verbiage that are unfamiliar.

## 2024-09-21 NOTE — PLAN OF CARE
Pt here from: Home with a caregiver.   Neuro: A&Ox4, VSS, RA, IS. Denies cough, chest pain, and SOB.   Telemetry: Normal sinus rhythm  GI: Abdomen soft, passing gas and belching. Denies nausea.   : Voids via reilly catheter.   Pain controlled with meds per MAR.   Bedbound at baseline.   Incisions: Mepilex on bottom is intact, other wound is ATIF with zinc.   Drains: Old nephro site covered with gauze and tape. CDI.   Diet: Tolerating regular diet.   IV saline locked.    All appropriate safety measures in place. All questions and concerns addressed. Bed locked and in lowest position. Call light in reach. Potassium down to 5.2. Provider paged, no new orders.

## 2024-09-21 NOTE — PROGRESS NOTES
Spoke with hospitalist.    Aware of K results.   MD states OK for discharge.  Spoke with pt, pt's  & pt's niece.  Aware of plans for discharge today.  Spoke with SW.   Transportation arranged for 5pm

## 2024-09-21 NOTE — CM/SW NOTE
09/21/24 1516   Discharge disposition   Expected discharge disposition Home-Health   Post Acute Care Provider [facility]   Discharge transportation [facility]     [facility] has no availability for today or tomorrow. [facility]KA confirmed w/ Sosa walton okay to use [facility] at discharge. [facility] arranged [facility] for [address]  to pt's home, [address] [address]. PCS form completed.     Niece aware of DC time and stated she'll update pt's spouse who will be present at the house waiting for pt. Savi from [facility] notified of DC today.     ALKA/MARCI to remain available for support and/or discharge planning.     Tatiana Louie, [facility] - [phone]

## 2024-09-22 NOTE — PROGRESS NOTES
Pt discharged home.  Discharge instructions given to pt, including:   Rx's for cipro, colace & norco sent to pt's [facility]  Stop eliquis, losartan & urocit k   Review of home meds  Diet & hydration  Activity  Wound care  Holm cath care  24 hour urine collection container  Xray abd in 4 months  Follow up care  Pt verbalized understanding of all instructions  Left unit stable via cart & 2 ambulance drivers

## 2024-09-24 ENCOUNTER — TELEPHONE (OUTPATIENT)
Dept: INTERNAL MEDICINE CLINIC | Facility: CLINIC | Age: 79
End: 2024-09-24

## 2024-09-24 NOTE — TELEPHONE ENCOUNTER
Incoming call from [facility] with Home Health   Start of care has been delayed due to patient not answering her phone or hanging up on nurse.   Niece is supposed to call RN back.     NAMITA  Ph- [phone]

## 2024-09-27 ENCOUNTER — TELEPHONE (OUTPATIENT)
Dept: INTERNAL MEDICINE CLINIC | Facility: CLINIC | Age: 79
End: 2024-09-27

## 2024-09-27 NOTE — TELEPHONE ENCOUNTER
Incoming call from [facility] with [facility]  Patient is declining PT services as she is bed bound and doesn't feel its necessary.   FYI   They are discontinuing home health orders     Ph- [phone]

## 2024-09-30 ENCOUNTER — TELEPHONE (OUTPATIENT)
Dept: SURGERY | Facility: CLINIC | Age: 79
End: 2024-09-30

## 2024-09-30 LAB
CAOX DIHYDRATE: 30 %
CAOX MONOHYDRATE: 60 %
HYDROXYAPATITE: 10 %
WEIGHT-STONE: 969 MG

## 2024-09-30 NOTE — TELEPHONE ENCOUNTER
No future appointments.    Patient had PCNL completed 9/19. Should have 1 mo post op scheduled with PA team, thanks.

## 2024-10-07 ENCOUNTER — TELEPHONE (OUTPATIENT)
Dept: INTERNAL MEDICINE CLINIC | Facility: CLINIC | Age: 79
End: 2024-10-07

## 2024-10-09 ENCOUNTER — TELEMEDICINE (OUTPATIENT)
Dept: SURGERY | Facility: CLINIC | Age: 79
End: 2024-10-09
Payer: MEDICARE

## 2024-10-09 DIAGNOSIS — N20.0 NEPHROLITHIASIS: Primary | ICD-10-CM

## 2024-10-09 DIAGNOSIS — E87.5 HYPERKALEMIA: ICD-10-CM

## 2024-10-09 PROCEDURE — 99024 POSTOP FOLLOW-UP VISIT: CPT | Performed by: PHYSICIAN ASSISTANT

## 2024-10-09 NOTE — PROGRESS NOTES
Subjective:   Virtual Telephone Check-In    Payton Henriquez verbally consents to a Virtual Telephone Check-In service on 10/09/24.  Patient understands and accepts financial responsibility for any deductible, co-insurance and/or co-pays associated with this service.    Duration of the service: 10 minutes    Summary of topics discussed:  See below    Payton Henriquez is a 79 year old female with a PMH of HTN, HL, DM, GERD, afib (on eliquis), DVT, who presents today for post op PCNL check.    Patient underwent right PCNL 9/19/24 with Dr. Blood. Post operatively labs revealed hyperkalemia and she was advised to stop K citrate at discharge. She has not had any labs rechecked since discharge. Had been started on K citrate in June for hypocitruria on 24 hour urine collection.     She overall feels well, no complaints. Holm catheter was removed 1 week post operatively and her urine has cleared appropriately. Family who is with patient able to check NT site and notes a tiny amt of ecchymosis but no suture. She denies any UTI symptoms.      Followed by Dr. Blood for:  1. Recurrent UTIs  - no issues up until [id number], now with recurrent UTIs  - multiple UCx with ESBL S to cipro, juan, macrobid, bactrim  2. Recurrent large CaOx b/l kidney stones  - s/p staged #1/2 left URS 6/13/24, #2/2 6/27/24  - s/p right URS 8/23/22 - [place]), no other stones  3. Hypocitraturia and low UOP  - 15 urocit BID 7/1/24  - 24 h urine 6/15/24: very low UOP, citrate (1150, 298), low pH (5.94)     PCP - [facility]     Gross hematuria: none  Tobacco hx: none  Fam h/o  malignancy: none     Drinks ~ 30 oz water, 12 oz coffee, 12 juice, 12 soda with light yellow.      CT SP 3/7/24: large b/l renal stones. Has ~ 24 mm LLP conglomerate, 5 LUP, 6 distal left ureteral stone without hydro. 37 mm RUP conglomerate  History/Other:    No Further Nursing Notes to Review         Current Outpatient Medications   Medication Sig Dispense Refill     HYDROcodone-acetaminophen (NORCO) 5-325 MG Oral Tab Take 1 tablet by mouth every 6 (six) hours as needed for Pain. 30 tablet 0    OMEPRAZOLE 20 MG Oral Capsule Delayed Release TAKE 1 CAPSULE EVERY MORNING 90 capsule 3    METOPROLOL SUCCINATE ER 25 MG Oral Tablet 24 Hr TAKE 1 TABLET DAILY 90 tablet 3    ketoconazole 2 % External Cream Apply to affected area daily for 2 weeks 60 g 1    mirtazapine 7.5 MG Oral Tab Take 1 tablet (7.5 mg total) by mouth nightly. 90 tablet 3    atorvastatin 20 MG Oral Tab Take 1 tablet (20 mg total) by mouth daily. 90 tablet 0    gabapentin 300 MG Oral Cap Take 1 capsule (300 mg total) by mouth in the morning and 1 capsule (300 mg total) before bedtime. 180 capsule 0    cyclobenzaprine 10 MG Oral Tab       ferrous sulfate 325 (65 FE) MG Oral Tab EC Take 1 tablet (325 mg total) by mouth daily with breakfast.      Zinc Gluconate 50 MG Oral Cap Take 50 mg by mouth daily.      polyethylene glycol, PEG 3350, 17 g Oral Powd Pack Take 17 g by mouth daily as needed.      acetaminophen 500 MG Oral Tab Take 2 tablets (1,000 mg total) by mouth every 6 (six) hours as needed for Pain.      ondansetron 4 MG Oral Tablet Dispersible Take 1 tablet (4 mg total) by mouth as needed for Nausea.      Multiple Vitamins-Minerals (PRESERVISION AREDS 2) Oral Cap Take 1 tablet by mouth 2 (two) times a day.      Acidophilus/Pectin (PROBIOTIC) Oral Cap Take 1 capsule by mouth daily.         Review of Systems:  Pertinent items are noted in HPI.      Objective:   There were no vitals taken for this visit. Estimated body mass index is 27.9 kg/m² as calculated from the following:    Height as of 9/19/24: 5' 9\" (1.753 m).    Weight as of 9/19/24: 188 lb 15 oz (85.7 kg).    GENERAL: in no acute distress  NEUROLOGIC: alert and oriented, normal speech  LUNGS: nonlabored breathing      Laboratory Data:  Lab Results   Component Value Date    WBC 14.4 (H) 09/21/2024    HGB 10.4 (L) 09/21/2024    .0 09/21/2024     Lab  Results   Component Value Date     (L) 09/21/2024    K 4.8 09/21/2024     09/21/2024    CO2 25.0 09/21/2024    BUN 21 09/21/2024     (H) 09/21/2024    GFRAA 23 (L) 07/30/2022    AST 19 09/04/2024    ALT 17 09/04/2024    TP 7.2 09/04/2024    ALB 4.1 09/04/2024    PHOS 3.3 12/31/2021    CA 9.4 09/21/2024    MG 1.7 09/16/2023       Urinalysis Results (last three years):  Recent Labs     12/30/21 2051 04/01/22  0022 06/03/22  0000 07/30/22  1323 08/15/22  1300 08/19/22  1540 09/12/22  1744 09/21/22  0220 11/15/22  1230 03/17/23  1546 07/21/23  1400 09/12/23  2132 10/05/23  1400 12/12/23  1315   COLORUR Straw Yellow  --  Yellow Red* Red* Nidia* Yellow Yellow Yellow Yellow Yellow Light-Yellow  --    CLARITY Clear Hazy*  --  Clear Cloudy* Hazy* Cloudy* Hazy* Cloudy* Clear Cloudy* Turbid* Turbid*  --    SPECGRAVITY 1.013 1.014  --  1.020 1.020 1.004 1.009 1.011 1.011 1.014 1.011 1.013 1.013 1.010   PHURINE 7.0 6.0  --  7.0 5.0 6.0 6.0 6.0 7.0 5.0 9.0* 6.5 7.0  --    PROUR 30* 100*  --  >=300* >=300* Negative 100* Negative 30* Negative 100* 70* Trace*  --    GLUUR Negative Negative  --  Negative Negative Negative Negative Negative Negative Negative Negative Normal Normal  --    KETUR Negative Trace*  --  Negative 15* Negative Negative Negative Negative Negative Negative Negative Negative  --    BILUR Negative Negative  --  Negative  --  Negative Negative Negative Negative Negative Negative Negative Negative  --    BLOODURINE Negative Large* 0.2 Large* Large* Moderate* Small* Negative Negative Moderate* Negative Trace* Negative  --    NITRITE Negative Negative  --  Positive* Negative Negative Negative Negative Negative Negative Negative Negative Negative Negative   UROBILINOGEN <2.0 <2.0  --  1.0* 1.0* <2.0 <2.0 <2.0 <2.0 <2.0 <2.0 Normal Normal  --    LEUUR Negative Small*  --  Large* Large* Moderate* Large* Moderate* Trace* Moderate* Moderate* 500* 500*  --    WBCUR 6-10* >50* 0-5 >50* >50* 11-20* >50*  21-50* 6-10* 11-20* None Seen >50* 21-50*  --    RBCUR 0-2 >10*  --  >10* >10* >10* >10* 6-10* 0-2 0-2 None Seen 3-5* 3-5*  --    BACUR None Seen None Seen many 3+* 1+* None Seen 3+* 1+* Rare* Rare* None Seen 3+* 3+*  --        Urine Culture Results (last three years):  Lab Results   Component Value Date    URINECUL >100,000 CFU/ML Escherichia coli  ESBL Pos (A) 04/09/2024    URINECUL >100,000 CFU/ML Escherichia coli  ESBL Pos (A) 10/27/2023    URINECUL  10/05/2023     10-50,000 cfu/ml Multiple species present-probable contamination.    URINECUL 50,000-99,000 CFU/ML Escherichia coli  ESBL Pos (A) 10/05/2023    URINECUL >100,000 CFU/ML Escherichia coli (A) 09/12/2023    URINECUL >100,000 CFU/ML Escherichia coli (A) 09/12/2023    URINECUL >100,000 CFU/ML Proteus mirabilis (A) 07/21/2023    URINECUL <10,000 CFU/ML Gram negative booker 07/21/2023    URINECUL 10,000 - 50,000 CFU/ML Candida tropicalis (A) 03/17/2023    URINECUL >100,000 CFU/ML Escherichia coli (A) 11/15/2022    URINECUL >100,000 CFU/ML Escherichia coli (A) 11/15/2022    URINECUL  09/21/2022     >100,000 cfu/ml Multiple species present- probable contamination.    URINECUL >100,000 CFU/ML Enterococcus species VRE (A) 09/12/2022    URINECUL <10,000 CFU/ML Mixed gram negative booker 09/12/2022    URINECUL No Growth at 18-24 hrs. 08/19/2022       Imaging  CT ABDOMEN+PELVIS KIDNEYSTONE 2D RNDR(NO IV,NO ORAL)(CPT=74176)    Result Date: 9/19/2024  PROCEDURE:  CT ABDOMEN+PELVIS KIDNEYSTONE 2D RNDR(NO IV,NO ORAL)(CPT=74176)  COMPARISON:  [facility] , XS, IR NEPHROSTOMY TUBE INSERTION EXCHANGE CHECK, 9/19/2024, 8:49 AM.  [place], CT, CT ABDOMEN+PELVIS KIDNEYSTONE 2D RNDR(NO IV,NO ORAL)(CPT=74176), 3/07/2024, 4:38 PM.  INDICATIONS:  Status post right percutaneous nephrolithotomy.  TECHNIQUE:  Unenhanced multislice CT scanning from above the kidneys to below the urinary bladder.  2D rendering are generated on the CT scanner workstation to localize potential stones in the  cranio-caudal plane.  Dose reduction techniques were used. Dose information is transmitted to the ACR (American College of Radiology) NRDR (National Radiology Data Registry) which includes the Dose Index Registry.  PATIENT STATED HISTORY: (As transcribed by Technologist)  s/p right PCNL.    FINDINGS:  KIDNEYS:  Normal anatomic positions.  Right nephroureteral catheter.  Contrast is seen in the right proximal collecting system.  Tiny, 0.2 cm, calculus in a posterior lower right calyx.  No definite calculi along the course of the nephroureteral catheter.   Left kidney with numerous nonobstructing calculi.  The largest measures up to 0.4 cm.  No hydronephrosis.  BLADDER:  Nondistended.  Holm catheter is present.  0.4 cm calculus seen posteriorly adjacent to the Holm balloon. ADRENALS:  Not enlarged. LIVER:  Uniform parenchyma. BILIARY:  Surgically absent gallbladder.  No biliary dilatation. PANCREAS:  Uniform parenchyma.  No ductal dilatation. SPLEEN:  Not enlarged. AORTA/VASCULAR:  No abdominal aortic aneurysm.  Moderate calcified atherosclerosis. RETROPERITONEUM:  There is mild stranding around both kidneys, right greater than left.  No focal fluid collection.  No hematoma. BOWEL/MESENTERY:  Normal bowel caliber.  Moderate to large amount of stool in the colon.  Moderate colonic diverticulosis.  No acute diverticulitis.  No free air or generalized ascites. ABDOMINAL WALL:  No hernia. BONES:  Scattered up to moderate to severe degenerative changes.  Left femur IM angel and compression screw is partially imaged. PELVIC ORGANS:  Surgically absent uterus. LUNG BASES:  Mild dependent atelectasis. OTHER:  None.             CONCLUSION:  1. Status post right nephro lithotomy.  A nephroureteral catheter is in place.  No hydronephrosis.  Contrast is seen in the collecting system.  There is a 0.2 cm nonobstructing calculus in a lower pole calyx. 2. Additional 0.4 cm calculus in the urinary bladder. 3. Numerous small  nonobstructing left renal calculi, measuring up to 0.4 cm. 4. Uncomplicated colonic diverticulosis. 5. Details as above.  Continued clinical correlation recommended.     LOCATION:  [facility]   Dictated by (CST): Hi Marshall MD on 9/19/2024 at 8:33 PM     Finalized by (CST): Hi Marshall MD on 9/19/2024 at 8:39 PM       XR OR - N/C    Result Date: 9/19/2024  PROCEDURE:  XR OR - N/C  COMPARISON:  [facility] , XR, XR OR - N/C, 6/27/2024, 1:33 PM.  INDICATIONS:  Percutaneous nephrolithotomy  TECHNIQUE:   FLUOROSCOPY IMAGES OBTAINED:  4 FLUOROSCOPY TIME:  1 minute 54 seconds TECHNOLOGIST TIME:  1 hour 30 minutes RADIATION DOSE (AIR KERMA PRODUCT):  37.108mGy              CONCLUSION:   Multiple fluoroscopic images document percutaneously nephrolithotomy procedure.  Please refer to dedicated procedure report for full detail.   LOCATION:  [facility]    Dictated by (CST): Davi Reese MD on 9/19/2024 at 12:56 PM     Finalized by (CST): Davi Reese MD on 9/19/2024 at 12:56 PM       IR NEPHROSTOMY TUBE    Result Date: 9/19/2024  PROCEDURE:  IR NEPHROSTOMY TUBE INSERTION EXCHANGE CHECK  COMPARISON:  None.  INDICATIONS:  N20.0 Staghorn calculus  DESCRIPTION OF PROCEDURE:  Written consent was obtained from the patient. The risks of the procedure including but not limited to bleeding, infection, and injury to the kidney were discussed with the patient who understood the risks and elected to proceed. The patient was draped and prepped in usual sterile fashion in the prone position on the fluoroscopy table. Maximum sterile barrier technique was used for the procedure. A  image was obtained demonstrating faintly visualized staghorn calculus within the right upper pole.  Under ultrasound guidance contrast micropuncture access was obtained within the upper pole. Injection of contrast under fluoroscopic guidance confirmed intraluminal positioning with filling of other calyces. A microwire was able to be passed into the collecting system. This  was used to secure position for an AccuStick device which was placed within the collecting system. A glide wire and Kumpe catheter were then navigated into the bladder.  Injection of contrast material under fluoroscopic guidance confirms intraluminal positioning within the urinary bladder.  Exchange was made for an Amplatz wire which was positioned within the bladder.  A  image was obtained.  The catheter and bladder were secured to the skin using a sterile dressing. The patient tolerated the procedure well and there were no immediate complications.  Fluoroscopic time: 6.4 minutes. A total of 9 fluoroscopic images were obtained.  The patient was assessed and IV was established or checked and maintained by a radiology nurse.  Moderate sedation was given.  1.5 mg Versed and 75 mcg of Fentanyl was administered for the procedure with the RN monitoring the oxygen, heart rate and blood  pressure under my direct face to face supervision for 16 minutes of conscious sedation.            CONCLUSION:  Technically successful right nephroureterostomy placement with a wire within the bladder, obtaining access for PCNL. Please refer to the operative report for further details.    LOCATION:  [facility]    Dictated by (CST): Wilbert Sutton MD on 9/19/2024 at 10:04 AM     Finalized by (CST): Wilbert Sutton MD on 9/19/2024 at 10:06 AM         Assessment & Plan:   Recurrent nephrolithiasis s/p R PCNL 9/19/24  Stone analysis calcium oxalate  Reviewed prior 24 hour urine collection  Currently off K Citrate due to hyperkalemia post op  Recommend we recheck BMP to assess K level, if normalized will plan to resume K citrate every day and repeat 24 hour urine collection and BMP in future  Anticipate KUB in 6 months and OV with Dr. Blood.     Order for BMP faxed to [facility] next week, #[phone]    Jayshree Patel PA-C, 10/9/2024

## 2024-10-21 ENCOUNTER — LAB REQUISITION (OUTPATIENT)
Dept: LAB | Age: 79
End: 2024-10-21
Payer: MEDICARE

## 2024-10-21 DIAGNOSIS — Z48.816 ENCOUNTER FOR SURGICAL AFTERCARE FOLLOWING SURGERY ON THE GENITOURINARY SYSTEM: ICD-10-CM

## 2024-10-21 LAB
ANION GAP SERPL CALC-SCNC: 5 MMOL/L (ref 0–18)
BUN BLD-MCNC: 21 MG/DL (ref 9–23)
CALCIUM BLD-MCNC: 8.9 MG/DL (ref 8.7–10.4)
CHLORIDE SERPL-SCNC: 106 MMOL/L (ref 98–112)
CO2 SERPL-SCNC: 26 MMOL/L (ref 21–32)
CREAT BLD-MCNC: 1.01 MG/DL
EGFRCR SERPLBLD CKD-EPI 2021: 56 ML/MIN/1.73M2 (ref 60–?)
GLUCOSE BLD-MCNC: 157 MG/DL (ref 70–99)
OSMOLALITY SERPL CALC.SUM OF ELEC: 290 MOSM/KG (ref 275–295)
POTASSIUM SERPL-SCNC: 3.8 MMOL/L (ref 3.5–5.1)
SODIUM SERPL-SCNC: 137 MMOL/L (ref 136–145)

## 2024-10-21 PROCEDURE — 80048 BASIC METABOLIC PNL TOTAL CA: CPT | Performed by: FAMILY MEDICINE

## 2024-11-04 ENCOUNTER — TELEPHONE (OUTPATIENT)
Dept: INTERNAL MEDICINE CLINIC | Facility: CLINIC | Age: 79
End: 2024-11-04

## 2024-11-12 RX ORDER — GABAPENTIN 300 MG/1
CAPSULE ORAL
Qty: 180 CAPSULE | Refills: 3 | Status: SHIPPED | OUTPATIENT
Start: 2024-11-12

## 2024-11-12 RX ORDER — ATORVASTATIN CALCIUM 20 MG/1
20 TABLET, FILM COATED ORAL DAILY
Qty: 90 TABLET | Refills: 2 | Status: SHIPPED | OUTPATIENT
Start: 2024-11-12

## 2024-11-12 NOTE — TELEPHONE ENCOUNTER
Atorvastatin 20 mg  Filled 8-23-24  Qty 90  0 refills  No future appointments.  LOV 9-4-24 TO  Labs 9-4-24 Lipid    Gabapentin 300 mg  Filled 8-23-24  Qty 180  0 refills  No future appointments.  LOV 9-4-24 TO

## 2024-11-18 ENCOUNTER — TELEPHONE (OUTPATIENT)
Dept: INTERNAL MEDICINE CLINIC | Facility: CLINIC | Age: 79
End: 2024-11-18

## 2024-11-18 NOTE — TELEPHONE ENCOUNTER
Incoming fax from [facility]      Order # [id number]    Placed in TO in basket for review

## 2024-11-25 NOTE — TELEPHONE ENCOUNTER
Jayshree  [facility]  [phone]    [facility] states they are re-certifying the pt for HH services for another 8 wks.    No call back needed.

## 2024-11-26 ENCOUNTER — TELEPHONE (OUTPATIENT)
Dept: INTERNAL MEDICINE CLINIC | Facility: CLINIC | Age: 79
End: 2024-11-26

## 2024-12-11 ENCOUNTER — TELEPHONE (OUTPATIENT)
Dept: INTERNAL MEDICINE CLINIC | Facility: CLINIC | Age: 79
End: 2024-12-11

## 2024-12-11 NOTE — TELEPHONE ENCOUNTER
Incoming fax from [facility]      Order # [id number]    Placed in TO in basket for review

## 2025-01-01 ENCOUNTER — APPOINTMENT (OUTPATIENT)
Dept: GENERAL RADIOLOGY | Facility: HOSPITAL | Age: 80
End: 2025-01-01
Attending: EMERGENCY MEDICINE
Payer: MEDICARE

## 2025-01-01 ENCOUNTER — APPOINTMENT (OUTPATIENT)
Dept: MRI IMAGING | Facility: HOSPITAL | Age: 80
End: 2025-01-01
Attending: EMERGENCY MEDICINE
Payer: MEDICARE

## 2025-01-01 ENCOUNTER — APPOINTMENT (OUTPATIENT)
Dept: GENERAL RADIOLOGY | Facility: HOSPITAL | Age: 80
End: 2025-01-01
Attending: NURSE PRACTITIONER
Payer: MEDICARE

## 2025-01-01 ENCOUNTER — APPOINTMENT (OUTPATIENT)
Dept: CT IMAGING | Facility: HOSPITAL | Age: 80
End: 2025-01-01
Attending: EMERGENCY MEDICINE
Payer: MEDICARE

## 2025-01-01 ENCOUNTER — APPOINTMENT (OUTPATIENT)
Dept: CV DIAGNOSTICS | Facility: HOSPITAL | Age: 80
End: 2025-01-01
Attending: INTERNAL MEDICINE
Payer: MEDICARE

## 2025-01-01 ENCOUNTER — APPOINTMENT (OUTPATIENT)
Dept: CT IMAGING | Facility: HOSPITAL | Age: 80
End: 2025-01-01
Attending: NURSE PRACTITIONER
Payer: MEDICARE

## 2025-01-01 ENCOUNTER — HOSPITAL ENCOUNTER (INPATIENT)
Facility: HOSPITAL | Age: 80
LOS: 1 days | End: 2025-01-01
Attending: EMERGENCY MEDICINE | Admitting: INTERNAL MEDICINE
Payer: MEDICARE

## 2025-01-01 VITALS
SYSTOLIC BLOOD PRESSURE: 80 MMHG | OXYGEN SATURATION: 91 % | DIASTOLIC BLOOD PRESSURE: 49 MMHG | TEMPERATURE: 97 F | BODY MASS INDEX: 29 KG/M2 | WEIGHT: 197.56 LBS

## 2025-01-01 DIAGNOSIS — I48.91 ATRIAL FIBRILLATION WITH RVR (HCC): ICD-10-CM

## 2025-01-01 DIAGNOSIS — J18.9 PNEUMONIA OF RIGHT LUNG DUE TO INFECTIOUS ORGANISM, UNSPECIFIED PART OF LUNG: ICD-10-CM

## 2025-01-01 DIAGNOSIS — L08.9 LEFT FOOT INFECTION: ICD-10-CM

## 2025-01-01 DIAGNOSIS — A41.9 SEPSIS WITH ACUTE ORGAN DYSFUNCTION AND SEPTIC SHOCK, DUE TO UNSPECIFIED ORGANISM, UNSPECIFIED ORGAN DYSFUNCTION TYPE (HCC): Primary | ICD-10-CM

## 2025-01-01 DIAGNOSIS — R65.21 SEPSIS WITH ACUTE ORGAN DYSFUNCTION AND SEPTIC SHOCK, DUE TO UNSPECIFIED ORGANISM, UNSPECIFIED ORGAN DYSFUNCTION TYPE (HCC): Primary | ICD-10-CM

## 2025-01-01 DIAGNOSIS — N39.0 URINARY TRACT INFECTION WITHOUT HEMATURIA, SITE UNSPECIFIED: ICD-10-CM

## 2025-01-01 LAB
ALBUMIN SERPL-MCNC: 3.7 G/DL (ref 3.2–4.8)
ALBUMIN/GLOB SERPL: 1.2 {RATIO} (ref 1–2)
ALP LIVER SERPL-CCNC: 360 U/L
ALT SERPL-CCNC: 18 U/L
ANION GAP SERPL CALC-SCNC: 13 MMOL/L (ref 0–18)
ANION GAP SERPL CALC-SCNC: 21 MMOL/L (ref 0–18)
ANION GAP SERPL CALC-SCNC: 25 MMOL/L (ref 0–18)
APTT PPP: 37 SECONDS (ref 23–36)
AST SERPL-CCNC: 19 U/L (ref ?–34)
ATRIAL RATE: 91 BPM
BASE EXCESS BLDA CALC-SCNC: -20.3 MMOL/L (ref ?–2)
BASOPHILS # BLD AUTO: 0.02 X10(3) UL (ref 0–0.2)
BASOPHILS # BLD: 0 X10(3) UL (ref 0–0.2)
BASOPHILS NFR BLD AUTO: 0.1 %
BASOPHILS NFR BLD: 0 %
BILIRUB SERPL-MCNC: 0.4 MG/DL (ref 0.2–1.1)
BILIRUB UR QL CFM: NEGATIVE
BODY TEMPERATURE: 98.6 F
BUN BLD-MCNC: 29 MG/DL (ref 9–23)
BUN BLD-MCNC: 31 MG/DL (ref 9–23)
BUN BLD-MCNC: 33 MG/DL (ref 9–23)
CA-I BLD-SCNC: 1.13 MMOL/L (ref 0.95–1.32)
CALCIUM BLD-MCNC: 8.1 MG/DL (ref 8.7–10.6)
CALCIUM BLD-MCNC: 8.6 MG/DL (ref 8.7–10.6)
CALCIUM BLD-MCNC: 8.7 MG/DL (ref 8.7–10.6)
CHLORIDE SERPL-SCNC: 100 MMOL/L (ref 98–112)
CHLORIDE SERPL-SCNC: 100 MMOL/L (ref 98–112)
CHLORIDE SERPL-SCNC: 98 MMOL/L (ref 98–112)
CK SERPL-CCNC: 24 U/L
CO2 SERPL-SCNC: 10 MMOL/L (ref 21–32)
CO2 SERPL-SCNC: 10 MMOL/L (ref 21–32)
CO2 SERPL-SCNC: 24 MMOL/L (ref 21–32)
COHGB MFR BLD: 1.5 % SAT (ref 0–3)
CREAT BLD-MCNC: 1.41 MG/DL
CREAT BLD-MCNC: 1.56 MG/DL
CREAT BLD-MCNC: 1.59 MG/DL
CRP SERPL-MCNC: 2.7 MG/DL (ref ?–0.5)
EGFRCR SERPLBLD CKD-EPI 2021: 32 ML/MIN/1.73M2 (ref 60–?)
EGFRCR SERPLBLD CKD-EPI 2021: 33 ML/MIN/1.73M2 (ref 60–?)
EGFRCR SERPLBLD CKD-EPI 2021: 37 ML/MIN/1.73M2 (ref 60–?)
EOSINOPHIL # BLD AUTO: 0 X10(3) UL (ref 0–0.7)
EOSINOPHIL # BLD: 0 X10(3) UL (ref 0–0.7)
EOSINOPHIL NFR BLD AUTO: 0 %
EOSINOPHIL NFR BLD: 0 %
ERYTHROCYTE [DISTWIDTH] IN BLOOD BY AUTOMATED COUNT: 20.4 %
ERYTHROCYTE [DISTWIDTH] IN BLOOD BY AUTOMATED COUNT: 21 %
ERYTHROCYTE [SEDIMENTATION RATE] IN BLOOD: 73 MM/HR
EST. AVERAGE GLUCOSE BLD GHB EST-MCNC: 131 MG/DL (ref 68–126)
GLOBULIN PLAS-MCNC: 3 G/DL (ref 2–3.5)
GLUCOSE BLD-MCNC: 199 MG/DL (ref 70–99)
GLUCOSE BLD-MCNC: 226 MG/DL (ref 70–99)
GLUCOSE BLD-MCNC: 247 MG/DL (ref 70–99)
GLUCOSE BLD-MCNC: 276 MG/DL (ref 70–99)
GLUCOSE BLD-MCNC: 323 MG/DL (ref 70–99)
GLUCOSE BLD-MCNC: 331 MG/DL (ref 70–99)
GLUCOSE BLD-MCNC: 339 MG/DL (ref 70–99)
GLUCOSE BLD-MCNC: 339 MG/DL (ref 70–99)
GLUCOSE BLD-MCNC: 359 MG/DL (ref 70–99)
GLUCOSE BLD-MCNC: 387 MG/DL (ref 70–99)
GLUCOSE BLD-MCNC: 431 MG/DL (ref 70–99)
GLUCOSE UR STRIP.AUTO-MCNC: NEGATIVE MG/DL
HBA1C MFR BLD: 6.2 % (ref ?–5.7)
HCO3 BLDA-SCNC: 9 MEQ/L (ref 21–27)
HCT VFR BLD AUTO: 41.3 %
HCT VFR BLD AUTO: 42.3 %
HGB BLD-MCNC: 13 G/DL
HGB BLD-MCNC: 13.4 G/DL
HGB BLD-MCNC: 13.7 G/DL
IMM GRANULOCYTES # BLD AUTO: 0.11 X10(3) UL (ref 0–1)
IMM GRANULOCYTES NFR BLD: 0.8 %
INR BLD: 2.47 (ref 0.8–1.2)
KETONES UR STRIP.AUTO-MCNC: 15 MG/DL
LACTATE BLD-SCNC: 11.7 MMOL/L (ref 0.5–2)
LACTATE SERPL-SCNC: 11 MMOL/L (ref 0.5–2)
LACTATE SERPL-SCNC: 13 MMOL/L (ref 0.5–2)
LACTATE SERPL-SCNC: 14.7 MMOL/L (ref 0.5–2)
LACTATE SERPL-SCNC: 3.4 MMOL/L (ref 0.5–2)
LACTATE SERPL-SCNC: 4.5 MMOL/L (ref 0.5–2)
LACTATE SERPL-SCNC: 4.7 MMOL/L (ref 0.5–2)
LYMPHOCYTES # BLD AUTO: 0.84 X10(3) UL (ref 1–4)
LYMPHOCYTES NFR BLD AUTO: 5.9 %
LYMPHOCYTES NFR BLD: 13 %
LYMPHOCYTES NFR BLD: 2.29 X10(3) UL (ref 1–4)
MAGNESIUM SERPL-MCNC: 2 MG/DL (ref 1.6–2.6)
MCH RBC QN AUTO: 27.2 PG (ref 26–34)
MCH RBC QN AUTO: 27.4 PG (ref 26–34)
MCHC RBC AUTO-ENTMCNC: 31.5 G/DL (ref 31–37)
MCHC RBC AUTO-ENTMCNC: 31.7 G/DL (ref 31–37)
MCV RBC AUTO: 86 FL
MCV RBC AUTO: 87.1 FL
METHGB MFR BLD: 0.6 % SAT (ref 0.4–1.5)
MONOCYTES # BLD AUTO: 0.37 X10(3) UL (ref 0.1–1)
MONOCYTES # BLD: 0.35 X10(3) UL (ref 0.1–1)
MONOCYTES NFR BLD AUTO: 2.6 %
MONOCYTES NFR BLD: 2 %
MORPHOLOGY: NORMAL
MRSA DNA SPEC QL NAA+PROBE: POSITIVE
NEUTROPHILS # BLD AUTO: 12.82 X10 (3) UL (ref 1.5–7.7)
NEUTROPHILS # BLD AUTO: 12.82 X10(3) UL (ref 1.5–7.7)
NEUTROPHILS # BLD AUTO: 14.69 X10 (3) UL (ref 1.5–7.7)
NEUTROPHILS NFR BLD AUTO: 90.6 %
NEUTROPHILS NFR BLD: 79 %
NEUTS BAND NFR BLD: 6 %
NEUTS HYPERSEG # BLD: 14.96 X10(3) UL (ref 1.5–7.7)
NITRITE UR QL STRIP.AUTO: NEGATIVE
NT-PROBNP SERPL-MCNC: ABNORMAL PG/ML (ref ?–450)
OSMOLALITY SERPL CALC.SUM OF ELEC: 292 MOSM/KG (ref 275–295)
OSMOLALITY SERPL CALC.SUM OF ELEC: 294 MOSM/KG (ref 275–295)
OSMOLALITY SERPL CALC.SUM OF ELEC: 301 MOSM/KG (ref 275–295)
OXYHGB MFR BLDA: 95.2 % (ref 92–100)
P AXIS: 97 DEGREES
P-R INTERVAL: 176 MS
P/F RATIO: 433 MMHG
PCO2 BLDA: 20 MM HG (ref 35–45)
PH BLDA: 7.14 [PH] (ref 7.35–7.45)
PH UR STRIP.AUTO: 5.5 [PH] (ref 5–8)
PLATELET # BLD AUTO: 344 10(3)UL (ref 150–450)
PLATELET # BLD AUTO: 453 10(3)UL (ref 150–450)
PLATELET MORPHOLOGY: NORMAL
PO2 BLDA: 91 MM HG (ref 80–100)
POTASSIUM BLD-SCNC: 7.2 MMOL/L (ref 3.6–5.1)
POTASSIUM SERPL-SCNC: 3.8 MMOL/L (ref 3.5–5.1)
POTASSIUM SERPL-SCNC: 5.1 MMOL/L (ref 3.5–5.1)
POTASSIUM SERPL-SCNC: 5.1 MMOL/L (ref 3.5–5.1)
POTASSIUM SERPL-SCNC: 6.7 MMOL/L (ref 3.5–5.1)
PROT SERPL-MCNC: 6.7 G/DL (ref 5.7–8.2)
PROT UR STRIP.AUTO-MCNC: >=300 MG/DL
PROTHROMBIN TIME: 26.9 SECONDS (ref 11.6–14.8)
Q-T INTERVAL: 304 MS
Q-T INTERVAL: 448 MS
QRS DURATION: 76 MS
QRS DURATION: 84 MS
QTC CALCULATION (BEZET): 499 MS
QTC CALCULATION (BEZET): 551 MS
R AXIS: -51 DEGREES
R AXIS: 225 DEGREES
RBC # BLD AUTO: 4.74 X10(6)UL
RBC # BLD AUTO: 4.92 X10(6)UL
RBC #/AREA URNS AUTO: >10 /HPF
SODIUM BLD-SCNC: 126 MMOL/L (ref 135–145)
SODIUM SERPL-SCNC: 131 MMOL/L (ref 136–145)
SODIUM SERPL-SCNC: 135 MMOL/L (ref 136–145)
SODIUM SERPL-SCNC: 135 MMOL/L (ref 136–145)
SP GR UR STRIP.AUTO: 1.02 (ref 1–1.03)
T AXIS: 179 DEGREES
T AXIS: 9 DEGREES
TOTAL CELLS COUNTED BLD: 100
UROBILINOGEN UR STRIP.AUTO-MCNC: 1 MG/DL
VENTRICULAR RATE: 162 BPM
VENTRICULAR RATE: 91 BPM
WBC # BLD AUTO: 14.2 X10(3) UL (ref 4–11)
WBC # BLD AUTO: 17.6 X10(3) UL (ref 4–11)

## 2025-01-01 PROCEDURE — 74176 CT ABD & PELVIS W/O CONTRAST: CPT | Performed by: NURSE PRACTITIONER

## 2025-01-01 PROCEDURE — 36620 INSERTION CATHETER ARTERY: CPT | Performed by: NURSE PRACTITIONER

## 2025-01-01 PROCEDURE — 71045 X-RAY EXAM CHEST 1 VIEW: CPT | Performed by: NURSE PRACTITIONER

## 2025-01-01 PROCEDURE — 73630 X-RAY EXAM OF FOOT: CPT | Performed by: EMERGENCY MEDICINE

## 2025-01-01 PROCEDURE — 99291 CRITICAL CARE FIRST HOUR: CPT | Performed by: NURSE PRACTITIONER

## 2025-01-01 PROCEDURE — 74176 CT ABD & PELVIS W/O CONTRAST: CPT | Performed by: EMERGENCY MEDICINE

## 2025-01-01 PROCEDURE — 99291 CRITICAL CARE FIRST HOUR: CPT | Performed by: INTERNAL MEDICINE

## 2025-01-01 PROCEDURE — 02HV33Z INSERTION OF INFUSION DEVICE INTO SUPERIOR VENA CAVA, PERCUTANEOUS APPROACH: ICD-10-PCS | Performed by: NURSE PRACTITIONER

## 2025-01-01 PROCEDURE — 99239 HOSP IP/OBS DSCHRG MGMT >30: CPT | Performed by: INTERNAL MEDICINE

## 2025-01-01 PROCEDURE — 71250 CT THORAX DX C-: CPT | Performed by: NURSE PRACTITIONER

## 2025-01-01 PROCEDURE — 99497 ADVNCD CARE PLAN 30 MIN: CPT | Performed by: INTERNAL MEDICINE

## 2025-01-01 PROCEDURE — 71045 X-RAY EXAM CHEST 1 VIEW: CPT | Performed by: EMERGENCY MEDICINE

## 2025-01-01 PROCEDURE — 99223 1ST HOSP IP/OBS HIGH 75: CPT | Performed by: INTERNAL MEDICINE

## 2025-01-01 RX ORDER — BISACODYL 10 MG
10 SUPPOSITORY, RECTAL RECTAL
Status: DISCONTINUED | OUTPATIENT
Start: 2025-01-01 | End: 2025-01-01

## 2025-01-01 RX ORDER — DEXTROSE MONOHYDRATE 25 G/50ML
INJECTION, SOLUTION INTRAVENOUS ONCE
Status: COMPLETED | OUTPATIENT
Start: 2025-01-01 | End: 2025-01-01

## 2025-01-01 RX ORDER — DILTIAZEM HYDROCHLORIDE 5 MG/ML
5 INJECTION INTRAVENOUS ONCE
Status: COMPLETED | OUTPATIENT
Start: 2025-01-01 | End: 2025-01-01

## 2025-01-01 RX ORDER — ATORVASTATIN CALCIUM 20 MG/1
20 TABLET, FILM COATED ORAL NIGHTLY
Status: DISCONTINUED | OUTPATIENT
Start: 2025-01-01 | End: 2025-01-01

## 2025-01-01 RX ORDER — INDOMETHACIN 25 MG/1
50 CAPSULE ORAL ONCE
Status: COMPLETED | OUTPATIENT
Start: 2025-01-01 | End: 2025-01-01

## 2025-01-01 RX ORDER — MORPHINE SULFATE 2 MG/ML
1 INJECTION, SOLUTION INTRAMUSCULAR; INTRAVENOUS
Status: DISCONTINUED | OUTPATIENT
Start: 2025-01-01 | End: 2025-01-01

## 2025-01-01 RX ORDER — FERROUS SULFATE 325(65) MG
325 TABLET, DELAYED RELEASE (ENTERIC COATED) ORAL
Status: DISCONTINUED | OUTPATIENT
Start: 2025-01-01 | End: 2025-01-01

## 2025-01-01 RX ORDER — NICOTINE POLACRILEX 4 MG
15 LOZENGE BUCCAL
Status: DISCONTINUED | OUTPATIENT
Start: 2025-01-01 | End: 2025-01-01

## 2025-01-01 RX ORDER — SODIUM PHOSPHATE, DIBASIC AND SODIUM PHOSPHATE, MONOBASIC 7; 19 G/230ML; G/230ML
1 ENEMA RECTAL ONCE AS NEEDED
Status: DISCONTINUED | OUTPATIENT
Start: 2025-01-01 | End: 2025-01-01

## 2025-01-01 RX ORDER — MAGNESIUM SULFATE HEPTAHYDRATE 40 MG/ML
2 INJECTION, SOLUTION INTRAVENOUS ONCE
Status: COMPLETED | OUTPATIENT
Start: 2025-01-01 | End: 2025-01-01

## 2025-01-01 RX ORDER — METOPROLOL TARTRATE 1 MG/ML
5 INJECTION, SOLUTION INTRAVENOUS ONCE
Status: DISCONTINUED | OUTPATIENT
Start: 2025-01-01 | End: 2025-01-01

## 2025-01-01 RX ORDER — SODIUM CHLORIDE 9 MG/ML
INJECTION, SOLUTION INTRAVENOUS CONTINUOUS
Status: DISCONTINUED | OUTPATIENT
Start: 2025-01-01 | End: 2025-01-01

## 2025-01-01 RX ORDER — DEXTROSE MONOHYDRATE 25 G/50ML
50 INJECTION, SOLUTION INTRAVENOUS
Status: DISCONTINUED | OUTPATIENT
Start: 2025-01-01 | End: 2025-01-01

## 2025-01-01 RX ORDER — PANTOPRAZOLE SODIUM 20 MG/1
20 TABLET, DELAYED RELEASE ORAL
Status: DISCONTINUED | OUTPATIENT
Start: 2025-01-01 | End: 2025-01-01

## 2025-01-01 RX ORDER — NICOTINE POLACRILEX 4 MG
30 LOZENGE BUCCAL
Status: DISCONTINUED | OUTPATIENT
Start: 2025-01-01 | End: 2025-01-01

## 2025-01-01 RX ORDER — POLYETHYLENE GLYCOL 3350 17 G/17G
17 POWDER, FOR SOLUTION ORAL DAILY PRN
Status: DISCONTINUED | OUTPATIENT
Start: 2025-01-01 | End: 2025-01-01

## 2025-01-01 RX ORDER — ONDANSETRON 2 MG/ML
4 INJECTION INTRAMUSCULAR; INTRAVENOUS EVERY 6 HOURS PRN
Status: DISCONTINUED | OUTPATIENT
Start: 2025-01-01 | End: 2025-01-01

## 2025-01-01 RX ORDER — PHENYLEPHRINE HCL IN 0.9% NACL 50MG/250ML
PLASTIC BAG, INJECTION (ML) INTRAVENOUS CONTINUOUS
Status: DISCONTINUED | OUTPATIENT
Start: 2025-01-01 | End: 2025-01-01

## 2025-01-01 RX ORDER — ACETAMINOPHEN 500 MG
500 TABLET ORAL EVERY 4 HOURS PRN
Status: DISCONTINUED | OUTPATIENT
Start: 2025-01-01 | End: 2025-01-01

## 2025-01-01 RX ORDER — MORPHINE SULFATE 2 MG/ML
2 INJECTION, SOLUTION INTRAMUSCULAR; INTRAVENOUS EVERY 2 HOUR PRN
Status: DISCONTINUED | OUTPATIENT
Start: 2025-01-01 | End: 2025-01-01

## 2025-01-01 RX ORDER — APIXABAN 5 MG/1
5 TABLET, FILM COATED ORAL 2 TIMES DAILY
Qty: 180 TABLET | Refills: 3 | Status: CANCELLED | OUTPATIENT
Start: 2025-01-01

## 2025-01-01 RX ORDER — SENNOSIDES 8.6 MG
17.2 TABLET ORAL NIGHTLY PRN
Status: DISCONTINUED | OUTPATIENT
Start: 2025-01-01 | End: 2025-01-01

## 2025-01-01 RX ORDER — POTASSIUM CHLORIDE 14.9 MG/ML
20 INJECTION INTRAVENOUS ONCE
Status: COMPLETED | OUTPATIENT
Start: 2025-01-01 | End: 2025-01-01

## 2025-01-01 RX ORDER — CALCIUM GLUCONATE 10 MG/ML
1 INJECTION, SOLUTION INTRAVENOUS ONCE
Status: COMPLETED | OUTPATIENT
Start: 2025-01-01 | End: 2025-01-01

## 2025-01-01 RX ORDER — DEXTROSE MONOHYDRATE 25 G/50ML
25 INJECTION, SOLUTION INTRAVENOUS
Status: ACTIVE | OUTPATIENT
Start: 2025-01-01 | End: 2025-01-01

## 2025-01-01 RX ORDER — ACETAMINOPHEN 500 MG
1000 TABLET ORAL ONCE
Status: COMPLETED | OUTPATIENT
Start: 2025-01-01 | End: 2025-01-01

## 2025-01-01 RX ORDER — ACETAMINOPHEN 500 MG
1000 TABLET ORAL ONCE
Status: DISCONTINUED | OUTPATIENT
Start: 2025-01-01 | End: 2025-01-01

## 2025-01-01 RX ORDER — VANCOMYCIN HYDROCHLORIDE
15 EVERY 24 HOURS
Status: DISCONTINUED | OUTPATIENT
Start: 2025-01-01 | End: 2025-01-01

## 2025-01-06 ENCOUNTER — TELEPHONE (OUTPATIENT)
Dept: INTERNAL MEDICINE CLINIC | Facility: CLINIC | Age: 80
End: 2025-01-06

## 2025-01-24 ENCOUNTER — TELEPHONE (OUTPATIENT)
Dept: INTERNAL MEDICINE CLINIC | Facility: CLINIC | Age: 80
End: 2025-01-24

## 2025-01-24 NOTE — TELEPHONE ENCOUNTER
Incoming fax from [facility]      Order # [id number]    Placed in TO in basket for review

## 2025-01-27 ENCOUNTER — TELEPHONE (OUTPATIENT)
Dept: INTERNAL MEDICINE CLINIC | Facility: CLINIC | Age: 80
End: 2025-01-27

## 2025-01-27 NOTE — TELEPHONE ENCOUNTER
Discharge form reviewed and signed by TO  Faxed back to residential home healthcare   Confirmation received /placed in accordion /sent to scan

## 2025-01-27 NOTE — TELEPHONE ENCOUNTER
Home health order #[id number] signed by TO  Faxed back to [facility] health /confirmation received /placed in accordion. Sent to scan

## 2025-02-10 NOTE — TELEPHONE ENCOUNTER
Requesting   Name from pharmacy: ELIQUIS TABS 5MG          Will file in chart as: ELIQUIS 5 MG Oral Tab    The original prescription was discontinued on 9/21/2024 by Siddhartha Blood MD for the following reason:  Stop Taking at Discharge. Renewing this prescription may not be appropriate.    Sig: TAKE 1 TABLET TWICE A DAY    Disp: 180 tablet    Refills: 3    Start: 2/8/2025    Class: Normal    Non-formulary    Last ordered: 1 year ago (1/7/2024) by Florencio Bowers MD    Last refill: 11/13/2024    Rx #: [id number]     LOV: 9/4/2024  RTC: n/a   Last Relevant Labs: n/a   Filled: 11/12/2024 #180 with 0 refills    No future appointments.

## 2025-02-20 NOTE — TELEPHONE ENCOUNTER
Sosa called to ask if Khushbu would be willing to provide refill since she will be out of medication on Wednesday. She is aware patient needs to schedule ofv as she is due. She will let patient know so arraignments can be made.    [facility]ush[facility] willing to provide 30 day supply.

## 2025-03-08 PROBLEM — I48.91 ATRIAL FIBRILLATION WITH RVR (HCC): Status: ACTIVE | Noted: 2025-01-01

## 2025-03-08 PROBLEM — R65.21 SEPSIS WITH ACUTE ORGAN DYSFUNCTION AND SEPTIC SHOCK, DUE TO UNSPECIFIED ORGANISM, UNSPECIFIED ORGAN DYSFUNCTION TYPE (HCC): Status: ACTIVE | Noted: 2025-01-01

## 2025-03-08 PROBLEM — A41.9 SEPSIS WITH ACUTE ORGAN DYSFUNCTION AND SEPTIC SHOCK, DUE TO UNSPECIFIED ORGANISM, UNSPECIFIED ORGAN DYSFUNCTION TYPE (HCC): Status: ACTIVE | Noted: 2025-01-01

## 2025-03-08 PROBLEM — R57.9 SHOCK (HCC): Status: ACTIVE | Noted: 2019-12-31

## 2025-03-08 NOTE — ED QUICK NOTES
Rounding Completed    Plan of Care reviewed. Waiting for lab work.  Elimination needs assessed.  Provided comfort care  Family at  and spoke with md .    Bed is locked and in lowest position. Call light within reach.

## 2025-03-08 NOTE — ED QUICK NOTES
Pt straight cath for urine.  Very diff to do pt was covered in stool.  Shannan care done prior to straight cath .  Pt has pain to john legs and knees which made it a challenge.  Mult people had to assist in process to keep area sterile.. pt cath and thick , gt rust  colored drainage returned.  Pure wick re applied

## 2025-03-08 NOTE — H&P
[facility]     Payton Henriquez Patient Status:  Emergency    1945 MRN [id number]   Location [facility] EMERGENCY DEPARTMENT Attending Maribell Rudd,    Hosp Day # 0 PCP Florencio Bowers MD     Chief Complaint: dry heaving, fatigue/generalized weakness    Subjective:    History of Present Illness:     Payton Henriquez is an 79 year old female with pmhx of paroxysmal afib, CAD/PAD, HTN/HLD, DM2 who presents with complaint of nausea/dry heaving and generalized weakness. Pt is a poor historian. Her niece at bedside reports pt is being cared for by 24h caregivers at home. She had a new caregiver today who noted that pt appeared to be more fatigued and weak. She also has been having some nausea and dry heaving this morning. The new caregiver noted wounds on pt's L foot as well, that the niece was not previously aware of. The patient denies any foot to her pain and has not noted any drainage. No reported fevers/chills, abdominal pain, diarrhea, chest pain/pressure, SOB/PHOENIX, URI/flu-like symptoms, recent sick contacts.     History/Other:    Past Medical History:  Past Medical History:    Arrhythmia    A FIB    Back problem    SPINAL STENOSIS    Bedridden    Due to a Fall in  broken left fankle w surgery, s/p infection bone left foot, rehab, antibx x 7 weeks, after Fractured her  right femur   between  -    Calculus of kidney    right and left    Deep vein thrombosis (HCC)    suspected left leg    Encounter for urinary catheter    uses external catheter system \" purewick\" / due to being bedridden    HIGH BLOOD PRESSURE    HIGH CHOLESTEROL    History of cardiac cath    post procedure data    Macular degeneration of right eye    Measles without mention of complication    Neuropathy    feet    Type II or unspecified type diabetes mellitus without mention of complication, not stated as uncontrolled    diet control    UTI (urinary tract infection)    X2 UTI'S brought to      Varicella without mention of complication    Visual impairment     Past Surgical History:   Past Surgical History:   Procedure Laterality Date    Cholecystectomy  2005    Colonoscopy  12/19/2013    Procedure: COLONOSCOPY;  Surgeon: Igor Simon MD;  Location:  ENDOSCOPY    Colonoscopy  12/19/13     Alfred  sandra 10    Colonoscopy & polypectomy  11/24/2003    +diminutive polyp; repeat 10 years Alfred    Femur fracture surgery Left 08/31/2022    FEMUR IM NAIL ANTEGRADE LEFT    Hysterectomy  1989    removal of left ovary    Needle biopsy right  11/2019    benign us guided x 2    Other surgical history  1972    salpingo-oophorectomy right side    Other surgical history  01/01/2020    Cysto, Left RPG,stent insertion      Family History:   Family History   Problem Relation Age of Onset    Other (Lung Cancer) Mother      Social History:    reports that she has never smoked. She has never used smokeless tobacco. She reports that she does not currently use alcohol. She reports that she does not use drugs.     Allergies: Allergies[1]    Medications:  Medications Ordered Prior to Encounter[2]    Review of Systems:   A comprehensive review of systems was completed.    Pertinent positives and negatives noted in the HPI.    Objective:   Physical Exam:    BP 91/73   Pulse (!) 153   Temp 97.5 °F (36.4 °C) (Temporal)   Resp (!) 0   SpO2 100%   General: No acute distress, Alert  Respiratory: No rhonchi, no wheezes  Cardiovascular: S1, S2. Regular rate and rhythm  Abdomen: Soft, Non-tender, non-distended, positive bowel sounds  Neuro: No new focal deficits  Extremities: No edema  L foot with extensive wounds over toes and plantar surface    Results:    Labs:      Labs Last 24 Hours:    Recent Labs   Lab 03/08/25  1158   RBC 4.92   HGB 13.4   HCT 42.3   MCV 86.0   MCH 27.2   MCHC 31.7   RDW 21.0   NEPRELIM 12.82*   WBC 14.2*   .0*       Recent Labs   Lab 03/08/25  1158   *   BUN 31*   CREATSERUM 1.41*   EGFRCR  37*   CA 8.7   ALB 3.7   *   CL 98   CO2 24.0   ALKPHO 360*   ALT 18   BILT 0.4   TP 6.7       Estimated Glomerular Filtration Rate: 37 mL/min/1.73m2 (A) (result from lab).    Lab Results   Component Value Date    INR 2.47 (H) 03/08/2025    INR 1.27 (H) 09/19/2024    INR 1.68 (H) 09/13/2023       No results for input(s): \"TROP\", \"TROPHS\", \"CK\" in the last 168 hours.    Recent Labs   Lab 03/08/25  1158   PBNP 22,811*       No results for input(s): \"PCT\" in the last 168 hours.    Imaging: Imaging data reviewed in Epic.    Assessment & Plan:      #Septic shock, suspect d/t L foot infection  -started on sepsis bolus in ED, but CXR concerning for acute pulmonary edema  -hold on further IVF for resuscitation  -empiric IV vanc/zosyn (previous ESBL cx's sensitive to zosyn)  -follow cultures  -start levophed and admit to ICU  -MRI left foot ordered  -CTAP ordered    -ICU consulted from ED    #Paroxysmal afib with RVR suspect d/t sepsis  -stop dilitazem drip  -start amiodarone  -echo ordered  -hold PTA metoprolol until BP's improved  -PTA Eliquis   -cardiology consulted from ED    #Acute pulmonary edema, suspect d/t afib  -CXR reviewed > possible R PNA  -empiric abx as above  -follow cultures  -hold on further IVF resuscitation  -currently on RA  -pulm/ICU consulted from ED     #Hx of DVT LLE  #Hypercoagulable state  -PTA Eliquis     #HLD  #PAD  #BL carotid artery stenosis   -atorvastatin    #HTN  -hold PTA metoprolol until BP's improve     #DM2 with hyperglycemia  -HgbA1c ordered  -hyperglycemia protocol  -ICF 1:20, carb ratio 1:10    #ACP/GOC  -16 minute face-face voluntary conversation with pt's niece/POA who indicates pt would not want chest compressions, intubation, or defibrillation/shock in the event her clinical condition should deteriorate. She would want aggressive medical treatment up to that point including pressor support, abx, etc. Pt code status changed from \"FULL\" to DNAR/select           Plan of care  discussed with pt, pt's niece/POKARISSA, STACEY Dempsey DO    Supplementary Documentation:     The 21st Century Cures Act makes medical notes like these available to patients in the interest of transparency. Please be advised this is a medical document. Medical documents are intended to carry relevant information, facts as evident, and the clinical opinion of the practitioner. The medical note is intended as peer to peer communication and may appear blunt or direct. It is written in medical language and may contain abbreviations or verbiage that are unfamiliar.                                       [1] No Known Allergies  [2]   No current facility-administered medications on file prior to encounter.     Current Outpatient Medications on File Prior to Encounter   Medication Sig Dispense Refill    apixaban (ELIQUIS) 5 MG Oral Tab Take 1 tablet (5 mg total) by mouth 2 (two) times daily. 60 tablet 0    GABAPENTIN 300 MG Oral Cap TAKE 1 CAPSULE IN THE MORNING AND 1 CAPSULE BEFORE BEDTIME 180 capsule 3    atorvastatin 20 MG Oral Tab TAKE 1 TABLET DAILY 90 tablet 2    HYDROcodone-acetaminophen (NORCO) 5-325 MG Oral Tab Take 1 tablet by mouth every 6 (six) hours as needed for Pain. 30 tablet 0    OMEPRAZOLE 20 MG Oral Capsule Delayed Release TAKE 1 CAPSULE EVERY MORNING 90 capsule 3    METOPROLOL SUCCINATE ER 25 MG Oral Tablet 24 Hr TAKE 1 TABLET DAILY 90 tablet 3    ketoconazole 2 % External Cream Apply to affected area daily for 2 weeks 60 g 1    mirtazapine 7.5 MG Oral Tab Take 1 tablet (7.5 mg total) by mouth nightly. 90 tablet 3    cyclobenzaprine 10 MG Oral Tab       ferrous sulfate 325 (65 FE) MG Oral Tab EC Take 1 tablet (325 mg total) by mouth daily with breakfast.      Zinc Gluconate 50 MG Oral Cap Take 50 mg by mouth daily.      polyethylene glycol, PEG 3350, 17 g Oral Powd Pack Take 17 g by mouth daily as needed.      acetaminophen 500 MG Oral Tab Take 2 tablets (1,000 mg total) by mouth every 6 (six)  hours as needed for Pain.      ondansetron 4 MG Oral Tablet Dispersible Take 1 tablet (4 mg total) by mouth as needed for Nausea.      Multiple Vitamins-Minerals (PRESERVISION AREDS 2) Oral Cap Take 1 tablet by mouth 2 (two) times a day.      Acidophilus/Pectin (PROBIOTIC) Oral Cap Take 1 capsule by mouth daily.

## 2025-03-08 NOTE — ED QUICK NOTES
Int  at bs will plan to stop cardizem  and start amiodarone and admit pt to hospital.  Md spoke with niece family has now gone home

## 2025-03-08 NOTE — ED QUICK NOTES
Rounding Completed    Plan of Care reviewed. Waiting for xray results  Md at bs with isabelle .  Elimination needs assessed.  Provided comfort care .    Bed is locked and in lowest position. Call light within reach.

## 2025-03-08 NOTE — ED QUICK NOTES
Monitor patient closely, md at bs considering levo if pressure does not change with amiodorone   Pt sleepy but able to awake and ans questions when prompted .   Will hold off on mri as pt is unstable with b/p.  Plan to admit to icu    and gcs 15

## 2025-03-08 NOTE — HISTORICAL OFFICE NOTE
[facility]  Outside Information  Continuity of Care Document  8/26/2024  Payton Henriquez - 79 y.o. Female; born [birth year]Summary of episode note, generated on Mar. 08, 2025March 08, 2025   CHIEF COMPLAINT    CHIEF COMPLAINT  Reason for Visit/Chief Complaint   Consultations  CRA Right Nephrolithotomy 09/19    78-year-old female with history of atrial fibrillation, bedbound presents to office for preoperative cardiovascular evaluation. The patient is having surgery near her right kidney. She states that she recently had the exact same surgery on her left kidney in June of this year. There were no complications. No chest pain. No shortness of breath. Her condition is not changed. She denies lower extremity swelling. She is bedbound due to complications from multiple procedures over the last few years. She has a history of a DVT of her lower extremities.     PROBLEMS  Reconcile with Patient's ChartPROBLEMS  Problem Effective Dates Date resolved Problem Status   History of DVT (deep vein thrombosis), [SNOMED-CT: 115560130] 8/27/2024 - Active   Cardiac Risk Assessment, pre-operative, [SNOMED-CT: 396265448] 8/27/2024 - Active   Stage 4 chronic kidney disease, [SNOMED-CT: 581685629] 9/2/2022 - Active   Hyperkalemia, [SNOMED-CT: 59491473] 9/2/2022 - Active   Atherosclerosis of arteries of extremities, [SNOMED-CT: 32846795] 9/2/2022 - Active   Class 2 severe obesity due to excess calories with serious comorbidity and body mass index (BMI) of 35.0 to 35.9 in adult, [SNOMED-CT: 44598790779669] 9/2/2022 - Active   Pure hypercholesterolemia, [SNOMED-CT: 711806263] 9/2/2022 - Active   Type 2 diabetes mellitus, without long-term current use of insulin, [SNOMED-CT: 43987351] 9/2/2022 - Active   Carotid artery disease, bilateral , [SNOMED-CT: 291897854] 9/2/2022 - Active   Unspecified atrial fibrillation, [SNOMED-CT: 69993950] 9/2/2022 - Active   Primary hypertension, [SNOMED-CT:  86344725] 9/2/2022 - Active   Holm catheter in place, [SNOMED-CT: 846196226] 8/4/2022 - Active   Hematuria, unspecified type, [SNOMED-CT: 64210677] 8/4/2022 - Active   Nephrolithiasis, [SNOMED-CT: 64294972] 8/4/2022 - Active   Retained ureteral stent, [SNOMED-CT: 409856454] 8/4/2022 - Active   Acute kidney injury, [SNOMED-CT: 17162935654672529] 7/29/2022 - Active   Acute renal failure (ARF), [SNOMED-CT: 07523206] 7/29/2022 - Active   Anemia, [SNOMED-CT: 082857988] 7/29/2022 8/27/2024 Resolved   Atrial fibrillation with rapid ventricular response, [SNOMED-CT: 168564665741925] 7/29/2022 - Active   VTE (venous thromboembolism), [SNOMED-CT: 013579410] 12/31/2021 - Active   Hypercalcemia, [SNOMED-CT: 89480655] 12/30/2021 - Active   Facet hypertrophy of lumbar region, [SNOMED-CT: 428174277] 11/18/2020 - Active   Hydronephrosis, [SNOMED-CT: 05692514] 1/1/2020 - Active     ENCOUNTER    ENCOUNTER  Problem Effective Dates Date resolved Problem Status   PAF (paroxysmal atrial fibrillation), [SNOMED-CT: 217893955] 8/27/2024 - Active   Cardiac Risk Assessment, pre-operative, [SNOMED-CT: 256845780] 8/27/2024 - Active     VITAL SIGNS    VITAL SIGNS  Date / Time: 8/27/2024   BP Systolic 110 mmHg   BP Diastolic 60 mmHg   Height 69 inches   Weight 189 lbs   BSA (Body Surface Area) 2.1 cc/m2   BMI (Body Mass Index) 27.9 cc/m2   Blood Pressure 110 / 60 mmHg     PHYSICAL EXAMINATION    PHYSICAL EXAMINATION  Header Details   Vitals Right Arm Sitting  / 60 mmHg, Height in 5' 9\", BMI: 27.9, Weight in 189.6 lbs (or) 86 kgs, BSA : 2.06 cc/m²   General Appearance No Acute Distress, Appropriate   Cardiovascular      ALLERGIES, ADVERSE REACTIONS, ALERTS    No data available    MEDICATIONS ADMINISTERED DURING VISIT    No data available    MEDICATIONS  Reconcile with Patient's ChartMEDICATIONS  Medication Start Date Route/Frequency Status   Acidophilus/Pectin (PROBIOTIC) Oral Cap, [RxNorm: 1753968] 8/23/2024 Take 1 capsule by mouth daily.  Active   apixaban 5 MG Oral Tab, [RxNorm: 8816886] 8/23/2024 Take 1 tablet (5 mg total) by mouth 2 (two) times daily. Active   atorvastatin 20 mg tablet, [RxNorm: 895452] 8/23/2024 Take 1 tablet orally once a day. Active   gabapentin 300 mg capsule, [RxNorm: 574798] 9/2/2022 Take 1 capsule orally 2 times a day. Active   HYDROcodone 5 mg-acetaminophen 325 mg tablet, [RxNorm: 999918] 9/2/2022 Take 1 tablet Q4H PRN Active   losartan 25 MG Oral Tab, [RxNorm: 372750] 8/23/2024 Take 1 tablet (25 mg total) by mouth daily. Active   mirtazapine 7.5 mg tablet, [RxNorm: 905452] 8/23/2024 Take 1 tablet orally once in the evening. Active   multivitamin tablet, [RxNorm: 0] 9/2/2022 Take 1 tablet orally once a day. Active   omeprazole 20 mg capsule,delayed release, [RxNorm: 585166] 8/23/2024 Take 1 capsule orally once in the morning. Active   potassium citrate ER 15 mEq (1,620 mg) tablet,extended release, [RxNorm: 946587] 8/23/2024 Take 1 tablet orally 2 times a day. Active   Tylenol 8 Hour 650 mg tablet,extended release, [RxNorm: 0399062] 9/2/2022 650 mg Q4H PRN Active     ASSESSMENT    Atrial fibrillation, paroxysmal  Preoperative cardiovascular risk evaluation.  History of DVT  Type 2 diabetesRecommendations  Patient moderate risk from a cardiac standpoint to proceed with surgery. She recently had similar procedure in June with no cardiac complications. Patient's condition has been unchanged since. She denies any palpitations. EKG today shows normal sinus rhythm with nonspecific T wave abnormalities. Will obtain an echocardiogram before her next visit in 6 months. Please contact our service during her hospital stay if any questions arise.     FAMILY HISTORY    FAMILY HISTORY  Relationship Age Diagnosis   Mother 0 Malignant neoplasm of unspecified part of unspecified bronchus or lung     GENERAL STATUS    No data available    PAST MEDICAL HISTORY    PAST MEDICAL HISTORY  Problem Date diagonsed Date resolved Status   History of  DVT (deep vein thrombosis), [SNOMED-CT: 888340992] 8/27/2024 - Active   Cardiac Risk Assessment, pre-operative, [SNOMED-CT: 004048786] 8/27/2024 - Active   Stage 4 chronic kidney disease, [SNOMED-CT: 999267213] 9/2/2022 - Active   Hyperkalemia, [SNOMED-CT: 78241146] 9/2/2022 - Active   Atherosclerosis of arteries of extremities, [SNOMED-CT: 63033620] 9/2/2022 - Active   Class 2 severe obesity due to excess calories with serious comorbidity and body mass index (BMI) of 35.0 to 35.9 in adult, [SNOMED-CT: 46758535136309] 9/2/2022 - Active   Pure hypercholesterolemia, [SNOMED-CT: 269637700] 9/2/2022 - Active   Type 2 diabetes mellitus, without long-term current use of insulin, [SNOMED-CT: 56690087] 9/2/2022 - Active   Carotid artery disease, bilateral , [SNOMED-CT: 622244882] 9/2/2022 - Active   Unspecified atrial fibrillation, [SNOMED-CT: 96394704] 9/2/2022 - Active   Primary hypertension, [SNOMED-CT: 71575177] 9/2/2022 - Active   Holm catheter in place, [SNOMED-CT: 843421938] 8/4/2022 - Active   Hematuria, unspecified type, [SNOMED-CT: 20684689] 8/4/2022 - Active   Nephrolithiasis, [SNOMED-CT: 87768305] 8/4/2022 - Active   Retained ureteral stent, [SNOMED-CT: 700953918] 8/4/2022 - Active   Acute kidney injury, [SNOMED-CT: 97943949797476988] 7/29/2022 - Active   Acute renal failure (ARF), [SNOMED-CT: 60606183] 7/29/2022 - Active   Anemia, [SNOMED-CT: 128338210] 7/29/2022 8/27/2024 Resolved   Atrial fibrillation with rapid ventricular response, [SNOMED-CT: 090762232605578] 7/29/2022 - Active   VTE (venous thromboembolism), [SNOMED-CT: 380415286] 12/31/2021 - Active   Hypercalcemia, [SNOMED-CT: 22300833] 12/30/2021 - Active   Facet hypertrophy of lumbar region, [SNOMED-CT: 976403231] 11/18/2020 - Active   Hydronephrosis, [SNOMED-CT: 93098859] 1/1/2020 - Active     HISTORY OF PRESENT ILLNESS    78-year-old female with history of atrial fibrillation, bedbound presents to office for preoperative cardiovascular evaluation.  The patient is having surgery near her right kidney. She states that she recently had the exact same surgery on her left kidney in June of this year. There were no complications. No chest pain. No shortness of breath. Her condition is not changed. She denies lower extremity swelling. She is bedbound due to complications from multiple procedures over the last few years. She has a history of a DVT of her lower extremities.         Document Information    Primary Care Provider Other Service Providers Document Coverage Dates   Osama UNK Qaqi

## 2025-03-08 NOTE — PROGRESS NOTES
[facility] Pharmacy Dosing Service      Initial Pharmacokinetic Consult for Vancomycin Dosing     Payton Henriquez is a 79 year old female who is being initiated on vancomycin therapy for cellulitis and sepsis.  Pharmacy has been asked to dose vancomycin by Dr. Dempsey.  The initial treatment and monitoring approach will be steady state AUC strategy.        Weight and Temperature:    Wt Readings from Last 1 Encounters:   25 87.9 kg (193 lb 12.6 oz)        Temp Readings from Last 1 Encounters:   25 98.8 °F (37.1 °C) (Temporal)      Labs:   Recent Labs   Lab 25  1158   CREATSERUM 1.41*      Estimated Creatinine Clearance: 32.7 mL/min (A) (based on SCr of 1.41 mg/dL (H)).     Recent Labs   Lab 25  1158   WBC 14.2*          The Pharmacokinetic Target is:     to 600 mg-h/L and trough <=15 mg/L    Renal Dosing Considerations:    None     Assessment/Plan:   Initial/Loading dose: Will receive 1250 mg IP (15 mg/kg, capped at 2250 mg) x 1 loading dose      Maintenance dose: Pharmacy will dose vancomycin at 1250 mg IV every 24 hours    Monitorin) Plan for vancomycin peak and trough to be obtained at steady state.    2) Pharmacy will order SCr as clinically indicated to assess renal function.    3) Pharmacy will monitor for toxicity and efficacy, adjust vancomycin dose and/or frequency, and order vancomycin levels as appropriate per the Pharmacy and Therapeutics Committee approved protocol until discontinuation of the medication.       We appreciate the opportunity to assist in the care of this patient.     Stefania Mac, PharmD  3/8/2025  4:53 PM  [facility]  Pharmacy Extension: [phone]

## 2025-03-08 NOTE — CONSULTS
CRITICAL CARE CONSULT NOTE:     Patient Name: Payton Henriquez  : 1945  MRN: [id number]  Admit Date: 3/8/2025  Length of Stay: 0 Days    HPI:  Payton Henriquez is a 79 year old female with a past medical history of HTN, HLD, AF on eliquis, kidney stone/staghorn calculus s/p nephrostomy, DM2, diabetic foot wounds, bedridden at baseline and lives at home with / caregiver. Presents to the ER today due to caregiver concern of L foot wound, nausea, emesis, and lethargy.     In the ER, she was found to be in AF with RVR HR in 160s and blood pressure in 90s. Labs significant for leukocytosis, lactic acidosis 4.5, BRITTANY, and abnormal UA concerning for UTI, and significantly elevated proBNP. CXR with edema and possible R lung infiltrate and therefore sepsis fluid bolus not given. XR of the left foot with nothing acute. Prior UTI with ESBL+ e coli with resistance pattern. She was treated with Zosyn. Started on cardizem for AF. Given IV magnesium.     Patient seen and evaluated in ER room C3. Her niece/POA Sosa is at the bedside who helps to provide HPI above. Confirmed health care wishes - no CPR or intubation, cardioversion, vasopressors ok. Plan is to transfer to ICU for further care.     ROS as above    Past medical history as above    Allergies  Patient has No Known Allergies.    Physical Exam  General: No acute distress, pale  Neuro: AOx3, non focal   Lungs: Clear  Cardio:  AF, rate 160s  Abdomen: Soft, non tended, non distended, obese  Extremities: Warm, mild generalized edema, L foot wound with dry skin - no drainage or erythema     Hemodynamics  24h Vitals:  VitalLast Value (24 Hour)24 Hour Range  Temp97.5 °F (36.4 °C)Temp  Min: 97.5 °F (36.4 °C)  Max: 97.5 °F (36.4 °C)  HR(!) 153Pulse  Min: 133  Max: 161  BP (Non-Invasive)91/73 BP  Min: 87/73  Max: 112/86  BP (A-Line) No data recorded  CVP  could not be evaluated. This SmartLink does not work with rows of the type:   RR(!) 0Resp  Min: 0  Max:  23  ElP5064 %SpO2  Min: 97 %  Max: 100 %  O2 Therapy       Assessment and Plan:  Severe sepsis with shock, likely urinary source  Hypotension  Lactic acidosis  AF with RVR  BRITTANY  Elevated proBNP  Leukocytosis   Hyperglycemia  H/o HTN  H/o HLD  H/o AF on Eliquis  H/o DM2  H/o UTI/kidney stones/nephrostomy tube    Neuro/Psych: No acute issues, no encephalopathy. PRN Tylenol for pain/fevers.     Cardiovascular: Hemodynamically labile on 15mg cardizem with SBP 80-90s. Despite remains in AF RVR. Would trial amiodarone bolus and infusion given hypotension/shock as she is anticoagulated on Eliquis. NE for MAP >65. ProBNP significantly elevated. Will perform POCUS upon arrival to ICU. Formal echo. Cardiology consulted in the ER.    Pulmonary: CXR with some interstitial edema and RUL opacity?- doubt pneumonia process given she is on room air, no cough, no sputum production-regardless will cover with broad spectrum abx.     GI: NPO for now while awaiting CT a/p. Resume home PPI. No further nausea/vomiting.     Renal/Metabolic: BRITTANY likely in the setting of sepsis however given hx of kidney stones will obtain CT a/p to ensure no obstructive uropathy. Was not given sepsis fluids in ER given CXR/proBNP, likely can tolerate IVF bolus if needed.  Trend lactic.     Heme: No acute issues, likely can resume Eliquis pending CT findings/ no procedures.     Infectious Disease: UA abnormal. Blood and urine cultures pending. Hx of ESBL+ ecoli uti with resistance pattern. Continue meropenem, consider ID consult.     Endocrine: Correction insulin     ICU checklist  Feeding: NPO  Analgesia: Tylenol  Sedation: NA  Thromboembolism PPX: Eliquis, SCDs  Stress Ulcer PPX: Home PPI  Glucose control: <180  Bowel Regimen: PRN  Lines/drains/tubes: PIV  Deescalation of antibiotics: NA  Therapies: PT/OT/ST when appropriate    Code Status: DNR/DNI - ok for pressors     Discussed with collaborating intensivist Dr. Randolph Palencia    Upon my evaluation, this  patient had a high probability of imminent or life-threatening deterioration due to critical findings of laboratory tests, hypotension, shock, sepsis, and arrhythmia , which required my direct attention, intervention, and personal management.    I have personally provided 45 minutes of critical care time, exclusive of time spent on separately billable procedures.  Time includes review of all pertinent laboratory/radiology results, discussion with consultants, and monitoring for potential decompensation.  Performed interventions included fluids, pressor drugs, and anti-arrhythmics.     Madeleine Muro [facility]  Critical Care Nurse Practitioner

## 2025-03-08 NOTE — ED PROVIDER NOTES
Patient Seen in: [facility] Emergency Department      History     Chief Complaint   Patient presents with    Other     Stated Complaint: wounds to left foot r/o sepsis    Subjective:   HPI  Patient is a 79-year-old female with a hx of HTN, HLD, DM, a fib on eliquis, UTIS who presents to the ED via EMS from home for evaluation of possible left foot infection.  Patient is alert and oriented x3 but history is limited.  Patient has no complaints at this time.  She is not sure why she was sent to the hospital.  Patient states that she did have some mild dry heaving this morning but does not consider this vomiting. Discussed with patient's niece who is at bedside.  Niece reports that she was told by a 24 hour caregiver that the patient has had decreased p.o. and some dry heaving this morning.  In addition caregiver mentioned left foot wound.  Niece was unaware that patient had a left foot wound.  Patient is bedbound at baseline which she reports is following a L ankle fracture which required surgery and RLE fracture.  Patient denies any fevers, chest pain, shortness of breath, palpitations, foot pain, abdominal pain.    Objective:     Past Medical History:    Arrhythmia    A FIB    Back problem    SPINAL STENOSIS    Bedridden    Due to a Fall in 2020 broken left fankle w surgery, s/p infection bone left foot, rehab, antibx x 7 weeks, after Fractured her  right femur   between 2020 -2024    Calculus of kidney    right and left    Deep vein thrombosis (HCC)    suspected left leg    Encounter for urinary catheter    uses external catheter system \" purewick\" / due to being bedridden    HIGH BLOOD PRESSURE    HIGH CHOLESTEROL    History of cardiac cath    post procedure data    Macular degeneration of right eye    Measles without mention of complication    Neuropathy    feet    Type II or unspecified type diabetes mellitus without mention of complication, not stated as uncontrolled    diet control    UTI (urinary tract  infection)    X2 UTI'S brought to Dr Short without mention of complication    Visual impairment              Past Surgical History:   Procedure Laterality Date    Cholecystectomy  2005    Colonoscopy  12/19/2013    Procedure: COLONOSCOPY;  Surgeon: Igor Simon MD;  Location:  ENDOSCOPY    Colonoscopy  12/19/13     Alfred  sandra 10    Colonoscopy & polypectomy  11/24/2003    +diminutive polyp; repeat 10 years Alfred    Femur fracture surgery Left 08/31/2022    FEMUR IM NAIL ANTEGRADE LEFT    Hysterectomy  1989    removal of left ovary    Needle biopsy right  11/2019    benign us guided x 2    Other surgical history  1972    salpingo-oophorectomy right side    Other surgical history  01/01/2020    Cysto, Left RPG,stent insertion                Social History     Socioeconomic History    Marital status:    Tobacco Use    Smoking status: Never    Smokeless tobacco: Never   Vaping Use    Vaping status: Never Used   Substance and Sexual Activity    Alcohol use: Not Currently    Drug use: Never   Other Topics Concern    Caffeine Concern No    Exercise No    Seat Belt Yes     Social Drivers of Health     Food Insecurity: No Food Insecurity (3/8/2025)    NCSS - Food Insecurity     Worried About Running Out of Food in the Last Year: No     Ran Out of Food in the Last Year: No   Transportation Needs: No Transportation Needs (3/8/2025)    NCSS - Transportation     Lack of Transportation: No   Housing Stability: At Risk (3/8/2025)    NCSS - Housing/Utilities     Has Housing: No     Worried About Losing Housing: No     Unable to Get Utilities: No                  Physical Exam     ED Triage Vitals   BP 03/08/25 1117 99/84   Pulse 03/08/25 1117 (!) 159   Resp 03/08/25 1117 23   Temp 03/08/25 1118 97.5 °F (36.4 °C)   Temp src 03/08/25 1118 Temporal   SpO2 03/08/25 1117 100 %   O2 Device 03/08/25 1117 None (Room air)       Current Vitals:   Vital Signs  BP: (!) 82/29  Pulse: (!) 144  Resp: 15  Temp: 98.8 °F (37.1  °C)  Temp src: Temporal  MAP (mmHg): (!) 37    Oxygen Therapy  SpO2: (!) 79 %  O2 Device: None (Room air)  Pulse Oximetry Type: Continuous  Oximetry Probe Site Changed: No  Pulse Ox Probe Location: Right hand        Physical Exam  Vitals and nursing note reviewed.   Constitutional:       General: She is not in acute distress.     Comments: Drowsy but opens eyes to voice and answers questions appropriately   HENT:      Head: Normocephalic and atraumatic.      Mouth/Throat:      Mouth: Mucous membranes are dry.   Eyes:      Extraocular Movements: Extraocular movements intact.      Pupils: Pupils are equal, round, and reactive to light.   Cardiovascular:      Rate and Rhythm: Tachycardia present. Rhythm irregular.   Pulmonary:      Effort: Pulmonary effort is normal. No respiratory distress.   Abdominal:      General: There is no distension.      Palpations: Abdomen is soft.      Tenderness: There is no abdominal tenderness.   Musculoskeletal:      Cervical back: Neck supple.      Right lower leg: No edema.      Left lower leg: No edema.      Comments: No LE TTP   Skin:     General: Skin is warm and dry.      Capillary Refill: Capillary refill takes less than 2 seconds.      Coloration: Skin is pale.      Comments: Crusted over area of distal L foot with some purulent drainage between 4th and 5 th toe. No erythema.    Neurological:      General: No focal deficit present.      Mental Status: She is alert.   Psychiatric:         Mood and Affect: Mood normal.                       ED Course     Labs Reviewed   URINALYSIS WITH CULTURE REFLEX - Abnormal; Notable for the following components:       Result Value    Clarity Urine Hazy (*)     Ketones Urine 15 (*)     Blood Urine Large (*)     Protein Urine >=300 (*)     Urobilinogen Urine 1.0 (*)     Leukocyte Esterase Urine Small (*)     All other components within normal limits   CBC WITH DIFFERENTIAL WITH PLATELET - Abnormal; Notable for the following components:    WBC  14.2 (*)     .0 (*)     Neutrophil Absolute Prelim 12.82 (*)     Neutrophil Absolute 12.82 (*)     Lymphocyte Absolute 0.84 (*)     All other components within normal limits   COMP METABOLIC PANEL (14) - Abnormal; Notable for the following components:    Glucose 199 (*)     Sodium 135 (*)     BUN 31 (*)     Creatinine 1.41 (*)     eGFR-Cr 37 (*)     Alkaline Phosphatase 360 (*)     All other components within normal limits   LACTIC ACID, PLASMA - Abnormal; Notable for the following components:    Lactic Acid 4.5 (*)     All other components within normal limits   SED RATE, WESTERGREN (AUTOMATED) - Abnormal; Notable for the following components:    Sed Rate 73 (*)     All other components within normal limits   C-REACTIVE PROTEIN - Abnormal; Notable for the following components:    C-Reactive Protein 2.70 (*)     All other components within normal limits   PRO BETA NATRIURETIC PEPTIDE - Abnormal; Notable for the following components:    Pro-Beta Natriuretic Peptide 22,811 (*)     All other components within normal limits   PTT, ACTIVATED - Abnormal; Notable for the following components:    PTT 37.0 (*)     All other components within normal limits   PROTHROMBIN TIME (PT) - Abnormal; Notable for the following components:    PT 26.9 (*)     INR 2.47 (*)     All other components within normal limits   LACTIC ACID REFLEX POST POSTIVE - Abnormal; Notable for the following components:    Lactic Acid 3.4 (*)     All other components within normal limits   UA MICROSCOPIC ONLY, URINE - Abnormal; Notable for the following components:    WBC Urine 11-20 (*)     RBC Urine >10 (*)     Bacteria Urine 2+ (*)     Squamous Epi. Cells Few (*)     All other components within normal limits   LACTIC ACID REFLEX POST POSITIVE [id number] - Abnormal; Notable for the following components:    Lactic Acid 4.7 (*)     All other components within normal limits   HEMOGLOBIN A1C - Abnormal; Notable for the following components:    HgbA1C 6.2  (*)     Estimated Average Glucose 131 (*)     All other components within normal limits   POCT GLUCOSE - Abnormal; Notable for the following components:    POC Glucose 247 (*)     All other components within normal limits   ED/MRSA SCREEN BY PCR-CC - Abnormal; Notable for the following components:    MRSA Screen By PCR Positive (*)     All other components within normal limits    Narrative:     A positive result does not necessarily indicate the presence of viable organisms. For confirmation, epidemiological typing and susceptibility testing, appropriate culture is needed.    PLEASE REFER TO MRSA SCREENING PROTOCOL FOR TREATMENT.     REDRAW POTASSIUM (P) - Normal   REDRAW AST (SGOT) (P) - Normal   ICTOTEST - Normal   MAGNESIUM - Normal   RAINBOW DRAW LAVENDER   RAINBOW DRAW LIGHT GREEN   RAINBOW DRAW BLUE   RAINBOW DRAW GOLD   BLOOD CULTURE   BLOOD CULTURE   URINE CULTURE, ROUTINE     EKG    Rate, intervals and axes as noted on EKG Report.  Rate: 162  Rhythm: Atrial Fibrillation  Reading: A-fib with ventricular rate of 162, no acute ST elevations, left axis deviation.                  MDM      Patient is a 79-year-old female who presents the ED via EMS from home for evaluation of left foot infection.  Patient arrives to the ED afebrile, tachycardic in A-fib with RVR.  Blood pressure is soft in the 90s.  Patient appears dehydrated on exam so she was initially given IV fluids with EMS.  Patient initially was on room air but then became mildly hypoxic, 91 to 92% so placed on 2 L nasal cannula with improvement.  Differential diagnosis includes but not limited to sepsis secondary to UTI, left foot infection, pneumonia in addition to arrhythmia, electrolyte and renal, dehydration. Plan for labs, EKG, chest x-ray, left foot x-ray.  Will start dilt drip and consider bolus if blood pressure improves with IVF.     ED Course as of 03/08/25 1925  ------------------------------------------------------------  Time: 03/08  1326  Comment: Patient still tachycardic to 160s after receiving 5 mg of dilt bolus (small dose due to hypotension) and while on gtt.  Blood pressure improved with IVF.  Chest x-ray independent reviewed which shows evidence of possible fluid overload vs. R sided pneumonia and BNP is 22,800 111 so we will hold off on further IV fluids at this time.    Discussed with NICO NP. Asked about amio but [facility] recommended increasing dilt rate and HR should improve with treating underlying sepsis.       Labs are significant for leukocytosis of 14.2, platelets 453, likely reactive.  CMP shows creatinine 1.41, BUN 31.  UA concerning for UTI.  Lactate 4.5.  Discussed with Carlos hospitalist who evaluated patient. Discussed with pharmacy and hospitalist. Pt was emiprically treated with IV zosyn initially due to prior cultures showing sensitivity to this. Discussed with intensivist who will evaluate patient in the ED.  ------------------------------------------------------------  Time: 03/08 1401  Comment: Intensivist evaluated at bedside.  Given patient is not responding to diltiazem recommends starting Amio with amio bolus and drip.  He also recommends meropenem instead of Zosyn so we will consult ID.  CT abd/pelvis obtained to rule out stone. CXR shows possible pneumonia.   ------------------------------------------------------------  Time: 03/08 1545  Comment: Pt was started on amio in the ED. She remained tachycardic with some improvement however she was mildly hypotensive so started on low dose levo. Pt admitted to ICU with CT abd/pelvis pending. ID placed on consult, discussed via [employer].       Patient did not receive 30ml/kg of fluids despite having: Septic Shock. The reason for doing so is: patient has heart failure. 800mL of fluids were given instead (800 mL is the amount of fluids given).    Critical Care Time:   A total of 60 minutes of critical care time (exclusive of billable procedures) was administered to manage  the patient's unstable vital signs due to her septic shock, a fib with RVR.  This involved direct patient intervention, complex decision making, and/or extensive discussions with the patient, family, and clinical staff.    Medical Decision Making      Disposition and Plan     Clinical Impression:  1. Sepsis with acute organ dysfunction and septic shock, due to unspecified organism, unspecified organ dysfunction type (HCC)    2. Pneumonia of right lung due to infectious organism, unspecified part of lung    3. Atrial fibrillation with RVR (HCC)    4. Urinary tract infection without hematuria, site unspecified    5. Left foot infection         Disposition:  Admit  3/8/2025  2:25 pm    Follow-up:  No follow-up provider specified.        Medications Prescribed:  Current Discharge Medication List              Supplementary Documentation:         Hospital Problems       Present on Admission  Date Reviewed: 10/9/2024            ICD-10-CM Noted POA    * (Principal) Sepsis with acute organ dysfunction and septic shock, due to unspecified organism, unspecified organ dysfunction type (HCC) A41.9, R65.21 3/8/2025 Unknown    Atrial fibrillation with RVR (HCC) I48.91 3/8/2025 Unknown    Septic shock (HCC) A41.9, R65.21 12/31/2019 Yes                             Sepsis dx documented at 3/8/2025  2:25 PM

## 2025-03-08 NOTE — ED QUICK NOTES
Rounding Completed    Plan of Care reviewed. Waiting for  ct scan and icu bed.  Elimination needs assessed.  Provided comfort care  .    Bed is locked and in lowest position. Call light within reach.

## 2025-03-09 NOTE — PROCEDURES
[facility]    Payton Henirquez Patient Status:  Inpatient    1945 MRN [id number]   Location [facility] 4SW-A Attending Helga Dempsey,    Hosp Day # 1 PCP Florencio Bowers MD       CVC Procedure Note    Procedure        : Right IJ triple lumen catheter with ultrasound guidance  Indication          : shock  Consent           : medical necessity  Timeout           : performed at bedside  (s)     :  VERNON Mei  Complications : none  EBL                 : none  Meds:                : 3mL 1% lidocaine      Consent was unable to be obtained due to emergent nature of procedure.    Timeout performed at bedside. Patient prepped and draped in sterile fashion, and pre-medicated as above. The Right IJ was visualized by ultrasound, and distinguished from the adjacent artery as being easily compressible. The Right IJ was seen to be entered by the 18g introducer needle under direct ultrasound guidance with one skin break(s). Venous location was verified by return of dark, non-pulsatile blood return. A 7fr triple lumen catheter was advanced by modified seldinger technique. All ports flush and draw easily.    No obvious complications noted.  Chest x-ray is pending.    VERNON Mei

## 2025-03-09 NOTE — PROGRESS NOTES
[facility]   part of [facility]     Hospitalist Progress Note     Payton Henriquez Patient Status:  Inpatient    1945 MRN [id number]   Location [facility] 4SW-A Attending Helga Dempsey, DO   Hosp Day # 1 PCP Florencio Bowers MD     Chief Complaint: ***    Subjective:     Patient ***    Objective:    Review of Systems:   A comprehensive review of systems was completed; pertinent positive and negatives stated in subjective.    Vital signs:  Temp:  [96.5 °F (35.8 °C)-98.8 °F (37.1 °C)] 96.5 °F (35.8 °C)  Pulse:  [] 73  Resp:  [0-37] 19  BP: ()/() 80/49  SpO2:  [79 %-100 %] 91 %  AO: ()/(42-80) 51/42    Physical Exam:    General: No acute distress  Respiratory: No wheezes, no rhonchi  Cardiovascular: S1, S2, regular rate and rhythm  Abdomen: Soft, Non-tender, non-distended, positive bowel sounds  Neuro: No new focal deficits.   Extremities: No edema  ***    Diagnostic Data:    Labs:  Recent Labs   Lab 25  1158 25  0406   WBC 14.2* 17.6*   HGB 13.4 13.0   MCV 86.0 87.1   .0* 344.0   BAND  --  6   INR 2.47*  --        Recent Labs   Lab 25  1158 25  1342 25  2314 25  0406   *  --  387* 339*   BUN 31*  --  29* 33*   CREATSERUM 1.41*  --  1.59* 1.56*   CA 8.7  --  8.6* 8.1*   ALB 3.7  --   --   --    *  --  131* 135*   K  --  3.8 6.7* 5.1  5.1   CL 98  --  100 100   CO2 24.0  --  10.0* 10.0*   ALKPHO 360*  --   --   --    AST  --  19  --   --    ALT 18  --   --   --    BILT 0.4  --   --   --    TP 6.7  --   --   --        Estimated Glomerular Filtration Rate: 33 mL/min/1.73m2 (A) (result from lab).    Recent Labs   Lab 25  2314   CK 24*       Recent Labs   Lab 25  1158   PTP 26.9*   INR 2.47*                  Microbiology    No results found for this visit on 25.      Imaging: Reviewed in Epic.    Medications:    [Held by provider] insulin aspart  1-10 Units Subcutaneous TID AC and HS    [Held by  provider] insulin aspart  1-68 Units Subcutaneous TID CC    dilTIAZem  10 mg Intravenous Once       Assessment & Plan:      #***      Helga Dempsey,     Supplementary Documentation:     Quality:  DVT Mechanical Prophylaxis:   SCDs,    DVT Pharmacologic Prophylaxis   Medication   None                Code Status: DNAR/Comfort Care  Holm: External urinary catheter in place  Holm Duration (in days):   Central line: central venous catheter in place  LONG:     Discharge is dependent on: ***  At this point Ms. Henriquez is expected to be discharge to: ***    The 21st Century Cures Act makes medical notes like these available to patients in the interest of transparency. Please be advised this is a medical document. Medical documents are intended to carry relevant information, facts as evident, and the clinical opinion of the practitioner. The medical note is intended as peer to peer communication and may appear blunt or direct. It is written in medical language and may contain abbreviations or verbiage that are unfamiliar.              **Certification      PHYSICIAN Certification of Need for Inpatient Hospitalization - {Certification Needs:6454::\"Initial Certification\"}    Patient will require inpatient services that will reasonably be expected to span two midnight's based on the clinical documentation in H+P.   Based on patients current state of illness, I anticipate that, after discharge, patient will require TBD.

## 2025-03-09 NOTE — PROGRESS NOTES
Critical Care Focus Note:    Family all arrived at bedside and ready to proceed with comfort measures. Vasopressors stopped. Morphine PRN. All questions answered. [place] appears comfortable at this time.     ALEJANDRA Steinberg-BC

## 2025-03-09 NOTE — PLAN OF CARE
Pt oriented to self and time, following commands and answering questions appropriately. Heart rhythm initially in afib, rate of 110-130's but pt converted on her own at around 1951. BP labile throughout the night, systolic fluctuating from the 60's to low 90's. Arterial line inserted to better monitor BP.  Hyperkalemia protocol initiated.  Increasing pressor needs required CVC placement: POA could not be contacted therefore d/t medical necessity, CVC was placed.   CT abd/pelvis done.  Pt refused NG tube insertion.   Contact made with POA; updated on pt's condition.   Steady decline in BP. Soon maxed on 3 pressors. Added 4th then soon maxed also.   Pt's son and POA decided on comfort care measures.    contacted.  All gtts turned off around 0658.  Comfort  Problem: Diabetes/Glucose Control  Goal: Glucose maintained within prescribed range  Description: INTERVENTIONS:  - Monitor Blood Glucose as ordered  - Assess for signs and symptoms of hyperglycemia and hypoglycemia  - Administer ordered medications to maintain glucose within target range  - Assess barriers to adequate nutritional intake and initiate nutrition consult as needed  - Instruct patient on self management of diabetes  Outcome: Not Progressing     Problem: CARDIOVASCULAR - ADULT  Goal: Maintains optimal cardiac output and hemodynamic stability  Description: INTERVENTIONS:  - Monitor vital signs, rhythm, and trends  - Monitor for bleeding, hypotension and signs of decreased cardiac output  - Evaluate effectiveness of vasoactive medications to optimize hemodynamic stability  - Monitor arterial and/or venous puncture sites for bleeding and/or hematoma  - Assess quality of pulses, skin color and temperature  - Assess for signs of decreased coronary artery perfusion - ex. Angina  - Evaluate fluid balance, assess for edema, trend weights  Outcome: Not Progressing  Goal: Absence of cardiac arrhythmias or at baseline  Description: INTERVENTIONS:  -  Continuous cardiac monitoring, monitor vital signs, obtain 12 lead EKG if indicated  - Evaluate effectiveness of antiarrhythmic and heart rate control medications as ordered  - Initiate emergency measures for life threatening arrhythmias  - Monitor electrolytes and administer replacement therapy as ordered  Outcome: Not Progressing     Problem: GASTROINTESTINAL - ADULT  Goal: Minimal or absence of nausea and vomiting  Description: INTERVENTIONS:  - Maintain adequate hydration with IV or PO as ordered and tolerated  - Nasogastric tube to low intermittent suction as ordered  - Evaluate effectiveness of ordered antiemetic medications  - Provide nonpharmacologic comfort measures as appropriate  - Advance diet as tolerated, if ordered  - Obtain nutritional consult as needed  - Evaluate fluid balance  Outcome: Not Progressing  Goal: Maintains or returns to baseline bowel function  Description: INTERVENTIONS:  - Assess bowel function  - Maintain adequate hydration with IV or PO as ordered and tolerated  - Evaluate effectiveness of GI medications  - Encourage mobilization and activity  - Obtain nutritional consult as needed  - Establish a toileting routine/schedule  - Consider collaborating with pharmacy to review patient's medication profile  Outcome: Not Progressing     Problem: GENITOURINARY - ADULT  Goal: Absence of urinary retention  Description: INTERVENTIONS:  - Assess patient’s ability to void and empty bladder  - Monitor intake/output and perform bladder scan as needed  - Follow urinary retention protocol/standard of care  - Consider collaborating with pharmacy to review patient's medication profile  - Implement strategies to promote bladder emptying  Outcome: Not Progressing     Problem: SKIN/TISSUE INTEGRITY - ADULT  Goal: Skin integrity remains intact  Description: INTERVENTIONS  - Assess and document risk factors for pressure ulcer development  - Assess and document skin integrity  - Monitor for areas of  redness and/or skin breakdown  - Initiate interventions, skin care algorithm/standards of care as needed  Outcome: Not Progressing    care measures initiated.

## 2025-03-09 NOTE — SPIRITUAL CARE NOTE
Spiritual Care Visit Note    Patient Name: Payton Henriquez Date of Spiritual Care Visit: 25   : 1945 Primary Dx: Sepsis with acute organ dysfunction and septic shock, due to unspecified organism, unspecified organ dysfunction type (HCC)       Referred By:      Spiritual Care Taxonomy:    Intended Effects: Demonstrate caring and concern    Methods: Collaborate with care team member, Offer support    Interventions: Active listening, Ask guided questions, Explain  role, [place], Provide hospitality    Visit Type/Summary:     - Spiritual Care: Consulted with RN prior to visit. Offered empathic listening and emotional support. Provided resources related to grief and grieving. Provided information regarding how to contact Spiritual Care and left a Spiritual Care information card.  remains available as needed for follow up. Spoke with the nurse prior to the visit. Upon entering the patient's room I observe the patient lying in bed family is at bedside. Patient's son and niece stated that they are at peace and requested prayer for the patient.  provided grief support. Also spiritual and emotional support to the family.  prayed at the request of the family.     Spiritual Care support can be requested via an Epic consult. For urgent/immediate needs, please contact the On Call  at: [place]: ext [phone]    Chaplain Resident Kyung Tong MA

## 2025-03-09 NOTE — PROGRESS NOTES
Took over pt care. Pt just made Comfort. Family all at bedside. All drips are off. Morphine pushes were given. RR stable. Pt ceased breathing. Asystole. No Audible heart tones. Pt pronounced. GIft of hope notified.  notified. Cleared. Pts family wishes for an autopsy. Form signed and filled out. Body sent to [facility] [place].

## 2025-03-09 NOTE — PROGRESS NOTES
Pt admitted to the ICU from ER. Placed in bed and on monitor. Pt aroused by name. Follows some commands. All ext weak. Pale skin. Cool to touch. Normal thermic. BP stable on low dose levo. MD wishes to change to Urbano. Stopped levo and started Urbano. See flow sheet. Pt has poor access. Placed another PIV in upper left arm. Continue supportive care. Vanco started.   Pt unable to safely take PO at this time. Falling asleep mid sentence. Unable to trust swallow for an eval or PO intake at this moment. Will continue to re eval.       Daughter here. Updated on status and plan of care. Pt DNR/Select.     Cont supportive care.

## 2025-03-09 NOTE — CONSULTS
[facility]  Report of Consultation    Payton Henriquez Patient Status:  Inpatient    1945 MRN [id number]   Location [facility] 4SW-A Attending Helga Dempsey, DO   Hosp Day # 0 PCP Floerncio Bowers MD     Reason for Consultation:  Rapid atrial fibrillation  SIRS/possible sepsis  Mildly hypotensive  Somnolent  Elevated proBNP    History of Present Illness:  Payton Henriquez is a a(n) 79 year old female, followed by my colleague Dr. Sofie Edwards, admitted from home with generalized weakness, decreased oral intake and left foot wound -patient has caregiver at home.  She is bedbound at baseline.  No fever.  No chest symptoms.  No chest pain or palpitations.  No new leg edema.  History of left ankle fracture requiring surgery.    Patient is bedridden at baseline.  Somnolent.    Telemetry shows rapid A-fib and she has paroxysmal A-fib history.      Cardiac wise patient takes losartan, atorvastatin and Eliquis outpatient.    Mildly hypotensive and tachycardic -Levophed started in ER.  Patient received bolus of IV fluid in ER per sepsis protocol.    IV Cardizem was initially initiated in ER and then was changed to amiodarone drip to better control rate.  Broad-spectrum antibiotics were initiated.    Chest x-ray suspicious for pneumonia with no congestion.  Urinalysis suspected for UTI.  Left foot infection with wounds.  History of UTI with ESBL resistant pattern.    Left foot chest x-ray with no acute findings.    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  Nephrolithiasis with history of ureteral stents and hematuria and history of nephrostomy.  PAD.  Spine arthritis with neuropathy.    Telemetry: Rapid atrial fibrillation 140s to 160s    EKG: Rapid atrial fibrillation 162 bpm with left axis deviation and narrow QRS with nonspecific changes but no acute ischemia.  Decreased voltage  in anterior leads.  No ischemic changes with tachycardia.    Echo -2022 -LVEF 45 to 50% with mild global hypokinesis with 1+ MR and mild to moderate enlarged left atrium with PA pressure 45 mmHg.    Important labs: A1c 6.2% potassium 3.8 sodium 135 glucose 199 creatinine 1.4 BUN 31 proBNP 22,811.  INR 2.47 WBC 14.2 platelet count 453    Chest x-ray -I reviewed images myself-no real congestion but small right pleural effusion could be chronic with some interstitial changes and atelectasis.  Cannot exclude right-sided infectious process.    History:  Past Medical History:    Arrhythmia    A FIB    Back problem    SPINAL STENOSIS    Bedridden    Due to a Fall in 2020 broken left fankle w surgery, s/p infection bone left foot, rehab, antibx x 7 weeks, after Fractured her  right femur   between 2020 -2024    Calculus of kidney    right and left    Deep vein thrombosis (HCC)    suspected left leg    Encounter for urinary catheter    uses external catheter system \" purewick\" / due to being bedridden    HIGH BLOOD PRESSURE    HIGH CHOLESTEROL    History of cardiac cath    post procedure data    Macular degeneration of right eye    Measles without mention of complication    Neuropathy    feet    Type II or unspecified type diabetes mellitus without mention of complication, not stated as uncontrolled    diet control    UTI (urinary tract infection)    X2 UTI'S brought to Dr Short without mention of complication    Visual impairment     Past Surgical History:   Procedure Laterality Date    Cholecystectomy  2005    Colonoscopy  12/19/2013    Procedure: COLONOSCOPY;  Surgeon: Igor Simon MD;  Location:  ENDOSCOPY    Colonoscopy  12/19/13     Alfred  sandra 10    Colonoscopy & polypectomy  11/24/2003    +diminutive polyp; repeat 10 years Alfred    Femur fracture surgery Left 08/31/2022    FEMUR IM NAIL ANTEGRADE LEFT    Hysterectomy  1989    removal of left ovary    Needle biopsy right  11/2019    benign us guided x 2     Other surgical history  1972    salpingo-oophorectomy right side    Other surgical history  01/01/2020    Cysto, Left RPG,stent insertion     Family History   Problem Relation Age of Onset    Other (Lung Cancer) Mother        Social History:   reports that she has never smoked. She has never used smokeless tobacco. She reports that she does not currently use alcohol. She reports that she does not use drugs.    Allergies:  Allergies[1]    Medications:    Current Facility-Administered Medications:     [COMPLETED] amiodarone in dextrose 4.21% (Cordarone) 150 mg/100mL BOLUS infusion premix 150 mg, 150 mg, Intravenous, Once **FOLLOWED BY** amiodarone in dextrose 4.14% (Cordarone) 360 mg/200mL infusion premix, 1 mg/min, Intravenous, Continuous **FOLLOWED BY** amiodarone in dextrose 4.14% (Cordarone) 360 mg/200mL infusion premix, 0.5 mg/min, Intravenous, Continuous    apixaban (Eliquis) tab 5 mg, 5 mg, Oral, BID    atorvastatin (Lipitor) tab 20 mg, 20 mg, Oral, Nightly    [START ON 3/9/2025] ferrous sulfate DR tab 325 mg, 325 mg, Oral, Daily with breakfast    [START ON 3/9/2025] pantoprazole (Protonix) DR tab 20 mg, 20 mg, Oral, QAM AC    melatonin tab 3 mg, 3 mg, Oral, Nightly PRN    ondansetron (Zofran) 4 MG/2ML injection 4 mg, 4 mg, Intravenous, Q6H PRN    acetaminophen (Tylenol Extra Strength) tab 500 mg, 500 mg, Oral, Q4H PRN    polyethylene glycol (PEG 3350) (Miralax) 17 g oral packet 17 g, 17 g, Oral, Daily PRN    sennosides (Senokot) tab 17.2 mg, 17.2 mg, Oral, Nightly PRN    bisacodyl (Dulcolax) 10 MG rectal suppository 10 mg, 10 mg, Rectal, Daily PRN    fleet enema (Fleet) rectal enema 133 mL, 1 enema, Rectal, Once PRN    glucose (Dex4) 15 GM/59ML oral liquid 15 g, 15 g, Oral, Q15 Min PRN **OR** glucose (Glutose) 40% oral gel 15 g, 15 g, Oral, Q15 Min PRN **OR** glucose-vitamin C (Dex-4) chewable tab 4 tablet, 4 tablet, Oral, Q15 Min PRN **OR** dextrose 50% injection 50 mL, 50 mL, Intravenous, Q15 Min PRN  **OR** glucose (Dex4) 15 GM/59ML oral liquid 30 g, 30 g, Oral, Q15 Min PRN **OR** glucose (Glutose) 40% oral gel 30 g, 30 g, Oral, Q15 Min PRN **OR** glucose-vitamin C (Dex-4) chewable tab 8 tablet, 8 tablet, Oral, Q15 Min PRN    insulin aspart (NovoLOG) 100 Units/mL FlexPen 1-10 Units, 1-10 Units, Subcutaneous, TID AC and HS    insulin aspart (NovoLOG) 100 Units/mL FlexPen 1-68 Units, 1-68 Units, Subcutaneous, TID CC    vancomycin (Vancocin) 1.25 g in sodium chloride 0.9% 250mL IVPB premix, 15 mg/kg, Intravenous, Q24H    [START ON 3/9/2025] meropenem (Merrem) 1 g in sodium chloride 0.9% 100 mL IVPB-MBP, 1 g, Intravenous, q12h    potassium chloride 20 mEq/100mL IVPB premix 20 mEq, 20 mEq, Intravenous, Once    phenylephrine in sodium chloride 0.9% (Urbano-Synephrine) 50 mg/250mL infusion premix,  mcg/min, Intravenous, Continuous    magnesium sulfate in sterile water for injection 2 g/50mL IVPB premix 2 g, 2 g, Intravenous, Once    Review of Systems:     Constitutional: Generalized weakness.  Eyes: Patient denies significant visual changes.  Ears, Nose, Mouth, Throat:  Negative for any new hearing loss. No sore throat. No nasal congestion.  Cardiovascular: see HPI -chronically moderately reduced LV systolic function.  PAF.  PAD.  Carotid disease.  Respiratory: No cough, wheezing or hemoptysis, no dyspnea.  Hematologic/Lymphatic: No easy bruising or bleeding.   Integumentary: No rash or suspicious lesions on the skin.   Musculoskeletal: Bedridden.  Arthritis.  Gastrointestinal: Negative for any bleeding. No abdominal pain. No diarrhea.  Genitourinary: No hematuria.   Neurological: Generalized weakness mild dementia.  Not fully oriented now.  Allergic/Immunologic: no rhinitis or environmental allergies      Physical Exam:  Blood pressure (!) 82/29, pulse (!) 144, temperature 98.8 °F (37.1 °C), temperature source Temporal, resp. rate 15, weight 193 lb 12.6 oz (87.9 kg), SpO2 (!) 79%, not currently breastfeeding.  Temp  (24hrs), Av.2 °F (36.8 °C), Min:97.5 °F (36.4 °C), Max:98.8 °F (37.1 °C)    Wt Readings from Last 3 Encounters:   25 193 lb 12.6 oz (87.9 kg)   24 188 lb 15 oz (85.7 kg)   24 200 lb 9.9 oz (91 kg)       Constitutional: Generalized weakness and somnolence.  Eyes: Moist conjunctivae, PERRLA.  Ears, Nose, Mouth, Throat:  Moist mucosa. Anicteric sclera. Oropharynx clear.  Head and Neck: No JVD, carotids 2+ no bruits. Neck supple. No thyromegaly or adenopathy. Normocephalic head.  Cardiovascular: Tachycardia, irregularly irregular, 1 out of 6 systolic murmur.  No gallop.  Respiratory: Decreased air entry with few basal crackles.  Gastrointestinal: Soft, non-tender abdomen.   Extremities: Without clubbing, cyanosis or edema.  Peripheral pulses are 2+.  Neurologic: Mental status changes.  Generalized weakness.  Musculoskeletal: normal range of motion, normal muscle strength, no joint effusion.   Integumentary: Left foot wound.  Psychiatric: Depression.    Laboratories and Data:    Imaging:  CT ABDOMEN+PELVIS(CPT=74176)    Result Date: 3/8/2025  PROCEDURE:  CT ABDOMEN+PELVIS (CPT=74176)  COMPARISON:      CONCLUSION:  1. There is 8 mm stone right renal pelvis without significant hydronephrosis. 2. There are dependent calcifications in the urinary bladder. 3. There are bilateral pleural effusions.  Right pleural effusion appears to be partly loculated. 4. Intermediate density distal common bile duct near the ampulla could represent common duct stone.  There is mild distension of the bile ducts although there is also been a prior cholecystectomy.  Correlation with any clinically significant jaundice is necessary. 5. Severely distended rectum with perirectal stranding and dense fecal debris in rectum.  Findings are consistent with fecal impaction in stercoral proctitis.      XR CHEST AP PORTABLE  (CPT=71045)    Result Date: 3/8/2025  PROCEDURE:  XR CHEST AP PORTABLE  (CPT=71045)  TECHNIQUE:  AP chest  radiograph was obtained.  COMPARISON:  [facility]WARD , XR, XR CHEST AP PORTABLE  (CPT=71045), 9/12/2023, 9:37 PM.  [facility], XR, XR CHEST AP PORTABLE  (CPT=71045), 9/20/2022, 4:33 PM.  INDICATIONS:  wounds to left foot r/o sepsis  PATIENT STATED HISTORY: (As transcribed by Technologist)  Patient offered no additional history at this time.    FINDINGS:  Patchy right lung airspace opacity, greatest within the right upper lung.  Suspect small right pleural effusion.  No left pleural effusion.  No measurable pneumothorax.  Cardiomediastinal silhouette is stable.            CONCLUSION:  Patchy right lung airspace opacity, greatest within the right upper lung with small right pleural effusion.  Findings suggestive of pneumonia in the appropriate clinical setting, correlate clinically.  Recommend follow-up to resolution.     XR FOOT, COMPLETE (MIN 3 VIEWS), LEFT (CPT=73630)    Result Date: 3/8/2025  PROCEDURE:  XR FOOT, COMPLETE (MIN 3 VIEWS), LEFT (CPT=73630)  TECHNIQUE:  AP, oblique, and lateral views were obtained.  COMPARISON:  None.  INDICATIONS:  wounds to left foot r/o sepsis  PATIENT STATED HISTORY: (As transcribed by Technologist)  Patient offered no additional history at this time.    FINDINGS:  There is diffuse generalized bony demineralization.  Postsurgical changes of distal tibial ORIF.  No evidence of hardware loosening or failure.  Multifocal osteoarthritic changes.  Vascular calcifications are noted.  Diffuse soft tissue edema.            CONCLUSION:  See above         Labs:     Lab Results   Component Value Date    INR 2.47 (H) 03/08/2025    INR 1.27 (H) 09/19/2024    INR 1.68 (H) 09/13/2023        Lab Results   Component Value Date    WBC 14.2 03/08/2025    HGB 13.4 03/08/2025    HCT 42.3 03/08/2025    .0 03/08/2025    CREATSERUM 1.41 03/08/2025    BUN 31 03/08/2025     03/08/2025    K 3.8 03/08/2025    CL 98 03/08/2025    CO2 24.0 03/08/2025     03/08/2025    CA 8.7 03/08/2025    ALB 3.7  03/08/2025    ALKPHO 360 03/08/2025    BILT 0.4 03/08/2025    TP 6.7 03/08/2025    AST 19 03/08/2025    ALT 18 03/08/2025    PTT 37.0 03/08/2025    INR 2.47 03/08/2025    PTP 26.9 03/08/2025    ESRML 73 03/08/2025    CRP 2.70 03/08/2025    MG 2.0 03/08/2025    PGLU 247 03/08/2025         Impression:   1.  Sepsis/septic shock with mild hypotension and reactive tachycardia and then with rapid A-fib.  Right foot wound, UTI, suspected pneumonia on x-ray.  Patient bedridden at baseline at home and has caregiver.  2.  Rapid atrial fibrillation with history of PAF-IV Cardizem changed to IV amiodarone.  3.  Somnolence with generalized weakness due to infection.  4.  Chronically anticoagulated for A-fib with Eliquis.  5.  High proBNP from septic picture.  6.  Chronically systolic heart failure with mildly reduced ejection fraction and EF 40 to 45% on imaging studies in previous years -no CHF decompensation.  7.  Hypertension history but now hypotensive.  8.  Peripheral arterial disease stable issue -carotid disease and PAD of lower extremities.  9.  Diabetes mellitus type 2.  10.  Dyslipidemia on statin at home.    Plan:  -ICU admit  -Levophed for BP support with sepsis  -IV fluids started for sepsis-follow volume status closely to avoid iatrogenic fluid overload  -IV amiodarone since IV Cardizem did not work well to control A-fib rate  -Would add back IV Cardizem drip to IV amiodarone drip -since heart rate is 140-160 on IV amiodarone drip -use pressors and do not stop IV Cardizem if BP drops  -By lowering heart rate we can improve systemic blood pressure  -IV Lopressor 5 mg x 1  -Hold losartan from home  -Broad-spectrum antibiotic  -Blood and urine culture  -Left foot wound culture  -MRI of the left foot to rule osteomyelitis  -Anticoagulation with Eliquis to decrease risk of stroke  -Hyperglycemia treatment  -Levophed or Urbano-Synephrine -for hypotension  -Echo Doppler was ordered and will review when completed -  known  chronically reduced LV systolic function  -Conservative approach    Discussed with ICU nursing staff    Mu Murphy M.D., F.A.C.C.  [facility]    3/8/2025  7:00 PM  C5         [1] No Known Allergies

## 2025-05-06 NOTE — TELEPHONE ENCOUNTER
"Please see \"Imaging\" tab under \"Chart Review\" for details of today's visit.    Georgia Weaver MD    " Astrid  [phone]    Called and provided Astrid with the requested verbal orders.

## 2025-05-20 NOTE — PLAN OF CARE
Exam/Assessment completed 5/16 with kit collection by Padmini Cifuentes & Sumaya Murray RN.    Pt here from: Home with a caregiver.   Neuro: A&Ox4, VSS, RA, , IS. Denies cough, chest pain, and SOB.   GI: Abdomen soft, passing gas and belching. Denies nausea.   : Voids via reilly catheter.   Pain controlled with prn meds per MAR.   Bed bound at baseline.   Drains: R nephro tube is bloody. Tubing changed d/t long blood clot preventing drainage.   Incisions: Wounds on pts buttox per report. Pt refused to be turned to view the wounds.   Diet: Tolerating clears. ADAT.   IVF running per order through PIV L FA.   All appropriate safety measures in place. All questions and concerns addressed. Bed locked and in lowest position. Call light in reach. Caregiver at bedside. QID accu checks.

## (undated) DIAGNOSIS — Z02.9 ENCOUNTERS FOR ADMINISTRATIVE PURPOSE: ICD-10-CM

## (undated) DEVICE — SINGLE ACTION PUMPING SYSTEM

## (undated) DEVICE — MARKER SKIN 2 TIP

## (undated) DEVICE — MEDI-VAC NON-CONDUCTIVE SUCTION TUBING: Brand: CARDINAL HEALTH

## (undated) DEVICE — SOLUTION IRRIG 3000ML 0.9% NACL FLX CONT

## (undated) DEVICE — LAMINECTOMY CDS: Brand: MEDLINE INDUSTRIES, INC.

## (undated) DEVICE — 5.5MM ROUND FAST CUTTING BUR

## (undated) DEVICE — DRESS WOUND AQUACEL 3.5INX4INL

## (undated) DEVICE — SLEEVE KENDALL SCD EXPRESS MED

## (undated) DEVICE — SOLUTION  .9 1000ML BTL

## (undated) DEVICE — GLOVE SURG SENSICARE SZ 7

## (undated) DEVICE — SINGLE-USE DIGITAL FLEXIBLE URETEROSCOPE: Brand: LITHOVUE

## (undated) DEVICE — PRECISION (12.0 X 0.51 X 34.5MM)

## (undated) DEVICE — C-ARM: Brand: UNBRANDED

## (undated) DEVICE — REMOVER DURAPREP 3M

## (undated) DEVICE — PAIN TRAY: Brand: MEDLINE INDUSTRIES, INC.

## (undated) DEVICE — AVANOS* TUOHY EPIDURAL NEEDLE: Brand: AVANOS

## (undated) DEVICE — BLADE SAW KM33-11

## (undated) DEVICE — Device

## (undated) DEVICE — PACK PBDS CYSTOSCOPY

## (undated) DEVICE — BAG DRNGE 2000ML URIN INF CTRL ANTIREFLX

## (undated) DEVICE — GUIW VERSANAIL ORTH TIB 100CM

## (undated) DEVICE — 3M™ MICROPORE™ TAPE, 1530-2: Brand: 3M™ MICROPORE™

## (undated) DEVICE — Device: Brand: PLUMEPEN

## (undated) DEVICE — KIT LITHO PRB 3.9X440MM W/ STONE CTCH

## (undated) DEVICE — ZIPWIRE GUIDEWIRE .038X150 STR

## (undated) DEVICE — SYRINGE MED 20ML STD CLR PLAS LL TIP N CTRL

## (undated) DEVICE — URETERAL ACCESS SHEATH SET: Brand: NAVIGATOR HD

## (undated) DEVICE — NITINOL WIRE WITH HYDROPHILIC TIP: Brand: SENSOR

## (undated) DEVICE — BNDG COHESIVE W4INXL5YD TAN E

## (undated) DEVICE — GLOVE SURG SENSICARE SZ 7-1/2

## (undated) DEVICE — CATHETER URETH 18FR BLLN 5CC SIL ALLY W/ SIL

## (undated) DEVICE — SUTURE VICRYL 3-0 RB-1

## (undated) DEVICE — PIN XTRNFX 508MM 3.2MM NTR NL

## (undated) DEVICE — TRAY SURESTEP 16 BARDEX UMETR

## (undated) DEVICE — NITINOL STONE RETRIEVAL BASKET: Brand: ZERO TIP

## (undated) DEVICE — ADAPTER BX SEAL PRT ADJ FOR HYSTEROSCOPE

## (undated) DEVICE — PIN FX 30.5CM 3MM NTR NL

## (undated) DEVICE — CYSTO CDS-LF: Brand: MEDLINE INDUSTRIES, INC.

## (undated) DEVICE — DILATOR/SHEATH SET: Brand: 8/10 DILATOR/SHEATH SET

## (undated) DEVICE — SUTURE VICRYL 2-0 CT-2

## (undated) DEVICE — ADAPTER BX SEAL Y BIOPSY PORT ADJ FOR HYSTEROSCOPE

## (undated) DEVICE — SOL NACL IRRIG 0.9% 1000ML BTL

## (undated) DEVICE — SLEEVE COMPR MD KNEE LEN SGL USE KENDALL SCD

## (undated) DEVICE — PERC NCIRCLE, NITINOL TIPLESS STONE EXTRACTOR: Brand: PERC NCIRCLE

## (undated) DEVICE — GLOVE SUR 7 SENSICARE PI PIP CRM PWD F

## (undated) DEVICE — #15 STERILE STAINLESS BLADE: Brand: STERILE STAINLESS BLADES

## (undated) DEVICE — SUT ETHILON 4-0 PS-2 1667H

## (undated) DEVICE — VIOLET BRAIDED (POLYGLACTIN 910), SYNTHETIC ABSORBABLE SUTURE: Brand: COATED VICRYL

## (undated) DEVICE — SOLUTION IRRIG 1000ML 0.9% NACL USP BTL

## (undated) DEVICE — LOWER EXTREMITY CDS-LF: Brand: MEDLINE INDUSTRIES, INC.

## (undated) DEVICE — SET TUBING DELTER CYSTO IRRIG L77IN DIA0.241IN BLDR NVENT

## (undated) DEVICE — SYRINGE MED 30ML STD CLR PLAS LL TIP N CTRL

## (undated) DEVICE — 3.0MM PRECISION NEURO (MATCH HEAD)

## (undated) DEVICE — BIT DRL 4.3MM NTR NL FR

## (undated) DEVICE — SERVICE RENTAL HOLM HI PWR STANDBY

## (undated) DEVICE — SPONGE GZ 4X4IN COT 12 PLY TYP VII WVN C

## (undated) DEVICE — TRAY CATH 16FR F INCL BARDX IC COMPLT CARE

## (undated) DEVICE — ZIPWIRE GUIDEWIRE .035X150 STR

## (undated) DEVICE — STERILE POLYISOPRENE POWDER-FREE SURGICAL GLOVES WITH EMOLLIENT COATING: Brand: PROTEXIS

## (undated) DEVICE — C-ARMOR C-ARM EQUIPMENT COVERS CLEAR STERILE UNIVERSAL FIT 12 PER CASE: Brand: C-ARMOR

## (undated) DEVICE — SUT VICRYL 3-0 SH J416H

## (undated) DEVICE — Device: Brand: INTELLICART™

## (undated) DEVICE — FIBER LSR 200UM 2J 80HZ 60W DL FOR LITHO

## (undated) DEVICE — SEAL BIOPSY PORT ACMI

## (undated) DEVICE — GLOVE SURG SENSICARE SZ 6-1/2

## (undated) DEVICE — #11 STERILE BLADE: Brand: POLYMER COATED BLADES

## (undated) DEVICE — SINGLE ACTION PUMPING SYSTEM CONTINUOUS FLOW

## (undated) DEVICE — EXOFIN TISSUE ADHESIVE 1.0ML

## (undated) DEVICE — SPONGE 4X4 10PK

## (undated) DEVICE — GOWN SURG AERO CHROME XXL

## (undated) DEVICE — STERILE POLYISOPRENE POWDER-FREE SURGICAL GLOVES: Brand: PROTEXIS

## (undated) DEVICE — SUTURE VICRYL 0 CT-1

## (undated) DEVICE — SCD SLEEVE KNEE HI BLEND

## (undated) DEVICE — SKIN MARKER DUAL TIP WITH RULER CAP AND LABELS: Brand: DEVON

## (undated) DEVICE — LIGHT HANDLE

## (undated) DEVICE — TIGERTAIL 5F FLXTIP 70CM

## (undated) DEVICE — HIGH PRESSURE NEPHROSTOMY BALLOON CATHETER KIT: Brand: NEPHROMAX KIT

## (undated) DEVICE — SEAL Y BIOPSY PORT P6R SCOPE

## (undated) DEVICE — GOWN AERO CHROME XXL

## (undated) DEVICE — GUIDEWIRE: Brand: AMPLATZ SUPER STIFF

## (undated) DEVICE — SYRINGE 20CC LL TIP

## (undated) DEVICE — CATH IV 14G X 5-1/4 ANGIO

## (undated) DEVICE — ALCOHOL 70% 4 OZ

## (undated) DEVICE — BANDAID COVERLET 1X3

## (undated) DEVICE — PERCUTANEUOS NEPHROLOGY CDS: Brand: MEDLINE INDUSTRIES, INC.

## (undated) DEVICE — SPONGE STICK WITH PVP-I: Brand: KENDALL

## (undated) DEVICE — GOWN,SIRUS,FABRIC-REINFORCED,X-LARGE: Brand: MEDLINE

## (undated) DEVICE — SUT VICRYL 0 CP-1 J267H

## (undated) DEVICE — BIGOPSY BACKLOADING BIOPSY FORCEPS: Brand: BIGOPSY

## (undated) DEVICE — SOL  .9 3000ML

## (undated) DEVICE — 3M™ IOBAN™ 2 ANTIMICROBIAL INCISE DRAPE 6650EZ: Brand: IOBAN™ 2

## (undated) DEVICE — DUAL LUMEN URETERAL CATHETER

## (undated) DEVICE — DRILL SRG OIL CRTDG MAESTRO

## (undated) DEVICE — STERILE SYNTHETIC POLYISOPRENE POWDER-FREE SURGICAL GLOVES WITH HYDROGEL COATING: Brand: PROTEXIS

## (undated) DEVICE — 1.9FR NITINOL TIPLESS BSKT

## (undated) DEVICE — RE-ENTRY MALECOT NEPHROSTOMY CATHETER SET: Brand: RE-ENTRY MALECOT NEPHROSTOMY CATHETER SET

## (undated) DEVICE — SYRINGE BULB 50/CS 48/PLT: Brand: MEDEGEN MEDICAL PRODUCTS, LLC

## (undated) DEVICE — MARKER SKIN PREP RESIST STRL

## (undated) DEVICE — SOL  .9 1000ML BTL

## (undated) DEVICE — SPLINT PRECUT ORTHOGLASS 4X15

## (undated) DEVICE — PADDING CAST COTTON STER 4

## (undated) DEVICE — GAUZE SPONGES,12 PLY: Brand: CURITY

## (undated) DEVICE — HIP PINNING CDS: Brand: MEDLINE INDUSTRIES, INC.

## (undated) DEVICE — URETEROSCOPIC BIOPSY FORCEPS: Brand: PIRANHA

## (undated) DEVICE — SUT MONOCRYL 2-0 SH Y417H

## (undated) DEVICE — MEGADYNE ELECTRODE ADULT PT RT

## (undated) DEVICE — STERILE SYNTHETIC POLYISOPRENE POWDER-FREE SURGICAL GLOVES WITH HYDROGEL COATING, SMOOTH FINISH, STRAIGHT FINGER: Brand: PROTEXIS

## (undated) DEVICE — KENDALL SCD EXPRESS SLEEVES, KNEE LENGTH, MEDIUM: Brand: KENDALL SCD

## (undated) DEVICE — GAMMEX® PI HYBRID SIZE 7.5, STERILE POWDER-FREE SURGICAL GLOVE, POLYISOPRENE AND NEOPRENE BLEND: Brand: GAMMEX

## (undated) DEVICE — BANDAGE,GAUZE,BULKEE II,4.5"X4.1YD,STRL: Brand: MEDLINE

## (undated) DEVICE — HOOK LOCK LATEX FREE ELASTIC BANDAGE 6INX5YD

## (undated) DEVICE — SOLUTION  .9 3000ML

## (undated) DEVICE — DRAPE,LAPAROTOMY,PCH,STERILE: Brand: MEDLINE

## (undated) DEVICE — DRESS WOUND AQUACEL 3.5INX6INL

## (undated) DEVICE — NON-ADHERENT STRIPS,OIL EMULSION: Brand: CURITY

## (undated) DEVICE — SEAL BIOPSY PORT ACMI BX BLACK CAP

## (undated) DEVICE — SUT VICRYL 2-0 SH J417H

## (undated) DEVICE — DISPOSABLE TOURNIQUET CUFF SINGLE BLADDER, DUAL PORT AND QUICK CONNECT CONNECTOR: Brand: COLOR CUFF

## (undated) NOTE — IP AVS SNAPSHOT
[address]            (For Outpatient Use Only) Initial Admit Date: 2022   Inpt/Obs Admit Date: Inpt: 22 / Obs: 22   Discharge Date:    Odalys Red:  [de-identified]   MRN: [de-identified]   CSN: [id number]   CEID: [id number]        ENCOUNTER  Patient Class: Inpatient Admitting Provider: Uche Yadav MD Unit: [address] [place] [address] 3SW-A   Hospital Service: Ortho/Spine Attending Provider: Vinh Pettit MD   Bed: 378-A   Visit Type:   Referring Physician: No ref. provider found Billing Flag:    Admit Diagnosis: Anticoagulated by anticoagulation treatment [Z79.01]      PATIENT  Legal Name:   Prakash Johnson   Legal Sex: Female  Gender ID: Female             Pref Name:   Marla Escobar PCP:  Suzan Saucedo MD Home: [phone]   Address:  [address] [place] [address] : 1945 (77 yrs) Mobile: [phone]         City/State/Zip: [facility] [address] Marital:  Language: [address] [place] [address] Drive: [employer] SSN4: [id number] Baptism: [address] Not Sagrario Nephew*     Race: White Ethnicity: Non  Or  O*   EMERGENCY CONTACT   Name Relationship Legal Guardian? Home Phone Work Phone Mobile Phone   1. Hi Henriquez  2.  Sosa Vanegas Spouse  OTHER    [phone]     GUARANTOR  Guarantor: Tosha Branden : 1945 Home Phone: [phone]   Address: [address] [place] [address]  Sex: Female Work Phone:    City/State/Zip: [address]   Rel. to Patient: Self Guarantor ID: [id number]   GUARANTOR EMPLOYER   Employer:  Status: RETIRED     COVERAGE  PRIMARY INSURANCE   Payor: MEDICARE Plan: MEDICARE PART A&B   Group Number: [id number] Insurance Type: [address] Name: Dayanara Sorensen : 1945   Subscriber ID: [id number] Pt Rel to Subscriber: Self   SECONDARY INSURANCE   Payor: [facility] Plan: [address] [place] [address] [place]   Group Number: [id number] Insurance Type: [place] [id number] Name: Levi Multani : 1945   Subscriber ID: [id number] Pt Rel to Subscriber: SPOUSE   TERTIARY INSURANCE   Payor:  Plan:    Group Number:  Insurance Type:    Subscriber Name:  Subscriber :    Subscriber ID:  Pt Rel to Subscriber:    Hospital Account Financial Class: Medicare    September 3, 2022

## (undated) NOTE — IP AVS SNAPSHOT
[address]            (For Outpatient Use Only) Initial Admit Date: 2022   Inpt/Obs Admit Date: Inpt: 22 / Obs: N/A   Discharge Date:    Celestina Castro:  [de-identified]   MRN: [de-identified]   CSN: [id number]   CEID: [id number]        ENCOUNTER  Patient Class: Inpatient Admitting Provider: Saddie Barthel, MD Unit: [place] [address] Service: Medical Attending Provider: Reji Lin DO   Bed: 323-A   Visit Type:   Referring Physician: No ref. provider found Billing Flag:    Admit Diagnosis: Urinary tract infection with hematuria, site unspecified [N39.0, R31.9]      PATIENT  Legal Name:   Constanza Woodard   Legal Sex: Female  Gender ID: Female             Pref Name:   Alexx Potts PCP:  Torey Harris MD Home: [phone]   Address:  [address] [place] [address] : 1945 (76 yrs) Mobile: [phone]         City/State/Zip: [facility] [address] Marital:  Language: [place]: Will SSN4: [id number] Latter-day: [address] Not Duaine Canavan*     Race: White Ethnicity: Non  Or  O*   EMERGENCY CONTACT   Name Relationship Legal Guardian? Home Phone Work Phone Mobile Phone   1. Hi Henriquez  2.  Sosa Vanegas Spouse  OTHER    [phone]     GUARANTOR  Guarantor: Pedrito Stephens : 1945 Home Phone: [phone]   Address: [address] [place] [address]  Sex: Female Work Phone:    City/State/Zip: [place], [address] [place] [address]   Rel. to Patient: Self Guarantor ID: [id number]   GUARANTOR EMPLOYER   Employer:  Status: RETIRED     COVERAGE  PRIMARY INSURANCE   Payor: MEDICARE Plan: MEDICARE PART A&B   Group Number: [id number] Insurance Type: [address] Name: Venecia Antonio : 1945   Subscriber ID: [id number] Pt Rel to Subscriber: Self   SECONDARY INSURANCE   Payor: EILZABETH Plan: [address]   Group Number: [id number] [facility] Type: [facility] Name: Fanny Richards : 1945   Subscriber ID: [id number] Pt Rel to Subscriber: SPOUSE   TERTIARY INSURANCE   Payor:  Plan:    Group Number:  Insurance Type:    Subscriber Name:  Subscriber :    Subscriber ID:  Pt Rel to Subscriber:    Hospital Account Financial Class: Medicare    2022

## (undated) NOTE — LETTER
Patient Name: Payton Henriquez  Surgery Date: 6/13/2024  Medical Record: [id number] CSN: [id number]      Surgeon(s):  Siddhartha Blood MD  Consent Procedure: CYSTOSCOPY, LEFT FLEXIBLE  URETEROSCOPY, LASER LITHOTRIPSY, STONE REMOVAL, STENT PLACEMENT  Anesthesia Type: General    Hi Dr Bowers-   Surgeon recommends pt stop Eliquis 3-4 days pre op, pending your approval.  I asked the pt to call you and check about this . Will you please follow up w pt addressing this issue.      Thank You, Rosaura ANG  Pre Admission Testing [phone]

## (undated) NOTE — LETTER
July 26, 2018    [address]y [address] [id number]    Dear Ryland Pena: It was a pleasure speaking with you over the phone recently.  To follow up, I wanted to send you some contact information to utilize when you have a question

## (undated) NOTE — IP AVS SNAPSHOT
[address]            (For Outpatient Use Only) Initial Admit Date: 2022   Inpt/Obs Admit Date: Inpt: 22 / Obs: N/A   Discharge Date:    Hospital Acct:  [de-identified]   MRN: [de-identified]   CSN: [id number]   CEID: [id number]        ENCOUNTER  Patient Class: Inpatient Admitting Provider: Dell Mayfield DO Unit: [address] [place] [address] Service: Cardiac Telemetry Attending Provider: Daniel Mcgovern MD   Bed: 8660-D   Visit Type:   Referring Physician: No ref. provider found Billing Flag:    Admit Diagnosis: Atrial fibrillation with rapid ventricular response [facility]) [I48.91]      PATIENT  Legal Name:   Jasper Arnold   Legal Sex: Female  Gender ID: Female             Pref Name:   Jarek Pratt PCP:  Cholo Basilio MD Home: [phone]   Address:  [address] [place] [address] : 1945 (76 yrs) Mobile: [phone]         City/State/Zip: [facility] [address] Marital:  Language: [address] [place] [address] Drive: Will SSN4: [id number] Taoism: [address] Not Roche[address] Anais*     Race: White Ethnicity: Non  Or  O*   EMERGENCY CONTACT   Name Relationship Legal Guardian? Home Phone Work Phone Mobile Phone   1. Hi Henriquez  2.  Sosa Vanegas Spouse  OTHER    [phone]     GUARANTOR  Guarantor: Essie Singh : 1945 Home Phone: [phone]   Address: [address] [place] [address]  Sex: Female Work Phone:    City/State/Zip: [place], [address] [facility]   Rel. to Patient: Self Guarantor ID: [id number]   GUARANTOR EMPLOYER   Employer:  Status: RETIRED     COVERAGE  PRIMARY INSURANCE   Payor: MEDICARE Plan: MEDICARE PART A&B   Group Number:  Insurance Type: INDEMNITY   Subscriber Name: Ulysses Good : 1945   Subscriber ID: [id number] Pt Rel to Subscriber: Self   SECONDARY INSURANCE   Payor: [facility] Plan: [address] [place] [address] Sara   Group Number: [id number] Insurance Type: [address] Name: Nena Carrasco : 1945 Subscriber ID: [id number] Pt Rel to Subscriber: SPOUSE   TERTIARY INSURANCE   Payor:  Plan:    Group Number:  Insurance Type:    Subscriber Name:  Subscriber :    Subscriber ID:  Pt Rel to Subscriber:    Hospital Account Financial Class: Medicare    2022

## (undated) NOTE — LETTER
Your patient was recently seen at the [facility] for a hospital follow-up visit. The visit note is attached. Please contact the clinic with any questions at [phone].     Thank you,  VERNON Mar

## (undated) NOTE — Clinical Note
Pt was contacted for TCM. Pt declined HFU appt. Pt reports she is better. Declined to review med rec so please complete at [facility] appt if pt schedules. TE was sent to FU on HFU appt. Pt plans to see Urology and Cardiology as recommended. [facility] starting today. Thank you.

## (undated) NOTE — Clinical Note
TCM assessment completed with patient. The patient declined to schedule a TCM HFU at this time and stated she will call another time to schedule.   Thank you

## (undated) NOTE — MR AVS SNAPSHOT
Nany  [address] [facility] 100  [place] Desouza [address]  [phone]               Thank you for choosing us for your health care visit with Eileen Yun NP.   We are glad to serve you and happy to provide you with this sum Take 1 tablet (20 mg total) by mouth once daily.    Commonly known as:  PRAVACHOL                Where to Get Your Medications      These medications were sent to [address], [address] [place] [address], [phone]

## (undated) NOTE — MR AVS SNAPSHOT
After Visit Summary   5/14/2020    Stan Ramirez    MRN: [id number]           Visit Information     Date & Time  5/14/2020  1:00 PM Provider  Leighton Ormond, MD Department  [address] [place] [address] Dept.  Phone  [phone]      Allergies as non-emergency, consider your options before heading to an ER. VIDEO VISITS  Visit face-to-face with a [facility] physician or   WENDY using your mobile device or computer   using [employer].    e-VISITS  Communicate with a [facility] Physician or WENDY online.  The physician hanh

## (undated) NOTE — LETTER
Joelle Hernandez 182 6 13[facility]  [place]ellie, [address] [place] [address]    Consent for Operation  Date: __________________                                Time: _______________    1.  I authorize the performance upon Yeny Cao the following operation:  Procedu procedure has been videotaped, the surgeon will obtain the original videotape. The hospital will not be responsible for storage or maintenance of this tape.   7. For the purpose of advancing medical education, I consent to the admittance of observers to the STATEMENTS REQUIRING INSERTION OR COMPLETION WERE FILLED IN.     Signature of Patient:   ___________________________    When the patient is a minor or mentally incompetent to give consent:  Signature of person authorized to consent for patient: ____________ supplements, and pills I can buy without a prescription (including street drugs/illegal medications). Failure to inform my anesthesiologist about these medicines may increase my risk of anesthetic complications. iv.  If I am allergic to anything or have ha Anesthesiologist Signature     Date   Time  I have discussed the procedure and information above with the patient (or patient’s representative) and answered their questions. The patient or their representative has agreed to have anesthesia services.     ___

## (undated) NOTE — IP AVS SNAPSHOT
[address]            (For Outpatient Use Only) Initial Admit Date: 12/30/2021   Inpt/Obs Admit Date: Inpt: 12/31/21 / Obs: N/A   Discharge Date:    Vince So:  [de-identified]   MRN: [de-identified]   CSN: [id number]   CEID: [id number] Payor:  Plan:    Group Number:  Insurance Type:    Subscriber Name:  Subscriber :    Subscriber ID:  Pt Rel to Subscriber:    Hospital Account Financial Class: Medicare    January 3, 2022

## (undated) NOTE — LETTER
21    RE: Monty Owens     : 1945    Dear Dr. Leslie Heaton,    This letter is to inform you that your patient is being scheduled for surgery with Dr. Shanda Olivera on 21 at [facility]. We have asked the patient to contact your office to sche

## (undated) NOTE — MR AVS SNAPSHOT
[facility] Robert  [address]  [phone]               Thank you for choosing us for your health care visit with Thad Dakin, APN. We are glad to serve you and happy to provide you with this summary of your visit.   P you are allergic to bee or wasp stings, your doctor may give you a prescription for an \"epi-pen\" that you can keep with you at all times in case of a sting.   · You may receive antivenin (a substance that reverses the effects of poison) for some spider bi Hydrocortisone Valerate 0.2 % Crea   Apply 1 g topically 2 (two) times daily. Commonly known as:  WESTCORT           MetFORMIN HCl 1000 MG Tabs   Take 1 tablet (1,000 mg total) by mouth 2 (two) times daily.    Commonly known as:  GLUCOPHAGE           Pra

## (undated) NOTE — LETTER
22     Pt: Zach Mace        : [birth year]          To [facility]:        Please provide patient, Girish Iglesias, with a high-dose Flu vaccine in her home.        Abdifatah Tolentino M.D.

## (undated) NOTE — IP AVS SNAPSHOT
Patient Demographics     Address  [place]  [address] Phone  [phone] (Home) *Preferred*  [phone] [facility]) E-mail Address  [email or web address]      Emergency Contact(s)     Name Relation Home Work [address] + D OR  Next dose due: Tomorrow 1/4      Take 1,200 mg by mouth daily. cyclobenzaprine 10 MG Tabs  Commonly known as: FLEXERIL      Take 0.5 tablets (5 mg total) by mouth 3 (three) times daily as needed for Muscle spasms.    Lelia Delmy ID Medication Name Action Time Action Reason Comments    [id number] Insulin Aspart Pen (NOVOLOG) 100 UNIT/ML flexpen 1-10 Units 01/03/22 1405 Given      [id number] Insulin Aspart Pen (NOVOLOG) 100 UNIT/ML flexpen 1-68 Units 01/02/22 1828 Given      [id number] mOsm/kg [facility] ([facility])   GFR, Non- 14 >=60 L [employer] ([facility])   GFR, -American 16 >=60 L [facility])            Testing Performed By     Lab - [address]re[address] Name Director Address Valid Date Range    [address] [facility]) EDRODNEY comprehensive 14 point review of systems was completed. Pertinent positives and negatives noted in the HPI.   Past Medical History:  Past Medical History:   Diagnosis Date   • HIGH BLOOD PRESSURE    • HIGH CHOLESTEROL    • History of cardiac cath 03/2001 TIMES A DAY, Disp: 90 capsule, Rfl: 11  ROSUVASTATIN 5 MG Oral Tab, TAKE 1 TABLET DAILY, Disp: 90 tablet, Rfl: 3  GLIPIZIDE XL 5 MG Oral Tablet 24 Hr, TAKE 1 TABLET DAILY, Disp: 90 tablet, Rfl: 3  cyclobenzaprine 10 MG Oral Tab, Take 0.5 tablets (5 mg tota --  132   AST  --  17   ALT  --  16   BILT  --  0.3   TP  --  7.3     No results for input(s): PTP, INR in the last 168 hours. No results for input(s): TROP, CK in the last 168 hours. Imaging: Imaging data reviewed in Epic.   ASSESSMENT / PLAN:     # BRITTANY ~ 1.2, DM, HTN,  ankle fracture complicated by infection (has been on abx for ~ 7wks) who presented to the hospital for evaluation of abnormal labs.  Bg reports some general malaise and had labs done per her primary physician- noted to have hyperkalemi glucose (DEX4) oral liquid 15 g, 15 g, Oral, Q15 Min PRN **OR** glucose-vitamin C (DEX-4) chewable tab 4 tablet, 4 tablet, Oral, Q15 Min PRN **OR** dextrose 50 % injection 50 mL, 50 mL, Intravenous, Q15 Min PRN **OR** glucose (DEX4) oral liquid 30 g, 30 g, No murmurs. Equal pulses   Abdomen: Soft, nontender, nondistended. Positive bowel sounds. No rebound tenderness   Neurologic: No focal neurological deficits. Musculoskeletal: Full range of motion of all extremities. No swelling noted.    Integument: No US  2. Hyperkalemia - related to # 1/acei use  Improving  - redose w/ veltassa this am  - monitor   3. Mild hypercalcemia- improved  4. Ankle fracture complicated by infection - follows w/ ortho @ [facility] See; ongoing abx - consider ID eval  5. DM/HTN  6. to meet goals and restore functional levels. RN notified to use mechanical lift for safety when transferring pt. DISCHARGE RECOMMENDATIONS  PT Discharge Recommendations: Sub-acute rehabilitation     PLAN  PT Treatment Plan: Bed mobility; Energy cons Help from Another: Moving to and from a bed to a chair (including a wheelchair)?: A Little   Help from Another: Need to walk in hospital room?: Total   Help from Another: Climbing 3-5 steps with a railing?: Total       AM-PAC Score:  Raw Score: 13   Appr Visits to Meet Established Goals: 4    Presenting Problem: weakness, BRITTANY  Co-Morbidities : recent L ankle surgery complicated with infection     History related to current admission: Patient is a 64year old female admitted on 12/30/2021 from home for abno denies pain at this time  Management Techniques: Repositioning    BALANCE                                                                                                                       Static Sitting: Fair  Dynamic Sitting: Fair -           Static S be changed prior to getting OOB. This therapist called for PCT, PCT occupied in another room. While waiting for assist in changing brief, transport arrives to take pt for US. Pt left with hospital staff, and RN aware of visit.   PM visit:  Pt received si rehab and receiving ABX. Pt recently discharged to home with [facility] PT set up but not initiated due to admission. ASSESSMENT     Pt seen today for therapeutic exercises for B LE and UE.   Pt continues to present with decrease strength, balance, and functio have...   Patient Difficulty: Turning over in bed (including adjusting bedclothes, sheets and blankets)?: A Little   Patient Difficulty: Sitting down on and standing up from a chair with arms (e.g., wheelchair, bedside commode, etc.): A Lot   Patient Diffic Patient End of Session: In bed;Needs met;Call light within reach;RN aware of session/findings; All patient questions and concerns addressed; Alarm set    Therapist PPE: Mask, gloves and goggles were worn. Patient/Family PPE: Mask not worn. simulated, also unable to clean self nor stand long enough to be cleaned    Functional Mobility:  Supine to Sit : Moderate assistance  Sitting: Min A with retropulsion at times  Sit to Stand: Dependent assistance  Standing: NT  Chair transfer: Max A x2 to 59.67%  Standardized Score (AM-PAC Scale): 33.39    PLAN  OT Treatment Plan: Balance activities; Energy conservation/work simplification techniques;ADL training;IADL training;Continued evaluation; Compensatory technique education;Equipment eval/education; Isaiah Hart Zoster Vaccine Recombinant Adjuvanted (Shingrix) 01/18/19     Zoster Vaccine Recombinant Adjuvanted (Shingrix) 10/05/18       Future Appointments        Provider Alejandro Nguyen    1/7/2022 12:30 PM Grady De, [facility]

## (undated) NOTE — Clinical Note
ELVINI, TCM call made, see notes. NCM confirmed with patient that she has a HFU on 1/9/2020, NCM changed visit type to TCM HFU.

## (undated) NOTE — MR AVS SNAPSHOT
[place]  [facility]  [address] [facility] [address] [phone]               Thank you for choosing us for your health care visit with Kaylan Grimaldo MD.  We are glad to serve you and happy to provide you with this sum This list is accurate as of: 3/15/17  8:22 AM.  Always use your most recent med list.                Acidophilus/Pectin Caps   Take 1 capsule by mouth daily.    Commonly known as:  PROBIOTIC           Amlodipine Besy-Benazepril HCl 10-20 MG Caps   Take 1 ca Eat plenty of protein, keep the fat content low Sugars:  sodas and sports drinks, candies and desserts   Eat plenty of low-fat dairy products High fat meats and dairy   Choose whole grain products Foods high in sodium   Water is best for hydration Fast samuel

## (undated) NOTE — IP AVS SNAPSHOT
[address]            (For Outpatient Use Only) Initial Admit Date: 2022   Inpt/Obs Admit Date: Inpt: 22 / Obs: 22   Discharge Date:    Juan Jose Hardy:  [de-identified]   MRN: [de-identified]   CSN: [id number]   CEID: [id number]        ENCOUNTER  Patient Class: Inpatient Admitting Provider: Brent Coker DO Unit: [address] [place] [address] Service: Cardiac Telemetry Attending Provider: Kellie Mari MD   Bed: 0008-D   Visit Type:   Referring Physician: No ref. provider found Billing Flag:    Admit Diagnosis: Bradycardia [R00.1]      PATIENT  Legal Name:   Estrada San   Legal Sex: Female  Gender ID: Female             Pref Name:   Judge Soto PCP:  Dayanna Zavala MD Home: [phone]   Address:  [address] [place] [address] : 1945 (77 yrs) Mobile: [phone]         City/State/Zip: [facility] [address] Marital:  Language: [address] [place] [address] Drive: [facility] SSN4: [id number] Christianity: [address] Not Joesph Ion*     Race: White Ethnicity: Non  Or  O*   EMERGENCY CONTACT   Name Relationship Legal Guardian? Home Phone Work Phone Mobile Phone   1. Hi Henriquez  2.  Sosa Vanegas Spouse  OTHER    [phone]  [phone]    [phone]     GUARANTOR  Guarantor: Jessy Fuentes : 1945 Home Phone: [phone]   Address: [address] [place] [address]  Sex: Female Work Phone:    City/State/Zip: [place], [address]   Rel. to Patient: Self Guarantor ID: [id number]   GUARANTOR EMPLOYER   Employer:  Status: RETIRED     COVERAGE  PRIMARY INSURANCE   Payor: MEDICARE Plan: MEDICARE PART A&B   Group Number: [id number] Insurance Type: [id number] Name: Faraz Graham : 1945   Subscriber ID: [id number] Pt Rel to Subscriber: Self   SECONDARY INSURANCE   Payor: [facility] Plan: [address] [place] [address] Sheree Ruiz   Group Number: [id number] Insurance Type: [address] Name: Shumwayrae Rushomayra : 1945   Subscriber ID: [id number] Pt Rel to Subscriber: SELF   TERTIARY INSURANCE   Payor:  Plan:    Group Number:  Insurance Type:    Subscriber Name:  Subscriber :    Subscriber ID:  Pt Rel to Subscriber:    Hospital Account Financial Class: Medicare    2022

## (undated) NOTE — LETTER
Patient Name: Payton Henriquez  Surgery Date: 9/19/2024  Medical Record: [id number] CSN: [id number]      Surgeon(s):  Siddhartha Blood MD  Consent Procedure: RIGHT NEPHROLITHOTOMY PERCUTANEOUS WITH IR ACCESS  Anesthesia Type: General    PATIENT REQUESTS CALL TO REVIEW TIMES 2 DAYS PRIOR TO PROCEDURE, DUE TO TRANSPORTATION/AMBULANCE.  PATIENT IS NON AMBULATORY.    THANK YOU,    PRE ADMISSION TESTING

## (undated) NOTE — IP AVS SNAPSHOT
[address]            (For Outpatient Use Only) Initial Admit Date: 2022   Inpt/Obs Admit Date: Inpt: 22 / Obs: 22   Discharge Date:    Vince So:  [de-identified]   MRN: [de-identified]   CSN: [id number]   CEID: [id number]        ENCOUNTER  Patient Class: Inpatient Admitting Provider: Yisel Norris MD Unit: [address] [place] [address] Service: Cardiac Telemetry Attending Provider: Lia Seals DO   Bed: [facility]   Visit Type:   Referring Physician: No ref. provider found Billing Flag:    Admit Diagnosis: Syncope and collapse [R55]      PATIENT  Legal Name:   Marivel Arita   Legal Sex: Female  Gender ID: Female             Pref Name:   Cornelia Ang PCP:  Rebekah Byrd MD Home: [phone]   Address:  [address] [place] [address] : 1945 (77 yrs) Mobile: [phone]         City/State/Zip: [place] [address] Marital:  Language: [address] [place] [address] Drive: [facility] SSN4: [id number] Yazidi: [address] Not Jose Reilly*     Race: White Ethnicity: Non  Or  O*   EMERGENCY CONTACT   Name Relationship Legal Guardian? Home Phone Work Phone Mobile Phone   1. Hi Henriquez  2.  Sosa Vanegas Spouse  OTHER    [phone]     GUARANTOR  Guarantor: Jayla Dutton : 1945 Home Phone: [phone]   Address: [address] [place] [address]  Sex: Female Work Phone:    City/State/Zip: [place] Anthony, [address]   Rel. to Patient: Self Guarantor ID: [id number]   GUARANTOR EMPLOYER   Employer:  Status: RETIRED     COVERAGE  PRIMARY INSURANCE   Payor: MEDICARE Plan: MEDICARE PART A&B   Group Number: [id number] Insurance Type: [address] Name: Collin Carrillo : 1945   Subscriber ID: [id number] Pt Rel to Subscriber: Self   SECONDARY INSURANCE   Payor: [facility] Plan: [facility]   Group Number: [id number] Insurance Type: [address] Name: Collin Carrillo : 1945   Subscriber ID: [id number] Pt Rel to Subscriber: SELF   TERTIARY INSURANCE   Payor:  Plan:    Group Number:  Insurance Type:    Subscriber Name:  Subscriber :    Subscriber ID:  Pt Rel to Subscriber:    Hospital Account Financial Class: Medicare    September 15, 2022

## (undated) NOTE — LETTER
09/21/21        [place] [address]      Dear Lorena Waterman records indicate that you have outstanding lab work and or testing that was ordered for you and has not yet been completed:  Orders Placed This Encou

## (undated) NOTE — IP AVS SNAPSHOT
[address]            (For Outpatient Use Only) Initial Admit Date: 3/31/2022   Inpt/Obs Admit Date: Inpt: N/A / Obs: 22   Discharge Date:    Hospital Acct:  [de-identified]   MRN: [de-identified]   CSN: [id number]   CEID: [id number]        ENCOUNTER  Patient Class: Observation Admitting Provider: Anitra Tripp MD Unit: [address] Service: Medical Attending Provider: Ama Caruso DO   Bed: 507-A   Visit Type:   Referring Physician: No ref. provider found Billing Flag:    Admit Diagnosis: Weakness [R53.1]      PATIENT  Legal Name:   Abby Blake   Legal Sex: Female  Gender ID: Female             Pref Name:   Alis Spencer PCP:  Deyanira Salinas MD Home: [phone]   Address:  [address] [place] [address] : 1945 (64 yrs) Mobile: [phone]         City/State/Zip: [place] [id number] Marital:  Language: [address] [place] [address] Drive: [facility] SSN4: [id number] Lutheran: [address] Not [facility]*     Race: White Ethnicity: Non  Or  O*   EMERGENCY CONTACT   Name Relationship Legal Guardian? Home Phone Work Phone Mobile Phone   1. Hi Henriquez  2.  Sosa Vanegas Spouse  OTHER    [phone]     GUARANTOR  Guarantor: Giorgio Delta : 1945 Home Phone: [phone]   Address: [address] [place] [address]  Sex: Female Work Phone:    City/State/Zip: [address]   Rel. to Patient: Self Guarantor ID: [id number]   GUARANTOR EMPLOYER   Employer:  Status: RETIRED     COVERAGE  PRIMARY INSURANCE   Payor: MEDICARE Plan: MEDICARE PART A&B   Group Number:  Insurance Type: INDEMNITY   Subscriber Name: Julien Alvarez : 1945   Subscriber ID: [id number] Pt Rel to Subscriber: Self   SECONDARY INSURANCE   Payor:  Plan: [facility]   Group Number: [id number] Insurance Type: [place] [id number] Name: Rolena Pain : 1945   Subscriber ID: [id number] Pt Rel to Subscriber: SELF TERTIARY INSURANCE   Payor:  Plan:    Group Number:  Insurance Type:    Subscriber Name:  Subscriber :    Subscriber ID:  Pt Rel to Subscriber:    Hospital Account Financial Class: Medicare    2022

## (undated) NOTE — LETTER
[address] [facility] FOR BLOOD OR BLOOD COMPONENTS      In the course of your treatment, it may become necessary to administer a transfusion of blood or blood components. This form provides basic information concerning this procedure and, if signed by you, authorizes its performance by qualified medical personnel. DESCRIPTION OF PROCEDURE:  Blood is introduced into one of your veins, commonly in the arm, using a sterilized disposable needle. The amount of blood transfused, and whether the transfusion will be of blood or blood components is a judgment the physician will make based on your particular needs. RISKS:  The transfusion is a common procedure of low risk. MINOR AND TEMPORARY REACTIONS ARE NOT UNCOMMON, including a slight bruise, swelling or local reaction in the area where the needle pierces your skin, or a non-serious reaction to the transfused material itself, including headache, fever or a mild skin reaction, such as rash. Serious reactions are possible, though very unlikely and include severe allergic reaction (shock)  and destruction (hemolysis) of transfused blood cells. Infectious diseases which are known to be transmitted by blood transfusion include CERTAIN TYPES OF VIRAL HEPATITIS, a viral infection of the liver, HUMAN IMMUNODEFICIENCY VIRUS (HIV-1,2) infection, a viral infection known to cause ACQUIRED IMMUNODEFICIENCY SYNDROME (AIDS) AS WELL AS CERTAIN OTHER BACTERIAL, VIRAL AND PARASITIC DISEASES. While a minimal risk of acquiring an infectious disease from transfused blood exists, in accordance with Federal and State law all due care has been taken in donor selection and testing to avoid transmission of disease. ALTERNATIVES:  If loss of blood poses serious threats in the course of your treatment, THERE IS NO EFFECTIVE ALTERNATIVE TO BLOOD TRANSFUSION.  However, if you have any further questions on this matter, your physician will fully explain the alternatives to you if it has not already been done. I,Payotn Henriquez, have read/had read to me the above. I understand the matters bearing on the decision whether or not to authorize a transfusion of blood or blood components. I have no questions which have not been answered to my full satisfaction.  I hereby consent to such transfusion as  my physician may deem necessary or advisable in the course of my treatment.        _______________   __________________________________________________  Date     Signature of Patient, Parent or Legal Guardian      ([facility] [facility])      __________________________________________  Witness to Signature (title or relationship to patient)    Patient Name: Apryl Varma     : 1945                 Printed: 2022     Medical Record #: [id number]                    Page 1

## (undated) NOTE — LETTER
Joelle Hernandez 182 6 13[facility]  [place], [address] [place] [address]    Consent for Operation  Date: __________________                                Time: _______________    1.  I authorize the performance upon Yael Galindo the following operation:  Procedu procedure has been videotaped, the surgeon will obtain the original videotape. The hospital will not be responsible for storage or maintenance of this tape.   7. For the purpose of advancing medical education, I consent to the admittance of observers to the STATEMENTS REQUIRING INSERTION OR COMPLETION WERE FILLED IN.     Signature of Patient:   ___________________________    When the patient is a minor or mentally incompetent to give consent:  Signature of person authorized to consent for patient: ____________ supplements, and pills I can buy without a prescription (including street drugs/illegal medications). Failure to inform my anesthesiologist about these medicines may increase my risk of anesthetic complications. iv.  If I am allergic to anything or have ha Anesthesiologist Signature     Date   Time  I have discussed the procedure and information above with the patient (or patient’s representative) and answered their questions. The patient or their representative has agreed to have anesthesia services.     ___